# Patient Record
Sex: MALE | Race: WHITE | Employment: OTHER | URBAN - METROPOLITAN AREA
[De-identification: names, ages, dates, MRNs, and addresses within clinical notes are randomized per-mention and may not be internally consistent; named-entity substitution may affect disease eponyms.]

---

## 2017-02-27 PROBLEM — M17.0 OSTEOARTHRITIS OF BOTH KNEES: Status: ACTIVE | Noted: 2017-02-27

## 2017-02-27 PROBLEM — L23.7 POISON IVY DERMATITIS: Status: ACTIVE | Noted: 2017-02-27

## 2021-02-26 ENCOUNTER — HOSPITAL ENCOUNTER (INPATIENT)
Age: 71
LOS: 2 days | Discharge: HOME | DRG: 392 | End: 2021-02-28
Attending: STUDENT IN AN ORGANIZED HEALTH CARE EDUCATION/TRAINING PROGRAM | Admitting: SURGERY
Payer: MEDICARE

## 2021-02-26 DIAGNOSIS — R19.8 CHANGE IN BOWEL MOVEMENT: ICD-10-CM

## 2021-02-26 DIAGNOSIS — C80.1 ADENOCARCINOMA (HCC): ICD-10-CM

## 2021-02-26 DIAGNOSIS — R63.4 WEIGHT LOSS: ICD-10-CM

## 2021-02-26 DIAGNOSIS — K57.32 DIVERTICULITIS OF LARGE INTESTINE WITHOUT BLEEDING, UNSPECIFIED COMPLICATION STATUS: ICD-10-CM

## 2021-02-26 DIAGNOSIS — R10.9 ABDOMINAL PAIN, UNSPECIFIED ABDOMINAL LOCATION: Primary | ICD-10-CM

## 2021-02-26 DIAGNOSIS — R10.32 LLQ ABDOMINAL PAIN: ICD-10-CM

## 2021-02-26 DIAGNOSIS — R93.5 ABNORMAL ABDOMINAL CT SCAN: ICD-10-CM

## 2021-02-26 DIAGNOSIS — K63.89 COLONIC MASS: ICD-10-CM

## 2021-02-26 LAB
ALBUMIN SERPL-MCNC: 3.2 G/DL (ref 3.2–4.6)
ALBUMIN/GLOB SERPL: 0.7 {RATIO} (ref 1.2–3.5)
ALP SERPL-CCNC: 87 U/L (ref 50–136)
ALT SERPL-CCNC: 15 U/L (ref 12–65)
ANION GAP SERPL CALC-SCNC: 7 MMOL/L (ref 7–16)
AST SERPL-CCNC: 15 U/L (ref 15–37)
BASOPHILS # BLD: 0.1 K/UL (ref 0–0.2)
BASOPHILS NFR BLD: 1 % (ref 0–2)
BILIRUB SERPL-MCNC: 0.4 MG/DL (ref 0.2–1.1)
BUN SERPL-MCNC: 9 MG/DL (ref 8–23)
CALCIUM SERPL-MCNC: 9.3 MG/DL (ref 8.3–10.4)
CHLORIDE SERPL-SCNC: 107 MMOL/L (ref 98–107)
CO2 SERPL-SCNC: 28 MMOL/L (ref 21–32)
CREAT SERPL-MCNC: 0.72 MG/DL (ref 0.8–1.5)
DIFFERENTIAL METHOD BLD: ABNORMAL
EOSINOPHIL # BLD: 0 K/UL (ref 0–0.8)
EOSINOPHIL NFR BLD: 0 % (ref 0.5–7.8)
ERYTHROCYTE [DISTWIDTH] IN BLOOD BY AUTOMATED COUNT: 12 % (ref 11.9–14.6)
GLOBULIN SER CALC-MCNC: 4.6 G/DL (ref 2.3–3.5)
GLUCOSE SERPL-MCNC: 88 MG/DL (ref 65–100)
HCT VFR BLD AUTO: 35.2 % (ref 41.1–50.3)
HGB BLD-MCNC: 11 G/DL (ref 13.6–17.2)
IMM GRANULOCYTES # BLD AUTO: 0 K/UL (ref 0–0.5)
IMM GRANULOCYTES NFR BLD AUTO: 0 % (ref 0–5)
LACTATE SERPL-SCNC: 0.7 MMOL/L (ref 0.4–2)
LYMPHOCYTES # BLD: 0.8 K/UL (ref 0.5–4.6)
LYMPHOCYTES NFR BLD: 9 % (ref 13–44)
MCH RBC QN AUTO: 29.6 PG (ref 26.1–32.9)
MCHC RBC AUTO-ENTMCNC: 31.3 G/DL (ref 31.4–35)
MCV RBC AUTO: 94.9 FL (ref 79.6–97.8)
MONOCYTES # BLD: 0.6 K/UL (ref 0.1–1.3)
MONOCYTES NFR BLD: 6 % (ref 4–12)
NEUTS SEG # BLD: 7.6 K/UL (ref 1.7–8.2)
NEUTS SEG NFR BLD: 84 % (ref 43–78)
NRBC # BLD: 0 K/UL (ref 0–0.2)
PLATELET # BLD AUTO: 425 K/UL (ref 150–450)
PMV BLD AUTO: 9.1 FL (ref 9.4–12.3)
POTASSIUM SERPL-SCNC: 3.8 MMOL/L (ref 3.5–5.1)
PROT SERPL-MCNC: 7.8 G/DL (ref 6.3–8.2)
RBC # BLD AUTO: 3.71 M/UL (ref 4.23–5.6)
SODIUM SERPL-SCNC: 142 MMOL/L (ref 136–145)
WBC # BLD AUTO: 9.1 K/UL (ref 4.3–11.1)

## 2021-02-26 PROCEDURE — 74011250636 HC RX REV CODE- 250/636: Performed by: SURGERY

## 2021-02-26 PROCEDURE — 85025 COMPLETE CBC W/AUTO DIFF WBC: CPT

## 2021-02-26 PROCEDURE — 65270000029 HC RM PRIVATE

## 2021-02-26 PROCEDURE — 0DBN8ZX EXCISION OF SIGMOID COLON, VIA NATURAL OR ARTIFICIAL OPENING ENDOSCOPIC, DIAGNOSTIC: ICD-10-PCS | Performed by: INTERNAL MEDICINE

## 2021-02-26 PROCEDURE — 87040 BLOOD CULTURE FOR BACTERIA: CPT

## 2021-02-26 PROCEDURE — 96365 THER/PROPH/DIAG IV INF INIT: CPT

## 2021-02-26 PROCEDURE — 74011000258 HC RX REV CODE- 258: Performed by: STUDENT IN AN ORGANIZED HEALTH CARE EDUCATION/TRAINING PROGRAM

## 2021-02-26 PROCEDURE — 74011000250 HC RX REV CODE- 250: Performed by: SURGERY

## 2021-02-26 PROCEDURE — 99284 EMERGENCY DEPT VISIT MOD MDM: CPT

## 2021-02-26 PROCEDURE — 99223 1ST HOSP IP/OBS HIGH 75: CPT | Performed by: SURGERY

## 2021-02-26 PROCEDURE — 74011250636 HC RX REV CODE- 250/636: Performed by: NURSE PRACTITIONER

## 2021-02-26 PROCEDURE — 96375 TX/PRO/DX INJ NEW DRUG ADDON: CPT

## 2021-02-26 PROCEDURE — 74011250637 HC RX REV CODE- 250/637: Performed by: SURGERY

## 2021-02-26 PROCEDURE — 74011000258 HC RX REV CODE- 258: Performed by: NURSE PRACTITIONER

## 2021-02-26 PROCEDURE — 74011250636 HC RX REV CODE- 250/636: Performed by: STUDENT IN AN ORGANIZED HEALTH CARE EDUCATION/TRAINING PROGRAM

## 2021-02-26 PROCEDURE — 83605 ASSAY OF LACTIC ACID: CPT

## 2021-02-26 PROCEDURE — 80053 COMPREHEN METABOLIC PANEL: CPT

## 2021-02-26 PROCEDURE — 2709999900 HC NON-CHARGEABLE SUPPLY

## 2021-02-26 RX ORDER — POLYETHYLENE GLYCOL 3350 17 G/17G
238 POWDER, FOR SOLUTION ORAL ONCE
Status: COMPLETED | OUTPATIENT
Start: 2021-02-26 | End: 2021-02-26

## 2021-02-26 RX ORDER — SODIUM CHLORIDE 9 MG/ML
100 INJECTION, SOLUTION INTRAVENOUS CONTINUOUS
Status: DISCONTINUED | OUTPATIENT
Start: 2021-02-26 | End: 2021-02-28 | Stop reason: HOSPADM

## 2021-02-26 RX ORDER — NALOXONE HYDROCHLORIDE 0.4 MG/ML
0.4 INJECTION, SOLUTION INTRAMUSCULAR; INTRAVENOUS; SUBCUTANEOUS AS NEEDED
Status: DISCONTINUED | OUTPATIENT
Start: 2021-02-26 | End: 2021-02-28 | Stop reason: HOSPADM

## 2021-02-26 RX ORDER — SODIUM CHLORIDE 0.9 % (FLUSH) 0.9 %
5-40 SYRINGE (ML) INJECTION AS NEEDED
Status: DISCONTINUED | OUTPATIENT
Start: 2021-02-26 | End: 2021-02-28 | Stop reason: HOSPADM

## 2021-02-26 RX ORDER — SODIUM CHLORIDE 0.9 % (FLUSH) 0.9 %
5-40 SYRINGE (ML) INJECTION EVERY 8 HOURS
Status: DISCONTINUED | OUTPATIENT
Start: 2021-02-26 | End: 2021-02-28 | Stop reason: HOSPADM

## 2021-02-26 RX ORDER — HYDROMORPHONE HYDROCHLORIDE 1 MG/ML
1 INJECTION, SOLUTION INTRAMUSCULAR; INTRAVENOUS; SUBCUTANEOUS
Status: DISCONTINUED | OUTPATIENT
Start: 2021-02-26 | End: 2021-02-28 | Stop reason: HOSPADM

## 2021-02-26 RX ORDER — ONDANSETRON 2 MG/ML
4 INJECTION INTRAMUSCULAR; INTRAVENOUS
Status: DISCONTINUED | OUTPATIENT
Start: 2021-02-26 | End: 2021-02-28 | Stop reason: HOSPADM

## 2021-02-26 RX ORDER — POLYETHYLENE GLYCOL 3350 17 G/17G
238 POWDER, FOR SOLUTION ORAL ONCE
Status: ACTIVE | OUTPATIENT
Start: 2021-02-26 | End: 2021-02-27

## 2021-02-26 RX ADMIN — PIPERACILLIN SODIUM AND TAZOBACTAM SODIUM 4.5 G: 4; .5 INJECTION, POWDER, LYOPHILIZED, FOR SOLUTION INTRAVENOUS at 14:34

## 2021-02-26 RX ADMIN — POLYETHYLENE GLYCOL 3350 238 G: 17 POWDER, FOR SOLUTION ORAL at 23:00

## 2021-02-26 RX ADMIN — FAMOTIDINE 20 MG: 10 INJECTION INTRAVENOUS at 21:57

## 2021-02-26 RX ADMIN — PIPERACILLIN AND TAZOBACTAM 3.38 G: 3; .375 INJECTION, POWDER, LYOPHILIZED, FOR SOLUTION INTRAVENOUS at 21:58

## 2021-02-26 RX ADMIN — PIPERACILLIN SODIUM AND TAZOBACTAM SODIUM 4.5 G: 4; .5 INJECTION, POWDER, LYOPHILIZED, FOR SOLUTION INTRAVENOUS at 14:38

## 2021-02-26 RX ADMIN — SODIUM CHLORIDE 100 ML/HR: 900 INJECTION, SOLUTION INTRAVENOUS at 17:07

## 2021-02-26 RX ADMIN — Medication 10 ML: at 22:00

## 2021-02-26 NOTE — PROGRESS NOTES
Spoke with radiologist about these findings, also spoke with Dr Duy Rodney, then spoke with pt personally, he will go to ER at [facility] for eval and likely admission by hospitalist followed by GI and / or Oncology or surgery consults.

## 2021-02-26 NOTE — H&P
H&P/Consult Note/Progress Note/Office Note:   Gale Hussein  MRN: [id number]  :1950  Age:70 y.o. General Surgery Consult ordered by: Dr: Xavier Dorantes  Reason for General Surgery Consult: Abnormal CT findings    HPI: Gale Hussein is a 79 y.o. male with a past medical History of kidney stones and hyperlipidemia who presented to the ED 21 for evaluation. He was sent by PCP after abnormal CT scan. He just recently when to see his PCP for his Medicare check up. He has lost 20 pounds in the last 6 months. He reports quitting drinking coca cola and thought was loosing weight due to that. He reports feeling some LLQ pain occasionally over the last 2 weeks. Pain is 5/10. Pain is worse with nothing. Pain is better with BMs. He denies any blood in his stools. No nausea or vomiting. He reports darker stools but not black. He denies thinner stools. He does reports looser stools. No fever. No chills. No SOB. No chest pain. He has never had a colonoscopy before. He was sent for CT scan by PCP which showed a possible perforated colon cancer vs diverticulitis and was sent to ER for further work up. No relevant past surgical history. Takes ASA 325mg daily and meloxicam daily. He takes Nexium daily. No family history of colon cancer in his family.      Past Medical History:   Diagnosis Date    Calculus of kidney     Encounter for long-term (current) use of other medications 2014    High cholesterol 2014    Other testicular hypofunction 2014     Past Surgical History:   Procedure Laterality Date    HX ORTHOPAEDIC      LEFT LEG/ANKLE FX    HX TONSILLECTOMY       Current Facility-Administered Medications   Medication Dose Route Frequency    sodium chloride (NS) flush 5-40 mL  5-40 mL IntraVENous Q8H    sodium chloride (NS) flush 5-40 mL  5-40 mL IntraVENous PRN    HYDROmorphone (PF) (DILAUDID) injection 1 mg  1 mg IntraVENous Q4H PRN    naloxone (NARCAN) injection 0.4 mg 0.4 mg IntraVENous PRN    ondansetron (ZOFRAN) injection 4 mg  4 mg IntraVENous Q4H PRN    0.9% sodium chloride infusion  100 mL/hr IntraVENous CONTINUOUS    piperacillin-tazobactam (ZOSYN) 3.375 g in 0.9% sodium chloride (MBP/ADV) 100 mL MBP  3.375 g IntraVENous Q8H    famotidine (PF) (PEPCID) 20 mg in 0.9% sodium chloride 10 mL injection  20 mg IntraVENous Q12H     Current Outpatient Medications   Medication Sig    esomeprazole (NexIUM) 40 mg capsule Take 1 Cap by mouth daily.  meloxicam (MOBIC) 15 mg tablet Take 1 Tab by mouth daily.  ketoconazole (NIZORAL) 2 % shampoo Apply 2 mL to affected area two (2) times a day.  clobetasoL (TEMOVATE) 0.05 % ointment Apply  to affected area two (2) times a day.  terbinafine HCL (LAMISIL) 250 mg tablet Take 1 Tab by mouth daily.  triamcinolone acetonide (KENALOG) 0.1 % topical cream Apply  to affected area two (2) times a day. use thin layer     Patient has no known allergies. Social History     Socioeconomic History    Marital status:      Spouse name: Not on file    Number of children: Not on file    Years of education: Not on file    Highest education level: Not on file   Tobacco Use    Smoking status: Never Smoker    Smokeless tobacco: Never Used   Substance and Sexual Activity    Alcohol use: Yes     Frequency: Monthly or less     Drinks per session: 1 or 2     Binge frequency: Never    Drug use: No     Social History     Tobacco Use   Smoking Status Never Smoker   Smokeless Tobacco Never Used     Family History   Problem Relation Age of Onset    Heart Disease Father         CABG & STENTS     Heart Attack Father     No Known Problems Mother      ROS: The patient has no difficulty with chest pain or shortness of breath. No fever or chills. Comprehensive review of systems was otherwise unremarkable except as noted above.     Physical Exam:   Visit Vitals  /64 (BP 1 Location: Right upper arm, BP Patient Position: Supine) Pulse 80   Temp 99.2 °F (37.3 °C)   Resp 16   Ht 5' 10\" (1.778 m)   Wt 211 lb (95.7 kg)   SpO2 99%   BMI 30.28 kg/m²     Vitals:    02/26/21 1520 02/26/21 1600 02/26/21 1640 02/26/21 1731   BP: (!) 115/57 121/60 123/68 118/64   Pulse:    80   Resp:    16   Temp:    99.2 °F (37.3 °C)   SpO2: 99% 97% 98% 99%   Weight:       Height:         General:  Well developed, well-nourished, no acute distress  Pysch:  Awake, alert, oriented x 3, insight and judgement intact  HEENT:  Normocephalic, atraumatic, PERRL, conjunctiva/corneas clear, sclera   Anicteric, MMM  Neck:  Supple, no thyromegaly, symmetrical, trachea midline  Lungs:   Clear to auscultation bilaterally. no w/r/c, respirations unlabored   Chest wall:   No tenderness or deformity. Heart:   Regular rate and rhythm, S1, S2 normal, no murmur, click, rub, or gallop. Abdomen:   Soft, minimal-tenderness in LLQ with deep palpation, non-distended. Bowel sounds normal. No masses. No organomegaly. Back:      Symmetric, no curvature, ROM normal, no CVA tenderness  Extremities:   Extremities normal, atraumatic, no cyanosis or edema. Pulses:   2+ and symmetric all extremities. Skin:    Skin color, texture, turgor normal. No rashes or lesions. Multiple areas of discoloration. Lymph nodes:  Cervical, supraclavicular, and axillary nodes normal.  Neurologic:   CNII-XII intact. Normal strength, sensation, and reflexes throughout.      Recent vitals (if inpt):  Patient Vitals for the past 24 hrs:   BP Temp Pulse Resp SpO2 Height Weight   02/26/21 1731 118/64 99.2 °F (37.3 °C) 80 16 99 %     02/26/21 1640 123/68    98 %     02/26/21 1600 121/60    97 %     02/26/21 1520 (!) 115/57    99 %     02/26/21 1440 (!) 113/58    99 %     02/26/21 1250     98 %     02/26/21 1245 122/65 99 °F (37.2 °C) 88 16 99 % 5' 10\" (1.778 m) 211 lb (95.7 kg)       Amount and/or Complexity of Data Reviewed and Analyzed:  I reviewed and analyzed all of the unique labs and radiologic studies that are shown below as well as any that are in the HPI, and any that are in the expanded problem list below  *Each unique test, order, or document contributes to the combination of 2 or combination of 3 in Category 1 below. For this visit I also reviewed old records and prior notes. Recent Labs     02/26/21  1433   WBC 9.1   HGB 11.0*         K 3.8      CO2 28   BUN 9   CREA 0.72*   GLU 88   TBILI 0.4   ALT 15   AP 87       Lab Results   Component Value Date/Time    WBC 9.1 02/26/2021 02:33 PM    HGB 11.0 (L) 02/26/2021 02:33 PM    HCT 35.2 (L) 02/26/2021 02:33 PM    PLATELET 462 79/05/7360 02:33 PM    MCV 94.9 02/26/2021 02:33 PM     Lab Results   Component Value Date/Time    Sodium 142 02/26/2021 02:33 PM    Potassium 3.8 02/26/2021 02:33 PM    Chloride 107 02/26/2021 02:33 PM    CO2 28 02/26/2021 02:33 PM    Anion gap 7 02/26/2021 02:33 PM    Glucose 88 02/26/2021 02:33 PM    BUN 9 02/26/2021 02:33 PM    Creatinine 0.72 (L) 02/26/2021 02:33 PM    BUN/Creatinine ratio 13 02/24/2021 09:06 AM    GFR est AA >60 02/26/2021 02:33 PM    GFR est non-AA >60 02/26/2021 02:33 PM    Calcium 9.3 02/26/2021 02:33 PM     Lab Results   Component Value Date/Time    ALT (SGPT) 15 02/26/2021 02:33 PM    AST (SGOT) 15 02/26/2021 02:33 PM    Alk.  phosphatase 87 02/26/2021 02:33 PM    Bilirubin, total 0.4 02/26/2021 02:33 PM     Lab Results   Component Value Date/Time    Lipase 29 10/24/2016 03:27 PM     No results found for: INR, APTT, CBIL, LCAD, NH4, TROPT, TROIQ, INREXT, INREXT    Lab Results   Component Value Date/Time    Hemoglobin A1c 5.5 02/24/2021 09:06 AM       Nutritional assessment screen for wound healing issues:  Lab Results   Component Value Date/Time    Protein, total 7.8 02/26/2021 02:33 PM    Albumin 3.2 02/26/2021 02:33 PM         XR Results (most recent):  Results from Abstract encounter on 12/31/18   XR CHEST AP LAT       CT Results (most recent):  Results from Orders Only encounter on 02/26/21   CT ABD PELV W WO CONT     US Results (most recent):  No results found for this or any previous visit. Admission date (for inpatients): 2/26/2021   * No surgery found *  * No surgery found *        ASSESSMENT/PLAN:    ICD-10-CM ICD-9-CM    1. Abdominal pain, unspecified abdominal location  R10.9 789.00    2. Abnormal abdominal CT scan  R93.5 793.6    3. LLQ abdominal pain  R10.32 789.04    4. Change in bowel movement  R19.8 787.99    5. Colonic mass  K63.89 569.89    6. Weight loss  R63.4 783.21    7. Diverticulitis of large intestine without bleeding, unspecified complication status  M84.67 562.11        79 y.o. male with weight loss, anemia and CT concerning for contained perforated colon mass vs severe diverticulitis. He has never had a colonoscopy in the past. He has never been treated for diverticulitis in the past. This was found incidentally on outpatient CT scan. I personally reviewed hi outpatient CT scan. There is thickening of the sigmoid colon with severe diverticulosis and area of extension into the mesentery this could be a possible contained perforation vs diverticulosis. There is no free air in the abdomen. On exam, he is not septic or has peritonitis. He just need a diagnosis to be able to proceed with treatment. Plan: Will admit for further work up and diagnosis  NPO, IVFs  IV abx for possible perforation  GI consult for colonoscopy for diagnosis - will contact and see if they want to do prep tonight. Patient has been having regular BMs and I think he will tolerate prep. Will consult oncology once diagnosis is obtained. No surgical intervention needed at this time but we extensively discussed possible exploration with colectomy and colostomy if he were to decompensate. I spent greater than 50% of this 70 minutes visit counseling and coordinating care for this patient.       Dulce King MD  Bariatric & Minimally Invasive Surgery  [facility]  2/26/2021 6:42 PM

## 2021-02-26 NOTE — ED PROVIDER NOTES
70-year-old male advised to come to the emergency department by his primary care physician. Patient had outpatient CT scan obtained which showed possible perforation potentially stemming from underlying cancer versus extensive diverticular disease. Patient reports 20 pound weight loss over the last 6 months, states he cut out soft drinks but has not been attempting to lose weight. Patient with intermittent lower abdominal pains which prompted primary care physician to obtain outpatient CT scan. Patient denies fever, chills, chest pain, shortness of breath, vomiting, melena or hematochezia. Does endorse loose stools. Denies history of prior abdominal surgeries or prior history of colon cancer. Patient with minimal abdominal pain but states it comes and goes.            Past Medical History:   Diagnosis Date    Calculus of kidney     Encounter for long-term (current) use of other medications 4/22/2014    High cholesterol 7/29/2014    Other testicular hypofunction 7/29/2014       Past Surgical History:   Procedure Laterality Date    HX ORTHOPAEDIC      LEFT LEG/ANKLE FX    HX TONSILLECTOMY           Family History:   Problem Relation Age of Onset    Heart Disease Father         CABG & STENTS     Heart Attack Father     No Known Problems Mother        Social History     Socioeconomic History    Marital status:      Spouse name: Not on file    Number of children: Not on file    Years of education: Not on file    Highest education level: Not on file   Occupational History    Not on file   Social Needs    Financial resource strain: Not on file    Food insecurity     Worry: Not on file     Inability: Not on file    Transportation needs     Medical: Not on file     Non-medical: Not on file   Tobacco Use    Smoking status: Never Smoker    Smokeless tobacco: Never Used   Substance and Sexual Activity    Alcohol use: Yes     Frequency: Monthly or less     Drinks per session: 1 or 2     Binge frequency: Never    Drug use: No    Sexual activity: Not on file   Lifestyle    Physical activity     Days per week: Not on file     Minutes per session: Not on file    Stress: Not on file   Relationships    Social connections     Talks on phone: Not on file     Gets together: Not on file     Attends Confucianism service: Not on file     Active member of club or organization: Not on file     Attends meetings of clubs or organizations: Not on file     Relationship status: Not on file    Intimate partner violence     Fear of current or ex partner: Not on file     Emotionally abused: Not on file     Physically abused: Not on file     Forced sexual activity: Not on file   Other Topics Concern    Not on file   Social History Narrative    Not on file         ALLERGIES: Patient has no known allergies. Review of Systems   Constitutional: Negative for chills and fever. HENT: Negative for congestion, rhinorrhea, sinus pressure and sore throat. Eyes: Negative for visual disturbance. Respiratory: Negative for cough and shortness of breath. Cardiovascular: Negative for chest pain. Gastrointestinal: Positive for abdominal pain and diarrhea. Negative for blood in stool, nausea and vomiting. Endocrine: Negative for polyuria. Genitourinary: Negative for difficulty urinating and dysuria. Musculoskeletal: Negative for arthralgias, neck pain and neck stiffness. Skin: Negative for color change and rash. Neurological: Negative for syncope, speech difficulty, weakness, numbness and headaches. Psychiatric/Behavioral: Negative for behavioral problems, confusion and suicidal ideas. All other systems reviewed and are negative. Vitals:    02/26/21 1245 02/26/21 1250   BP: 122/65    Pulse: 88    Resp: 16    Temp: 99 °F (37.2 °C)    SpO2: 99% 98%   Weight: 95.7 kg (211 lb)    Height: 5' 10\" (1.778 m)             Physical Exam  Vitals signs and nursing note reviewed.    Constitutional:       Appearance: Normal appearance. HENT:      Head: Normocephalic and atraumatic. Nose: Nose normal.      Mouth/Throat:      Mouth: Mucous membranes are moist.   Eyes:      Extraocular Movements: Extraocular movements intact. Neck:      Musculoskeletal: Normal range of motion. Cardiovascular:      Rate and Rhythm: Normal rate and regular rhythm. Heart sounds: Normal heart sounds. Pulmonary:      Effort: Pulmonary effort is normal.      Breath sounds: Normal breath sounds. No wheezing, rhonchi or rales. Abdominal:      General: Abdomen is flat. Palpations: Abdomen is soft. Tenderness: There is abdominal tenderness. There is no guarding. Comments: Mild left lower quadrant suprapubic abdominal pain palpation. Musculoskeletal: Normal range of motion. Skin:     General: Skin is warm and dry. Neurological:      General: No focal deficit present. Mental Status: He is alert and oriented to person, place, and time. Psychiatric:         Mood and Affect: Mood normal.          MDM  Number of Diagnoses or Management Options  Abdominal pain, unspecified abdominal location  Abnormal abdominal CT scan  Diagnosis management comments: 49-year-old male presents with abnormal CT scan. Concern for intestinal perforation as well as potential evidence of colon cancer. CT scan was reviewed. Will obtain blood cultures, basic labs as well as will give empiric antibiotics, Zosyn. Labs normal white count, stable H&H, normal electrolytes and kidney function, Lactic acid 0.7. I spoke with general surgery who stated, evaluate the patient. Disposition to inpatient either hospitalist or surgery depending on their agreement. Patient voiced understanding agreement with this plan.        Amount and/or Complexity of Data Reviewed  Clinical lab tests: ordered and reviewed  Tests in the radiology section of CPT®: reviewed  Tests in the medicine section of CPT®: ordered and reviewed  Discussion of test results with the performing providers: yes  Decide to obtain previous medical records or to obtain history from someone other than the patient: yes  Discuss the patient with other providers: yes  Independent visualization of images, tracings, or specimens: yes           Procedures

## 2021-02-26 NOTE — ED TRIAGE NOTES
Pt masked prior to triage. Pt sent by PCP from [facility] due to abnormal finding on Abdominal CT.

## 2021-02-26 NOTE — ED NOTES
TRANSFER - OUT REPORT:    Verbal report given to Ester Obrien  being transferred to [facility] 07 for routine progression of care       Report consisted of patients Situation, Background, Assessment and   Recommendations(SBAR). Information from the following report(s) ED Summary, MAR and Recent Results was reviewed with the receiving nurse. Lines:   Peripheral IV 02/26/21 Left Antecubital (Active)       Peripheral IV 02/26/21 Left Forearm (Active)   Site Assessment Clean, dry, & intact 02/26/21 1600   Phlebitis Assessment 0 02/26/21 1600   Infiltration Assessment 0 02/26/21 1600   Dressing Status Clean, dry, & intact 02/26/21 1600   Dressing Type Tape;Transparent 02/26/21 1600   Hub Color/Line Status Pink;Flushed;Patent 02/26/21 1600   Action Taken Blood drawn 02/26/21 1600        Opportunity for questions and clarification was provided.       Patient transported with:   Proenza Schouer

## 2021-02-27 ENCOUNTER — ANESTHESIA EVENT (OUTPATIENT)
Dept: ENDOSCOPY | Age: 71
DRG: 392 | End: 2021-02-27
Payer: MEDICARE

## 2021-02-27 PROCEDURE — 99231 SBSQ HOSP IP/OBS SF/LOW 25: CPT | Performed by: SURGERY

## 2021-02-27 PROCEDURE — 74011250636 HC RX REV CODE- 250/636: Performed by: SURGERY

## 2021-02-27 PROCEDURE — 65270000029 HC RM PRIVATE

## 2021-02-27 PROCEDURE — 74011250637 HC RX REV CODE- 250/637: Performed by: INTERNAL MEDICINE

## 2021-02-27 PROCEDURE — 74011000258 HC RX REV CODE- 258: Performed by: NURSE PRACTITIONER

## 2021-02-27 PROCEDURE — 74011000250 HC RX REV CODE- 250: Performed by: SURGERY

## 2021-02-27 PROCEDURE — 74011250636 HC RX REV CODE- 250/636: Performed by: NURSE PRACTITIONER

## 2021-02-27 PROCEDURE — 2709999900 HC NON-CHARGEABLE SUPPLY

## 2021-02-27 RX ORDER — POLYETHYLENE GLYCOL 3350 17 G/17G
238 POWDER, FOR SOLUTION ORAL ONCE
Status: COMPLETED | OUTPATIENT
Start: 2021-02-27 | End: 2021-02-27

## 2021-02-27 RX ADMIN — FAMOTIDINE 20 MG: 10 INJECTION INTRAVENOUS at 21:04

## 2021-02-27 RX ADMIN — SODIUM CHLORIDE 100 ML/HR: 900 INJECTION, SOLUTION INTRAVENOUS at 06:26

## 2021-02-27 RX ADMIN — PIPERACILLIN AND TAZOBACTAM 3.38 G: 3; .375 INJECTION, POWDER, LYOPHILIZED, FOR SOLUTION INTRAVENOUS at 06:14

## 2021-02-27 RX ADMIN — PIPERACILLIN AND TAZOBACTAM 3.38 G: 3; .375 INJECTION, POWDER, LYOPHILIZED, FOR SOLUTION INTRAVENOUS at 13:49

## 2021-02-27 RX ADMIN — Medication 10 ML: at 21:04

## 2021-02-27 RX ADMIN — PIPERACILLIN AND TAZOBACTAM 3.38 G: 3; .375 INJECTION, POWDER, LYOPHILIZED, FOR SOLUTION INTRAVENOUS at 21:04

## 2021-02-27 RX ADMIN — Medication 10 ML: at 14:00

## 2021-02-27 RX ADMIN — Medication 10 ML: at 06:14

## 2021-02-27 RX ADMIN — POLYETHYLENE GLYCOL 3350 238 G: 17 POWDER, FOR SOLUTION ORAL at 11:46

## 2021-02-27 RX ADMIN — FAMOTIDINE 20 MG: 10 INJECTION INTRAVENOUS at 10:18

## 2021-02-27 NOTE — CONSULTS
[facility] Consult Note       Primary GI Physician: None    Referring Provider:  Ace Pillai    Consult Date:  2/26/2021    Admit Date:  2/26/2021    Chief Complaint: Weight loss    Subjective:     Mr Karthik Rod is a 79 y.o. male admitted for an abnormal CT showing diverticulosis, a thickened sigmoid colon, ? cancer, with some extraluminal gas. Patient has been losing weight but states he was trying to. He occasionally will have LLQ pain, but BMs have only been irregular lately. No blood is seen. There is a family hx of colon polyps but not colon cancer. Mr Karthik Rod has never had a colonoscopy. Is on  A 325 mg ASA every day. Hx of GERD and is on Nexium. No prior EGD. PMH:  Past Medical History:   Diagnosis Date    Calculus of kidney     Encounter for long-term (current) use of other medications 4/22/2014    High cholesterol 7/29/2014    Other testicular hypofunction 7/29/2014       PSH:  Past Surgical History:   Procedure Laterality Date    HX ORTHOPAEDIC      LEFT LEG/ANKLE FX    HX TONSILLECTOMY         Allergies:  No Known Allergies    Home Medications:  Prior to Admission medications    Medication Sig Start Date End Date Taking? Authorizing Provider   esomeprazole (NexIUM) 40 mg capsule Take 1 Cap by mouth daily. 2/24/21  Yes Rachele Lugo MD   meloxicam (MOBIC) 15 mg tablet Take 1 Tab by mouth daily. 2/24/21  Yes Rachele Lugo MD   clobetasoL (TEMOVATE) 0.05 % ointment Apply  to affected area two (2) times a day. 2/24/21  Yes Rachele Lugo MD   terbinafine HCL (LAMISIL) 250 mg tablet Take 1 Tab by mouth daily. 8/24/20  Yes Rachele Lugo MD   ketoconazole (NIZORAL) 2 % shampoo Apply 2 mL to affected area two (2) times a day. 2/24/21   Rachele Lugo MD   triamcinolone acetonide (KENALOG) 0.1 % topical cream Apply  to affected area two (2) times a day.  use thin layer 10/7/19   Ino Buchanan MD       Hospital Medications:  Current Facility-Administered Medications   Medication Dose Route Frequency    sodium chloride (NS) flush 5-40 mL  5-40 mL IntraVENous Q8H    sodium chloride (NS) flush 5-40 mL  5-40 mL IntraVENous PRN    HYDROmorphone (PF) (DILAUDID) injection 1 mg  1 mg IntraVENous Q4H PRN    naloxone (NARCAN) injection 0.4 mg  0.4 mg IntraVENous PRN    ondansetron (ZOFRAN) injection 4 mg  4 mg IntraVENous Q4H PRN    0.9% sodium chloride infusion  100 mL/hr IntraVENous CONTINUOUS    piperacillin-tazobactam (ZOSYN) 3.375 g in 0.9% sodium chloride (MBP/ADV) 100 mL MBP  3.375 g IntraVENous Q8H    famotidine (PF) (PEPCID) 20 mg in 0.9% sodium chloride 10 mL injection  20 mg IntraVENous Q12H    polyethylene glycol (MIRALAX) powder 238 g  238 g Oral ONCE       Social History:  Social History     Tobacco Use    Smoking status: Never Smoker    Smokeless tobacco: Never Used   Substance Use Topics    Alcohol use: Yes     Frequency: Monthly or less     Drinks per session: 1 or 2     Binge frequency: Never       Pt denies any history of drug use, blood transfusions, or tattoos. Family History:  Family History   Problem Relation Age of Onset    Heart Disease Father         CABG & STENTS     Heart Attack Father     No Known Problems Mother        Review of Systems:  A detailed 10 system ROS is obtained, with pertinent positives as listed above. All others are negative. Diet:      Objective:     Physical Exam:  Vitals:  Visit Vitals  BP (!) 152/79 (BP 1 Location: Right upper arm, BP Patient Position: Sitting)   Pulse 91   Temp 99.6 °F (37.6 °C)   Resp 20   Ht 5' 10\" (1.778 m)   Wt 92 kg (202 lb 12.8 oz)   SpO2 97%   BMI 29.10 kg/m²     Gen:  Pt is alert, cooperative, no acute distress  Skin:  No rashes. HEENT: Sclerae anicteric. The neck is supple. Cardiovascular: Regular rate and rhythm. No murmurs, gallops, or rubs. Respiratory: Clear breath sounds anteriorly with no wheezes, rales, or rhonchi. GI:  Abdomen nondistended, soft, and nontender.   The is a smooth fullness to deep palpation in the LLQ. Normal active bowel sounds. No enlargement of the liver or spleen. Rectal:  Deferred      Laboratory:    Recent Labs     02/26/21  1433 02/24/21  0906   WBC 9.1 11.1*   HGB 11.0* 11.2*   HCT 35.2* 34.6*    447   MCV 94.9 94    138   K 3.8 4.4    102   CO2 28 24   BUN 9 10   CREA 0.72* 0.79   CA 9.3 9.4   GLU 88 97   AP 87 87   ALT 15 11   TBILI 0.4 0.4   ALB 3.2 4.1   TP 7.8 6.5          Assessment:     Active Problems:    Adenocarcinoma (HCC) (2/26/2021)    Abnormal CT  Weight loss  GERD    Plan:     -Taking colon prep. I will see if procedure can be done tomorrow, but may have to move to Sunday if prep is poor or schedule will not allow.  -Needs an EGD for his GERD but this can be done electively.     Wilder Villarreal MD

## 2021-02-27 NOTE — PROGRESS NOTES
Pt completed bowel prep. Has had numerous BM's. He still is not clear. BM's are like muddy water. He understands that he is still NPO. He denies pain. Ambulates to BR independently. Has slept very little this night.

## 2021-02-27 NOTE — PROGRESS NOTES
[facility] Progress Note         Admit Date:  2/26/2021    Today's Date:  2/27/2021    CC:  Weight loss and Anemia    Subjective:     Patient reports multiple loose BM's with prep that are brown in color. Denies BRRB or tarry stool. Medications:   Current Facility-Administered Medications   Medication Dose Route Frequency    sodium chloride (NS) flush 5-40 mL  5-40 mL IntraVENous Q8H    sodium chloride (NS) flush 5-40 mL  5-40 mL IntraVENous PRN    HYDROmorphone (PF) (DILAUDID) injection 1 mg  1 mg IntraVENous Q4H PRN    naloxone (NARCAN) injection 0.4 mg  0.4 mg IntraVENous PRN    ondansetron (ZOFRAN) injection 4 mg  4 mg IntraVENous Q4H PRN    0.9% sodium chloride infusion  100 mL/hr IntraVENous CONTINUOUS    piperacillin-tazobactam (ZOSYN) 3.375 g in 0.9% sodium chloride (MBP/ADV) 100 mL MBP  3.375 g IntraVENous Q8H    famotidine (PF) (PEPCID) 20 mg in 0.9% sodium chloride 10 mL injection  20 mg IntraVENous Q12H       Review of Systems:  ROS was obtained, with pertinent positives as listed above. No chest pain or SOB. Diet:  Clears    Objective:   Vitals:  Visit Vitals  BP (!) 140/64 (BP 1 Location: Left upper arm, BP Patient Position: Sitting)   Pulse 76   Temp 98.4 °F (36.9 °C)   Resp 19   Ht 5' 10\" (1.778 m)   Wt 92 kg (202 lb 12.8 oz)   SpO2 98%   BMI 29.10 kg/m²     Intake/Output:  No intake/output data recorded. 02/25 1901 - 02/27 0700  In: 480 [P.O.:480]  Out: -   Exam:  General appearance: alert, cooperative, no distress  Lungs: clear to auscultation bilaterally anteriorly  Heart: regular rate and rhythm  Abdomen: soft, non-tender.  Bowel sounds normal. No masses, no organomegaly  Extremities: extremities normal, atraumatic, no cyanosis or edema  Neuro:  alert and oriented    Data Review (Labs):    Recent Labs     02/26/21  1433   WBC 9.1   HGB 11.0*   HCT 35.2*      MCV 94.9      K 3.8      CO2 28   BUN 9   CREA 0.72*   CA 9.3   GLU 88   AP 87   ALT 15   TBILI 0.4   ALB 3.2   TP 7.8       Assessment:     Active Problems:    Adenocarcinoma (HCC) (2/26/2021)      Mr Macario is a 70 y.o. male admitted for an abnormal CT showing diverticulosis, a thickened sigmoid colon, ? cancer, with some extraluminal gas.  Patient has been losing weight but states he was trying to.  He occasionally will have LLQ pain, but BMs have only been irregular lately.  He denies overt bleeding.  There is a family hx of colon polyps but not colon cancer. He denies prior colonoscopy. He is on  A 325 mg ASA every day.  Hx of GERD and is on Nexium.  No prior EGD.     Plan:      - Continue colon prep for COLO tomorrow  - Needs an EGD for his GERD but this can be done electively     Shea Cross NP  Patient is seen and examined in collaboration with Dr. Jose Carreon.  Assessment and plan as per Dr. Carreon.

## 2021-02-27 NOTE — H&P
H&P/Consult Note/Progress Note/Office Note:   Gale Hussein  MRN: [id number]  :1950  Age:70 y.o. General Surgery Consult ordered by: Dr: Xavier Dorantes  Reason for General Surgery Consult: Abnormal CT findings    HPI: Gale Hussein is a 79 y.o. male with a past medical History of kidney stones and hyperlipidemia who presented to the ED 21 for evaluation. He was sent by PCP after abnormal CT scan. He just recently when to see his PCP for his Medicare check up. He has lost 20 pounds in the last 6 months. He reports quitting drinking coca cola and thought was loosing weight due to that. He reports feeling some LLQ pain occasionally over the last 2 weeks. Pain is 5/10. Pain is worse with nothing. Pain is better with BMs. He denies any blood in his stools. No nausea or vomiting. He reports darker stools but not black. He denies thinner stools. He does reports looser stools. No fever. No chills. No SOB. No chest pain. He has never had a colonoscopy before. He was sent for CT scan by PCP which showed a possible perforated colon cancer vs diverticulitis and was sent to ER for further work up. No relevant past surgical history. Takes ASA 325mg daily and meloxicam daily. He takes Nexium daily. No family history of colon cancer in his family. 2021 - Doing well today. Still prepping. Still has some color stools with some solid portions. No worsening abdominal pain with prep. GI saw patient and will scope tomorrow.      Past Medical History:   Diagnosis Date    Calculus of kidney     Encounter for long-term (current) use of other medications 2014    High cholesterol 2014    Other testicular hypofunction 2014     Past Surgical History:   Procedure Laterality Date    HX ORTHOPAEDIC      LEFT LEG/ANKLE FX    HX TONSILLECTOMY       Current Facility-Administered Medications   Medication Dose Route Frequency    sodium chloride (NS) flush 5-40 mL  5-40 mL IntraVENous Q8H    sodium chloride (NS) flush 5-40 mL  5-40 mL IntraVENous PRN    HYDROmorphone (PF) (DILAUDID) injection 1 mg  1 mg IntraVENous Q4H PRN    naloxone (NARCAN) injection 0.4 mg  0.4 mg IntraVENous PRN    ondansetron (ZOFRAN) injection 4 mg  4 mg IntraVENous Q4H PRN    0.9% sodium chloride infusion  100 mL/hr IntraVENous CONTINUOUS    piperacillin-tazobactam (ZOSYN) 3.375 g in 0.9% sodium chloride (MBP/ADV) 100 mL MBP  3.375 g IntraVENous Q8H    famotidine (PF) (PEPCID) 20 mg in 0.9% sodium chloride 10 mL injection  20 mg IntraVENous Q12H     Patient has no known allergies. Social History     Socioeconomic History    Marital status:      Spouse name: Not on file    Number of children: Not on file    Years of education: Not on file    Highest education level: Not on file   Tobacco Use    Smoking status: Never Smoker    Smokeless tobacco: Never Used   Substance and Sexual Activity    Alcohol use: Yes     Frequency: Monthly or less     Drinks per session: 1 or 2     Binge frequency: Never    Drug use: No     Social History     Tobacco Use   Smoking Status Never Smoker   Smokeless Tobacco Never Used     Family History   Problem Relation Age of Onset    Heart Disease Father         CABG & STENTS     Heart Attack Father     No Known Problems Mother      ROS: The patient has no difficulty with chest pain or shortness of breath. No fever or chills. Comprehensive review of systems was otherwise unremarkable except as noted above.     Physical Exam:   Visit Vitals  /70 (BP 1 Location: Left upper arm, BP Patient Position: Sitting)   Pulse 79   Temp 98.6 °F (37 °C)   Resp 18   Ht 5' 10\" (1.778 m)   Wt 202 lb 12.8 oz (92 kg)   SpO2 99%   BMI 29.10 kg/m²     Vitals:    02/26/21 2311 02/27/21 0255 02/27/21 0759 02/27/21 1234   BP: (!) 128/91 136/73 (!) 140/64 132/70   Pulse: 84 78 76 79   Resp: 20 20 19 18   Temp: 98.6 °F (37 °C) 98.4 °F (36.9 °C) 98.4 °F (36.9 °C) 98.6 °F (37 °C)   SpO2: 96% 99% 98% 99%   Weight:       Height:         General:  Well developed, well-nourished, no acute distress  Pysch:  Awake, alert, oriented x 3, insight and judgement intact  HEENT:  Normocephalic, atraumatic, PERRL, conjunctiva/corneas clear, sclera   Anicteric, MMM  Neck:  Supple, no thyromegaly, symmetrical, trachea midline  Lungs:   Clear to auscultation bilaterally. no w/r/c, respirations unlabored   Chest wall:   No tenderness or deformity. Heart:   Regular rate and rhythm, S1, S2 normal, no murmur, click, rub, or gallop. Abdomen:   Soft,nontender, non-distended. Bowel sounds normal. No masses. No organomegaly. Back:      Symmetric, no curvature, ROM normal, no CVA tenderness  Extremities:   Extremities normal, atraumatic, no cyanosis or edema. Pulses:   2+ and symmetric all extremities. Skin:    Skin color, texture, turgor normal. No rashes or lesions. Multiple areas of discoloration. Lymph nodes:  Cervical, supraclavicular, and axillary nodes normal.  Neurologic:   CNII-XII intact. Normal strength, sensation, and reflexes throughout.      Recent vitals (if inpt):  Patient Vitals for the past 24 hrs:   BP Temp Pulse Resp SpO2 Weight   02/27/21 1234 132/70 98.6 °F (37 °C) 79 18 99 %    02/27/21 0759 (!) 140/64 98.4 °F (36.9 °C) 76 19 98 %    02/27/21 0255 136/73 98.4 °F (36.9 °C) 78 20 99 %    02/26/21 2311 (!) 128/91 98.6 °F (37 °C) 84 20 96 %    02/26/21 1905 (!) 152/79 99.6 °F (37.6 °C) 91 20 97 % 202 lb 12.8 oz (92 kg)   02/26/21 1848 124/68 99.2 °F (37.3 °C) 80 16 98 %    02/26/21 1731 118/64 99.2 °F (37.3 °C) 80 16 99 %    02/26/21 1640 123/68    98 %    02/26/21 1600 121/60    97 %    02/26/21 1520 (!) 115/57    99 %    02/26/21 1440 (!) 113/58    99 %        Amount and/or Complexity of Data Reviewed and Analyzed:  I reviewed and analyzed all of the unique labs and radiologic studies that are shown below as well as any that are in the HPI, and any that are in the expanded problem list below  *Each unique test, order, or document contributes to the combination of 2 or combination of 3 in Category 1 below. For this visit I also reviewed old records and prior notes. Recent Labs     02/26/21  1433   WBC 9.1   HGB 11.0*         K 3.8      CO2 28   BUN 9   CREA 0.72*   GLU 88   TBILI 0.4   ALT 15   AP 87       Lab Results   Component Value Date/Time    WBC 9.1 02/26/2021 02:33 PM    HGB 11.0 (L) 02/26/2021 02:33 PM    HCT 35.2 (L) 02/26/2021 02:33 PM    PLATELET 950 03/98/4150 02:33 PM    MCV 94.9 02/26/2021 02:33 PM     Lab Results   Component Value Date/Time    Sodium 142 02/26/2021 02:33 PM    Potassium 3.8 02/26/2021 02:33 PM    Chloride 107 02/26/2021 02:33 PM    CO2 28 02/26/2021 02:33 PM    Anion gap 7 02/26/2021 02:33 PM    Glucose 88 02/26/2021 02:33 PM    BUN 9 02/26/2021 02:33 PM    Creatinine 0.72 (L) 02/26/2021 02:33 PM    BUN/Creatinine ratio 13 02/24/2021 09:06 AM    GFR est AA >60 02/26/2021 02:33 PM    GFR est non-AA >60 02/26/2021 02:33 PM    Calcium 9.3 02/26/2021 02:33 PM     Lab Results   Component Value Date/Time    ALT (SGPT) 15 02/26/2021 02:33 PM    AST (SGOT) 15 02/26/2021 02:33 PM    Alk.  phosphatase 87 02/26/2021 02:33 PM    Bilirubin, total 0.4 02/26/2021 02:33 PM     Lab Results   Component Value Date/Time    Lipase 29 10/24/2016 03:27 PM     No results found for: INR, APTT, CBIL, LCAD, NH4, TROPT, TROIQ, INREXT, INREXT    Lab Results   Component Value Date/Time    Hemoglobin A1c 5.5 02/24/2021 09:06 AM       Nutritional assessment screen for wound healing issues:  Lab Results   Component Value Date/Time    Protein, total 7.8 02/26/2021 02:33 PM    Albumin 3.2 02/26/2021 02:33 PM         XR Results (most recent):  Results from Abstract encounter on 12/31/18   XR CHEST AP LAT       CT Results (most recent):  Results from Orders Only encounter on 02/26/21   CT ABD PELV W WO CONT     US Results (most recent):  No results found for this or any previous visit. Admission date (for inpatients): 2/26/2021   * No surgery found *  Procedure(s):  COLONOSCOPY        ASSESSMENT/PLAN:    ICD-10-CM ICD-9-CM    1. Abdominal pain, unspecified abdominal location  R10.9 789.00    2. Abnormal abdominal CT scan  R93.5 793.6    3. LLQ abdominal pain  R10.32 789.04    4. Change in bowel movement  R19.8 787.99    5. Colonic mass  K63.89 569.89    6. Weight loss  R63.4 783.21    7. Diverticulitis of large intestine without bleeding, unspecified complication status  P23.52 562.11    8. [facility]  C80.1 199.1        79 y.o. male with weight loss, anemia and CT concerning for contained perforated colon mass vs severe diverticulitis. He has never had a colonoscopy in the past. He has never been treated for diverticulitis in the past. This was found incidentally on outpatient CT scan. I personally reviewed his outpatient CT scan. There is thickening of the sigmoid colon with severe diverticulosis and area of extension into the mesentery this could be a possible contained perforation vs diverticulosis. There is no free air in the abdomen. On exam, he is not septic or has peritonitis. He just need a diagnosis to be able to proceed with treatment. Plan:  CLD, NPO at midnight for colonoscopy tomorrow  IV abx for possible perforation  GI consulted and following - plan for colonoscopy tomorrow - continue miralax prep today for colonoscopy tomorrow for diagnosis   Will consult oncology once diagnosis is obtained. No surgical intervention needed at this time but we extensively discussed possible exploration with colectomy and colostomy if he were to decompensate.        Josette Giraldo MD  Bariatric & Minimally Invasive Surgery  [facility]  2/27/2021

## 2021-02-27 NOTE — PROGRESS NOTES
End of Shift Note:   - Pt had a second colon prep this afternoon.   - Pt's stool is watery and light brown in color.  - Colonoscopy scheduled for tomorrow, Sun 2/28    Report given to oncoming RN.     Diogo Sawant, RN

## 2021-02-28 ENCOUNTER — ANESTHESIA (OUTPATIENT)
Dept: ENDOSCOPY | Age: 71
DRG: 392 | End: 2021-02-28
Payer: MEDICARE

## 2021-02-28 VITALS
RESPIRATION RATE: 18 BRPM | HEIGHT: 70 IN | OXYGEN SATURATION: 98 % | SYSTOLIC BLOOD PRESSURE: 107 MMHG | BODY MASS INDEX: 29.03 KG/M2 | DIASTOLIC BLOOD PRESSURE: 57 MMHG | TEMPERATURE: 98.4 F | WEIGHT: 202.8 LBS | HEART RATE: 76 BPM

## 2021-02-28 LAB
ANION GAP SERPL CALC-SCNC: 6 MMOL/L (ref 7–16)
BASOPHILS # BLD: 0 K/UL (ref 0–0.2)
BASOPHILS NFR BLD: 1 % (ref 0–2)
BUN SERPL-MCNC: 3 MG/DL (ref 8–23)
CALCIUM SERPL-MCNC: 8.8 MG/DL (ref 8.3–10.4)
CEA SERPL-MCNC: 1.1 NG/ML (ref 0–3)
CHLORIDE SERPL-SCNC: 109 MMOL/L (ref 98–107)
CO2 SERPL-SCNC: 27 MMOL/L (ref 21–32)
CREAT SERPL-MCNC: 0.72 MG/DL (ref 0.8–1.5)
DIFFERENTIAL METHOD BLD: ABNORMAL
EOSINOPHIL # BLD: 0.1 K/UL (ref 0–0.8)
EOSINOPHIL NFR BLD: 1 % (ref 0.5–7.8)
ERYTHROCYTE [DISTWIDTH] IN BLOOD BY AUTOMATED COUNT: 11.8 % (ref 11.9–14.6)
GLUCOSE SERPL-MCNC: 94 MG/DL (ref 65–100)
HCT VFR BLD AUTO: 29.9 % (ref 41.1–50.3)
HGB BLD-MCNC: 9.5 G/DL (ref 13.6–17.2)
IMM GRANULOCYTES # BLD AUTO: 0 K/UL (ref 0–0.5)
IMM GRANULOCYTES NFR BLD AUTO: 0 % (ref 0–5)
LYMPHOCYTES # BLD: 1.1 K/UL (ref 0.5–4.6)
LYMPHOCYTES NFR BLD: 18 % (ref 13–44)
MCH RBC QN AUTO: 29.8 PG (ref 26.1–32.9)
MCHC RBC AUTO-ENTMCNC: 31.8 G/DL (ref 31.4–35)
MCV RBC AUTO: 93.7 FL (ref 79.6–97.8)
MONOCYTES # BLD: 0.7 K/UL (ref 0.1–1.3)
MONOCYTES NFR BLD: 11 % (ref 4–12)
NEUTS SEG # BLD: 4.3 K/UL (ref 1.7–8.2)
NEUTS SEG NFR BLD: 69 % (ref 43–78)
NRBC # BLD: 0 K/UL (ref 0–0.2)
PLATELET # BLD AUTO: 376 K/UL (ref 150–450)
PMV BLD AUTO: 9 FL (ref 9.4–12.3)
POTASSIUM SERPL-SCNC: 3.5 MMOL/L (ref 3.5–5.1)
RBC # BLD AUTO: 3.19 M/UL (ref 4.23–5.6)
SODIUM SERPL-SCNC: 142 MMOL/L (ref 136–145)
WBC # BLD AUTO: 6.2 K/UL (ref 4.3–11.1)

## 2021-02-28 PROCEDURE — 74011000250 HC RX REV CODE- 250: Performed by: SURGERY

## 2021-02-28 PROCEDURE — 88305 TISSUE EXAM BY PATHOLOGIST: CPT

## 2021-02-28 PROCEDURE — 74011000258 HC RX REV CODE- 258: Performed by: NURSE PRACTITIONER

## 2021-02-28 PROCEDURE — 85025 COMPLETE CBC W/AUTO DIFF WBC: CPT

## 2021-02-28 PROCEDURE — 74011250636 HC RX REV CODE- 250/636: Performed by: SURGERY

## 2021-02-28 PROCEDURE — 74011000250 HC RX REV CODE- 250: Performed by: NURSE ANESTHETIST, CERTIFIED REGISTERED

## 2021-02-28 PROCEDURE — 76040000025: Performed by: INTERNAL MEDICINE

## 2021-02-28 PROCEDURE — 80048 BASIC METABOLIC PNL TOTAL CA: CPT

## 2021-02-28 PROCEDURE — 76060000031 HC ANESTHESIA FIRST 0.5 HR: Performed by: INTERNAL MEDICINE

## 2021-02-28 PROCEDURE — 82378 CARCINOEMBRYONIC ANTIGEN: CPT

## 2021-02-28 PROCEDURE — 2709999900 HC NON-CHARGEABLE SUPPLY

## 2021-02-28 PROCEDURE — 74011250636 HC RX REV CODE- 250/636: Performed by: NURSE PRACTITIONER

## 2021-02-28 PROCEDURE — 36415 COLL VENOUS BLD VENIPUNCTURE: CPT

## 2021-02-28 PROCEDURE — 2709999900 HC NON-CHARGEABLE SUPPLY: Performed by: INTERNAL MEDICINE

## 2021-02-28 PROCEDURE — 99238 HOSP IP/OBS DSCHRG MGMT 30/<: CPT | Performed by: SURGERY

## 2021-02-28 PROCEDURE — 74011250636 HC RX REV CODE- 250/636: Performed by: NURSE ANESTHETIST, CERTIFIED REGISTERED

## 2021-02-28 PROCEDURE — 77030021593 HC FCPS BIOP ENDOSC BSC -A: Performed by: INTERNAL MEDICINE

## 2021-02-28 RX ORDER — METRONIDAZOLE 500 MG/1
500 TABLET ORAL 3 TIMES DAILY
Qty: 42 TAB | Refills: 0 | Status: SHIPPED | OUTPATIENT
Start: 2021-02-28 | End: 2021-03-14

## 2021-02-28 RX ORDER — PROPOFOL 10 MG/ML
INJECTION, EMULSION INTRAVENOUS AS NEEDED
Status: DISCONTINUED | OUTPATIENT
Start: 2021-02-28 | End: 2021-02-28 | Stop reason: HOSPADM

## 2021-02-28 RX ORDER — LIDOCAINE HYDROCHLORIDE 20 MG/ML
INJECTION, SOLUTION EPIDURAL; INFILTRATION; INTRACAUDAL; PERINEURAL AS NEEDED
Status: DISCONTINUED | OUTPATIENT
Start: 2021-02-28 | End: 2021-02-28 | Stop reason: HOSPADM

## 2021-02-28 RX ORDER — PROPOFOL 10 MG/ML
INJECTION, EMULSION INTRAVENOUS
Status: DISCONTINUED | OUTPATIENT
Start: 2021-02-28 | End: 2021-02-28 | Stop reason: HOSPADM

## 2021-02-28 RX ORDER — CIPROFLOXACIN 750 MG/1
750 TABLET, FILM COATED ORAL 2 TIMES DAILY
Qty: 28 TAB | Refills: 0 | Status: SHIPPED | OUTPATIENT
Start: 2021-02-28 | End: 2021-03-14

## 2021-02-28 RX ORDER — SODIUM CHLORIDE, SODIUM LACTATE, POTASSIUM CHLORIDE, CALCIUM CHLORIDE 600; 310; 30; 20 MG/100ML; MG/100ML; MG/100ML; MG/100ML
INJECTION, SOLUTION INTRAVENOUS
Status: DISCONTINUED | OUTPATIENT
Start: 2021-02-28 | End: 2021-02-28 | Stop reason: HOSPADM

## 2021-02-28 RX ADMIN — PROPOFOL 100 MG: 10 INJECTION, EMULSION INTRAVENOUS at 08:21

## 2021-02-28 RX ADMIN — LIDOCAINE HYDROCHLORIDE 20 MG: 20 INJECTION, SOLUTION EPIDURAL; INFILTRATION; INTRACAUDAL; PERINEURAL at 08:21

## 2021-02-28 RX ADMIN — PHENYLEPHRINE HYDROCHLORIDE 100 MCG: 10 INJECTION INTRAVENOUS at 08:25

## 2021-02-28 RX ADMIN — PROPOFOL 25 MCG/KG/MIN: 10 INJECTION, EMULSION INTRAVENOUS at 08:21

## 2021-02-28 RX ADMIN — SODIUM CHLORIDE, SODIUM LACTATE, POTASSIUM CHLORIDE, AND CALCIUM CHLORIDE: 600; 310; 30; 20 INJECTION, SOLUTION INTRAVENOUS at 08:14

## 2021-02-28 RX ADMIN — PIPERACILLIN AND TAZOBACTAM 3.38 G: 3; .375 INJECTION, POWDER, LYOPHILIZED, FOR SOLUTION INTRAVENOUS at 05:30

## 2021-02-28 RX ADMIN — FAMOTIDINE 20 MG: 10 INJECTION INTRAVENOUS at 11:46

## 2021-02-28 RX ADMIN — PHENYLEPHRINE HYDROCHLORIDE 50 MCG: 10 INJECTION INTRAVENOUS at 08:34

## 2021-02-28 RX ADMIN — Medication 10 ML: at 05:31

## 2021-02-28 NOTE — PROGRESS NOTES
TRANSFER - OUT REPORT:    Verbal report given to Sheeba López RN(name) on Gabriel Suarez  being transferred to [facility](unit) for ordered procedure       Report consisted of patients Situation, Background, Assessment and   Recommendations(SBAR). Information from the following report(s) SBAR, Kardex and Recent Results was reviewed with the receiving nurse. Lines:   Peripheral IV 02/26/21 Left Antecubital (Active)   Site Assessment Clean, dry, & intact 02/28/21 0312   Phlebitis Assessment 0 02/28/21 0312   Infiltration Assessment 0 02/28/21 0312   Dressing Status Clean, dry, & intact 02/28/21 0312   Dressing Type Transparent;Tape 02/28/21 0312   Hub Color/Line Status Patent; Flushed 02/28/21 0312   Alcohol Cap Used No 02/28/21 0312       Peripheral IV 02/26/21 Left Forearm (Active)   Site Assessment Clean, dry, & intact 02/28/21 0312   Phlebitis Assessment 0 02/28/21 0312   Infiltration Assessment 0 02/28/21 0312   Dressing Status Clean, dry, & intact 02/28/21 0312   Dressing Type Transparent;Tape 02/28/21 5738   Hub Color/Line Status Patent; Flushed; Infusing 02/28/21 6756   Action Taken Blood drawn 02/26/21 1600   Alcohol Cap Used No 02/28/21 8598        Opportunity for questions and clarification was provided.       Patient transported with:   Registered Nurse

## 2021-02-28 NOTE — PROGRESS NOTES
Problem: Falls - Risk of  Goal: *Absence of Falls  Description: Document Ronald Hernandez Fall Risk and appropriate interventions in the flowsheet.   Outcome: Progressing Towards Goal  Note: Fall Risk Interventions:            Medication Interventions: Teach patient to arise slowly

## 2021-02-28 NOTE — PROGRESS NOTES
TRANSFER - IN REPORT:    Verbal report received from [facility]) on Gabriel Sing  being received from [facility](unit) for ordered procedure      Report consisted of patients Situation, Background, Assessment and   Recommendations(SBAR). Information from the following report(s) SBAR and Kardex was reviewed with the receiving nurse. Opportunity for questions and clarification was provided. Assessment completed upon patients arrival to unit and care assumed.

## 2021-02-28 NOTE — PROCEDURES
Colonoscopy Procedure Note    Indications: Mr Alberta Hoang a 75 y. o. male admitted for an abnormal CT showing diverticulosis, a thickened sigmoid colon, ? cancer, with some extraluminal gas.  Patient has been losing weight but states he was trying to. Jorge Speak occasionally will have LLQ pain, but BMs have only been irregular lately. Jorge Speak denies overt bleeding. Carmen Winters is a family hx of colon polyps but not colon cancer. He denies prior colonoscopy. He is on  A 325 mg ASA every day.  Hx of GERD and is on Nexium.  No prior EGD. Anesthesia/Sedation: TIVA     Pre-Procedure Physical:  Current Facility-Administered Medications   Medication Dose Route Frequency    sodium chloride (NS) flush 5-40 mL  5-40 mL IntraVENous Q8H    sodium chloride (NS) flush 5-40 mL  5-40 mL IntraVENous PRN    HYDROmorphone (PF) (DILAUDID) injection 1 mg  1 mg IntraVENous Q4H PRN    naloxone (NARCAN) injection 0.4 mg  0.4 mg IntraVENous PRN    ondansetron (ZOFRAN) injection 4 mg  4 mg IntraVENous Q4H PRN    0.9% sodium chloride infusion  100 mL/hr IntraVENous CONTINUOUS    piperacillin-tazobactam (ZOSYN) 3.375 g in 0.9% sodium chloride (MBP/ADV) 100 mL MBP  3.375 g IntraVENous Q8H    famotidine (PF) (PEPCID) 20 mg in 0.9% sodium chloride 10 mL injection  20 mg IntraVENous Q12H      Patient has no known allergies. Patient Vitals for the past 8 hrs:   BP Temp Pulse Resp SpO2   02/28/21 0743 (!) 115/55  68 20 98 %   02/28/21 0313 120/65 98.5 °F (36.9 °C) 66 20 97 %       Exam:    Airway: clear   Heart: normal S1and S2    Lungs: clear bilateral  Abdomen: soft, nontender, bowel sounds present and normal in all quads   Mental Status: awake, alert and oriented to person, place and time        Procedure Details      Informed consent was obtained for the procedure, including sedation. Risks of perforation, hemorrhage, adverse drug reaction and aspiration were discussed. The patient was placed in the left lateral decubitus position.    The patient was monitored continuously with ECG tracing, pulse oximetry, blood pressure monitoring, and direct observations. A rectal examination was performed. The colonoscope was inserted into the rectum and advanced under direct vision to the proximal sigmoid colon. The quality of the colonic preparation was fair. A careful inspection was made as the colonoscope was withdrawn, findings and interventions are described below. Appropriate photodocumentation was obtained. Findings: Rectum was normal.  Edematous folds in sigmoid with luminal narrowing. Two biopsies taken. No definite mass. Narrow colon from 20 cm to 28 cm. Sigmoid diverticulosis and ? diverticulitis noted. Scope passed only to 35 cm past the narrowing, did not go further due to perforation risk. Specimens: Sigmoid fold biopsies X 2    Estimated Blood Loss: 2 cc           Complications: None; patient tolerated the procedure well. Attending Attestation: I performed the procedure. Impression:  Sigmoid stricture/diverticulitis/diverticulosis    Recommendations:  F/U biopsies, check CEA, IV Abs, repeat colon in 2 months. If worsens surgical considerations. Start clears and defer advancing diet to Dr Eyad Capone.     Francesca Sanches MD

## 2021-02-28 NOTE — ACP (ADVANCE CARE PLANNING)
Patient advises that his son is his FRANCES Escalera Jump [phone]). Documents not on file, patient understands that he can have them brought in to be scanned into his chart.      Jo Ann Jenkins LMSW    [facility]    * [email or web address]

## 2021-02-28 NOTE — ROUTINE PROCESS
TRANSFER - OUT REPORT: 
 
Verbal report given to Madhuri ALY(name) on Darryl Macario  being transferred to [facility](unit) for routine post - op    
 
Report consisted of patient’s Situation, Background, Assessment and  
Recommendations(SBAR).  
 
Information from the following report(s) SBAR and Kardex was reviewed with the receiving nurse. 
 
Lines:  
Peripheral IV 02/26/21 Left Antecubital (Active)  
Site Assessment Clean, dry, & intact 02/28/21 0715  
Phlebitis Assessment 0 02/28/21 0715  
Infiltration Assessment 0 02/28/21 0715  
Dressing Status Clean, dry, & intact 02/28/21 0715  
Dressing Type Tape;Transparent 02/28/21 0715  
Hub Color/Line Status Patent;Flushed 02/28/21 0715  
Alcohol Cap Used No 02/28/21 0715  
   
Peripheral IV 02/26/21 Left Forearm (Active)  
Site Assessment Clean, dry, & intact 02/28/21 0848  
Phlebitis Assessment 0 02/28/21 0848  
Infiltration Assessment 0 02/28/21 0848  
Dressing Status Clean, dry, & intact 02/28/21 0848  
Dressing Type Transparent;Tape 02/28/21 0848  
Hub Color/Line Status Infusing;Patent 02/28/21 0848  
Action Taken Blood drawn 02/26/21 1600  
Alcohol Cap Used No 02/28/21 0715  
  
 
Opportunity for questions and clarification was provided.   
 
Patient transported with: 
 bed

## 2021-02-28 NOTE — DISCHARGE SUMMARY
Physician Discharge Summary     Patient ID:  Aneesh Finney  [id number]  79 y.o.  1950    Allergies: Patient has no known allergies. Admit Date: 2/26/2021    Discharge Date: 2/28/2021     HPI: Aneesh Finney is a 79 y.o. male with a past medical History of kidney stones and hyperlipidemia who presented to the ED 2/26/21 for evaluation. He was sent by PCP after abnormal CT scan.     He just recently when to see his PCP for his Medicare check up. He has lost 20 pounds in the last 6 months. He reports quitting drinking coca cola and thought was loosing weight due to that. He reports feeling some LLQ pain occasionally over the last 2 weeks. Pain is 5/10. Pain is worse with nothing. Pain is better with BMs. He denies any blood in his stools. No nausea or vomiting. He reports darker stools but not black. He denies thinner stools. He does reports looser stools. No fever. No chills. No SOB. No chest pain. He has never had a colonoscopy before.      He was sent for CT scan by PCP which showed a possible perforated colon cancer vs diverticulitis and was sent to ER for further work up.      No relevant past surgical history. Takes ASA 325mg daily and meloxicam daily. He takes Nexium daily. No family history of colon cancer in his family. Scope by GI 2/28/21. Rectum was normal.  Edematous folds in sigmoid with luminal narrowing. Two biopsies taken. No definite mass. Narrow colon from 20 cm to 28 cm. Sigmoid diverticulosis and ? diverticulitis noted. Scope passed only to 35 cm past the narrowing, did not go further due to perforation risk.       * Admission Diagnoses: Adenocarcinoma ([facility] [C80.1]    * Discharge Diagnoses:    Hospital Problems as of 2/28/2021 Date Reviewed: 2/24/2021          Codes Class Noted - Resolved POA    Adenocarcinoma ([facility] ICD-10-CM: C80.1  ICD-9-CM: 199.1  2/26/2021 - Present Unknown               Admission Condition: Stable    * Discharge Condition: stable    * Procedures: Procedure(s):  COLONOSCOPY  COLON BIOPSY    * Hospital Course:   Normal hospital course for this procedure. Consults: Gastroenterology    Significant Diagnostic Studies: labs, radiology and endoscopy    * Disposition: Home    Discharge Medications:   Current Discharge Medication List      START taking these medications    Details   ciprofloxacin HCl (CIPRO) 750 mg tablet Take 1 Tab by mouth two (2) times a day for 14 days. Qty: 28 Tab, Refills: 0      metroNIDAZOLE (FlagyL) 500 mg tablet Take 1 Tab by mouth three (3) times daily for 14 days. Qty: 42 Tab, Refills: 0         CONTINUE these medications which have NOT CHANGED    Details   esomeprazole (NexIUM) 40 mg capsule Take 1 Cap by mouth daily. Qty: 30 Cap, Refills: 6    Associated Diagnoses: Gastroesophageal reflux disease      meloxicam (MOBIC) 15 mg tablet Take 1 Tab by mouth daily. Qty: 30 Tab, Refills: 6    Associated Diagnoses: Arthritis      clobetasoL (TEMOVATE) 0.05 % ointment Apply  to affected area two (2) times a day. Qty: 15 g, Refills: 3    Associated Diagnoses: Seborrheic dermatitis      terbinafine HCL (LAMISIL) 250 mg tablet Take 1 Tab by mouth daily. Qty: 90 Tab, Refills: 0    Associated Diagnoses: Onychia of toe, unspecified laterality      ketoconazole (NIZORAL) 2 % shampoo Apply 2 mL to affected area two (2) times a day. Qty: 120 mL, Refills: 1    Associated Diagnoses: Seborrheic dermatitis      triamcinolone acetonide (KENALOG) 0.1 % topical cream Apply  to affected area two (2) times a day. use thin layer  Qty: 450 g, Refills: 0    Associated Diagnoses: Flexural eczema             * Follow-up Care/Patient Instructions:   Activity: Activity as tolerated  Diet: ADAT to GI soft  Wound Care: None needed    Follow-up Information     Follow up With Specialties Details Why Contact Info    Ruben Saldana, [address] [place] [address]  [phone]            Discharge Instructions/Follow-up Plans:   MD Instructions:     Follow-up with Dr. Dr. Janice Shrestha in 3 weeks. Call office to schedule appt     Diet - advance as tolerated to Soft foods diet. Activity - ambulate - as tolerated - no heavy lifting >10lb. May shower - no tub baths or soaking/submerging.     Rx: Cipro & Flagyl x 2 weeks  Resume other home medications. FU with GI in 1 month for repeat colonoscopy in 2 months. We discussed that he may still need a sigmoid resection if thickening and narrowing does not resolved in order to help his symptoms of LLQ pain and difficulty defecating. I discussed possible complications of outpatient treatment.   We went over the recommended diet and fiber intake.      If problems or questions arise, please call our office at [phone].     Greater than 30 minutes were spent discharging the patient          Signed:  Tiffany Andre NP  2/28/2021  12:42 PM

## 2021-02-28 NOTE — H&P
H&P/Consult Note/Progress Note/Office Note:   Harvinder Cortes  MRN: [id number]  :1950  Age:70 y.o. General Surgery Consult ordered by: Dr: Stephanie Phillips  Reason for General Surgery Consult: Abnormal CT findings    HPI: Harvinder Cortes is a 79 y.o. male with a past medical History of kidney stones and hyperlipidemia who presented to the ED 21 for evaluation. He was sent by PCP after abnormal CT scan. He just recently when to see his PCP for his Medicare check up. He has lost 20 pounds in the last 6 months. He reports quitting drinking coca cola and thought was loosing weight due to that. He reports feeling some LLQ pain occasionally over the last 2 weeks. Pain is 5/10. Pain is worse with nothing. Pain is better with BMs. He denies any blood in his stools. No nausea or vomiting. He reports darker stools but not black. He denies thinner stools. He does reports looser stools. No fever. No chills. No SOB. No chest pain. He has never had a colonoscopy before. He was sent for CT scan by PCP which showed a possible perforated colon cancer vs diverticulitis and was sent to ER for further work up. No relevant past surgical history. Takes ASA 325mg daily and meloxicam daily. He takes Nexium daily. No family history of colon cancer in his family. 2021 - Doing well today. Still prepping. Still has some color stools with some solid portions. No worsening abdominal pain with prep. GI saw patient and will scope tomorrow. 2021 - Seen after returning from colonoscopy. Feeling very well and happy because had a new grand baby last night. He denies any abdominal pain. No nausea or vomiting. Tolerating liquid diet yesterday without issues. Colonoscopy done today and results reviewed with him.      Past Medical History:   Diagnosis Date    Calculus of kidney     Encounter for long-term (current) use of other medications 2014    High cholesterol 2014    Other testicular hypofunction 7/29/2014     Past Surgical History:   Procedure Laterality Date    HX ORTHOPAEDIC      LEFT LEG/ANKLE FX    HX TONSILLECTOMY       Current Facility-Administered Medications   Medication Dose Route Frequency    sodium chloride (NS) flush 5-40 mL  5-40 mL IntraVENous Q8H    sodium chloride (NS) flush 5-40 mL  5-40 mL IntraVENous PRN    HYDROmorphone (PF) (DILAUDID) injection 1 mg  1 mg IntraVENous Q4H PRN    naloxone (NARCAN) injection 0.4 mg  0.4 mg IntraVENous PRN    ondansetron (ZOFRAN) injection 4 mg  4 mg IntraVENous Q4H PRN    0.9% sodium chloride infusion  100 mL/hr IntraVENous CONTINUOUS    piperacillin-tazobactam (ZOSYN) 3.375 g in 0.9% sodium chloride (MBP/ADV) 100 mL MBP  3.375 g IntraVENous Q8H    famotidine (PF) (PEPCID) 20 mg in 0.9% sodium chloride 10 mL injection  20 mg IntraVENous Q12H     Patient has no known allergies. Social History     Socioeconomic History    Marital status:      Spouse name: Not on file    Number of children: Not on file    Years of education: Not on file    Highest education level: Not on file   Tobacco Use    Smoking status: Never Smoker    Smokeless tobacco: Never Used   Substance and Sexual Activity    Alcohol use: Yes     Frequency: Monthly or less     Drinks per session: 1 or 2     Binge frequency: Never    Drug use: No     Social History     Tobacco Use   Smoking Status Never Smoker   Smokeless Tobacco Never Used     Family History   Problem Relation Age of Onset    Heart Disease Father         CABG & STENTS     Heart Attack Father     No Known Problems Mother      ROS: The patient has no difficulty with chest pain or shortness of breath. No fever or chills. Comprehensive review of systems was otherwise unremarkable except as noted above.     Physical Exam:   Visit Vitals  /64 (BP 1 Location: Right arm, BP Patient Position: At rest)   Pulse 78   Temp 97.2 °F (36.2 °C)   Resp 18   Ht 5' 10\" (1.778 m)   Wt 202 lb 12.8 oz (92 kg)   SpO2 98%   BMI 29.10 kg/m²     Vitals:    02/28/21 0852 02/28/21 0902 02/28/21 0910 02/28/21 0929   BP: (!) 107/47 (!) 128/55 (!) 110/56 129/64   Pulse: 67 72 67 78   Resp: 17 18 18 18   Temp:    97.2 °F (36.2 °C)   SpO2: 99% 97% 97% 98%   Weight:       Height:         General:  Well developed, well-nourished, no acute distress  Pysch:  Awake, alert, oriented x 3, insight and judgement intact  HEENT:  Normocephalic, atraumatic, PERRL, conjunctiva/corneas clear, sclera   Anicteric, MMM  Neck:  Supple, no thyromegaly, symmetrical, trachea midline  Lungs:   Clear to auscultation bilaterally. no w/r/c, respirations unlabored   Chest wall:   No tenderness or deformity. Heart:   Regular rate and rhythm, S1, S2 normal, no murmur, click, rub, or gallop. Abdomen:   Soft, nontender, non-distended. Bowel sounds normal. No masses. No organomegaly. Back:      Symmetric, no curvature, ROM normal, no CVA tenderness  Extremities:   Extremities normal, atraumatic, no cyanosis or edema. Pulses:   2+ and symmetric all extremities. Skin:    Skin color, texture, turgor normal. No rashes or lesions. Multiple areas of discoloration. Lymph nodes:  Cervical, supraclavicular, and axillary nodes normal.  Neurologic:   CNII-XII intact. Normal strength, sensation, and reflexes throughout.      Recent vitals (if inpt):  Patient Vitals for the past 24 hrs:   BP Temp Pulse Resp SpO2   02/28/21 0929 129/64 97.2 °F (36.2 °C) 78 18 98 %   02/28/21 0910 (!) 110/56  67 18 97 %   02/28/21 0902 (!) 128/55  72 18 97 %   02/28/21 0852 (!) 107/47  67 17 99 %   02/28/21 0843 (!) 107/52 98.6 °F (37 °C) 69 16 100 %   02/28/21 0743 (!) 115/55  68 20 98 %   02/28/21 0313 120/65 98.5 °F (36.9 °C) 66 20 97 %   02/27/21 2310 129/68 98.6 °F (37 °C) 77 20 97 %   02/27/21 1908 132/68 98.9 °F (37.2 °C) 75 20 97 %   02/27/21 1548 138/71 98.5 °F (36.9 °C) 82 20 97 %   02/27/21 1234 132/70 98.6 °F (37 °C) 79 18 99 %       Amount and/or Complexity of Data Reviewed and Analyzed:  I reviewed and analyzed all of the unique labs and radiologic studies that are shown below as well as any that are in the HPI, and any that are in the expanded problem list below  *Each unique test, order, or document contributes to the combination of 2 or combination of 3 in Category 1 below. For this visit I also reviewed old records and prior notes. Recent Labs     02/28/21  0458 02/26/21  1433   WBC 6.2 9.1   HGB 9.5* 11.0*    425    142   K 3.5 3.8   * 107   CO2 27 28   BUN 3* 9   CREA 0.72* 0.72*   GLU 94 88   TBILI  --  0.4   ALT  --  15   AP  --  87       Lab Results   Component Value Date/Time    WBC 6.2 02/28/2021 04:58 AM    HGB 9.5 (L) 02/28/2021 04:58 AM    HCT 29.9 (L) 02/28/2021 04:58 AM    PLATELET 841 31/54/4583 04:58 AM    MCV 93.7 02/28/2021 04:58 AM     Lab Results   Component Value Date/Time    Sodium 142 02/28/2021 04:58 AM    Potassium 3.5 02/28/2021 04:58 AM    Chloride 109 (H) 02/28/2021 04:58 AM    CO2 27 02/28/2021 04:58 AM    Anion gap 6 (L) 02/28/2021 04:58 AM    Glucose 94 02/28/2021 04:58 AM    BUN 3 (L) 02/28/2021 04:58 AM    Creatinine 0.72 (L) 02/28/2021 04:58 AM    BUN/Creatinine ratio 13 02/24/2021 09:06 AM    GFR est AA >60 02/28/2021 04:58 AM    GFR est non-AA >60 02/28/2021 04:58 AM    Calcium 8.8 02/28/2021 04:58 AM     Lab Results   Component Value Date/Time    ALT (SGPT) 15 02/26/2021 02:33 PM    AST (SGOT) 15 02/26/2021 02:33 PM    Alk.  phosphatase 87 02/26/2021 02:33 PM    Bilirubin, total 0.4 02/26/2021 02:33 PM     Lab Results   Component Value Date/Time    Lipase 29 10/24/2016 03:27 PM     No results found for: INR, APTT, CBIL, LCAD, NH4, TROPT, TROIQ, INREXT, INREXT    Lab Results   Component Value Date/Time    Hemoglobin A1c 5.5 02/24/2021 09:06 AM       Nutritional assessment screen for wound healing issues:  Lab Results   Component Value Date/Time    Protein, total 7.8 02/26/2021 02:33 PM    Albumin 3.2 02/26/2021 02:33 PM         XR Results (most recent):  Results from Abstract encounter on 12/31/18   XR CHEST AP LAT       CT Results (most recent):  Results from Orders Only encounter on 02/26/21   CT ABD PELV W WO CONT     US Results (most recent):  No results found for this or any previous visit. Admission date (for inpatients): 2/26/2021   * No surgery found *  Procedure(s):  COLONOSCOPY  COLON BIOPSY        ASSESSMENT/PLAN:    ICD-10-CM ICD-9-CM    1. Abdominal pain, unspecified abdominal location  R10.9 789.00    2. Abnormal abdominal CT scan  R93.5 793.6    3. LLQ abdominal pain  R10.32 789.04    4. Change in bowel movement  R19.8 787.99    5. Colonic mass  K63.89 569.89    6. Weight loss  R63.4 783.21    7. Diverticulitis of large intestine without bleeding, unspecified complication status  P71.35 562.11    8. Adenocarcinoma ([facility]  C80.1 199.1        79 y.o. male with weight loss, anemia and CT concerning for contained perforated colon mass vs severe diverticulitis. He has never had a colonoscopy in the past. He has never been treated for diverticulitis in the past. This was found incidentally on outpatient CT scan. I personally reviewed his outpatient CT scan. There is thickening of the sigmoid colon with severe diverticulosis and area of extension into the mesentery this could be a possible contained perforation vs diverticulosis. There is no free air in the abdomen. On exam, he is not septic or has peritonitis. He just need a diagnosis to be able to proceed with treatment. Colonoscopy today showed a long segment of diverticulitis with narrowing. CEA ordered by GI. Plan:  ADAT to GI soft  Cipro/flagyl x 2 weeks   FU with me in 3 weeks  FU with GI in 1 month for repeat colonoscopy in 2 months. We discussed that he may still need a sigmoid resection if thickening and narrowing does not resolved in order to help his symptoms of LLQ pain and difficulty defecating.    I discussed possible complications of outpatient treatment. We went over the recommended diet and fiber intake. I spent greater than 50% of this 30 minutes visit counseling and coordinating care for this patient.         Dulce King MD  Bariatric & Minimally Invasive Surgery  [facility]  2/28/2021

## 2021-02-28 NOTE — PROGRESS NOTES
Went over discharge paperwork with pt. Talked about a diet rich in fiber and how to incorporate it into their diet. Remove 2 IVs.    Pt felt stable driving home himself.    Madhuri Mtz RN

## 2021-02-28 NOTE — H&P
Date of Surgery Update:  Darryl Macario was seen and examined.  History and physical has been reviewed. The patient has been examined. There have been no significant clinical changes since the completion of the originally dated History and Physical.    Signed By: Manuel Kapadia MD     February 28, 2021 8:02 AM

## 2021-02-28 NOTE — ANESTHESIA PREPROCEDURE EVALUATION
Relevant Problems   PERSONAL HX & FAMILY HX OF CANCER   (+) Adenocarcinoma (HCC)       Anesthetic History   No history of anesthetic complications            Review of Systems / Medical History  Patient summary reviewed and pertinent labs reviewed    Pulmonary  Within defined limits                 Neuro/Psych   Within defined limits           Cardiovascular              Hyperlipidemia    Exercise tolerance: >4 METS  Comments: Denies CP, SOB or changes in functional status   GI/Hepatic/Renal               Comments: Lower abdominal pain  Wt loss  Diverticulitis vs ca seen on CT scan Endo/Other        Obesity and arthritis     Other Findings              Physical Exam    Airway  Mallampati: II  TM Distance: 4 - 6 cm  Neck ROM: normal range of motion   Mouth opening: Normal     Cardiovascular    Rhythm: regular  Rate: normal         Dental    Dentition: Caps/crowns     Pulmonary  Breath sounds clear to auscultation               Abdominal  GI exam deferred       Other Findings            Anesthetic Plan    ASA: 2  Anesthesia type: total IV anesthesia          Induction: Intravenous  Anesthetic plan and risks discussed with: Patient

## 2021-02-28 NOTE — PROGRESS NOTES
SW reviewed patient's chart and conducted a baseline assessment. Discharge plan at this time is as follows:     Care Management Interventions  PCP Verified by CM: Yes  Mode of Transport at Discharge: Self  Transition of Care Consult (CM Consult): Discharge Planning  Current Support Network: Own Home, Family Lives Nearby  Confirm Follow Up Transport: Family  Discharge Location  Discharge Placement: Home with family assistance      *Please note that discharge plans can change throughout an inpatient admission.  Ensure that you are referring to the most recent social work/nurse case management note for current discharge plan*     Alden Marx, [address] [place] [address] Work   [facility]    * [email or web address]

## 2021-02-28 NOTE — DISCHARGE INSTRUCTIONS
Take your antibiotics daily x 2 weeks  Drink at least 64 ounces of water daily. Take Metamucil supplement  1 scoop daily and follow a high fiber diet. Take Colace 100 mg twice a day. If still having hard stools, add Miralax 17g daily.

## 2021-02-28 NOTE — ANESTHESIA POSTPROCEDURE EVALUATION
Procedure(s):  COLONOSCOPY  COLON BIOPSY.     total IV anesthesia    Anesthesia Post Evaluation      Multimodal analgesia: multimodal analgesia used between 6 hours prior to anesthesia start to PACU discharge  Patient location during evaluation: PACU  Patient participation: complete - patient participated  Level of consciousness: awake and alert  Pain management: adequate  Airway patency: patent  Anesthetic complications: no  Cardiovascular status: acceptable  Respiratory status: acceptable  Hydration status: acceptable  Post anesthesia nausea and vomiting:  none  Final Post Anesthesia Temperature Assessment:  Normothermia (36.0-37.5 degrees C)      INITIAL Post-op Vital signs:   Vitals Value Taken Time   /55 02/28/21 0902   Temp     Pulse 72 02/28/21 0902   Resp 18 02/28/21 0902   SpO2 97 % 02/28/21 0902

## 2021-02-28 NOTE — PROGRESS NOTES
END OF SHIFT-     -Tolerated bowel prep well; BMs running clear/yellow.  -Pt prepped and ready for endoscopy. Will give bedside report to oncoming nurse.      Pat Gramajo RN

## 2021-02-28 NOTE — PROGRESS NOTES
SW met with patient who confirmed demographic information, states that he lives alone but his son lives on the same lot of land. Patient does not use assistive devices to ambulate, does not require ADL assistance, and denies any falls. Patient is seen by Dr. Lakisha Hearn and is current. Patient advises that his son is his HCPOA [facility] [employer] [phone]). Documents not on file, patient understands that he can have them brought in to be scanned into his chart.      Brown Sánchez LMSW    [facility]    * [email or web address]

## 2021-03-03 LAB
BACTERIA SPEC CULT: NORMAL
BACTERIA SPEC CULT: NORMAL
SERVICE CMNT-IMP: NORMAL
SERVICE CMNT-IMP: NORMAL

## 2021-05-11 NOTE — H&P (VIEW-ONLY)
Lana Whitaker MD  
Bariatric & Advanced Laparoscopic Surgery & Endoscopy [address] [facility], [address] [place] Phone [phone]   Fax [phone] Date of visit: 2021 Primary/Requesting provider: Miller Nichols MD  
   
 
Name: Jonathan Weiss MRN: [id number] : 1950 Age: 79 y.o. Sex: male PCP: Miller Nichols MD  
 
CC:   
Chief Complaint Patient presents with  Follow-up CT results; abnormal CT  
 
 
HPI: 
 
 Jonathan Weiss is a 79 y.o. male who presents for evaluation of recent hospitalization due to abnormal CT scan concerning for colonic mass. He was admitted and prepped for colonoscopy. GI did colonoscopy and biopsied colon. Biopsies were benign. Colon was very thickened but no obvious mass was seen. He was discharged with oral antibiotics for 2 weeks which he completed. He reports LLQ pain is improved but still has some discomfort. He saw GI yesterday and they ordered repeat CT scan. He denies fever, chills. He has been eating well. 
 
 
2021 - He is here for follow up. He reports symptoms have worsen. He has lost over 25 pounds in the last 3 months. He also reports suprapubic pain. pain is 1-2/10 and occurs during urination. It does not occur all the time. He also started having debris and stool in his urine. He saw urology and they did a cystoscopy confirming a colovesical fistula. PMH: 
 
Past Medical History:  
Diagnosis Date  Calculus of kidney  Encounter for long-term (current) use of other medications 2014  High cholesterol 2014  Other testicular hypofunction 2014 PSH: 
 
Past Surgical History:  
Procedure Laterality Date  COLONOSCOPY N/A 2021 COLONOSCOPY performed by Devon Tuttle MD at [address] [facility] HX ORTHOPAEDIC    
 LEFT LEG/ANKLE FX  
 HX TONSILLECTOMY MEDS: 
 
Current Outpatient Medications Medication Sig  neomycin (MYCIFRADIN) 500 mg tablet Take 2 Tabs by mouth four (4) times daily. Take 1000mg at 1pm, 2 pm and 10 pm the day prior to the procedure  metroNIDAZOLE (FlagyL) 500 mg tablet Take 1 Tab by mouth three (3) times daily. Take 1000mg at 1pm, 2 pm and 10 pm the day prior to the procedure  ciprofloxacin HCl (CIPRO) 500 mg tablet Take 1 Tab by mouth two (2) times a day.  clobetasoL (TEMOVATE) 0.05 % topical cream Apply  to affected area two (2) times a day.  meloxicam (MOBIC) 15 mg tablet Take 15 mg by mouth daily.  TERBINAFINE HCL EX by Apply Externally route.  esomeprazole (NEXIUM) 40 mg capsule Take 40 mg by mouth daily.  multivitamin, tx-iron-ca-min (THERA-M w/ IRON) 9 mg iron-400 mcg tab tablet Take 1 Tab by mouth daily.  aspirin (ASPIRIN) 325 mg tablet Take 325 mg by mouth daily.  B.infantis-B.ani-B.long-B.bifi (Probiotic 4X) 10-15 mg TbEC Take  by mouth. No current facility-administered medications for this visit. ALLERGIES:   
 
No Known Allergies SH: Social History Tobacco Use  Smoking status: Never Smoker  Smokeless tobacco: Never Used Substance Use Topics  Alcohol use: Yes Frequency: Monthly or less Drinks per session: 1 or 2 Binge frequency: Never  Drug use: No  
 
 
FH: 
 
Family History Problem Relation Age of Onset  Heart Disease Father CABG & STENTS   
 Heart Attack Father  No Known Problems Mother Review of systems: 
Review of Systems Constitutional: Negative for chills, fever and weight loss. HENT: Negative for ear pain, hearing loss and tinnitus. Eyes: Negative for blurred vision, double vision and redness. Respiratory: Negative for cough, hemoptysis, shortness of breath and wheezing. Cardiovascular: Negative for chest pain, palpitations and leg swelling. Gastrointestinal: Positive for blood in stool and heartburn. Negative for nausea and vomiting.   
Genitourinary: Positive for frequency and hematuria. Negative for dysuria and urgency. Musculoskeletal: Negative for back pain, joint pain and neck pain. Skin: Positive for rash. Negative for itching. Neurological: Positive for weakness. Negative for dizziness, seizures and loss of consciousness. Endo/Heme/Allergies: Negative for environmental allergies and polydipsia. Bruises/bleeds easily. Psychiatric/Behavioral: Negative for depression and memory loss. The patient does not have insomnia. Physical Exam:  
 
Visit Vitals /70 Pulse 89 Ht 5' 10\" (1.778 m) Wt 187 lb (84.8 kg) BMI 26.83 kg/m² General:  Well-developed, well-nourished, no distress. Psych:  Cooperative, good insight and judgement. Neuro:  Alert, oriented to person, place and time. HEENT:  Normocephalic, atraumatic. Sclera clear. Lungs:  Unlabored breathing. Symmetrical chest expansion. Chest wall:  No tenderness or deformity. Heart:  Regular rate and rhythm. No JVD. Abdomen:  Soft, non-tender, non-distended. No guarding or rebound. No palpable masses. Extremities:  Extremities normal, atraumatic, no cyanosis or edema. Skin:  Skin color, texture, turgor normal. No rashes. Labs: All recent labs were reviewed. Imaging: CT images were independently reviewed by me. + inflammation No results found. ICD-10-CM ICD-9-CM 1. Abnormal CT of the abdomen  R93.5 793.6 2. Colonic thickening  K63.9 569.89   
3. Diverticulitis of large intestine without perforation or abscess without bleeding  K57.32 562.11   
4. Weight loss  R63.4 783.21   
5. Pneumaturia  R39.89 599.84   
6. Suprapubic pain  R10.2 789.09   
7. Colovesical fistula  N32.1 596.1 8. BRBPR (bright red blood per rectum)  K62.5 569.3 Assessment/Plan:  Osman Hamlin is a 79 y.o. male who has signs and symptoms consistent with diverticulitis vs olon malignancy with associated colovesical fistula. 1. Schedule for colonoscopy.  
The risks, benefits, alternatives, and consequences were reviewed with the patient, who expressed verbal understanding and agreement to proceed. 2.  Bowel prep - discussed. The patient was educated on the importance of bowel preparation as well as a clear liquid diet the day before the procedure to minimize the chance of missed lesions and the need to repeat the procedure. 3.  After colonoscopy, will plan for robotic assisted laparoscopic low anterior resection with anastomosis, excision of colovesical fistula, bladder repair, possible ostomy. Antibiotics prescribed for bowel prep. Bowel prep discussed. I went over risks and benefits of procedure with patient and he agreed to proceed. Time: I spent 45 minutes preparing to see patient (including chart review and preparation), obtaining and/or reviewing additional medical history, performing a physical exam and evaluation, documenting clinical information in the electronic health record, independently interpreting results, communicating results to patient, family or caregiver, and/or coordinating care. Signed: Jag Ochoa MD 
Bariatric & Minimally Invasive Surgery 5/11/2021 3:45 PM

## 2021-05-11 NOTE — H&P (VIEW-ONLY)
Aleksandar Walker MD  
Bariatric & Advanced Laparoscopic Surgery & Endoscopy [facility], [address] [place] Phone [phone]   Fax [phone] Date of visit: 2021 Primary/Requesting provider: Beatrice Montano MD  
   
 
Name: Dimple Miller MRN: [id number] : 1950 Age: 79 y.o. Sex: male PCP: Beatrice Montano MD  
 
CC:   
Chief Complaint Patient presents with  Follow-up CT results; abnormal CT  
 
 
HPI: 
 
 Dimple Miller is a 79 y.o. male who presents for evaluation of recent hospitalization due to abnormal CT scan concerning for colonic mass. He was admitted and prepped for colonoscopy. GI did colonoscopy and biopsied colon. Biopsies were benign. Colon was very thickened but no obvious mass was seen. He was discharged with oral antibiotics for 2 weeks which he completed. He reports LLQ pain is improved but still has some discomfort. He saw GI yesterday and they ordered repeat CT scan. He denies fever, chills. He has been eating well. 
 
 
2021 - He is here for follow up. He reports symptoms have worsen. He has lost over 25 pounds in the last 3 months. He also reports suprapubic pain. pain is 1-2/10 and occurs during urination. It does not occur all the time. He also started having debris and stool in his urine. He saw urology and they did a cystoscopy confirming a colovesical fistula. PMH: 
 
Past Medical History:  
Diagnosis Date  Calculus of kidney  Encounter for long-term (current) use of other medications 2014  High cholesterol 2014  Other testicular hypofunction 2014 PSH: 
 
Past Surgical History:  
Procedure Laterality Date  COLONOSCOPY N/A 2021 COLONOSCOPY performed by Renetta Rocha MD at [address] HX ORTHOPAEDIC    
 LEFT LEG/ANKLE FX  
 HX TONSILLECTOMY MEDS: 
 
Current Outpatient Medications Medication Sig  neomycin (MYCIFRADIN) 500 mg tablet Take 2 Tabs by mouth four (4) times daily. Take 1000mg at 1pm, 2 pm and 10 pm the day prior to the procedure  metroNIDAZOLE (FlagyL) 500 mg tablet Take 1 Tab by mouth three (3) times daily. Take 1000mg at 1pm, 2 pm and 10 pm the day prior to the procedure  ciprofloxacin HCl (CIPRO) 500 mg tablet Take 1 Tab by mouth two (2) times a day.  clobetasoL (TEMOVATE) 0.05 % topical cream Apply  to affected area two (2) times a day.  meloxicam (MOBIC) 15 mg tablet Take 15 mg by mouth daily.  TERBINAFINE HCL EX by Apply Externally route.  esomeprazole (NEXIUM) 40 mg capsule Take 40 mg by mouth daily.  multivitamin, tx-iron-ca-min (THERA-M w/ IRON) 9 mg iron-400 mcg tab tablet Take 1 Tab by mouth daily.  aspirin (ASPIRIN) 325 mg tablet Take 325 mg by mouth daily.  B.infantis-B.ani-B.long-B.bifi (Probiotic 4X) 10-15 mg TbEC Take  by mouth. No current facility-administered medications for this visit. ALLERGIES:   
 
No Known Allergies SH: Social History Tobacco Use  Smoking status: Never Smoker  Smokeless tobacco: Never Used Substance Use Topics  Alcohol use: Yes Frequency: Monthly or less Drinks per session: 1 or 2 Binge frequency: Never  Drug use: No  
 
 
FH: 
 
Family History Problem Relation Age of Onset  Heart Disease Father CABG & STENTS   
 Heart Attack Father  No Known Problems Mother Review of systems: 
Review of Systems Constitutional: Negative for chills, fever and weight loss. HENT: Negative for ear pain, hearing loss and tinnitus. Eyes: Negative for blurred vision, double vision and redness. Respiratory: Negative for cough, hemoptysis, shortness of breath and wheezing. Cardiovascular: Negative for chest pain, palpitations and leg swelling. Gastrointestinal: Positive for blood in stool and heartburn. Negative for nausea and vomiting.   
Genitourinary: Positive for frequency and hematuria. Negative for dysuria and urgency. Musculoskeletal: Negative for back pain, joint pain and neck pain. Skin: Positive for rash. Negative for itching. Neurological: Positive for weakness. Negative for dizziness, seizures and loss of consciousness. Endo/Heme/Allergies: Negative for environmental allergies and polydipsia. Bruises/bleeds easily. Psychiatric/Behavioral: Negative for depression and memory loss. The patient does not have insomnia. Physical Exam:  
 
Visit Vitals /70 Pulse 89 Ht 5' 10\" (1.778 m) Wt 187 lb (84.8 kg) BMI 26.83 kg/m² General:  Well-developed, well-nourished, no distress. Psych:  Cooperative, good insight and judgement. Neuro:  Alert, oriented to person, place and time. HEENT:  Normocephalic, atraumatic. Sclera clear. Lungs:  Unlabored breathing. Symmetrical chest expansion. Chest wall:  No tenderness or deformity. Heart:  Regular rate and rhythm. No JVD. Abdomen:  Soft, non-tender, non-distended. No guarding or rebound. No palpable masses. Extremities:  Extremities normal, atraumatic, no cyanosis or edema. Skin:  Skin color, texture, turgor normal. No rashes. Labs: All recent labs were reviewed. Imaging: CT images were independently reviewed by me. + inflammation No results found. ICD-10-CM ICD-9-CM 1. Abnormal CT of the abdomen  R93.5 793.6 2. Colonic thickening  K63.9 569.89   
3. Diverticulitis of large intestine without perforation or abscess without bleeding  K57.32 562.11   
4. Weight loss  R63.4 783.21   
5. Pneumaturia  R39.89 599.84   
6. Suprapubic pain  R10.2 789.09   
7. Colovesical fistula  N32.1 596.1 8. BRBPR (bright red blood per rectum)  K62.5 569.3 Assessment/Plan:  Сергей Estes is a 79 y.o. male who has signs and symptoms consistent with diverticulitis vs olon malignancy with associated colovesical fistula. 1. Schedule for colonoscopy.  
The risks, benefits, alternatives, and consequences were reviewed with the patient, who expressed verbal understanding and agreement to proceed. 2.  Bowel prep - discussed. The patient was educated on the importance of bowel preparation as well as a clear liquid diet the day before the procedure to minimize the chance of missed lesions and the need to repeat the procedure. 3.  After colonoscopy, will plan for robotic assisted laparoscopic low anterior resection with anastomosis, excision of colovesical fistula, bladder repair, possible ostomy. Antibiotics prescribed for bowel prep. Bowel prep discussed. I went over risks and benefits of procedure with patient and he agreed to proceed. Time: I spent 45 minutes preparing to see patient (including chart review and preparation), obtaining and/or reviewing additional medical history, performing a physical exam and evaluation, documenting clinical information in the electronic health record, independently interpreting results, communicating results to patient, family or caregiver, and/or coordinating care. Signed: Mario Livingston MD 
Bariatric & Minimally Invasive Surgery 5/11/2021 3:45 PM

## 2021-05-12 ENCOUNTER — HOSPITAL ENCOUNTER (OUTPATIENT)
Dept: SURGERY | Age: 71
Discharge: HOME | End: 2021-05-12

## 2021-05-12 VITALS — WEIGHT: 187 LBS | BODY MASS INDEX: 26.77 KG/M2 | HEIGHT: 70 IN

## 2021-05-12 NOTE — PERIOP NOTES
Patient verified name, , and procedure. Type: 1a; abbreviated assessment per anesthesia guidelines    Labs per anesthesia: NONE    Instructed pt that they will be notified the day before their procedure by the GI Lab for time of arrival if their procedure is [place] and Pre-op for [address]. Arrival times should be called by 5 pm. If no phone is received the patient should contact their respective hospital. The GI lab telephone number is [phone] and ES Pre-op is [phone]. Follow diet and prep instructions per office including NPO status. If patient has NOT received instructions from office patient is advised to call surgeon office, verbalizes understanding. Bath or shower the night before and the am of surgery with non-mositurizing soap. No lotions, oils, powders, cologne on skin. No make up, eye make up or jewelry. Wear loose fitting comfortable, clean clothing. Must have adult present in building the entire time . Medications for the day of procedure NONE, patient to hold - none    The following discharge instructions reviewed with patient: medication given during procedure may cause drowsiness for several hours, therefore, do not drive or operate machinery for remainder of the day. You may not drink alcohol on the day of your procedure, please resume regular diet and activity unless otherwise directed. You may experience abdominal distention for several hours that is relieved by the passage of gas. Contact your physician if you have any of the following: fever or chills, severe abdominal pain or excessive amount of bleeding or a large amount when having a bowel movement.  Occasional specks of blood with bowel movement would not be unusual.

## 2021-05-15 RX ORDER — SODIUM CHLORIDE 0.9 % (FLUSH) 0.9 %
5-40 SYRINGE (ML) INJECTION AS NEEDED
Status: CANCELLED | OUTPATIENT
Start: 2021-05-15

## 2021-05-15 RX ORDER — SODIUM CHLORIDE 0.9 % (FLUSH) 0.9 %
5-40 SYRINGE (ML) INJECTION EVERY 8 HOURS
Status: CANCELLED | OUTPATIENT
Start: 2021-05-15

## 2021-05-16 ENCOUNTER — ANESTHESIA EVENT (OUTPATIENT)
Dept: ENDOSCOPY | Age: 71
DRG: 330 | End: 2021-05-16
Payer: MEDICARE

## 2021-05-16 RX ORDER — SODIUM CHLORIDE, SODIUM LACTATE, POTASSIUM CHLORIDE, CALCIUM CHLORIDE 600; 310; 30; 20 MG/100ML; MG/100ML; MG/100ML; MG/100ML
25 INJECTION, SOLUTION INTRAVENOUS CONTINUOUS
Status: CANCELLED | OUTPATIENT
Start: 2021-05-16 | End: 2021-05-17

## 2021-05-17 ENCOUNTER — HOSPITAL ENCOUNTER (OUTPATIENT)
Age: 71
Setting detail: OUTPATIENT SURGERY
Discharge: HOME | DRG: 330 | End: 2021-05-17
Attending: SURGERY | Admitting: SURGERY
Payer: MEDICARE

## 2021-05-17 ENCOUNTER — ANESTHESIA (OUTPATIENT)
Dept: ENDOSCOPY | Age: 71
DRG: 330 | End: 2021-05-17
Payer: MEDICARE

## 2021-05-17 VITALS
RESPIRATION RATE: 16 BRPM | WEIGHT: 180 LBS | DIASTOLIC BLOOD PRESSURE: 61 MMHG | OXYGEN SATURATION: 97 % | HEIGHT: 70 IN | TEMPERATURE: 97.6 F | HEART RATE: 69 BPM | SYSTOLIC BLOOD PRESSURE: 128 MMHG | BODY MASS INDEX: 25.77 KG/M2

## 2021-05-17 DIAGNOSIS — K56.699 SIGMOID STRICTURE (HCC): ICD-10-CM

## 2021-05-17 DIAGNOSIS — N32.1 COLOVESICAL FISTULA: ICD-10-CM

## 2021-05-17 PROCEDURE — 74011250636 HC RX REV CODE- 250/636: Performed by: REGISTERED NURSE

## 2021-05-17 PROCEDURE — 88305 TISSUE EXAM BY PATHOLOGIST: CPT

## 2021-05-17 PROCEDURE — 74011250636 HC RX REV CODE- 250/636: Performed by: ANESTHESIOLOGY

## 2021-05-17 PROCEDURE — 2709999900 HC NON-CHARGEABLE SUPPLY: Performed by: SURGERY

## 2021-05-17 PROCEDURE — 45331 SIGMOIDOSCOPY AND BIOPSY: CPT | Performed by: SURGERY

## 2021-05-17 PROCEDURE — 74011000250 HC RX REV CODE- 250: Performed by: REGISTERED NURSE

## 2021-05-17 PROCEDURE — 76040000025: Performed by: SURGERY

## 2021-05-17 PROCEDURE — 45338 SIGMOIDOSCOPY W/TUMR REMOVE: CPT | Performed by: SURGERY

## 2021-05-17 PROCEDURE — 76060000032 HC ANESTHESIA 0.5 TO 1 HR: Performed by: SURGERY

## 2021-05-17 PROCEDURE — 77030021593 HC FCPS BIOP ENDOSC BSC -A: Performed by: SURGERY

## 2021-05-17 PROCEDURE — 77030013991 HC SNR POLYP ENDOSC BSC -A: Performed by: SURGERY

## 2021-05-17 RX ORDER — SODIUM CHLORIDE, SODIUM LACTATE, POTASSIUM CHLORIDE, CALCIUM CHLORIDE 600; 310; 30; 20 MG/100ML; MG/100ML; MG/100ML; MG/100ML
100 INJECTION, SOLUTION INTRAVENOUS CONTINUOUS
Status: DISCONTINUED | OUTPATIENT
Start: 2021-05-17 | End: 2021-05-17 | Stop reason: HOSPADM

## 2021-05-17 RX ORDER — PROPOFOL 10 MG/ML
INJECTION, EMULSION INTRAVENOUS
Status: DISCONTINUED | OUTPATIENT
Start: 2021-05-17 | End: 2021-05-17 | Stop reason: HOSPADM

## 2021-05-17 RX ORDER — PROPOFOL 10 MG/ML
INJECTION, EMULSION INTRAVENOUS AS NEEDED
Status: DISCONTINUED | OUTPATIENT
Start: 2021-05-17 | End: 2021-05-17 | Stop reason: HOSPADM

## 2021-05-17 RX ORDER — LIDOCAINE HYDROCHLORIDE 20 MG/ML
INJECTION, SOLUTION EPIDURAL; INFILTRATION; INTRACAUDAL; PERINEURAL AS NEEDED
Status: DISCONTINUED | OUTPATIENT
Start: 2021-05-17 | End: 2021-05-17 | Stop reason: HOSPADM

## 2021-05-17 RX ADMIN — LIDOCAINE HYDROCHLORIDE 40 MG: 20 INJECTION, SOLUTION EPIDURAL; INFILTRATION; INTRACAUDAL; PERINEURAL at 08:50

## 2021-05-17 RX ADMIN — PROPOFOL 50 MG: 10 INJECTION, EMULSION INTRAVENOUS at 08:50

## 2021-05-17 RX ADMIN — PROPOFOL 140 MCG/KG/MIN: 10 INJECTION, EMULSION INTRAVENOUS at 08:50

## 2021-05-17 RX ADMIN — SODIUM CHLORIDE, SODIUM LACTATE, POTASSIUM CHLORIDE, AND CALCIUM CHLORIDE 100 ML/HR: 600; 310; 30; 20 INJECTION, SOLUTION INTRAVENOUS at 08:11

## 2021-05-17 NOTE — INTERVAL H&P NOTE
Update History & Physical 
 
The Patient's History and Physical of May 11, Colonoscopy was reviewed with the patient and I examined the patient. There was no change. The surgical site was confirmed by the patient and me. Plan:  The risk, benefits, expected outcome, and alternative to the recommended procedure have been discussed with the patient. Patient understands and wants to proceed with the procedure.  
 
Electronically signed by Alyson Croft MD on 5/17/2021 at 8:02 AM

## 2021-05-17 NOTE — ANESTHESIA POSTPROCEDURE EVALUATION
Procedure(s):  COLON BIOPSY  ENDOSCOPIC POLYPECTOMY  SIGMOIDOSCOPY FLEXIBLE.    total IV anesthesia    Anesthesia Post Evaluation      Multimodal analgesia: multimodal analgesia not used between 6 hours prior to anesthesia start to PACU discharge  Patient location during evaluation: PACU  Patient participation: complete - patient participated  Level of consciousness: awake and alert  Pain management: adequate  Airway patency: patent  Anesthetic complications: no  Cardiovascular status: hemodynamically stable  Respiratory status: acceptable  Hydration status: acceptable        INITIAL Post-op Vital signs:   Vitals Value Taken Time   /61 05/17/21 0939   Temp 36.4 °C (97.6 °F) 05/17/21 0924   Pulse 69 05/17/21 0939   Resp 16 05/17/21 0939   SpO2 97 % 05/17/21 0939

## 2021-05-17 NOTE — DISCHARGE INSTRUCTIONS
Gastrointestinal Colonoscopy/Flexible Sigmoidoscopy  Lower Exam Discharge Instructions      1. Call your doctor for any problems or questions. 2. Contact the doctors office for follow up appointment as directed  3. Medication may cause drowsiness for several hours, therefore, do not drive or operate machinery for remainder of the day. 4. No alcohol today. 5. Ordinarily, you may resume regular diet and activity after exam unless otherwise specified by your physician. 6. Because of air put into your colon during exam, you may experience some abdominal distension, relieved by the passage of gas, for several hours. 7. Contact your physician if you have any of the following:  a. Excessive amount of bleeding - large amount when having a bowel movement. Occasional specks of blood with bowel movement would not be unusual.  b. Severe abdominal pain  c.  Fever or Chills      Instructions Following Ambulatory Surgery    Activity  · As tolerated and directed by your doctor  · Bathe or shower as directed by your doctor    Diet  · Clear liquids until no nausea or vomiting; then light diet for the first day  · Advance to regular diet on second day, unless your doctor orders otherwise  · If nausea and vomiting continues, call your doctor    Pain  · Take pain medication as directed by your doctor  ·  Call your doctor if pain is NOT relieved by medication  · DO NOT take aspirin or blood thinners until directed by your doctor    Follow-Up Phone Calls  · Will be made nursing staff  · If you have any problems, call your doctor as needed    Call your doctor if  · Excessive bleeding that does not stop after holding mild pressure over the area  · Temperature of 101 degrees F or above  · Redness,excessive swelling or bruising, and/or green or yellow, smelly discharge from incision    After Anesthesia  · For the first 24 hours: DO NOT Drive, Drink alcoholic beverages, or Make important decisions  · Be aware of dizziness following anesthesia and while taking pain medication

## 2021-05-17 NOTE — ANESTHESIA PREPROCEDURE EVALUATION
Relevant Problems   PERSONAL HX & FAMILY HX OF CANCER   (+) Adenocarcinoma (HCC)       Anesthetic History   No history of anesthetic complications            Review of Systems / Medical History  Patient summary reviewed and pertinent labs reviewed    Pulmonary  Within defined limits                 Neuro/Psych   Within defined limits           Cardiovascular              Hyperlipidemia    Exercise tolerance: >4 METS  Comments: Denies CP, SOB or changes in functional status   GI/Hepatic/Renal               Comments: Lower abdominal pain  Wt loss  Diverticulitis vs ca seen on CT scan Endo/Other        Obesity and arthritis     Other Findings   Comments: Having colon resection Thursday for diverticulitis         Physical Exam    Airway  Mallampati: II  TM Distance: 4 - 6 cm  Neck ROM: normal range of motion   Mouth opening: Normal     Cardiovascular    Rhythm: regular  Rate: normal         Dental    Dentition: Caps/crowns     Pulmonary  Breath sounds clear to auscultation               Abdominal  GI exam deferred       Other Findings            Anesthetic Plan    ASA: 2  Anesthesia type: total IV anesthesia          Induction: Intravenous  Anesthetic plan and risks discussed with: Patient

## 2021-05-17 NOTE — OP NOTES
Colonoscopy Procedure Note    Date of procedure: 2021      Name: Mag Rowland      MRN: [id number]       : 1950       Age: 79 y.o. Sex: male    Preoperative Diagnosis: 1. Hx of Perforated diverticulitis      2. Sigmoid narrowing      3. Colovesical fistula    Postoperative Diagnosis: 1. same      2. Diverticulosis      3. Sigmoid narrowing at 25 cm      4. Rectal polyp      5. Internal hemorrhoids    Procedure(s):  COLON BIOPSY  ENDOSCOPIC POLYPECTOMY  SIGMOIDOSCOPY FLEXIBLE [id number], [id number], 73861-52    Procedure in Detail:  Informed consent was obtained for the procedure. The patient was placed in the left lateral decubitus position and sedation was induced by anesthesia. The [id number] was inserted into the rectum and advanced under direct vision to the sigmoid colon. The quality of the colonic preparation was good. A careful inspection was made as the colonoscope was withdrawn, including a retroflexed view of the rectum; findings and interventions are described below. Appropriate photodocumentation was obtained. Findings:   Rectum:     - Protruding lesions:     -Internal Hemorrhoids and     -Sessile Polyp(s) size 3 mm removed by polypectomy (snare cautery)  Sigmoid:     - Excavated lesions:     - Diverticulosis    - Protruding lesions:     -unable to pass passed 25 cm from anus due to swollen mucosa and narrowing. biopsies taken from visualized mucosa. Specimens: Specimens were collected and sent to pathology. ID Type Source Tests Collected by Time Destination   1 : Sigmoid Colon Bxs Preservative   Vasquez Kern MD 2021 [id number] Pathology   2 : Rectal Polyp (HOT) Preservative   Vasquez Kern MD 2021 0908 Pathology        Complications: None; patient tolerated the procedure well. EBL - minimal    Recommendations:   - Await pathology.      Signed By: Heavenly Pederson MD  [address] [facility] - Bariatric & Minimally Invasive Surgery  5/17/2021 9:17 AM

## 2021-05-18 ENCOUNTER — HOSPITAL ENCOUNTER (OUTPATIENT)
Dept: SURGERY | Age: 71
Discharge: HOME | DRG: 330 | End: 2021-05-18
Attending: SURGERY
Payer: MEDICARE

## 2021-05-18 VITALS
BODY MASS INDEX: 25.58 KG/M2 | DIASTOLIC BLOOD PRESSURE: 73 MMHG | TEMPERATURE: 97.3 F | WEIGHT: 178.7 LBS | RESPIRATION RATE: 12 BRPM | HEIGHT: 70 IN | SYSTOLIC BLOOD PRESSURE: 121 MMHG | OXYGEN SATURATION: 98 % | HEART RATE: 73 BPM

## 2021-05-18 LAB
ANION GAP SERPL CALC-SCNC: 7 MMOL/L (ref 7–16)
BUN SERPL-MCNC: 4 MG/DL (ref 8–23)
CALCIUM SERPL-MCNC: 9.4 MG/DL (ref 8.3–10.4)
CHLORIDE SERPL-SCNC: 107 MMOL/L (ref 98–107)
CO2 SERPL-SCNC: 27 MMOL/L (ref 21–32)
CREAT SERPL-MCNC: 0.63 MG/DL (ref 0.8–1.5)
ERYTHROCYTE [DISTWIDTH] IN BLOOD BY AUTOMATED COUNT: 13.5 % (ref 11.9–14.6)
GLUCOSE SERPL-MCNC: 94 MG/DL (ref 65–100)
HCT VFR BLD AUTO: 34.9 % (ref 41.1–50.3)
HGB BLD-MCNC: 11 G/DL (ref 13.6–17.2)
MCH RBC QN AUTO: 29.4 PG (ref 26.1–32.9)
MCHC RBC AUTO-ENTMCNC: 31.5 G/DL (ref 31.4–35)
MCV RBC AUTO: 93.3 FL (ref 79.6–97.8)
NRBC # BLD: 0 K/UL (ref 0–0.2)
PLATELET # BLD AUTO: 370 K/UL (ref 150–450)
PMV BLD AUTO: 9.1 FL (ref 9.4–12.3)
POTASSIUM SERPL-SCNC: 3.9 MMOL/L (ref 3.5–5.1)
RBC # BLD AUTO: 3.74 M/UL (ref 4.23–5.6)
SODIUM SERPL-SCNC: 141 MMOL/L (ref 136–145)
WBC # BLD AUTO: 3.8 K/UL (ref 4.3–11.1)

## 2021-05-18 PROCEDURE — 36415 COLL VENOUS BLD VENIPUNCTURE: CPT

## 2021-05-18 PROCEDURE — 80048 BASIC METABOLIC PNL TOTAL CA: CPT

## 2021-05-18 PROCEDURE — 85027 COMPLETE CBC AUTOMATED: CPT

## 2021-05-18 NOTE — PERIOP NOTES
Patient verified name and . Order for consent found in EHR; patient verified. Type 3 surgery, PAT assessment complete. Labs per surgeon: none  Labs per anesthesia protocol: CBC, BMP collected- results pending  EKG: not needed per anesthesia protocol    Patient COVID test not done- case posting listed as urgent. COVID test not required by current hospital policy- pt had [employer] vaccination series completed 21- pt instructed to bring card DOS to scan into EMR. Hospital approved surgical skin cleanser and instructions given per hospital policy. Patient provided with and instructed on educational handouts including Guide to Surgery, Pain Management, Hand Hygiene, Blood Transfusion Education, and [facility] Brochure. Patient answered medical/surgical history questions at their best of ability. All prior to admission medications documented in [facility]. Original medication prescription bottles NOT visualized during patient appointment. Patient instructed to hold all vitamins 7 days prior to surgery and NSAIDS 5 days prior to surgery, patient verbalized understanding. Patient teach back successful and patient demonstrates knowledge of instructions.

## 2021-05-18 NOTE — PERIOP NOTES
Lab results within anesthesia guidelines- will forward CBC/BMP to surgeon's office to review abnormal WBC result per anesthesia guidelines.     Recent Results (from the past 12 hour(s))   CBC W/O DIFF    Collection Time: 05/18/21 10:20 AM   Result Value Ref Range    WBC 3.8 (L) 4.3 - 11.1 K/uL    RBC 3.74 (L) 4.23 - 5.6 M/uL    HGB 11.0 (L) 13.6 - 17.2 g/dL    HCT 34.9 (L) 41.1 - 50.3 %    MCV 93.3 79.6 - 97.8 FL    MCH 29.4 26.1 - 32.9 PG    MCHC 31.5 31.4 - 35.0 g/dL    RDW 13.5 11.9 - 14.6 %    PLATELET 972 831 - 748 K/uL    MPV 9.1 (L) 9.4 - 12.3 FL    ABSOLUTE NRBC 0.00 0.0 - 0.2 K/uL   METABOLIC PANEL, BASIC    Collection Time: 05/18/21 10:20 AM   Result Value Ref Range    Sodium 141 136 - 145 mmol/L    Potassium 3.9 3.5 - 5.1 mmol/L    Chloride 107 98 - 107 mmol/L    CO2 27 21 - 32 mmol/L    Anion gap 7 7 - 16 mmol/L    Glucose 94 65 - 100 mg/dL    BUN 4 (L) 8 - 23 MG/DL    Creatinine 0.63 (L) 0.8 - 1.5 MG/DL    GFR est AA >60 >60 ml/min/1.73m2    GFR est non-AA >60 >60 ml/min/1.73m2    Calcium 9.4 8.3 - 10.4 MG/DL

## 2021-05-18 NOTE — PERIOP NOTES
PLEASE CONTINUE TAKING ALL PRESCRIPTION MEDICATIONS UP TO THE DAY OF SURGERY UNLESS OTHERWISE DIRECTED BELOW. DISCONTINUE all vitamins and supplements 7 days prior to surgery. DISCONTINUE Non-Steriodal Anti-Inflammatory (NSAIDS) such as Advil and Aleve 5 days prior to surgery. Home Medications to take  the day of surgery   Antibiotics if prescribed by surgeon            Home Medications   to Hold           Comments                Please do not bring home medications with you on the day of surgery unless otherwise directed by your nurse. If you are instructed to bring home medications, please give them to your nurse as they will be administered by the nursing staff. If you have any questions, please call [facility] [phone] or [facility] [phone]. A copy of this note was provided to the patient for reference.

## 2021-05-19 ENCOUNTER — ANESTHESIA EVENT (OUTPATIENT)
Dept: SURGERY | Age: 71
DRG: 330 | End: 2021-05-19
Payer: MEDICARE

## 2021-05-19 RX ORDER — FENTANYL CITRATE 50 UG/ML
100 INJECTION, SOLUTION INTRAMUSCULAR; INTRAVENOUS ONCE
Status: CANCELLED | OUTPATIENT
Start: 2021-05-19 | End: 2021-05-19

## 2021-05-19 RX ORDER — SODIUM CHLORIDE 0.9 % (FLUSH) 0.9 %
5-40 SYRINGE (ML) INJECTION AS NEEDED
Status: CANCELLED | OUTPATIENT
Start: 2021-05-19

## 2021-05-19 RX ORDER — SODIUM CHLORIDE 0.9 % (FLUSH) 0.9 %
5-40 SYRINGE (ML) INJECTION EVERY 8 HOURS
Status: CANCELLED | OUTPATIENT
Start: 2021-05-19

## 2021-05-20 ENCOUNTER — HOSPITAL ENCOUNTER (INPATIENT)
Age: 71
LOS: 6 days | Discharge: HOME HEALTH | DRG: 330 | End: 2021-05-26
Attending: SURGERY | Admitting: SURGERY
Payer: MEDICARE

## 2021-05-20 ENCOUNTER — ANESTHESIA (OUTPATIENT)
Dept: SURGERY | Age: 71
DRG: 330 | End: 2021-05-20
Payer: MEDICARE

## 2021-05-20 DIAGNOSIS — C80.1 ADENOCARCINOMA (HCC): ICD-10-CM

## 2021-05-20 DIAGNOSIS — N32.1 COLOVESICAL FISTULA: ICD-10-CM

## 2021-05-20 LAB
ABO + RH BLD: NORMAL
BLOOD GROUP ANTIBODIES SERPL: NORMAL
SPECIMEN EXP DATE BLD: NORMAL

## 2021-05-20 PROCEDURE — 77030020703 HC SEAL CANN DISP INTU -B: Performed by: SURGERY

## 2021-05-20 PROCEDURE — 74011250636 HC RX REV CODE- 250/636

## 2021-05-20 PROCEDURE — 0DBN0ZZ EXCISION OF SIGMOID COLON, OPEN APPROACH: ICD-10-PCS | Performed by: SURGERY

## 2021-05-20 PROCEDURE — 77030036998 HC STPLR CRV CUT CNTOUR J&J -F: Performed by: SURGERY

## 2021-05-20 PROCEDURE — 77030002916 HC SUT ETHLN J&J -A: Performed by: SURGERY

## 2021-05-20 PROCEDURE — 76210000006 HC OR PH I REC 0.5 TO 1 HR: Performed by: SURGERY

## 2021-05-20 PROCEDURE — 77030037088 HC TUBE ENDOTRACH ORAL NSL COVD-A: Performed by: ANESTHESIOLOGY

## 2021-05-20 PROCEDURE — 8E0W0CZ ROBOTIC ASSISTED PROCEDURE OF TRUNK REGION, OPEN APPROACH: ICD-10-PCS | Performed by: SURGERY

## 2021-05-20 PROCEDURE — 77030040922 HC BLNKT HYPOTHRM STRY -A: Performed by: ANESTHESIOLOGY

## 2021-05-20 PROCEDURE — 2709999900 HC NON-CHARGEABLE SUPPLY: Performed by: SURGERY

## 2021-05-20 PROCEDURE — 94762 N-INVAS EAR/PLS OXIMTRY CONT: CPT

## 2021-05-20 PROCEDURE — 77030013259 HC BG UROSTMY HOLL -A: Performed by: SURGERY

## 2021-05-20 PROCEDURE — 76010000882 HC OR TIME 5 TO 5.5HR INTENSV - TIER 2: Performed by: SURGERY

## 2021-05-20 PROCEDURE — 77030016151 HC PROTCTR LNS DFOG COVD -B: Performed by: SURGERY

## 2021-05-20 PROCEDURE — 77030036731 HC STPLR ENDOSC J&J -F: Performed by: SURGERY

## 2021-05-20 PROCEDURE — 77030035278 HC STPLR SEAL ENDOWR INTU -B: Performed by: SURGERY

## 2021-05-20 PROCEDURE — 77030002966 HC SUT PDS J&J -A: Performed by: SURGERY

## 2021-05-20 PROCEDURE — 77030012935 HC DRSG AQUACEL BMS -B: Performed by: SURGERY

## 2021-05-20 PROCEDURE — 77030033188 HC TBNG FLTRD BIIFUR DISP CNMD -C: Performed by: SURGERY

## 2021-05-20 PROCEDURE — 77030019908 HC STETH ESOPH SIMS -A: Performed by: ANESTHESIOLOGY

## 2021-05-20 PROCEDURE — 77030034461 HC TIP ENDO SUC IRR STRYKFLW STRY -B: Performed by: SURGERY

## 2021-05-20 PROCEDURE — 74011250636 HC RX REV CODE- 250/636: Performed by: ANESTHESIOLOGY

## 2021-05-20 PROCEDURE — 77030035279 HC SEAL VSL ENDOWR XI INTU -I2: Performed by: SURGERY

## 2021-05-20 PROCEDURE — 0DTP0ZZ RESECTION OF RECTUM, OPEN APPROACH: ICD-10-PCS | Performed by: SURGERY

## 2021-05-20 PROCEDURE — 77030035489 HC REDUCR CANN ENDOWR INTU -C: Performed by: SURGERY

## 2021-05-20 PROCEDURE — 88307 TISSUE EXAM BY PATHOLOGIST: CPT

## 2021-05-20 PROCEDURE — 44143 PARTIAL REMOVAL OF COLON: CPT | Performed by: SURGERY

## 2021-05-20 PROCEDURE — 44139 MOBILIZATION OF COLON: CPT | Performed by: SURGERY

## 2021-05-20 PROCEDURE — 74011250636 HC RX REV CODE- 250/636: Performed by: SURGERY

## 2021-05-20 PROCEDURE — 77030008462 HC STPLR SKN PROX J&J -A: Performed by: SURGERY

## 2021-05-20 PROCEDURE — 77030002986 HC SUT PROL J&J -A: Performed by: SURGERY

## 2021-05-20 PROCEDURE — 77030002996 HC SUT SLK J&J -A: Performed by: SURGERY

## 2021-05-20 PROCEDURE — C1765 ADHESION BARRIER: HCPCS | Performed by: SURGERY

## 2021-05-20 PROCEDURE — 76060000041 HC ANESTHESIA 5 TO 5.5 HR: Performed by: SURGERY

## 2021-05-20 PROCEDURE — 99221 1ST HOSP IP/OBS SF/LOW 40: CPT | Performed by: UROLOGY

## 2021-05-20 PROCEDURE — 74011000250 HC RX REV CODE- 250: Performed by: SURGERY

## 2021-05-20 PROCEDURE — 77030035277 HC OBTRTR BLDELSS DISP INTU -B: Performed by: SURGERY

## 2021-05-20 PROCEDURE — 77030011266 HC ELECTRD BLD INSL COVD -A: Performed by: SURGERY

## 2021-05-20 PROCEDURE — 74011250637 HC RX REV CODE- 250/637: Performed by: ANESTHESIOLOGY

## 2021-05-20 PROCEDURE — 86901 BLOOD TYPING SEROLOGIC RH(D): CPT

## 2021-05-20 PROCEDURE — 77030039425 HC BLD LARYNG TRULITE DISP TELE -A: Performed by: ANESTHESIOLOGY

## 2021-05-20 PROCEDURE — 74011000250 HC RX REV CODE- 250: Performed by: ANESTHESIOLOGY

## 2021-05-20 PROCEDURE — 0D1E0Z4 BYPASS LARGE INTESTINE TO CUTANEOUS, OPEN APPROACH: ICD-10-PCS | Performed by: SURGERY

## 2021-05-20 PROCEDURE — 77030031139 HC SUT VCRL2 J&J -A: Performed by: SURGERY

## 2021-05-20 PROCEDURE — 77030038552 HC DRN WND MDII -A: Performed by: SURGERY

## 2021-05-20 PROCEDURE — 77030019927 HC TBNG IRR CYSTO BAXT -A: Performed by: SURGERY

## 2021-05-20 PROCEDURE — 74011250637 HC RX REV CODE- 250/637: Performed by: SURGERY

## 2021-05-20 PROCEDURE — 65270000029 HC RM PRIVATE

## 2021-05-20 PROCEDURE — 74011000250 HC RX REV CODE- 250

## 2021-05-20 RX ORDER — ACETAMINOPHEN 500 MG
1000 TABLET ORAL ONCE
Status: COMPLETED | OUTPATIENT
Start: 2021-05-20 | End: 2021-05-20

## 2021-05-20 RX ORDER — HEPARIN SODIUM 5000 [USP'U]/ML
5000 INJECTION, SOLUTION INTRAVENOUS; SUBCUTANEOUS EVERY 8 HOURS
Status: DISCONTINUED | OUTPATIENT
Start: 2021-05-21 | End: 2021-05-26 | Stop reason: HOSPADM

## 2021-05-20 RX ORDER — ACETAMINOPHEN 500 MG
1000 TABLET ORAL
Status: DISCONTINUED | OUTPATIENT
Start: 2021-05-20 | End: 2021-05-20

## 2021-05-20 RX ORDER — FAMOTIDINE 20 MG/1
20 TABLET, FILM COATED ORAL ONCE
Status: COMPLETED | OUTPATIENT
Start: 2021-05-20 | End: 2021-05-20

## 2021-05-20 RX ORDER — HYDROMORPHONE HYDROCHLORIDE 2 MG/ML
INJECTION, SOLUTION INTRAMUSCULAR; INTRAVENOUS; SUBCUTANEOUS AS NEEDED
Status: DISCONTINUED | OUTPATIENT
Start: 2021-05-20 | End: 2021-05-20 | Stop reason: HOSPADM

## 2021-05-20 RX ORDER — ONDANSETRON 2 MG/ML
INJECTION INTRAMUSCULAR; INTRAVENOUS AS NEEDED
Status: DISCONTINUED | OUTPATIENT
Start: 2021-05-20 | End: 2021-05-20 | Stop reason: HOSPADM

## 2021-05-20 RX ORDER — PROPOFOL 10 MG/ML
INJECTION, EMULSION INTRAVENOUS AS NEEDED
Status: DISCONTINUED | OUTPATIENT
Start: 2021-05-20 | End: 2021-05-20 | Stop reason: HOSPADM

## 2021-05-20 RX ORDER — SODIUM CHLORIDE, SODIUM LACTATE, POTASSIUM CHLORIDE, CALCIUM CHLORIDE 600; 310; 30; 20 MG/100ML; MG/100ML; MG/100ML; MG/100ML
150 INJECTION, SOLUTION INTRAVENOUS CONTINUOUS
Status: DISCONTINUED | OUTPATIENT
Start: 2021-05-20 | End: 2021-05-20

## 2021-05-20 RX ORDER — MIDAZOLAM HYDROCHLORIDE 1 MG/ML
2 INJECTION, SOLUTION INTRAMUSCULAR; INTRAVENOUS
Status: DISCONTINUED | OUTPATIENT
Start: 2021-05-20 | End: 2021-05-20 | Stop reason: HOSPADM

## 2021-05-20 RX ORDER — GLYCOPYRROLATE 0.2 MG/ML
INJECTION INTRAMUSCULAR; INTRAVENOUS AS NEEDED
Status: DISCONTINUED | OUTPATIENT
Start: 2021-05-20 | End: 2021-05-20 | Stop reason: HOSPADM

## 2021-05-20 RX ORDER — LIDOCAINE HYDROCHLORIDE 20 MG/ML
INJECTION, SOLUTION EPIDURAL; INFILTRATION; INTRACAUDAL; PERINEURAL AS NEEDED
Status: DISCONTINUED | OUTPATIENT
Start: 2021-05-20 | End: 2021-05-20 | Stop reason: HOSPADM

## 2021-05-20 RX ORDER — HYDROCODONE BITARTRATE AND ACETAMINOPHEN 5; 325 MG/1; MG/1
1 TABLET ORAL AS NEEDED
Status: DISCONTINUED | OUTPATIENT
Start: 2021-05-20 | End: 2021-05-20

## 2021-05-20 RX ORDER — SODIUM CHLORIDE, SODIUM LACTATE, POTASSIUM CHLORIDE, CALCIUM CHLORIDE 600; 310; 30; 20 MG/100ML; MG/100ML; MG/100ML; MG/100ML
150 INJECTION, SOLUTION INTRAVENOUS CONTINUOUS
Status: DISCONTINUED | OUTPATIENT
Start: 2021-05-20 | End: 2021-05-20 | Stop reason: HOSPADM

## 2021-05-20 RX ORDER — SODIUM CHLORIDE, SODIUM LACTATE, POTASSIUM CHLORIDE, CALCIUM CHLORIDE 600; 310; 30; 20 MG/100ML; MG/100ML; MG/100ML; MG/100ML
100 INJECTION, SOLUTION INTRAVENOUS CONTINUOUS
Status: DISPENSED | OUTPATIENT
Start: 2021-05-20 | End: 2021-05-21

## 2021-05-20 RX ORDER — ROCURONIUM BROMIDE 10 MG/ML
INJECTION, SOLUTION INTRAVENOUS AS NEEDED
Status: DISCONTINUED | OUTPATIENT
Start: 2021-05-20 | End: 2021-05-20 | Stop reason: HOSPADM

## 2021-05-20 RX ORDER — SODIUM CHLORIDE 9 MG/ML
50 INJECTION, SOLUTION INTRAVENOUS CONTINUOUS
Status: DISCONTINUED | OUTPATIENT
Start: 2021-05-20 | End: 2021-05-20

## 2021-05-20 RX ORDER — CEFAZOLIN SODIUM/WATER 2 G/20 ML
2 SYRINGE (ML) INTRAVENOUS ONCE
Status: COMPLETED | OUTPATIENT
Start: 2021-05-20 | End: 2021-05-20

## 2021-05-20 RX ORDER — NALOXONE HYDROCHLORIDE 0.4 MG/ML
0.4 INJECTION, SOLUTION INTRAMUSCULAR; INTRAVENOUS; SUBCUTANEOUS AS NEEDED
Status: DISCONTINUED | OUTPATIENT
Start: 2021-05-20 | End: 2021-05-26 | Stop reason: HOSPADM

## 2021-05-20 RX ORDER — HYDROMORPHONE HYDROCHLORIDE 2 MG/ML
0.5 INJECTION, SOLUTION INTRAMUSCULAR; INTRAVENOUS; SUBCUTANEOUS
Status: DISCONTINUED | OUTPATIENT
Start: 2021-05-20 | End: 2021-05-20

## 2021-05-20 RX ORDER — FENTANYL CITRATE 50 UG/ML
INJECTION, SOLUTION INTRAMUSCULAR; INTRAVENOUS AS NEEDED
Status: DISCONTINUED | OUTPATIENT
Start: 2021-05-20 | End: 2021-05-20 | Stop reason: HOSPADM

## 2021-05-20 RX ORDER — LIDOCAINE HYDROCHLORIDE 10 MG/ML
0.1 INJECTION INFILTRATION; PERINEURAL AS NEEDED
Status: DISCONTINUED | OUTPATIENT
Start: 2021-05-20 | End: 2021-05-20 | Stop reason: HOSPADM

## 2021-05-20 RX ORDER — ACETAMINOPHEN 500 MG
1000 TABLET ORAL EVERY 6 HOURS
Status: DISCONTINUED | OUTPATIENT
Start: 2021-05-21 | End: 2021-05-26 | Stop reason: HOSPADM

## 2021-05-20 RX ORDER — NEOSTIGMINE METHYLSULFATE 1 MG/ML
INJECTION, SOLUTION INTRAVENOUS AS NEEDED
Status: DISCONTINUED | OUTPATIENT
Start: 2021-05-20 | End: 2021-05-20 | Stop reason: HOSPADM

## 2021-05-20 RX ORDER — OXYCODONE HYDROCHLORIDE 5 MG/1
5 TABLET ORAL
Status: DISCONTINUED | OUTPATIENT
Start: 2021-05-20 | End: 2021-05-26 | Stop reason: HOSPADM

## 2021-05-20 RX ORDER — BUPIVACAINE HYDROCHLORIDE AND EPINEPHRINE 5; 5 MG/ML; UG/ML
INJECTION, SOLUTION EPIDURAL; INTRACAUDAL; PERINEURAL AS NEEDED
Status: DISCONTINUED | OUTPATIENT
Start: 2021-05-20 | End: 2021-05-20 | Stop reason: HOSPADM

## 2021-05-20 RX ORDER — ONDANSETRON 4 MG/1
4 TABLET, ORALLY DISINTEGRATING ORAL
Status: DISCONTINUED | OUTPATIENT
Start: 2021-05-20 | End: 2021-05-26 | Stop reason: HOSPADM

## 2021-05-20 RX ORDER — EPHEDRINE SULFATE/0.9% NACL/PF 50 MG/5 ML
SYRINGE (ML) INTRAVENOUS AS NEEDED
Status: DISCONTINUED | OUTPATIENT
Start: 2021-05-20 | End: 2021-05-20 | Stop reason: HOSPADM

## 2021-05-20 RX ADMIN — OXYCODONE 5 MG: 5 TABLET ORAL at 22:58

## 2021-05-20 RX ADMIN — ACETAMINOPHEN 1000 MG: 500 TABLET, FILM COATED ORAL at 23:01

## 2021-05-20 RX ADMIN — ROCURONIUM BROMIDE 10 MG: 10 INJECTION, SOLUTION INTRAVENOUS at 15:16

## 2021-05-20 RX ADMIN — CEFAZOLIN 2 MG: 1 INJECTION, POWDER, FOR SOLUTION INTRAVENOUS at 14:56

## 2021-05-20 RX ADMIN — SODIUM CHLORIDE, SODIUM LACTATE, POTASSIUM CHLORIDE, AND CALCIUM CHLORIDE: 600; 310; 30; 20 INJECTION, SOLUTION INTRAVENOUS at 15:42

## 2021-05-20 RX ADMIN — FAMOTIDINE 20 MG: 20 TABLET, FILM COATED ORAL at 11:29

## 2021-05-20 RX ADMIN — ROCURONIUM BROMIDE 20 MG: 10 INJECTION, SOLUTION INTRAVENOUS at 16:17

## 2021-05-20 RX ADMIN — ONDANSETRON 4 MG: 2 INJECTION INTRAMUSCULAR; INTRAVENOUS at 19:04

## 2021-05-20 RX ADMIN — FENTANYL CITRATE 50 MCG: 50 INJECTION INTRAMUSCULAR; INTRAVENOUS at 15:29

## 2021-05-20 RX ADMIN — GLYCOPYRROLATE 0.4 MG: 0.2 INJECTION, SOLUTION INTRAMUSCULAR; INTRAVENOUS at 19:23

## 2021-05-20 RX ADMIN — ROCURONIUM BROMIDE 10 MG: 10 INJECTION, SOLUTION INTRAVENOUS at 17:19

## 2021-05-20 RX ADMIN — LIDOCAINE HYDROCHLORIDE 0.1 ML: 10 INJECTION, SOLUTION INFILTRATION; PERINEURAL at 11:31

## 2021-05-20 RX ADMIN — SODIUM CHLORIDE, SODIUM LACTATE, POTASSIUM CHLORIDE, AND CALCIUM CHLORIDE: 600; 310; 30; 20 INJECTION, SOLUTION INTRAVENOUS at 18:33

## 2021-05-20 RX ADMIN — PROPOFOL 150 MG: 10 INJECTION, EMULSION INTRAVENOUS at 14:58

## 2021-05-20 RX ADMIN — Medication 3 MG: at 19:23

## 2021-05-20 RX ADMIN — ACETAMINOPHEN 1000 MG: 500 TABLET, FILM COATED ORAL at 11:29

## 2021-05-20 RX ADMIN — Medication 10 MG: at 16:53

## 2021-05-20 RX ADMIN — FENTANYL CITRATE 50 MCG: 50 INJECTION INTRAMUSCULAR; INTRAVENOUS at 16:23

## 2021-05-20 RX ADMIN — CEFAZOLIN 2 MG: 1 INJECTION, POWDER, FOR SOLUTION INTRAVENOUS at 19:01

## 2021-05-20 RX ADMIN — ROCURONIUM BROMIDE 20 MG: 10 INJECTION, SOLUTION INTRAVENOUS at 15:25

## 2021-05-20 RX ADMIN — HYDROMORPHONE HYDROCHLORIDE 0.5 MG: 2 INJECTION INTRAMUSCULAR; INTRAVENOUS; SUBCUTANEOUS at 17:30

## 2021-05-20 RX ADMIN — SODIUM CHLORIDE, SODIUM LACTATE, POTASSIUM CHLORIDE, AND CALCIUM CHLORIDE 150 ML/HR: 600; 310; 30; 20 INJECTION, SOLUTION INTRAVENOUS at 11:31

## 2021-05-20 RX ADMIN — HYDROMORPHONE HYDROCHLORIDE 0.5 MG: 2 INJECTION INTRAMUSCULAR; INTRAVENOUS; SUBCUTANEOUS at 18:06

## 2021-05-20 RX ADMIN — SODIUM CHLORIDE, SODIUM LACTATE, POTASSIUM CHLORIDE, AND CALCIUM CHLORIDE 100 ML/HR: 600; 310; 30; 20 INJECTION, SOLUTION INTRAVENOUS at 20:50

## 2021-05-20 RX ADMIN — FENTANYL CITRATE 100 MCG: 50 INJECTION INTRAMUSCULAR; INTRAVENOUS at 14:53

## 2021-05-20 RX ADMIN — LIDOCAINE HYDROCHLORIDE 100 MG: 20 INJECTION, SOLUTION EPIDURAL; INFILTRATION; INTRACAUDAL; PERINEURAL at 14:58

## 2021-05-20 RX ADMIN — HYDROMORPHONE HYDROCHLORIDE 0.5 MG: 2 INJECTION INTRAMUSCULAR; INTRAVENOUS; SUBCUTANEOUS at 16:43

## 2021-05-20 RX ADMIN — ROCURONIUM BROMIDE 40 MG: 10 INJECTION, SOLUTION INTRAVENOUS at 14:58

## 2021-05-20 RX ADMIN — ROCURONIUM BROMIDE 10 MG: 10 INJECTION, SOLUTION INTRAVENOUS at 17:58

## 2021-05-20 RX ADMIN — PROMETHAZINE HYDROCHLORIDE 3.25 MG: 25 INJECTION INTRAMUSCULAR; INTRAVENOUS at 20:18

## 2021-05-20 NOTE — PROGRESS NOTES
- Visited as requested, to offer spiritual support and prayer prior to surgery. Pt's son was present. Listened, as pt reflected on the events that led to his current health issues. He anticipates being in the hospital for several days after surgery. Pt shared that his father Deatra Puls similar issues which eventually led to his death at age 80. \"  Mr. Estrella Fields is hopeful that he can recover from this, however, and return to work at [employer] and  other daily activities. Chaplains to follow-up, as needed.      Sari Sarmiento MDiv, [address] [place] [address]

## 2021-05-20 NOTE — ANESTHESIA PREPROCEDURE EVALUATION
Relevant Problems   PERSONAL HX & FAMILY HX OF CANCER   (+) Adenocarcinoma (HCC)       Anesthetic History   No history of anesthetic complications            Review of Systems / Medical History  Patient summary reviewed and pertinent labs reviewed    Pulmonary  Within defined limits                 Neuro/Psych   Within defined limits           Cardiovascular              Hyperlipidemia    Exercise tolerance: >4 METS  Comments: Denies CP, SOB or changes in functional status   GI/Hepatic/Renal     GERD: well controlled          Comments: Lower abdominal pain  Wt loss  Diverticulitis vs ca seen on CT scan Endo/Other        Arthritis     Other Findings   Comments: Having colon resection today for diverticulitis           Physical Exam    Airway  Mallampati: II  TM Distance: 4 - 6 cm  Neck ROM: normal range of motion   Mouth opening: Normal     Cardiovascular    Rhythm: irregular  Rate: normal         Dental    Dentition: Caps/crowns     Pulmonary  Breath sounds clear to auscultation               Abdominal  GI exam deferred       Other Findings            Anesthetic Plan    ASA: 2  Anesthesia type: general          Induction: Intravenous  Anesthetic plan and risks discussed with: Patient and Son / Daughter      Possible tap blocks

## 2021-05-20 NOTE — INTERVAL H&P NOTE
Update History & Physical 
 
The Patient's History and Physical of May 11, Robotic assisted laparoscopic LAR, excision of colovesical fistula, bladder repair possible ostomy was reviewed with the patient and I examined the patient. There was no change. The surgical site was confirmed by the patient and me. Plan:  The risk, benefits, expected outcome, and alternative to the recommended procedure have been discussed with the patient. Patient understands and wants to proceed with the procedure.  
 
Electronically signed by Christina Hill MD on 5/20/2021 at 11:55 AM

## 2021-05-20 NOTE — OP NOTES
Operative Report    Name: Dl Archibald      MRN: [id number]       : 1950       Age: 79 y.o. 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 Arianne Ruiz helped with dissection of rectum in pelvis and resection. Anesthesia: General     Complications: None    Estimated Blood Loss: less than 100 ml           Specimens:   ID Type Source Tests Collected by Time Destination   1 : low anterior resection Fresh   Yaritza Marshall MD 2021 190 Pathology       Implants:  * No implants in log *     Findings: Sigmoid and rectum adhered to the bladder dome. This was carefully dissected. The colon seem to have perforated into the posterior pelvis creating a very hard and calcified mass. The colon was hardened and dissection was extremely difficult. It require additional time and we had to convert to open for better visualization. Dr. Arianne Ruiz came in to help with distal dissection. Statement of Medical Necessity: Dl Archibald is a 79 y.o. male who had an episode of perforated diverticulitis and presented to the ER with abdominal pain. He underwent colonoscopy and Gi was unable to scope pass the sigmoid area due to narrowing. They took biopsies which were negative. We were concern about colon cancer but it seem more inflammatory in nature. He received antibiotics for 2 weeks and recovered well. Then he started having pneumaturia and fecaluria.  He saw urology and had a cystoscopy showing a possible colovesical fistula in the dome of the bladder. He returned and decided to proceed with surgery. We attempted a colonoscopy  4 days ago and I was unable to scope pass the narrow area in the sigmoid colon. He was having normal bowel movements throughout all this time. He lost a few pounds. CEA was normal. A robotic assisted laparoscopic low anterior resection with possible anastomosis vs ostomy, excision of colovesical fistula and bladder repair was recommended. We discussed risks, benefits and alternatives of surgery including but not limited to pain, bleeding, infection, scar, bowel injury, ureter injury, nerve injury, anastomotic leak, impotence, injury to surrounding organs, need for more procedures. Patient understood and agreed to proceed. Procedure Details   After informed consent was taken, patient was taken to the operating room and placed in supine position. Anesthesia was induced and patient was intubated. Patient received preoperative antibiotics. Patient was placed in lithotomy position with pressure points adequately padded. Patient received a mechanical and antibiotic prep the day prior to the procedure. Saucedo was placed. Abdomen and perineum were prepped and draped in standard sterile fashion. A timeout was performed with all team members present. A 1 cm incision was made to the right of the umbilicus. The fascias was elevated. A Veress needle was inserted. Saline drop test was normal. The abdomen was insufflated with carbon dioxide to a pressure of 15 mmHg and the patient tolerated insufflation well. The abdomen was bluntly accessed with a blunt 8 mm robotic trocar. A laparoscope was inserted and a general survey was performed. There was no evidence of intraabdominal injury from trocar placement. Two additional 8 mm robotic cannulas were inserted in the left upper quadrant of the abdomen. A 12 mm robotic cannula was placed in the right lower quadrant.  An assistant 5 mm port was placed in the right upper quadrant. The patient was placed in a Trendelenburg position. The robot was docked. The instruments were carefully inserted under direct visualization. A general survey was performed and the sigmoid colon seem distended and hard and was adhered to the bladder dome. We decided to takedown the splenic flexure laparoscopically and then open to proceed with resection. Using a combination of blunt and sharp dissection, I dissected the Line of Toldt up to the splenic flexure. and carefully mobilize the left colon medially. We then removed the instruments and undocked the robot. A lower midline incision was made, centered around the umbilicus, and electrocautery was used to dissect down to the anterior abdominal fascia. The fascia was elevated and carefully opened with cautery. The liver was inspected and was free of lesions. There was no peritoneal disease. The sigmoid colon and proximal was very adhered to the dome of the bladder. Carefully blunt and sharp dissection was used to detached the colon from the bladder. Once detached, we inspected the bladder, it was very hardened but no obvious fistulous tract was seen for closure. We continue to reflect the left colon medially by incising the line of Toldt with electrocautery in the left lateral pelvis. We encoutered a small abscess with food contents/ this was drained tne the left pelvis wall was very hard and it was very difficault to find the left ureter. The left ureter was finally identified and protected at all times. We continued to mobilized the left colon superiorly and we mobilized the splenic flexure in order to have adequate length after resection. Once the left colon was adequately medialized and the splenic flexure mobilized, the specimen was exteriorized. A resection point was identified in the proximal sigmoid colon. A window was made in the mesentery. The proximal sigmoid colon was divided with a blue load RADHAMES stapler.  Then, we continue dissecting the the distal sigmoid/ proximal rectum off the pelvic wall circumferentially. This was very hard and I called Dr. Hilario Reardon to assist. It was very hard and I was concerned this could be cancer and wanted to make sure we could get far enough distally for margins. Dr. Hilario Reardon scrubbed and assisted with distal mobilization and resection. The left colic branch was identified and carefully divided and the stump was tied doubly with 2-0 silk tie. The distal rectum was divided with a green loaded contour stapler. Dr. Carla Martinez with Urology came to evaluated the ureter. It was present and intact. We also injected about 150 cc of saline cole the bladder and no leakage was seen. Decision was made to do a colostomy and not a primary anastomosis due to chronic inflammation and thickening of tissues. Hemostasis was assured in the wound after the specimen was removed. Hemostasis was good in the left colic gutter. The previously marked preferred ostomy site in the left side of the abdomen skin was incised by removing a nickel size piece of skin. The subcutaneous tissue was divided down to the fascia with electrocautery. The was fascia was identified. We created a X incision on the anterior abdominal fascia over the rectus muscle. The rectus abdomini muscle fibers were carefully  and another X incision was created in the posterior abdominal fascia. Once we had a large enough ostomy tunnel, we tunnel the left colon and brought it out the skin. A [place] clamp was placed to prevent retraction. The abdomen was irrigated and suctioned. The gutter was checked for hemostasis. Seprafilm was placed in the abdomen. The fascia was closed with running #1 PDS starting from each end of the wound and tying to each other in the middle. Interrupted 1-Vicryl sutures were placed for internal retention. The wound was again irrigated and suctioned. There was no bleeding. The skin was closed with staples. Betadine smooth were placed.   A sterile Aquacel Ag dressing was placed. The colostomy was then matured using 3-0 Vicryl suture circumferentially. An ostomy bag was cut to size and placed over the ostomy. All instrument, sponge, and needle counts were correct. The patient was awakened, extubated, and taken to PACU in stable condition.          Cheri Serrano MD  Bariatric & Minimally Invasive Surgery  [facility]  5/20/2021 9:43 PM

## 2021-05-21 LAB
ANION GAP SERPL CALC-SCNC: 9 MMOL/L (ref 7–16)
BASOPHILS # BLD: 0 K/UL (ref 0–0.2)
BASOPHILS NFR BLD: 0 % (ref 0–2)
BUN SERPL-MCNC: 6 MG/DL (ref 8–23)
CALCIUM SERPL-MCNC: 8.2 MG/DL (ref 8.3–10.4)
CHLORIDE SERPL-SCNC: 107 MMOL/L (ref 98–107)
CO2 SERPL-SCNC: 25 MMOL/L (ref 21–32)
CREAT SERPL-MCNC: 0.65 MG/DL (ref 0.8–1.5)
DIFFERENTIAL METHOD BLD: ABNORMAL
EOSINOPHIL # BLD: 0 K/UL (ref 0–0.8)
EOSINOPHIL NFR BLD: 0 % (ref 0.5–7.8)
ERYTHROCYTE [DISTWIDTH] IN BLOOD BY AUTOMATED COUNT: 13.7 % (ref 11.9–14.6)
GLUCOSE SERPL-MCNC: 130 MG/DL (ref 65–100)
HCT VFR BLD AUTO: 33.4 % (ref 41.1–50.3)
HGB BLD-MCNC: 10.5 G/DL (ref 13.6–17.2)
IMM GRANULOCYTES # BLD AUTO: 0.1 K/UL (ref 0–0.5)
IMM GRANULOCYTES NFR BLD AUTO: 0 % (ref 0–5)
LYMPHOCYTES # BLD: 0.5 K/UL (ref 0.5–4.6)
LYMPHOCYTES NFR BLD: 3 % (ref 13–44)
MAGNESIUM SERPL-MCNC: 1.8 MG/DL (ref 1.8–2.4)
MCH RBC QN AUTO: 29 PG (ref 26.1–32.9)
MCHC RBC AUTO-ENTMCNC: 31.4 G/DL (ref 31.4–35)
MCV RBC AUTO: 92.3 FL (ref 79.6–97.8)
MONOCYTES # BLD: 0.5 K/UL (ref 0.1–1.3)
MONOCYTES NFR BLD: 3 % (ref 4–12)
NEUTS SEG # BLD: 13.5 K/UL (ref 1.7–8.2)
NEUTS SEG NFR BLD: 93 % (ref 43–78)
NRBC # BLD: 0 K/UL (ref 0–0.2)
PHOSPHATE SERPL-MCNC: 3.6 MG/DL (ref 2.3–3.7)
PLATELET # BLD AUTO: 350 K/UL (ref 150–450)
PMV BLD AUTO: 9.3 FL (ref 9.4–12.3)
POTASSIUM SERPL-SCNC: 3.6 MMOL/L (ref 3.5–5.1)
RBC # BLD AUTO: 3.62 M/UL (ref 4.23–5.6)
SODIUM SERPL-SCNC: 141 MMOL/L (ref 136–145)
WBC # BLD AUTO: 14.5 K/UL (ref 4.3–11.1)

## 2021-05-21 PROCEDURE — 74011000250 HC RX REV CODE- 250: Performed by: SURGERY

## 2021-05-21 PROCEDURE — 83735 ASSAY OF MAGNESIUM: CPT

## 2021-05-21 PROCEDURE — 74011250637 HC RX REV CODE- 250/637: Performed by: PHYSICIAN ASSISTANT

## 2021-05-21 PROCEDURE — 65270000029 HC RM PRIVATE

## 2021-05-21 PROCEDURE — 2709999900 HC NON-CHARGEABLE SUPPLY

## 2021-05-21 PROCEDURE — 74011250636 HC RX REV CODE- 250/636: Performed by: PHYSICIAN ASSISTANT

## 2021-05-21 PROCEDURE — APPSS15 APP SPLIT SHARED TIME 0-15 MINUTES: Performed by: PHYSICIAN ASSISTANT

## 2021-05-21 PROCEDURE — 74011250636 HC RX REV CODE- 250/636: Performed by: SURGERY

## 2021-05-21 PROCEDURE — 85025 COMPLETE CBC W/AUTO DIFF WBC: CPT

## 2021-05-21 PROCEDURE — 74011250637 HC RX REV CODE- 250/637: Performed by: SURGERY

## 2021-05-21 PROCEDURE — 36415 COLL VENOUS BLD VENIPUNCTURE: CPT

## 2021-05-21 PROCEDURE — 80048 BASIC METABOLIC PNL TOTAL CA: CPT

## 2021-05-21 PROCEDURE — 84100 ASSAY OF PHOSPHORUS: CPT

## 2021-05-21 PROCEDURE — APPNB15 APP NON BILLABLE TIME 0-15 MINS: Performed by: PHYSICIAN ASSISTANT

## 2021-05-21 RX ORDER — POTASSIUM CHLORIDE 14.9 MG/ML
20 INJECTION INTRAVENOUS
Status: COMPLETED | OUTPATIENT
Start: 2021-05-21 | End: 2021-05-21

## 2021-05-21 RX ORDER — MAGNESIUM SULFATE HEPTAHYDRATE 40 MG/ML
2 INJECTION, SOLUTION INTRAVENOUS ONCE
Status: COMPLETED | OUTPATIENT
Start: 2021-05-21 | End: 2021-05-21

## 2021-05-21 RX ORDER — CEFAZOLIN SODIUM/WATER 2 G/20 ML
2 SYRINGE (ML) INTRAVENOUS EVERY 8 HOURS
Status: COMPLETED | OUTPATIENT
Start: 2021-05-21 | End: 2021-05-21

## 2021-05-21 RX ORDER — HYDROMORPHONE HYDROCHLORIDE 1 MG/ML
0.5 INJECTION, SOLUTION INTRAMUSCULAR; INTRAVENOUS; SUBCUTANEOUS
Status: COMPLETED | OUTPATIENT
Start: 2021-05-21 | End: 2021-05-21

## 2021-05-21 RX ORDER — HYDROMORPHONE HYDROCHLORIDE 1 MG/ML
0.5 INJECTION, SOLUTION INTRAMUSCULAR; INTRAVENOUS; SUBCUTANEOUS
Status: DISCONTINUED | OUTPATIENT
Start: 2021-05-21 | End: 2021-05-24

## 2021-05-21 RX ORDER — FAMOTIDINE 20 MG/1
20 TABLET, FILM COATED ORAL 2 TIMES DAILY
Status: DISCONTINUED | OUTPATIENT
Start: 2021-05-21 | End: 2021-05-26 | Stop reason: HOSPADM

## 2021-05-21 RX ADMIN — MAGNESIUM SULFATE HEPTAHYDRATE 2 G: 40 INJECTION, SOLUTION INTRAVENOUS at 09:07

## 2021-05-21 RX ADMIN — CEFAZOLIN SODIUM 2 G: 100 INJECTION, POWDER, LYOPHILIZED, FOR SOLUTION INTRAVENOUS at 22:40

## 2021-05-21 RX ADMIN — CEFAZOLIN SODIUM 2 G: 100 INJECTION, POWDER, LYOPHILIZED, FOR SOLUTION INTRAVENOUS at 09:04

## 2021-05-21 RX ADMIN — OXYCODONE 5 MG: 5 TABLET ORAL at 14:37

## 2021-05-21 RX ADMIN — OXYCODONE 5 MG: 5 TABLET ORAL at 05:39

## 2021-05-21 RX ADMIN — ACETAMINOPHEN 1000 MG: 500 TABLET, FILM COATED ORAL at 12:12

## 2021-05-21 RX ADMIN — POTASSIUM CHLORIDE 20 MEQ: 14.9 INJECTION, SOLUTION INTRAVENOUS at 10:32

## 2021-05-21 RX ADMIN — ACETAMINOPHEN 1000 MG: 500 TABLET, FILM COATED ORAL at 05:39

## 2021-05-21 RX ADMIN — CEFAZOLIN SODIUM 2 G: 100 INJECTION, POWDER, LYOPHILIZED, FOR SOLUTION INTRAVENOUS at 14:37

## 2021-05-21 RX ADMIN — FAMOTIDINE 20 MG: 20 TABLET, FILM COATED ORAL at 17:19

## 2021-05-21 RX ADMIN — HYDROMORPHONE HYDROCHLORIDE 0.5 MG: 1 INJECTION, SOLUTION INTRAMUSCULAR; INTRAVENOUS; SUBCUTANEOUS at 07:47

## 2021-05-21 RX ADMIN — ACETAMINOPHEN 1000 MG: 500 TABLET, FILM COATED ORAL at 17:19

## 2021-05-21 RX ADMIN — POTASSIUM CHLORIDE 20 MEQ: 14.9 INJECTION, SOLUTION INTRAVENOUS at 12:12

## 2021-05-21 RX ADMIN — HEPARIN SODIUM 5000 UNITS: 5000 INJECTION INTRAVENOUS; SUBCUTANEOUS at 23:15

## 2021-05-21 RX ADMIN — ACETAMINOPHEN 1000 MG: 500 TABLET, FILM COATED ORAL at 23:16

## 2021-05-21 RX ADMIN — FAMOTIDINE 20 MG: 20 TABLET, FILM COATED ORAL at 09:27

## 2021-05-21 RX ADMIN — HEPARIN SODIUM 5000 UNITS: 5000 INJECTION INTRAVENOUS; SUBCUTANEOUS at 17:19

## 2021-05-21 RX ADMIN — OXYCODONE 5 MG: 5 TABLET ORAL at 09:27

## 2021-05-21 RX ADMIN — OXYCODONE 5 MG: 5 TABLET ORAL at 02:05

## 2021-05-21 RX ADMIN — HEPARIN SODIUM 5000 UNITS: 5000 INJECTION INTRAVENOUS; SUBCUTANEOUS at 07:49

## 2021-05-21 NOTE — ANESTHESIA POSTPROCEDURE EVALUATION
Procedure(s):  LOW ANTERIOR RESECTION ROBOTIC ASSISTED RESECTION OF COLOVESICLE FISTULA AND BLADDER REPAIR/ OSTOMY.     general    Anesthesia Post Evaluation      Multimodal analgesia: multimodal analgesia used between 6 hours prior to anesthesia start to PACU discharge  Patient location during evaluation: PACU  Patient participation: complete - patient participated  Level of consciousness: awake  Pain management: adequate  Airway patency: patent  Anesthetic complications: no  Cardiovascular status: acceptable  Respiratory status: acceptable  Hydration status: acceptable  Post anesthesia nausea and vomiting:  none  Final Post Anesthesia Temperature Assessment:  Normothermia (36.0-37.5 degrees C)      INITIAL Post-op Vital signs:   Vitals Value Taken Time   /71 05/20/21 2020   Temp 37.1 °C (98.7 °F) 05/20/21 1957   Pulse 85 05/20/21 2020   Resp 16 05/20/21 2020   SpO2 99 % 05/20/21 2020

## 2021-05-21 NOTE — CONSULTS
[address] [facility]    Name:  Kevin Beckwith  MR#:  [id number]  :  1950  ACCOUNT #:  [de-identified]  DATE OF SERVICE:  2021    CONSULTING PHYSICIAN:  Dr. Bk Lagnston. REASON FOR CONSULTATION:  Colovesical fistula. HISTORY OF PRESENT ILLNESS:  A 35-year-old man who had been seen with fecaluria and cystoscopy by myself on 2021, showed likely colovesical fistula. He was taken to the OR today by Dr. Lucía Michelle, and I had a call, there was difficulty locating the ureter. I was asked to come to the OR in the [place] and examine the patient. On entering the OR, the patient has had the sigmoid removed. There is a defect in the dome of the bladder that does not enter the mucosa. It appears to be very hard and is consistent with an inflammatory rind. We had the scrub tech fill the bladder with 200 mL of normal saline. I was not able to see a leak. The left ureter is easily visible and is posterior to the surgical field. It does not appear to have any defects or leak. My recommendation is to leave an indwelling Saucedo for at least 7 days and then the Saucedo can be removed. I believe that the bladder defect is very small and should heal with Saucedo decompression.       MD PEEWEE Rodriguez/[email or web address]  D:  2021 19:03  T:  2021 2:00  JOB #:  [id number]

## 2021-05-21 NOTE — WOUND CARE
Pt seen for new colostomy placed yesterday. Pt currently hurting but agreed to lesson. Current pouch intact and stoma is pink and budded with some bowel sweat. Reviewed with patient emptying and changing procedures on model. Pt states that his dad had a stoma and he helped him change it once and was unable due to the odor. But now since his sense of smell is not as \"strong\" he does not think that will be a problem. Pt able to perform emptying procedure on the model with some verbal cues. Pt states that he lives at home alone but his son and his son's wife live on the same property and would be able to help. Pt also states that his cousin that lives in [place] has had a colostomy for 20 years and that he will be a great resource for him as well. All questions answered. Ostomy team will follow up Monday for continued lessons.

## 2021-05-21 NOTE — PROGRESS NOTES
05/20/21 2221   Oxygen Therapy   O2 Sat (%) 100 %   Pulse via Oximetry 89 beats per minute   O2 Device None (Room air)   O2 Flow Rate (L/min) 0 l/min   Pt on continuous monitor for HS. Alarm limits set. Pt working on IS.

## 2021-05-21 NOTE — PROGRESS NOTES
PLAN:  Continue CLD  LR @100mL/hr  Replace K+ and Mg  Continue Ancef  Keep busby for 5-7 days post op  Add Dilaudid for better pain control  Wound care consult for new colostomy  Labs in AM  Prophylaxis with SQ Heparin, SCDs, IS, Pepcid. ASSESSMENT:  Admit Date: 5/20/2021   1 Day Post-Op  Procedure(s):  LOW ANTERIOR RESECTION ROBOTIC ASSISTED RESECTION OF COLOVESICLE FISTULA AND BLADDER REPAIR/ OSTOMY    Active Problems:    Colovesical fistula (5/20/2021)         SUBJECTIVE:  5/21/21 POD#1 Pt reports uncontrolled pain this Am. -N/V, -flatus or BM. Af, VSS, on 2L NC.  975mL UOP/24h stanley urine via busby. RAFIQ drain with 160mL output. K+ 3.6, Cr 0.65. WBC 14, H/H 10/33. Mg 1.8. OBJECTIVE:  Constitutional: Alert oriented cooperative patient in no acute distress; appears stated age   Visit Vitals  /76   Pulse 88   Temp 97.7 °F (36.5 °C)   Resp 18   Ht 5' 10\" (1.778 m)   Wt 177 lb 1.6 oz (80.3 kg)   SpO2 98%   BMI 25.41 kg/m²     Eyes: Sclera are clear. ENMT: no external lesions gross hearing normal; no obvious neck masses, no ear or lip lesions  CV: Regular rate. Normal perfusion  Resp: No JVD. Breathing is  non-labored; no audible wheezing. GI: mild distention; colostomy to LUQ with pink and viable stoma, no gas or stool ouptut. Dressing with serosanguinous strike through. RAFIQ drain with serosanguinous output. Hypoactive BS. : busby with stanley urine  Musculoskeletal: unremarkable with normal function. No embolic signs or cyanosis.    Neuro:  Oriented; moves all 4; no focal deficits  Psychiatric: normal affect and mood, no memory impairment      Patient Vitals for the past 24 hrs:   BP Temp Pulse Resp SpO2 Height Weight   05/21/21 0710 133/76 97.7 °F (36.5 °C) 88 18 98 %     05/21/21 0340 127/74 97.7 °F (36.5 °C) 85 16 97 %     05/20/21 2330 136/70 98.1 °F (36.7 °C) 81 16 97 %     05/20/21 2221     100 %     05/20/21 2030 136/64 98.8 °F (37.1 °C) 80 16 100 %     05/20/21 2020 138/71  85 16 99 %     05/20/21 2015 (!) 144/72  76 16 99 %     05/20/21 2010 (!) 143/71  83 16 99 %     05/20/21 2005 138/70  77 16 99 %     05/20/21 2000 130/66  76 16 98 %     05/20/21 1957 (!) 145/64 98.7 °F (37.1 °C) 80 16 100 %     05/20/21 1056 125/71 98.6 °F (37 °C) 83 16 98 % 5' 10\" (1.778 m) 177 lb 1.6 oz (80.3 kg)     Labs:    Recent Labs     05/21/21  0451   WBC 14.5*   HGB 10.5*         K 3.6      CO2 25   BUN 6*   CREA 0.65*   *     Brigida Huizar [place]

## 2021-05-21 NOTE — PROGRESS NOTES
TRANSFER - IN REPORT:    Verbal report received from Han Calvillo RN on Sage Nguyen  being received from PACU for routine post - op      Report consisted of patients Situation, Background, Assessment and   Recommendations(SBAR). Information from the following report(s) SBAR, Kardex, OR Summary, Intake/Output, MAR and Recent Results was reviewed with the receiving nurse. Opportunity for questions and clarification was provided.

## 2021-05-21 NOTE — PERIOP NOTES
TRANSFER - OUT REPORT:    Verbal report given to [facility] RN on Richard Perrin  being transferred to [facility] 613 for routine post - op       Report consisted of patients Situation, Background, Assessment and   Recommendations(SBAR). Information from the following report(s) SBAR, OR Summary, MAR and Cardiac Rhythm NSR was reviewed with the receiving nurse. Lines:   Peripheral IV 05/20/21 Left;Posterior Hand (Active)   Site Assessment Clean, dry, & intact 05/20/21 2020   Phlebitis Assessment 0 05/20/21 2020   Infiltration Assessment 0 05/20/21 2020   Dressing Status Clean, dry, & intact 05/20/21 2020   Dressing Type Transparent;Tape 05/20/21 2020   Hub Color/Line Status Green; Infusing;Patent 05/20/21 2020   Alcohol Cap Used No 05/20/21 1130        Opportunity for questions and clarification was provided. Patient transported with:   O2 @ 2 liters  Tech    VTE prophylaxis orders have been written for Richard Perrin. Patient and family given floor number and nurses name. Family updated re: pt status after security code verified.

## 2021-05-21 NOTE — PROGRESS NOTES
Admission assessment complete. Received patient from PACU with son at bedside. Patient is still very drowsy but opens eyes spontaneously to voice and touch. Lung sounds are clear bilaterally, respirations even and unlabored, IV site c/d/i and infusing LR at 100. Patient looks comfortable. Patient has busby which is patent and draining, will remove once patient is more alert and able to ambulate to bathroom. Abd incision has aquacel dressing and it is dry and intact with some breakthrough drainage present. There is some bruising on R arm and chest area. RAFIQ drain on right side is charged, patent, and draining, see flowsheet. Ostomy site is c/d/i. Ostomy bag is empty at this time. Oriented patient to room and call light system. Safety measures in place. Bed low and locked, side table and call light within reach, side rails up x3, bed alarm on, gripper socks on.  Instructed patient to call for assistance and when getting out of bed.          05/20/21 3654   Dual Skin Pressure Injury Assessment   Dual Skin Pressure Injury Assessment X   Second Care Provider (Based on [address] [place] [address]) Kelsy Persaud RN   Skin Integumentary   Skin Integumentary (WDL) X    Pressure  Injury Documentation No Pressure Injury Noted-Pressure Ulcer Prevention Initiated   Skin Color Appropriate for ethnicity   Skin Integrity Incision (comment)

## 2021-05-21 NOTE — PROGRESS NOTES
Problem: Falls - Risk of  Goal: *Absence of Falls  Description: Document Billie Broad Fall Risk and appropriate interventions in the flowsheet.   Outcome: Progressing Towards Goal  Note: Fall Risk Interventions:  Mobility Interventions: Patient to call before getting OOB    Mentation Interventions: Adequate sleep, hydration, pain control, Family/sitter at bedside, Evaluate medications/consider consulting pharmacy, Door open when patient unattended, Bed/chair exit alarm, More frequent rounding, Increase mobility, Toileting rounds, Room close to nurse's station    Medication Interventions: Patient to call before getting OOB, Evaluate medications/consider consulting pharmacy, Teach patient to arise slowly, Bed/chair exit alarm    Elimination Interventions: Call light in reach, Bed/chair exit alarm, Stay With Me (per policy), Patient to call for help with toileting needs, Toilet paper/wipes in reach    History of Falls Interventions: Bed/chair exit alarm, Evaluate medications/consider consulting pharmacy, Investigate reason for fall, Room close to nurse's station

## 2021-05-22 LAB
ANION GAP SERPL CALC-SCNC: 5 MMOL/L (ref 7–16)
BASOPHILS # BLD: 0.1 K/UL (ref 0–0.2)
BASOPHILS NFR BLD: 1 % (ref 0–2)
BUN SERPL-MCNC: 9 MG/DL (ref 8–23)
CALCIUM SERPL-MCNC: 8.3 MG/DL (ref 8.3–10.4)
CHLORIDE SERPL-SCNC: 105 MMOL/L (ref 98–107)
CO2 SERPL-SCNC: 29 MMOL/L (ref 21–32)
CREAT SERPL-MCNC: 0.52 MG/DL (ref 0.8–1.5)
DIFFERENTIAL METHOD BLD: ABNORMAL
EOSINOPHIL # BLD: 0.1 K/UL (ref 0–0.8)
EOSINOPHIL NFR BLD: 1 % (ref 0.5–7.8)
ERYTHROCYTE [DISTWIDTH] IN BLOOD BY AUTOMATED COUNT: 13.7 % (ref 11.9–14.6)
GLUCOSE SERPL-MCNC: 96 MG/DL (ref 65–100)
HCT VFR BLD AUTO: 29.6 % (ref 41.1–50.3)
HGB BLD-MCNC: 9.1 G/DL (ref 13.6–17.2)
IMM GRANULOCYTES # BLD AUTO: 0.1 K/UL (ref 0–0.5)
IMM GRANULOCYTES NFR BLD AUTO: 1 % (ref 0–5)
LYMPHOCYTES # BLD: 0.7 K/UL (ref 0.5–4.6)
LYMPHOCYTES NFR BLD: 9 % (ref 13–44)
MCH RBC QN AUTO: 29 PG (ref 26.1–32.9)
MCHC RBC AUTO-ENTMCNC: 30.7 G/DL (ref 31.4–35)
MCV RBC AUTO: 94.3 FL (ref 79.6–97.8)
MONOCYTES # BLD: 0.6 K/UL (ref 0.1–1.3)
MONOCYTES NFR BLD: 7 % (ref 4–12)
NEUTS SEG # BLD: 6.8 K/UL (ref 1.7–8.2)
NEUTS SEG NFR BLD: 82 % (ref 43–78)
NRBC # BLD: 0 K/UL (ref 0–0.2)
PLATELET # BLD AUTO: 293 K/UL (ref 150–450)
PMV BLD AUTO: 9.8 FL (ref 9.4–12.3)
POTASSIUM SERPL-SCNC: 3.8 MMOL/L (ref 3.5–5.1)
RBC # BLD AUTO: 3.14 M/UL (ref 4.23–5.6)
SODIUM SERPL-SCNC: 139 MMOL/L (ref 136–145)
WBC # BLD AUTO: 8.4 K/UL (ref 4.3–11.1)

## 2021-05-22 PROCEDURE — 74011250636 HC RX REV CODE- 250/636: Performed by: SURGERY

## 2021-05-22 PROCEDURE — 85025 COMPLETE CBC W/AUTO DIFF WBC: CPT

## 2021-05-22 PROCEDURE — 77030027138 HC INCENT SPIROMETER -A

## 2021-05-22 PROCEDURE — 80048 BASIC METABOLIC PNL TOTAL CA: CPT

## 2021-05-22 PROCEDURE — 36415 COLL VENOUS BLD VENIPUNCTURE: CPT

## 2021-05-22 PROCEDURE — 74011250637 HC RX REV CODE- 250/637: Performed by: SURGERY

## 2021-05-22 PROCEDURE — 74011250637 HC RX REV CODE- 250/637: Performed by: PHYSICIAN ASSISTANT

## 2021-05-22 PROCEDURE — 65270000029 HC RM PRIVATE

## 2021-05-22 RX ORDER — AMOXICILLIN 250 MG
2 CAPSULE ORAL 2 TIMES DAILY
Status: DISCONTINUED | OUTPATIENT
Start: 2021-05-22 | End: 2021-05-26 | Stop reason: HOSPADM

## 2021-05-22 RX ADMIN — OXYCODONE 5 MG: 5 TABLET ORAL at 18:05

## 2021-05-22 RX ADMIN — OXYCODONE 5 MG: 5 TABLET ORAL at 06:30

## 2021-05-22 RX ADMIN — FAMOTIDINE 20 MG: 20 TABLET, FILM COATED ORAL at 18:05

## 2021-05-22 RX ADMIN — OXYCODONE 5 MG: 5 TABLET ORAL at 11:49

## 2021-05-22 RX ADMIN — ONDANSETRON 4 MG: 4 TABLET, ORALLY DISINTEGRATING ORAL at 18:13

## 2021-05-22 RX ADMIN — DOCUSATE SODIUM 50MG AND SENNOSIDES 8.6MG 2 TABLET: 8.6; 5 TABLET, FILM COATED ORAL at 18:05

## 2021-05-22 RX ADMIN — ACETAMINOPHEN 1000 MG: 500 TABLET, FILM COATED ORAL at 06:30

## 2021-05-22 RX ADMIN — ACETAMINOPHEN 1000 MG: 500 TABLET, FILM COATED ORAL at 18:05

## 2021-05-22 RX ADMIN — HEPARIN SODIUM 5000 UNITS: 5000 INJECTION INTRAVENOUS; SUBCUTANEOUS at 08:52

## 2021-05-22 RX ADMIN — HEPARIN SODIUM 5000 UNITS: 5000 INJECTION INTRAVENOUS; SUBCUTANEOUS at 15:44

## 2021-05-22 RX ADMIN — FAMOTIDINE 20 MG: 20 TABLET, FILM COATED ORAL at 08:53

## 2021-05-22 NOTE — PROGRESS NOTES
Admit Date: 2021    POD 2 Days Post-Op    Procedure:  Procedure(s):  LOW ANTERIOR RESECTION ROBOTIC ASSISTED RESECTION OF COLOVESICLE FISTULA AND BLADDER REPAIR/ OSTOMY    Subjective:     Patient has complaints of general malaise. Pain control has been adequate. Got a little light-headed when arose earlier. No gas per ostomy. Objective:     Visit Vitals  BP (!) 151/79 (BP 1 Location: Right upper arm, BP Patient Position: At rest)   Pulse 85   Temp 98.2 °F (36.8 °C)   Resp 18   Ht 5' 10\" (1.778 m)   Wt 176 lb 9.6 oz (80.1 kg)   SpO2 97%   BMI 25.34 kg/m²       Temp (24hrs), Av.4 °F (36.9 °C), Min:98.1 °F (36.7 °C), Max:99 °F (37.2 °C)  . I&O reviewed as documented. Physical Exam:    General-in no distress. Abdomen- Wound dressing dry and pt has mild appropriately distributed tenderness along incision. Stoma pink. RAFIQ nonbloody. Urine per busby also nonbloody. Labs:   Recent Results (from the past 24 hour(s))   CBC WITH AUTOMATED DIFF    Collection Time: 21  5:12 AM   Result Value Ref Range    WBC 8.4 4.3 - 11.1 K/uL    RBC 3.14 (L) 4.23 - 5.6 M/uL    HGB 9.1 (L) 13.6 - 17.2 g/dL    HCT 29.6 (L) 41.1 - 50.3 %    MCV 94.3 79.6 - 97.8 FL    MCH 29.0 26.1 - 32.9 PG    MCHC 30.7 (L) 31.4 - 35.0 g/dL    RDW 13.7 11.9 - 14.6 %    PLATELET 680 851 - 729 K/uL    MPV 9.8 9.4 - 12.3 FL    ABSOLUTE NRBC 0.00 0.0 - 0.2 K/uL    DF AUTOMATED      NEUTROPHILS 82 (H) 43 - 78 %    LYMPHOCYTES 9 (L) 13 - 44 %    MONOCYTES 7 4.0 - 12.0 %    EOSINOPHILS 1 0.5 - 7.8 %    BASOPHILS 1 0.0 - 2.0 %    IMMATURE GRANULOCYTES 1 0.0 - 5.0 %    ABS. NEUTROPHILS 6.8 1.7 - 8.2 K/UL    ABS. LYMPHOCYTES 0.7 0.5 - 4.6 K/UL    ABS. MONOCYTES 0.6 0.1 - 1.3 K/UL    ABS. EOSINOPHILS 0.1 0.0 - 0.8 K/UL    ABS. BASOPHILS 0.1 0.0 - 0.2 K/UL    ABS. IMM.  GRANS. 0.1 0.0 - 0.5 K/UL   METABOLIC PANEL, BASIC    Collection Time: 21  5:12 AM   Result Value Ref Range    Sodium 139 136 - 145 mmol/L    Potassium 3.8 3.5 - 5.1 mmol/L    Chloride 105 98 - 107 mmol/L    CO2 29 21 - 32 mmol/L    Anion gap 5 (L) 7 - 16 mmol/L    Glucose 96 65 - 100 mg/dL    BUN 9 8 - 23 MG/DL    Creatinine 0.52 (L) 0.8 - 1.5 MG/DL    GFR est AA >60 >60 ml/min/1.73m2    GFR est non-AA >60 >60 ml/min/1.73m2    Calcium 8.3 8.3 - 10.4 MG/DL            Assessment:     Active Problems:    Colovesical fistula (5/20/2021)          Plan/Recommendations/Medical Decision Making:     Continue present treatment   Await GI function  Mobilize and add incentive spirometer

## 2021-05-22 NOTE — PROGRESS NOTES
Care Management Interventions  PCP Verified by CM: Yes  Current Support Network: Other  Discharge Location  Discharge Placement: Home with family assistance  79 yr-old M with new colostomy. Lives alone but son lives on the same property and would be able to help. Ostomy team f/u again on Monday for continued lessons. Will follow.

## 2021-05-22 NOTE — PROGRESS NOTES
Problem: Falls - Risk of  Goal: *Absence of Falls  Description: Document Chris Rodas Fall Risk and appropriate interventions in the flowsheet.   Outcome: Progressing Towards Goal  Note: Fall Risk Interventions:  Mobility Interventions: OT consult for ADLs, Patient to call before getting OOB, PT Consult for mobility concerns, PT Consult for assist device competence, Strengthening exercises (ROM-active/passive), Utilize walker, cane, or other assistive device    Mentation Interventions: Adequate sleep, hydration, pain control    Medication Interventions: Patient to call before getting OOB, Teach patient to arise slowly, Evaluate medications/consider consulting pharmacy    Elimination Interventions: Call light in reach, Elevated toilet seat, Patient to call for help with toileting needs, Urinal in reach    History of Falls Interventions: Bed/chair exit alarm, Evaluate medications/consider consulting pharmacy, Investigate reason for fall, Room close to nurse's station         Problem: Patient Education: Go to Patient Education Activity  Goal: Patient/Family Education  Outcome: Progressing Towards Goal     Problem: Pain  Goal: *Control of Pain  Outcome: Progressing Towards Goal  Goal: *PALLIATIVE CARE:  Alleviation of Pain  Outcome: Progressing Towards Goal     Problem: Patient Education: Go to Patient Education Activity  Goal: Patient/Family Education  Outcome: Progressing Towards Goal

## 2021-05-23 LAB
BASOPHILS # BLD: 0.1 K/UL (ref 0–0.2)
BASOPHILS NFR BLD: 1 % (ref 0–2)
DIFFERENTIAL METHOD BLD: ABNORMAL
EOSINOPHIL # BLD: 0.2 K/UL (ref 0–0.8)
EOSINOPHIL NFR BLD: 2 % (ref 0.5–7.8)
ERYTHROCYTE [DISTWIDTH] IN BLOOD BY AUTOMATED COUNT: 13.7 % (ref 11.9–14.6)
HCT VFR BLD AUTO: 33.7 % (ref 41.1–50.3)
HGB BLD-MCNC: 10.5 G/DL (ref 13.6–17.2)
IMM GRANULOCYTES # BLD AUTO: 0 K/UL (ref 0–0.5)
IMM GRANULOCYTES NFR BLD AUTO: 0 % (ref 0–5)
LYMPHOCYTES # BLD: 0.7 K/UL (ref 0.5–4.6)
LYMPHOCYTES NFR BLD: 7 % (ref 13–44)
MCH RBC QN AUTO: 29 PG (ref 26.1–32.9)
MCHC RBC AUTO-ENTMCNC: 31.2 G/DL (ref 31.4–35)
MCV RBC AUTO: 93.1 FL (ref 79.6–97.8)
MONOCYTES # BLD: 0.4 K/UL (ref 0.1–1.3)
MONOCYTES NFR BLD: 4 % (ref 4–12)
NEUTS SEG # BLD: 9.2 K/UL (ref 1.7–8.2)
NEUTS SEG NFR BLD: 86 % (ref 43–78)
NRBC # BLD: 0 K/UL (ref 0–0.2)
PLATELET # BLD AUTO: 420 K/UL (ref 150–450)
PMV BLD AUTO: 9.3 FL (ref 9.4–12.3)
RBC # BLD AUTO: 3.62 M/UL (ref 4.23–5.6)
WBC # BLD AUTO: 10.6 K/UL (ref 4.3–11.1)

## 2021-05-23 PROCEDURE — 65270000029 HC RM PRIVATE

## 2021-05-23 PROCEDURE — 74011250637 HC RX REV CODE- 250/637: Performed by: SURGERY

## 2021-05-23 PROCEDURE — 36415 COLL VENOUS BLD VENIPUNCTURE: CPT

## 2021-05-23 PROCEDURE — 85025 COMPLETE CBC W/AUTO DIFF WBC: CPT

## 2021-05-23 PROCEDURE — 74011250636 HC RX REV CODE- 250/636: Performed by: SURGERY

## 2021-05-23 PROCEDURE — 74011250637 HC RX REV CODE- 250/637: Performed by: PHYSICIAN ASSISTANT

## 2021-05-23 RX ADMIN — HEPARIN SODIUM 5000 UNITS: 5000 INJECTION INTRAVENOUS; SUBCUTANEOUS at 00:16

## 2021-05-23 RX ADMIN — ACETAMINOPHEN 1000 MG: 500 TABLET, FILM COATED ORAL at 17:31

## 2021-05-23 RX ADMIN — DOCUSATE SODIUM 50MG AND SENNOSIDES 8.6MG 2 TABLET: 8.6; 5 TABLET, FILM COATED ORAL at 17:31

## 2021-05-23 RX ADMIN — FAMOTIDINE 20 MG: 20 TABLET, FILM COATED ORAL at 09:25

## 2021-05-23 RX ADMIN — HEPARIN SODIUM 5000 UNITS: 5000 INJECTION INTRAVENOUS; SUBCUTANEOUS at 09:24

## 2021-05-23 RX ADMIN — HEPARIN SODIUM 5000 UNITS: 5000 INJECTION INTRAVENOUS; SUBCUTANEOUS at 16:07

## 2021-05-23 RX ADMIN — FAMOTIDINE 20 MG: 20 TABLET, FILM COATED ORAL at 17:31

## 2021-05-23 RX ADMIN — ACETAMINOPHEN 1000 MG: 500 TABLET, FILM COATED ORAL at 23:09

## 2021-05-23 RX ADMIN — HEPARIN SODIUM 5000 UNITS: 5000 INJECTION INTRAVENOUS; SUBCUTANEOUS at 23:09

## 2021-05-23 RX ADMIN — ACETAMINOPHEN 1000 MG: 500 TABLET, FILM COATED ORAL at 00:16

## 2021-05-23 RX ADMIN — DOCUSATE SODIUM 50MG AND SENNOSIDES 8.6MG 2 TABLET: 8.6; 5 TABLET, FILM COATED ORAL at 09:24

## 2021-05-23 RX ADMIN — ACETAMINOPHEN 1000 MG: 500 TABLET, FILM COATED ORAL at 12:22

## 2021-05-23 NOTE — ROUTINE PROCESS
Verbal shift change report given to myself (oncoming nurse) by Mari aDe Jesus Scherer RN (offgoing nurse). Report included the following information SBAR, Kardex, MAR and Recent Results.

## 2021-05-23 NOTE — ROUTINE PROCESS
Bedside and Verbal shift change report given to [facility] [address], RN (oncoming nurse) by myself (offgoing nurse). Report included the following information SBAR, Kardex, MAR and Recent Results.

## 2021-05-23 NOTE — PROGRESS NOTES
Midline incision aquacel is peeling up at the edges. Contacted on-call general surgery.  Orders given to change aquacel

## 2021-05-24 ENCOUNTER — HOME HEALTH ADMISSION (OUTPATIENT)
Dept: HOME HEALTH SERVICES | Facility: HOME HEALTH | Age: 71
End: 2021-05-24
Payer: MEDICARE

## 2021-05-24 PROCEDURE — 74011250637 HC RX REV CODE- 250/637: Performed by: SURGERY

## 2021-05-24 PROCEDURE — 74011250637 HC RX REV CODE- 250/637: Performed by: PHYSICIAN ASSISTANT

## 2021-05-24 PROCEDURE — 65270000029 HC RM PRIVATE

## 2021-05-24 PROCEDURE — 74011250636 HC RX REV CODE- 250/636: Performed by: SURGERY

## 2021-05-24 RX ORDER — HYDROMORPHONE HYDROCHLORIDE 1 MG/ML
0.5 INJECTION, SOLUTION INTRAMUSCULAR; INTRAVENOUS; SUBCUTANEOUS
Status: DISCONTINUED | OUTPATIENT
Start: 2021-05-24 | End: 2021-05-26 | Stop reason: HOSPADM

## 2021-05-24 RX ADMIN — ACETAMINOPHEN 1000 MG: 500 TABLET, FILM COATED ORAL at 06:48

## 2021-05-24 RX ADMIN — FAMOTIDINE 20 MG: 20 TABLET, FILM COATED ORAL at 08:15

## 2021-05-24 RX ADMIN — ACETAMINOPHEN 1000 MG: 500 TABLET, FILM COATED ORAL at 17:03

## 2021-05-24 RX ADMIN — HEPARIN SODIUM 5000 UNITS: 5000 INJECTION INTRAVENOUS; SUBCUTANEOUS at 23:14

## 2021-05-24 RX ADMIN — DOCUSATE SODIUM 50MG AND SENNOSIDES 8.6MG 2 TABLET: 8.6; 5 TABLET, FILM COATED ORAL at 17:03

## 2021-05-24 RX ADMIN — DOCUSATE SODIUM 50MG AND SENNOSIDES 8.6MG 2 TABLET: 8.6; 5 TABLET, FILM COATED ORAL at 08:15

## 2021-05-24 RX ADMIN — FAMOTIDINE 20 MG: 20 TABLET, FILM COATED ORAL at 17:03

## 2021-05-24 RX ADMIN — HEPARIN SODIUM 5000 UNITS: 5000 INJECTION INTRAVENOUS; SUBCUTANEOUS at 17:03

## 2021-05-24 RX ADMIN — HEPARIN SODIUM 5000 UNITS: 5000 INJECTION INTRAVENOUS; SUBCUTANEOUS at 08:16

## 2021-05-24 RX ADMIN — ACETAMINOPHEN 1000 MG: 500 TABLET, FILM COATED ORAL at 23:12

## 2021-05-24 NOTE — PROGRESS NOTES
Admit Date: 2021    POD 4 Days Post-Op    Procedure:  Procedure(s):  LOW ANTERIOR RESECTION ROBOTIC ASSISTED RESECTION OF COLOVESICLE FISTULA AND BLADDER REPAIR/ OSTOMY    Subjective:     Pt reports mild nausea. No vomiting. On CLD. No ostomy output yet. AF, VSS, on room air. Busby in place with 1.3L UOP. RAFIQ drain with 170mL serous output. Objective:     Visit Vitals  /81 (BP 1 Location: Right upper arm, BP Patient Position: At rest)   Pulse 79   Temp 98.6 °F (37 °C)   Resp 18   Ht 5' 10\" (1.778 m)   Wt 174 lb 3.2 oz (79 kg)   SpO2 97%   BMI 25.00 kg/m²       Temp (24hrs), Av.2 °F (36.8 °C), Min:97.5 °F (36.4 °C), Max:98.6 °F (37 °C)  . I&O reviewed as documented. Physical Exam:    General-in no distress. Abdomen- Wound dressing dry and pt has mild appropriately distributed tenderness along incision. Stoma slightly dusky. RAFIQ serous. Urine per busby also nonbloody. Hypoactive BS. Labs:   Recent Results (from the past 24 hour(s))   CBC WITH AUTOMATED DIFF    Collection Time: 21  9:21 AM   Result Value Ref Range    WBC 10.6 4.3 - 11.1 K/uL    RBC 3.62 (L) 4.23 - 5.6 M/uL    HGB 10.5 (L) 13.6 - 17.2 g/dL    HCT 33.7 (L) 41.1 - 50.3 %    MCV 93.1 79.6 - 97.8 FL    MCH 29.0 26.1 - 32.9 PG    MCHC 31.2 (L) 31.4 - 35.0 g/dL    RDW 13.7 11.9 - 14.6 %    PLATELET 539 970 - 573 K/uL    MPV 9.3 (L) 9.4 - 12.3 FL    ABSOLUTE NRBC 0.00 0.0 - 0.2 K/uL    NEUTROPHILS 86 (H) 43 - 78 %    LYMPHOCYTES 7 (L) 13 - 44 %    MONOCYTES 4 4.0 - 12.0 %    EOSINOPHILS 2 0.5 - 7.8 %    BASOPHILS 1 0.0 - 2.0 %    IMMATURE GRANULOCYTES 0 0.0 - 5.0 %    ABS. NEUTROPHILS 9.2 (H) 1.7 - 8.2 K/UL    ABS. LYMPHOCYTES 0.7 0.5 - 4.6 K/UL    ABS. MONOCYTES 0.4 0.1 - 1.3 K/UL    ABS. EOSINOPHILS 0.2 0.0 - 0.8 K/UL    ABS. BASOPHILS 0.1 0.0 - 0.2 K/UL    ABS. IMM.  GRANS. 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      LUIS Vieira

## 2021-05-24 NOTE — PROGRESS NOTES
Care Management Interventions  PCP Verified by CM: Yes  Mode of Transport at Discharge: Self  Transition of Care Consult (CM Consult): [address]: Yes  Current Support Network: Relative's Home  Confirm Follow Up Transport: Family  The Plan for Transition of Care is Related to the Following Treatment Goals : return home with new ostomony  The Patient and/or Patient Representative was Provided with a Choice of Provider and Agrees with the Discharge Plan?: Yes  Freedom of Choice List was Provided with Basic Dialogue that Supports the Patient's Individualized Plan of Care/Goals, Treatment Preferences and Shares the Quality Data Associated with the Providers?: Yes  Discharge Location  Discharge Placement: Home with family assistance    Order rec'd to arrange home health. Pt going home with a new ostomy. He lives with his son and his family in [place]. He is normally indep with care and still works. He plans to be at home (homebound) until he is given permission to resume his work which does require some lifting. Pt agreeable to [facility] and did not have a preference towards provider. Referral sent to Dunia Mejia with [facility]. WD nurse scheduled for initial education today. Pt does not anticipate discharge today.

## 2021-05-24 NOTE — PROGRESS NOTES
Problem: Falls - Risk of  Goal: *Absence of Falls  Description: Document Layo Paulson Fall Risk and appropriate interventions in the flowsheet.   Outcome: Progressing Towards Goal  Note: Fall Risk Interventions:  Mobility Interventions: Bed/chair exit alarm    Mentation Interventions: Bed/chair exit alarm    Medication Interventions: Bed/chair exit alarm    Elimination Interventions: Call light in reach    History of Falls Interventions: Evaluate medications/consider consulting pharmacy, Door open when patient unattended, Investigate reason for fall         Problem: Pain  Goal: *Control of Pain  Outcome: Progressing Towards Goal

## 2021-05-24 NOTE — PROGRESS NOTES
Shift assessment complete. Pt resting in bed. A&Ox4. Abdominal incision c/d/i. Saucedo in place. RAFIQ drain and colostomy c/d/i. Pt denies pain at this time. Bed in lowest position, call light within reach, side railx3. Encouraged to call for help when needed.

## 2021-05-24 NOTE — WOUND CARE
Patient seen for new colostomy lesson. Stoma site red and budded, 1 inch at present. It is in a deep fold, will need flexible pouch until he can go into a convex barrier in 2 weeks. Patient reports he has lost almost 50 lbs since January 2021. Changed pouch on patient with him observing. Is putting out loose brown stool and gas. Discussed pouching regime, ordering supplies, gas producing foods and diet. Discussed hernia prevention. All written materials given including home skills kit. DVD given in kit. Plan to continue teaching, will order home starter kits as he stated he would like to try different pouches. Answered all questions.

## 2021-05-24 NOTE — PROGRESS NOTES
Shift assessment complete. Pt a/ox4 and sitting in recliner. Dressings to abd c/d/i. RAFIQ drain c/d/i, patent, charged and draining serosanguinous fluid. Saucedo catheter c/d/i patent and draining clear yellow urine. Pt up ad marilee. No complaints of pain at this time/ Bed low and locked, side rails x3, gripper socks on, and call light in reach. Encouraged to call for help if needed and pt verbalized understanding.

## 2021-05-25 LAB
ANION GAP SERPL CALC-SCNC: 5 MMOL/L (ref 7–16)
BASOPHILS # BLD: 0 K/UL (ref 0–0.2)
BASOPHILS NFR BLD: 1 % (ref 0–2)
BUN SERPL-MCNC: 7 MG/DL (ref 8–23)
CALCIUM SERPL-MCNC: 8.9 MG/DL (ref 8.3–10.4)
CHLORIDE SERPL-SCNC: 105 MMOL/L (ref 98–107)
CO2 SERPL-SCNC: 29 MMOL/L (ref 21–32)
CREAT SERPL-MCNC: 0.46 MG/DL (ref 0.8–1.5)
DIFFERENTIAL METHOD BLD: ABNORMAL
EOSINOPHIL # BLD: 0.3 K/UL (ref 0–0.8)
EOSINOPHIL NFR BLD: 5 % (ref 0.5–7.8)
ERYTHROCYTE [DISTWIDTH] IN BLOOD BY AUTOMATED COUNT: 13.7 % (ref 11.9–14.6)
GLUCOSE SERPL-MCNC: 101 MG/DL (ref 65–100)
HCT VFR BLD AUTO: 30.3 % (ref 41.1–50.3)
HGB BLD-MCNC: 9.7 G/DL (ref 13.6–17.2)
IMM GRANULOCYTES # BLD AUTO: 0 K/UL (ref 0–0.5)
IMM GRANULOCYTES NFR BLD AUTO: 1 % (ref 0–5)
LYMPHOCYTES # BLD: 0.8 K/UL (ref 0.5–4.6)
LYMPHOCYTES NFR BLD: 14 % (ref 13–44)
MCH RBC QN AUTO: 29.3 PG (ref 26.1–32.9)
MCHC RBC AUTO-ENTMCNC: 32 G/DL (ref 31.4–35)
MCV RBC AUTO: 91.5 FL (ref 79.6–97.8)
MONOCYTES # BLD: 0.4 K/UL (ref 0.1–1.3)
MONOCYTES NFR BLD: 7 % (ref 4–12)
NEUTS SEG # BLD: 4.3 K/UL (ref 1.7–8.2)
NEUTS SEG NFR BLD: 73 % (ref 43–78)
NRBC # BLD: 0 K/UL (ref 0–0.2)
PLATELET # BLD AUTO: 354 K/UL (ref 150–450)
PMV BLD AUTO: 9.2 FL (ref 9.4–12.3)
POTASSIUM SERPL-SCNC: 3.2 MMOL/L (ref 3.5–5.1)
RBC # BLD AUTO: 3.31 M/UL (ref 4.23–5.6)
SODIUM SERPL-SCNC: 139 MMOL/L (ref 136–145)
WBC # BLD AUTO: 5.9 K/UL (ref 4.3–11.1)

## 2021-05-25 PROCEDURE — 85025 COMPLETE CBC W/AUTO DIFF WBC: CPT

## 2021-05-25 PROCEDURE — 74011250637 HC RX REV CODE- 250/637: Performed by: SURGERY

## 2021-05-25 PROCEDURE — 74011250637 HC RX REV CODE- 250/637: Performed by: PHYSICIAN ASSISTANT

## 2021-05-25 PROCEDURE — 80048 BASIC METABOLIC PNL TOTAL CA: CPT

## 2021-05-25 PROCEDURE — 36415 COLL VENOUS BLD VENIPUNCTURE: CPT

## 2021-05-25 PROCEDURE — 74011250636 HC RX REV CODE- 250/636: Performed by: SURGERY

## 2021-05-25 PROCEDURE — APPSS15 APP SPLIT SHARED TIME 0-15 MINUTES: Performed by: PHYSICIAN ASSISTANT

## 2021-05-25 PROCEDURE — 74011250636 HC RX REV CODE- 250/636: Performed by: PHYSICIAN ASSISTANT

## 2021-05-25 PROCEDURE — 2709999900 HC NON-CHARGEABLE SUPPLY

## 2021-05-25 PROCEDURE — APPNB15 APP NON BILLABLE TIME 0-15 MINS: Performed by: PHYSICIAN ASSISTANT

## 2021-05-25 PROCEDURE — 65270000029 HC RM PRIVATE

## 2021-05-25 RX ORDER — POTASSIUM CHLORIDE 14.9 MG/ML
20 INJECTION INTRAVENOUS
Status: COMPLETED | OUTPATIENT
Start: 2021-05-25 | End: 2021-05-25

## 2021-05-25 RX ADMIN — POTASSIUM CHLORIDE 20 MEQ: 14.9 INJECTION, SOLUTION INTRAVENOUS at 11:09

## 2021-05-25 RX ADMIN — DOCUSATE SODIUM 50MG AND SENNOSIDES 8.6MG 2 TABLET: 8.6; 5 TABLET, FILM COATED ORAL at 17:40

## 2021-05-25 RX ADMIN — FAMOTIDINE 20 MG: 20 TABLET, FILM COATED ORAL at 08:13

## 2021-05-25 RX ADMIN — DOCUSATE SODIUM 50MG AND SENNOSIDES 8.6MG 2 TABLET: 8.6; 5 TABLET, FILM COATED ORAL at 08:13

## 2021-05-25 RX ADMIN — ACETAMINOPHEN 1000 MG: 500 TABLET, FILM COATED ORAL at 17:40

## 2021-05-25 RX ADMIN — ACETAMINOPHEN 1000 MG: 500 TABLET, FILM COATED ORAL at 23:12

## 2021-05-25 RX ADMIN — FAMOTIDINE 20 MG: 20 TABLET, FILM COATED ORAL at 17:40

## 2021-05-25 RX ADMIN — ACETAMINOPHEN 1000 MG: 500 TABLET, FILM COATED ORAL at 11:09

## 2021-05-25 RX ADMIN — POTASSIUM CHLORIDE 20 MEQ: 14.9 INJECTION, SOLUTION INTRAVENOUS at 14:11

## 2021-05-25 RX ADMIN — HEPARIN SODIUM 5000 UNITS: 5000 INJECTION INTRAVENOUS; SUBCUTANEOUS at 08:13

## 2021-05-25 NOTE — WOUND CARE
Lengthy lesson for colostomy, pouch ordering, changing, emptying discussed, patient verbalized understanding. Pouch emptied by patient with minimal guidance. Pouch changed by patient with minimal guidance. Opportunity for questions given all answered. Patient state he may discharge as early as tomorrow. If possible would benefit from 1 more lesson with patient and will need home health for reinforcing teaching. Will monitor.

## 2021-05-25 NOTE — PROGRESS NOTES
Problem: Falls - Risk of  Goal: *Absence of Falls  Description: Document Mellissa Fall Risk and appropriate interventions in the flowsheet.   Outcome: Progressing Towards Goal  Note: Fall Risk Interventions:  Mobility Interventions: Bed/chair exit alarm, OT consult for ADLs, Patient to call before getting OOB, PT Consult for mobility concerns, PT Consult for assist device competence, Strengthening exercises (ROM-active/passive), Utilize walker, cane, or other assistive device    Mentation Interventions: Bed/chair exit alarm    Medication Interventions: Patient to call before getting OOB, Teach patient to arise slowly, Bed/chair exit alarm    Elimination Interventions: Call light in reach, Elevated toilet seat, Patient to call for help with toileting needs    History of Falls Interventions: Bed/chair exit alarm         Problem: Patient Education: Go to Patient Education Activity  Goal: Patient/Family Education  Outcome: Progressing Towards Goal     Problem: Pain  Goal: *Control of Pain  Outcome: Progressing Towards Goal  Goal: *PALLIATIVE CARE:  Alleviation of Pain  Outcome: Progressing Towards Goal     Problem: Patient Education: Go to Patient Education Activity  Goal: Patient/Family Education  Outcome: Progressing Towards Goal

## 2021-05-25 NOTE — PROGRESS NOTES
Problem: Falls - Risk of  Goal: *Absence of Falls  Description: Document Keira Oliva Fall Risk and appropriate interventions in the flowsheet.   Outcome: Progressing Towards Goal  Note: Fall Risk Interventions:  Mobility Interventions: Bed/chair exit alarm    Mentation Interventions: Bed/chair exit alarm    Medication Interventions: Bed/chair exit alarm    Elimination Interventions: Call light in reach    History of Falls Interventions: Bed/chair exit alarm         Problem: Pain  Goal: *Control of Pain  Outcome: Progressing Towards Goal

## 2021-05-25 NOTE — PROGRESS NOTES
Admit Date: 2021    POD 5 Days Post-Op    Procedure:  Procedure(s):  LOW ANTERIOR RESECTION ROBOTIC ASSISTED RESECTION OF COLOVESICLE FISTULA AND BLADDER REPAIR/ OSTOMY    Subjective:     Pt reports gas and stool output. Tolerating FLD. AF, VSS, on room air. Busby in place with 1.2L UOP. RAFIQ drain with 20mL serous output. Dressing and smooth removed yesterday. K+ 3.2, replacing. Objective:     Visit Vitals  /75 (BP 1 Location: Right upper arm, BP Patient Position: Sitting)   Pulse 87   Temp 98.1 °F (36.7 °C)   Resp 16   Ht 5' 10\" (1.778 m)   Wt 174 lb 3.2 oz (79 kg)   SpO2 97%   BMI 25.00 kg/m²       Temp (24hrs), Av.2 °F (36.8 °C), Min:97.8 °F (36.6 °C), Max:98.7 °F (37.1 °C)  . I&O reviewed as documented. Physical Exam:    General-in no distress. Abdomen- Wound is without cellulitis or drainage and pt has mild appropriately distributed tenderness along incision. Stoma slightly dusky with stool and gas output. RAFIQ serous. Urine per busby also nonbloody. + BS. Labs:   Recent Results (from the past 24 hour(s))   CBC WITH AUTOMATED DIFF    Collection Time: 21  4:13 AM   Result Value Ref Range    WBC 5.9 4.3 - 11.1 K/uL    RBC 3.31 (L) 4.23 - 5.6 M/uL    HGB 9.7 (L) 13.6 - 17.2 g/dL    HCT 30.3 (L) 41.1 - 50.3 %    MCV 91.5 79.6 - 97.8 FL    MCH 29.3 26.1 - 32.9 PG    MCHC 32.0 31.4 - 35.0 g/dL    RDW 13.7 11.9 - 14.6 %    PLATELET 433 334 - 993 K/uL    MPV 9.2 (L) 9.4 - 12.3 FL    ABSOLUTE NRBC 0.00 0.0 - 0.2 K/uL    DF AUTOMATED      NEUTROPHILS 73 43 - 78 %    LYMPHOCYTES 14 13 - 44 %    MONOCYTES 7 4.0 - 12.0 %    EOSINOPHILS 5 0.5 - 7.8 %    BASOPHILS 1 0.0 - 2.0 %    IMMATURE GRANULOCYTES 1 0.0 - 5.0 %    ABS. NEUTROPHILS 4.3 1.7 - 8.2 K/UL    ABS. LYMPHOCYTES 0.8 0.5 - 4.6 K/UL    ABS. MONOCYTES 0.4 0.1 - 1.3 K/UL    ABS. EOSINOPHILS 0.3 0.0 - 0.8 K/UL    ABS. BASOPHILS 0.0 0.0 - 0.2 K/UL    ABS. IMM.  GRANS. 0.0 0.0 - 0.5 K/UL   METABOLIC PANEL, BASIC    Collection Time: 05/25/21  4:13 AM   Result Value Ref Range    Sodium 139 136 - 145 mmol/L    Potassium 3.2 (L) 3.5 - 5.1 mmol/L    Chloride 105 98 - 107 mmol/L    CO2 29 21 - 32 mmol/L    Anion gap 5 (L) 7 - 16 mmol/L    Glucose 101 (H) 65 - 100 mg/dL    BUN 7 (L) 8 - 23 MG/DL    Creatinine 0.46 (L) 0.8 - 1.5 MG/DL    GFR est AA >60 >60 ml/min/1.73m2    GFR est non-AA >60 >60 ml/min/1.73m2    Calcium 8.9 8.3 - 10.4 MG/DL            Assessment:     Active Problems:    Colovesical fistula (5/20/2021)          Plan/Recommendations/Medical Decision Making:     Continue present treatment   FLD  Mobilize and incentive spirometer  Replace lytes PRN  Routine drain care  Continue busby day 5/7  Routine ostomy care  Dressing and smooth removed by surgeon yesterday  Ambulating  Prophylaxis with SQ Heparin, Pepcid, SCDs, IS. CM following for HH at discharge.      LUIS Cali

## 2021-05-25 NOTE — PROGRESS NOTES
Shift assessment complete. Pt resting in chair. A&Ox4. Ostomy and RAFIQ drain in place. Saucedo draining without difficulty. Pt denies pain at this time. Bed in lowest position, call light within reach, side rails x3. Encouraged to call for help when needed.

## 2021-05-26 VITALS
TEMPERATURE: 97.6 F | OXYGEN SATURATION: 99 % | DIASTOLIC BLOOD PRESSURE: 55 MMHG | RESPIRATION RATE: 15 BRPM | WEIGHT: 174.2 LBS | BODY MASS INDEX: 24.94 KG/M2 | HEART RATE: 88 BPM | SYSTOLIC BLOOD PRESSURE: 103 MMHG | HEIGHT: 70 IN

## 2021-05-26 PROCEDURE — 99024 POSTOP FOLLOW-UP VISIT: CPT | Performed by: PHYSICIAN ASSISTANT

## 2021-05-26 PROCEDURE — APPNB30 APP NON BILLABLE TIME 0-30 MINS: Performed by: PHYSICIAN ASSISTANT

## 2021-05-26 PROCEDURE — 2709999900 HC NON-CHARGEABLE SUPPLY

## 2021-05-26 PROCEDURE — 74011250637 HC RX REV CODE- 250/637: Performed by: SURGERY

## 2021-05-26 PROCEDURE — 74011250637 HC RX REV CODE- 250/637: Performed by: PHYSICIAN ASSISTANT

## 2021-05-26 PROCEDURE — APPSS15 APP SPLIT SHARED TIME 0-15 MINUTES: Performed by: PHYSICIAN ASSISTANT

## 2021-05-26 RX ORDER — AMOXICILLIN 250 MG
2 CAPSULE ORAL 2 TIMES DAILY
Status: SHIPPED | COMMUNITY
Start: 2021-05-26

## 2021-05-26 RX ORDER — OXYCODONE HYDROCHLORIDE 5 MG/1
5 TABLET ORAL
Qty: 20 TABLET | Refills: 0 | Status: SHIPPED | OUTPATIENT
Start: 2021-05-26 | End: 2021-05-31

## 2021-05-26 RX ADMIN — DOCUSATE SODIUM 50MG AND SENNOSIDES 8.6MG 2 TABLET: 8.6; 5 TABLET, FILM COATED ORAL at 10:32

## 2021-05-26 RX ADMIN — ACETAMINOPHEN 1000 MG: 500 TABLET, FILM COATED ORAL at 06:22

## 2021-05-26 RX ADMIN — FAMOTIDINE 20 MG: 20 TABLET, FILM COATED ORAL at 10:32

## 2021-05-26 NOTE — DISCHARGE SUMMARY
[facility] [address]  [place], [address] [facility]  [phone]   Discharge Summary     Emily Taylor  MRN: [id number]     : 1950     Age: 79 y.o. Admit date: 2021     Discharge date: 2021  Attending Physician: Petra Doan*  Primary Discharge Diagnosis:   Active Problems:    Colovesical fistula (2021)      Primary Operations or Procedures Performed :  Procedure(s):  LOW ANTERIOR RESECTION ROBOTIC ASSISTED RESECTION OF COLOVESICLE FISTULA AND BLADDER REPAIR/ OSTOMY     Brief History and Reason for Admission: Emily Taylor was admitted with the following history of present illness. Emily Taylor is a 79 y.o. male who presents for evaluation of recent hospitalization due to abnormal CT scan concerning for colonic mass. He was admitted and prepped for colonoscopy. GI did colonoscopy and biopsied colon. Biopsies were benign. Colon was very thickened but no obvious mass was seen. He was discharged with oral antibiotics for 2 weeks which he completed. He reports LLQ pain is improved but still has some discomfort. He saw GI yesterday and they ordered repeat CT scan. He denies fever, chills. He has been eating well.        2021 - He is here for follow up. He reports symptoms have worsen. He has lost over 25 pounds in the last 3 months. He also reports suprapubic pain. pain is 1-2/10 and occurs during urination. It does not occur all the time. He also started having debris and stool in his urine. He saw urology and they did a cystoscopy confirming a colovesical fistula. Hospital Course: The patient underwent Procedure(s):  LOW ANTERIOR RESECTION ROBOTIC ASSISTED RESECTION OF COLOVESICLE FISTULA AND BLADDER REPAIR/ OSTOMY on 2021. On POD#6 pt met criteria for discharge. He had stool and gas output from his ostomy. He had minimal serous drainage from his RAFIQ drain.  His busby was removed and he was voiding with incontinence. He was tolerating PO intake and ambulating. He was discharged home to home health with his drain in place. The patient progressed satisfactorily meeting milestones necessary for successful discharge including tolerating a diet, adequate mobility, adequate pain control, and active bowel function. Patient was deemed a good candidate for discharge at the time of morning rounding. They are to follow up as indicated in their provided discharge paperwork. The patient helped develop and voices understanding with the plan of care. They are amenable without reservations at this time to moving forward with discharge. Condition at Discharge: Good    Discharge Medications:   Current Discharge Medication List      START taking these medications    Details   oxyCODONE IR (ROXICODONE) 5 mg immediate release tablet Take 1 Tablet by mouth every four (4) hours as needed for Pain for up to 5 days. Max Daily Amount: 30 mg.  Qty: 20 Tablet, Refills: 0    Associated Diagnoses: Colovesical fistula; Adenocarcinoma ([address]      senna-docusate (PERICOLACE) 8.6-50 mg per tablet Take 2 Tablets by mouth two (2) times a day. CONTINUE these medications which have NOT CHANGED    Details   clobetasoL (TEMOVATE) 0.05 % topical cream Apply  to affected area two (2) times a day. meloxicam (MOBIC) 15 mg tablet Take 15 mg by mouth daily. TERBINAFINE HCL EX by Apply Externally route.      esomeprazole (NEXIUM) 40 mg capsule Take 40 mg by mouth daily. Takes mid day      multivitamin, tx-iron-ca-min (THERA-M w/ IRON) 9 mg iron-400 mcg tab tablet Take 1 Tab by mouth daily. aspirin (ASPIRIN) 325 mg tablet Take 325 mg by mouth daily. B.infantis-B.ani-B.long-B.bifi (Probiotic 4X) 10-15 mg TbEC Take  by mouth.          STOP taking these medications       neomycin (MYCIFRADIN) 500 mg tablet Comments:   Reason for Stopping:         metroNIDAZOLE (FlagyL) 500 mg tablet Comments:   Reason for Stopping: Disposition: Home with Home Health    Discharge Instructions/Follow-up Care:        Discharge Instructions/Follow-up Plans:   MD Instructions:     Follow-up with Dr. Yordan Arvizu in 1 week. Keep incisions clean and dry, may remain uncovered. Do not apply lotions, creams or ointments to incisions. Keep record of your drain output and bring this to your follow up appointment.     Diet - as tolerated - Soft foods diet. Activity - ambulate - as tolerated - no heavy lifting >10lb. May shower - no tub baths or soaking/submerging.     No driving while taking narcotics. Do not drink alcohol while taking narcotics.   Resume other home medications.      If problems or questions arise, please call our office at [phone].     Greater than 30 minutes were spent discharging the patient       Signed:  Brigida Huizar, [address]   5/26/2021  10:37 AM

## 2021-05-26 NOTE — DISCHARGE INSTRUCTIONS
Discharge Instructions/Follow-up Plans:   MD Instructions:     Follow-up with Dr. Deidra Dinh in 1 week. Keep incisions clean and dry, may remain uncovered. Do not apply lotions, creams or ointments to incisions. Keep record of your drain output and bring this to your follow up appointment.     Diet - as tolerated - Soft foods diet. Activity - ambulate - as tolerated - no heavy lifting >10lb. May shower - no tub baths or soaking/submerging.     No driving while taking narcotics. Do not drink alcohol while taking narcotics. Resume other home medications.      If problems or questions arise, please call our office at [phone].     Greater than 30 minutes were spent discharging the patient      Patient Education        Laparoscopic Bowel Resection: What to Expect at [place] [address]. had part of your small or large intestine taken out. You are likely to have pain that comes and goes for the next few days. You may feel like you have the flu. You also may have a low fever and feel tired and nauseated. This is common. You should feel better after 1 to 2 weeks and will probably be back to normal in 2 to 4 weeks. Your bowel movements may not be regular for several weeks. Also, you may have some blood in your stool. This care sheet gives you a general idea about how long it will take for you to recover. But each person recovers at a different pace. Follow the steps below to get better as quickly as possible. How can you care for yourself at home? Activity    · Rest when you feel tired. Getting enough sleep will help you recover.     · Try to walk each day. Start by walking a little more than you did the day before. Bit by bit, increase the amount you walk.  Walking boosts blood flow and helps prevent pneumonia and constipation.     · Avoid strenuous activities, such as biking, jogging, weight lifting, or aerobic exercise, until your doctor says it is okay.     · Avoid lifting anything that would make you strain. This may include heavy grocery bags and milk containers, a heavy briefcase or backpack, cat litter or dog food bags, a vacuum , or a child.     · Ask your doctor when you can drive again.     · You will probably need to take 2 to 4 weeks off from work. It depends on the type of work you do and how you feel.     · You may shower 24 to 48 hours after surgery, if your doctor says it is okay. Pat the cut (incision) dry. Do not take a bath for the first 2 weeks, or until your doctor tells you it is okay.     · Ask your doctor when it is okay for you to have sex. Diet    · You may not have much appetite after the surgery. But try to eat a healthy diet. Your doctor will tell you about any foods you should not eat.     · Eat a low-fiber diet for several weeks after surgery. Eat many small meals throughout the day. Add high-fiber foods a little at a time.     · Eat yogurt. It puts good bacteria into your colon and helps prevent diarrhea.     · Try to avoid nuts, seeds, and corn for a while. They may be hard to digest.     · You may need to take vitamins that contain sodium and potassium. Your doctor will tell you whether you should take any vitamins or supplements.     · Drink plenty of fluids to prevent dehydration. Choose water and other caffeine-free clear liquids until you feel better. If you have kidney, heart, or liver disease and have to limit fluids, talk with your doctor before you increase the amount of fluids you drink. Medicines    · Your doctor will tell you if and when you can restart your medicines. You will also be given instructions about taking any new medicines.     · If you take aspirin or some other blood thinner, ask your doctor if and when to start taking it again. Make sure that you understand exactly what your doctor wants you to do.     · Take pain medicines exactly as directed. ? If the doctor gave you a prescription medicine for pain, take it as prescribed.   ? If you are not taking a prescription pain medicine, ask your doctor if you can take an over-the-counter medicine.     · If your doctor prescribed antibiotics, take them as directed. Do not stop taking them just because you feel better. You need to take the full course of antibiotics.     · You may need to take some medicines in a different form. You will be told whether to crush pills or take a liquid form of the medicine.     · If your doctor gives you a stool softener, take it as directed. Incision care    · If you have strips of tape on the incisions, leave the tape on for a week or until it falls off.     · Wash the area daily with warm, soapy water and pat it dry. Don't use hydrogen peroxide or alcohol, which can slow healing. You may cover the area with a gauze bandage if it weeps or rubs against clothing. Change the bandage every day. Follow-up care is a key part of your treatment and safety. Be sure to make and go to all appointments, and call your doctor if you are having problems. It's also a good idea to know your test results and keep a list of the medicines you take. When should you call for help? Call 911 anytime you think you may need emergency care. For example, call if:    · You passed out (lost consciousness).     · You are short of breath. Call your doctor now or seek immediate medical care if:    · You have pain that does not get better after you take pain medicine.     · You cannot pass stool or gas.     · You are sick to your stomach and cannot keep fluids down.     · Bright red blood has soaked through your bandage.     · You have loose stitches, or your incision comes open.     · You have signs of a blood clot in your leg (called a deep vein thrombosis), such as:  ? Pain in your calf, back of the knee, thigh, or groin. ? Redness and swelling in your leg or groin.     · You have signs of infection, such as:  ? Increased pain, swelling, warmth, or redness.   ? Red streaks leading from the incision. ? Pus draining from the incision. ? A fever. Watch closely for any changes in your health, and be sure to contact your doctor if you have any problems. Where can you learn more? Go to http://www.gray.com/  Enter H191 in the search box to learn more about \"Laparoscopic Bowel Resection: What to Expect at Home. \"  Current as of: April 15, 2020               Content Version: 12.8  © 2006-2021 [employer]. Care instructions adapted under license by [employer] (which disclaims liability or warranty for this information). If you have questions about a medical condition or this instruction, always ask your healthcare professional. [address] any warranty or liability for your use of this information.

## 2021-05-26 NOTE — PROGRESS NOTES
Admit Date: 2021    POD 6 Days Post-Op    Procedure:  Procedure(s):  LOW ANTERIOR RESECTION ROBOTIC ASSISTED RESECTION OF COLOVESICLE FISTULA AND BLADDER REPAIR/ OSTOMY    Subjective:     Pt reports gas and stool output. Tolerating FLD. AF, VSS, on room air. Saucedo removed and pt incontinent of urine post removal. RAFIQ drain with minimal serous output. Objective:     Visit Vitals  /75 (BP 1 Location: Right upper arm, BP Patient Position: At rest)   Pulse 74   Temp 98.2 °F (36.8 °C)   Resp 15   Ht 5' 10\" (1.778 m)   Wt 174 lb 3.2 oz (79 kg)   SpO2 97%   BMI 25.00 kg/m²       Temp (24hrs), Av °F (31.1 °C), Min:36.7 °F (2.6 °C), Max:98.8 °F (37.1 °C)  . I&O reviewed as documented. Physical Exam:    General-in no distress. Abdomen- Wound is without cellulitis or drainage and pt has mild appropriately distributed tenderness along incision. Stoma slightly dusky with stool and gas output. RAFIQ serous.  + BS. Labs:   No results found for this or any previous visit (from the past 24 hour(s)). Assessment:     Active Problems:    Colovesical fistula (2021)          Plan/Recommendations/Medical Decision Making:     Continue present treatment   GI soft  Mobilize and incentive spirometer  Routine drain care  Saucedo removed this AM; incontinent of urine post removal  Routine ostomy care  Ambulating  Prophylaxis with SQ Heparin, Pepcid, SCDs, IS.    CM following for [facility] at discharge later today     LUIS Vieira

## 2021-05-26 NOTE — WOUND CARE
Patient seen for ostomy lesson. Patient emptied his pouch independently. He felt comfortable with cares but stated he is sure he will have questions after he goes home. He has the ostomy team office number for further questions. He agreed to home starter kits for free samples. Discussed diet, hydration and bathing. Discussed lifting restrictions for hernia prevention. Answered all questions at bedside. Patient being discharged home today. He has supplies and written materials.

## 2021-05-27 ENCOUNTER — HOME CARE VISIT (OUTPATIENT)
Dept: SCHEDULING | Facility: HOME HEALTH | Age: 71
End: 2021-05-27
Payer: MEDICARE

## 2021-05-27 PROCEDURE — 400018 HH-NO PAY CLAIM PROCEDURE

## 2021-05-27 PROCEDURE — 400013 HH SOC

## 2021-05-27 PROCEDURE — G0299 HHS/HOSPICE OF RN EA 15 MIN: HCPCS

## 2021-05-27 PROCEDURE — 3331090002 HH PPS REVENUE DEBIT

## 2021-05-27 PROCEDURE — 3331090001 HH PPS REVENUE CREDIT

## 2021-05-28 VITALS
OXYGEN SATURATION: 98 % | DIASTOLIC BLOOD PRESSURE: 70 MMHG | RESPIRATION RATE: 16 BRPM | TEMPERATURE: 98.2 F | HEART RATE: 78 BPM | SYSTOLIC BLOOD PRESSURE: 110 MMHG

## 2021-05-28 PROCEDURE — 3331090001 HH PPS REVENUE CREDIT

## 2021-05-28 PROCEDURE — 3331090002 HH PPS REVENUE DEBIT

## 2021-05-29 PROCEDURE — 3331090001 HH PPS REVENUE CREDIT

## 2021-05-29 PROCEDURE — 3331090002 HH PPS REVENUE DEBIT

## 2021-05-30 PROCEDURE — 3331090002 HH PPS REVENUE DEBIT

## 2021-05-30 PROCEDURE — 3331090001 HH PPS REVENUE CREDIT

## 2021-05-31 PROCEDURE — 3331090001 HH PPS REVENUE CREDIT

## 2021-05-31 PROCEDURE — 3331090002 HH PPS REVENUE DEBIT

## 2021-06-01 PROCEDURE — 3331090002 HH PPS REVENUE DEBIT

## 2021-06-01 PROCEDURE — 3331090001 HH PPS REVENUE CREDIT

## 2021-06-02 ENCOUNTER — HOME CARE VISIT (OUTPATIENT)
Dept: SCHEDULING | Facility: HOME HEALTH | Age: 71
End: 2021-06-02
Payer: MEDICARE

## 2021-06-02 PROCEDURE — 3331090002 HH PPS REVENUE DEBIT

## 2021-06-02 PROCEDURE — G0299 HHS/HOSPICE OF RN EA 15 MIN: HCPCS

## 2021-06-02 PROCEDURE — 3331090001 HH PPS REVENUE CREDIT

## 2021-06-03 VITALS
RESPIRATION RATE: 19 BRPM | DIASTOLIC BLOOD PRESSURE: 64 MMHG | SYSTOLIC BLOOD PRESSURE: 122 MMHG | TEMPERATURE: 98.6 F | HEART RATE: 78 BPM | OXYGEN SATURATION: 99 %

## 2021-06-03 PROCEDURE — 3331090002 HH PPS REVENUE DEBIT

## 2021-06-03 PROCEDURE — 3331090001 HH PPS REVENUE CREDIT

## 2021-06-04 PROCEDURE — 3331090001 HH PPS REVENUE CREDIT

## 2021-06-04 PROCEDURE — 3331090002 HH PPS REVENUE DEBIT

## 2021-06-05 PROCEDURE — 3331090002 HH PPS REVENUE DEBIT

## 2021-06-05 PROCEDURE — 3331090001 HH PPS REVENUE CREDIT

## 2021-06-06 PROCEDURE — 3331090002 HH PPS REVENUE DEBIT

## 2021-06-06 PROCEDURE — 3331090001 HH PPS REVENUE CREDIT

## 2021-06-07 PROCEDURE — 3331090001 HH PPS REVENUE CREDIT

## 2021-06-07 PROCEDURE — 3331090002 HH PPS REVENUE DEBIT

## 2021-06-08 ENCOUNTER — HOME CARE VISIT (OUTPATIENT)
Dept: SCHEDULING | Facility: HOME HEALTH | Age: 71
End: 2021-06-08
Payer: MEDICARE

## 2021-06-08 VITALS
SYSTOLIC BLOOD PRESSURE: 120 MMHG | TEMPERATURE: 98.5 F | HEART RATE: 73 BPM | RESPIRATION RATE: 19 BRPM | DIASTOLIC BLOOD PRESSURE: 88 MMHG | OXYGEN SATURATION: 99 %

## 2021-06-08 PROCEDURE — A4414 OST SKNBAR W/O CONV<=4 SQ IN: HCPCS

## 2021-06-08 PROCEDURE — 3331090001 HH PPS REVENUE CREDIT

## 2021-06-08 PROCEDURE — G0299 HHS/HOSPICE OF RN EA 15 MIN: HCPCS

## 2021-06-08 PROCEDURE — A5063 DRAIN OSTOMY POUCH W/FLANGE: HCPCS

## 2021-06-08 PROCEDURE — 3331090002 HH PPS REVENUE DEBIT

## 2021-06-09 PROCEDURE — 3331090001 HH PPS REVENUE CREDIT

## 2021-06-09 PROCEDURE — 3331090002 HH PPS REVENUE DEBIT

## 2021-06-10 ENCOUNTER — HOME CARE VISIT (OUTPATIENT)
Dept: SCHEDULING | Facility: HOME HEALTH | Age: 71
End: 2021-06-10
Payer: MEDICARE

## 2021-06-10 VITALS
DIASTOLIC BLOOD PRESSURE: 60 MMHG | RESPIRATION RATE: 16 BRPM | TEMPERATURE: 98 F | SYSTOLIC BLOOD PRESSURE: 105 MMHG | HEART RATE: 77 BPM | OXYGEN SATURATION: 100 %

## 2021-06-10 PROCEDURE — 3331090001 HH PPS REVENUE CREDIT

## 2021-06-10 PROCEDURE — 3331090002 HH PPS REVENUE DEBIT

## 2021-06-10 PROCEDURE — G0299 HHS/HOSPICE OF RN EA 15 MIN: HCPCS

## 2021-06-11 PROCEDURE — 3331090002 HH PPS REVENUE DEBIT

## 2021-06-11 PROCEDURE — 3331090001 HH PPS REVENUE CREDIT

## 2021-06-12 PROCEDURE — 3331090001 HH PPS REVENUE CREDIT

## 2021-06-12 PROCEDURE — 3331090002 HH PPS REVENUE DEBIT

## 2021-06-13 PROCEDURE — 3331090002 HH PPS REVENUE DEBIT

## 2021-06-13 PROCEDURE — 3331090001 HH PPS REVENUE CREDIT

## 2021-06-14 PROCEDURE — 3331090002 HH PPS REVENUE DEBIT

## 2021-06-14 PROCEDURE — 3331090001 HH PPS REVENUE CREDIT

## 2021-06-15 ENCOUNTER — HOME CARE VISIT (OUTPATIENT)
Dept: SCHEDULING | Facility: HOME HEALTH | Age: 71
End: 2021-06-15
Payer: MEDICARE

## 2021-06-15 VITALS
SYSTOLIC BLOOD PRESSURE: 120 MMHG | HEART RATE: 66 BPM | RESPIRATION RATE: 17 BRPM | TEMPERATURE: 98.9 F | DIASTOLIC BLOOD PRESSURE: 70 MMHG | OXYGEN SATURATION: 99 %

## 2021-06-15 PROCEDURE — 3331090001 HH PPS REVENUE CREDIT

## 2021-06-15 PROCEDURE — 3331090002 HH PPS REVENUE DEBIT

## 2021-06-15 PROCEDURE — G0299 HHS/HOSPICE OF RN EA 15 MIN: HCPCS

## 2021-06-15 PROCEDURE — A5057 1 PC OST POU W BUILT-IN CONV: HCPCS

## 2021-06-16 PROCEDURE — 3331090002 HH PPS REVENUE DEBIT

## 2021-06-16 PROCEDURE — 3331090001 HH PPS REVENUE CREDIT

## 2021-06-17 PROCEDURE — 3331090001 HH PPS REVENUE CREDIT

## 2021-06-17 PROCEDURE — 3331090002 HH PPS REVENUE DEBIT

## 2021-06-18 PROCEDURE — 3331090002 HH PPS REVENUE DEBIT

## 2021-06-18 PROCEDURE — 3331090001 HH PPS REVENUE CREDIT

## 2021-06-19 PROCEDURE — 3331090002 HH PPS REVENUE DEBIT

## 2021-06-19 PROCEDURE — 3331090001 HH PPS REVENUE CREDIT

## 2021-06-20 PROCEDURE — 3331090002 HH PPS REVENUE DEBIT

## 2021-06-20 PROCEDURE — 3331090001 HH PPS REVENUE CREDIT

## 2021-06-21 PROCEDURE — 3331090001 HH PPS REVENUE CREDIT

## 2021-06-21 PROCEDURE — 3331090002 HH PPS REVENUE DEBIT

## 2021-06-22 ENCOUNTER — HOME CARE VISIT (OUTPATIENT)
Dept: SCHEDULING | Facility: HOME HEALTH | Age: 71
End: 2021-06-22
Payer: MEDICARE

## 2021-06-22 VITALS
SYSTOLIC BLOOD PRESSURE: 122 MMHG | TEMPERATURE: 98.5 F | OXYGEN SATURATION: 99 % | HEART RATE: 66 BPM | DIASTOLIC BLOOD PRESSURE: 64 MMHG | RESPIRATION RATE: 17 BRPM

## 2021-06-22 PROCEDURE — G0299 HHS/HOSPICE OF RN EA 15 MIN: HCPCS

## 2021-06-22 PROCEDURE — 3331090002 HH PPS REVENUE DEBIT

## 2021-06-22 PROCEDURE — 3331090001 HH PPS REVENUE CREDIT

## 2021-08-24 NOTE — H&P (VIEW-ONLY)
Shira Burdick MD   Bariatric & Advanced Laparoscopic Surgery & Endoscopy  [address] [place], [place] [address]  Phone [phone]   Fax [phone]      Date of visit: 2021      Primary/Requesting provider: Isabel Briones MD         Name: Everardo Jones      MRN: [id number]       : 1950       Age: 79 y.o. Sex: male        PCP: Isabel Briones MD     CC:    Chief Complaint   Patient presents with    Follow-up     FU 2021 Low Anterior Resection robotic assisted resection        HPI:     Everardo Jones is a 79 y.o. male who presents for follow up after Hartmanns' procedure due to colovesical . He reports doing very well over the last 3 months. His colostomy has been working well. No complaints. He is eating well. Good stool output. No pain. No wound issues. He is excited about having his colostomy potentially reversed. PMH:    Past Medical History:   Diagnosis Date    Calculus of kidney     \"they don't bother me\" 21    Colovesical fistula     COVID-19 vaccine series completed 2021    Aron Olvera     Diverticulitis     Encounter for long-term (current) use of other medications 2014    High cholesterol 2014    no meds    Other testicular hypofunction 2014    Weight loss        PSH:    Past Surgical History:   Procedure Laterality Date    COLONOSCOPY N/A 2021    COLONOSCOPY performed by Edison Nunez MD at [address] 2021    SIGMOIDOSCOPY FLEXIBLE performed by Toan Bermudez MD at [facility] HX COLONOSCOPY  2021    HX ORTHOPAEDIC      LEFT LEG/ANKLE FX    HX TONSILLECTOMY         MEDS:    Current Outpatient Medications   Medication Sig    senna-docusate (PERICOLACE) 8.6-50 mg per tablet Take 2 Tablets by mouth two (2) times a day.  clobetasoL (TEMOVATE) 0.05 % topical cream Apply 1 Dose to affected area two (2) times a day. both hands    meloxicam (MOBIC) 15 mg tablet Take 15 mg by mouth daily.  TERBINAFINE HCL EX 1 Dose by Apply Externally route daily. toes    esomeprazole (NEXIUM) 40 mg capsule Take 40 mg by mouth daily. Takes mid day    multivitamin, tx-iron-ca-min (THERA-M w/ IRON) 9 mg iron-400 mcg tab tablet Take 1 Tab by mouth daily.  aspirin (ASPIRIN) 325 mg tablet Take 325 mg by mouth daily.  B.infantis-B.ani-B.long-B.bifi (Probiotic 4X) 10-15 mg TbEC Take 1 Dose by mouth daily. (Patient not taking: Reported on 8/24/2021)     No current facility-administered medications for this visit. ALLERGIES:      No Known Allergies    SH:    Social History     Tobacco Use    Smoking status: Never Smoker    Smokeless tobacco: Never Used   Vaping Use    Vaping Use: Never used   Substance Use Topics    Alcohol use: Not Currently    Drug use: No       FH:    Family History   Problem Relation Age of Onset    Heart Disease Father         CABG & STENTS     Heart Attack Father     No Known Problems Mother        Review of systems:  Review of Systems   Constitutional: Negative for chills, fever and weight loss. HENT: Negative for hearing loss, sore throat and tinnitus. Eyes:        Glasses   Respiratory: Negative for cough, shortness of breath and wheezing. Cardiovascular: Negative for chest pain, palpitations and PND. Gastrointestinal: Positive for heartburn. Negative for abdominal pain and vomiting. Genitourinary: Negative for dysuria, frequency and urgency. Musculoskeletal: Negative for back pain, joint pain and neck pain. Skin: Negative for itching and rash. Neurological: Negative for dizziness, seizures and loss of consciousness. Endo/Heme/Allergies: Bruises/bleeds easily. Psychiatric/Behavioral: Negative for depression and memory loss. The patient does not have insomnia.           Physical Exam:     Visit Vitals  BP (!) 144/82   Pulse 72   Ht 5' 10\" (1.778 m)   Wt 187 lb (84.8 kg)   BMI 26.83 kg/m²       General:  Well-developed, well-nourished, no distress. Psych:  Cooperative, good insight and judgement. Neuro:  Alert, oriented to person, place and time. HEENT:  Normocephalic, atraumatic. Sclera clear. Lungs:  Unlabored breathing. Symmetrical chest expansion. Chest wall:  No tenderness or deformity. Heart:  Regular rate and rhythm. No JVD. Abdomen:  Soft, non-tender, non-distended. No guarding or rebound. LLQ colostomy pink, stool in bag. Midline lower abdominal scar well healed. Extremities:  Extremities normal, atraumatic, no cyanosis or edema. Skin:  Skin color, texture, turgor normal. No rashes. Labs: All recent labs were reviewed. Normal WBC. Normal lytes. ICD-10-CM ICD-9-CM    1. Colovesical fistula  N32.1 596.1    2. Sigmoid stricture ([facility] [address]  K56.699 560.9    3. Abnormal CT of the abdomen  R93.5 793.6    4. Colostomy care [facility])  Z43.3 V55.3          Assessment/Plan:  Negar Solorio is a 79 y.o. male who has signs and symptoms consistent with colostomy that needs reversal    1. Schedule for colonoscopy to clear colon and pouch  The risks, benefits, alternatives, and consequences were reviewed with the patient, who expressed verbal understanding and agreement to proceed. 2.  Bowel prep and enemas - discussed. The patient was educated on the importance of bowel preparation as well as a clear liquid diet the day before the procedure to minimize the chance of missed lesions and the need to repeat the procedure. 3.  We will then plan for Patrizia's reversal. He will then need a mechanical bowel prep and antibiotic prep. He will return to discuss as does not want to get confused with colonoscopy prep.         Time: I spent 40 minutes preparing to see patient (including chart review and preparation), obtaining and/or reviewing additional medical history, performing a physical exam and evaluation, documenting clinical information in the electronic health record, independently interpreting results, communicating results to patient, family or caregiver, and/or coordinating care.       Signed: Dnota Muhammad MD  Bariatric & Minimally Invasive Surgery  8/24/2021 1:27 PM

## 2021-09-03 ENCOUNTER — HOSPITAL ENCOUNTER (OUTPATIENT)
Dept: SURGERY | Age: 71
Discharge: HOME | End: 2021-09-03

## 2021-09-08 VITALS — HEIGHT: 70 IN | BODY MASS INDEX: 26.92 KG/M2 | WEIGHT: 188 LBS

## 2021-09-08 NOTE — PERIOP NOTES
Patient verified name, , and procedure. Type: 1a; abbreviated assessment per anesthesia guidelines    Labs per anesthesia: none    Instructed pt that they will be notified the day before their procedure by the GI Lab for time of arrival if their procedure is [facility] and Pre-op for [place] cases. Arrival times should be called by 5 pm. If no phone is received the patient should contact their respective hospital. The GI lab telephone number is [phone] and ES Pre-op is [phone]. Follow diet and prep instructions per office including NPO status. If patient has NOT received instructions from office patient is advised to call surgeon office, verbalizes understanding. Bath or shower the night before and the am of surgery with non-mositurizing soap. No lotions, oils, powders, cologne on skin. No make up, eye make up or jewelry. Wear loose fitting comfortable, clean clothing. Must have adult present in building the entire time . Medications for the day of procedure Nexium, patient to hold ASA per Dr. Webber Goods instructions (pt states has not taken ASA in a week)    The following discharge instructions reviewed with patient: medication given during procedure may cause drowsiness for several hours, therefore, do not drive or operate machinery for remainder of the day. You may not drink alcohol on the day of your procedure, please resume regular diet and activity unless otherwise directed. You may experience abdominal distention for several hours that is relieved by the passage of gas. Contact your physician if you have any of the following: fever or chills, severe abdominal pain or excessive amount of bleeding or a large amount when having a bowel movement.  Occasional specks of blood with bowel movement would not be unusual.

## 2021-09-09 ENCOUNTER — ANESTHESIA EVENT (OUTPATIENT)
Dept: ENDOSCOPY | Age: 71
End: 2021-09-09
Payer: MEDICARE

## 2021-09-10 ENCOUNTER — HOSPITAL ENCOUNTER (OUTPATIENT)
Age: 71
Setting detail: OUTPATIENT SURGERY
Discharge: HOME | End: 2021-09-10
Attending: SURGERY | Admitting: SURGERY
Payer: MEDICARE

## 2021-09-10 ENCOUNTER — ANESTHESIA (OUTPATIENT)
Dept: ENDOSCOPY | Age: 71
End: 2021-09-10
Payer: MEDICARE

## 2021-09-10 VITALS
BODY MASS INDEX: 25.48 KG/M2 | TEMPERATURE: 98.4 F | SYSTOLIC BLOOD PRESSURE: 123 MMHG | DIASTOLIC BLOOD PRESSURE: 69 MMHG | RESPIRATION RATE: 16 BRPM | HEART RATE: 66 BPM | OXYGEN SATURATION: 99 % | WEIGHT: 178 LBS | HEIGHT: 70 IN

## 2021-09-10 DIAGNOSIS — Z12.11 COLON CANCER SCREENING: ICD-10-CM

## 2021-09-10 DIAGNOSIS — Z43.3 COLOSTOMY CARE (HCC): ICD-10-CM

## 2021-09-10 PROCEDURE — 2709999900 HC NON-CHARGEABLE SUPPLY: Performed by: SURGERY

## 2021-09-10 PROCEDURE — 76040000025: Performed by: SURGERY

## 2021-09-10 PROCEDURE — 74011250636 HC RX REV CODE- 250/636: Performed by: ANESTHESIOLOGY

## 2021-09-10 PROCEDURE — 76060000031 HC ANESTHESIA FIRST 0.5 HR: Performed by: SURGERY

## 2021-09-10 PROCEDURE — 74011000250 HC RX REV CODE- 250: Performed by: STUDENT IN AN ORGANIZED HEALTH CARE EDUCATION/TRAINING PROGRAM

## 2021-09-10 PROCEDURE — 44388 COLONOSCOPY THRU STOMA SPX: CPT | Performed by: SURGERY

## 2021-09-10 PROCEDURE — 74011250636 HC RX REV CODE- 250/636: Performed by: STUDENT IN AN ORGANIZED HEALTH CARE EDUCATION/TRAINING PROGRAM

## 2021-09-10 RX ORDER — SODIUM CHLORIDE, SODIUM LACTATE, POTASSIUM CHLORIDE, CALCIUM CHLORIDE 600; 310; 30; 20 MG/100ML; MG/100ML; MG/100ML; MG/100ML
100 INJECTION, SOLUTION INTRAVENOUS CONTINUOUS
Status: DISCONTINUED | OUTPATIENT
Start: 2021-09-10 | End: 2021-09-10 | Stop reason: HOSPADM

## 2021-09-10 RX ORDER — PROPOFOL 10 MG/ML
INJECTION, EMULSION INTRAVENOUS AS NEEDED
Status: DISCONTINUED | OUTPATIENT
Start: 2021-09-10 | End: 2021-09-10 | Stop reason: HOSPADM

## 2021-09-10 RX ORDER — LIDOCAINE HYDROCHLORIDE 20 MG/ML
INJECTION, SOLUTION EPIDURAL; INFILTRATION; INTRACAUDAL; PERINEURAL AS NEEDED
Status: DISCONTINUED | OUTPATIENT
Start: 2021-09-10 | End: 2021-09-10 | Stop reason: HOSPADM

## 2021-09-10 RX ADMIN — LIDOCAINE HYDROCHLORIDE 50 MG: 20 INJECTION, SOLUTION EPIDURAL; INFILTRATION; INTRACAUDAL; PERINEURAL at 08:59

## 2021-09-10 RX ADMIN — PROPOFOL 140 MCG/KG/MIN: 10 INJECTION, EMULSION INTRAVENOUS at 09:00

## 2021-09-10 RX ADMIN — PROPOFOL 120 MG: 10 INJECTION, EMULSION INTRAVENOUS at 08:59

## 2021-09-10 RX ADMIN — SODIUM CHLORIDE, SODIUM LACTATE, POTASSIUM CHLORIDE, AND CALCIUM CHLORIDE 100 ML/HR: 600; 310; 30; 20 INJECTION, SOLUTION INTRAVENOUS at 08:24

## 2021-09-10 NOTE — ANESTHESIA PREPROCEDURE EVALUATION
Relevant Problems   No relevant active problems       Anesthetic History   No history of anesthetic complications            Review of Systems / Medical History  Patient summary reviewed and pertinent labs reviewed    Pulmonary  Within defined limits                 Neuro/Psych   Within defined limits           Cardiovascular              Hyperlipidemia    Exercise tolerance: >4 METS  Comments: Denies CP, SOB or changes in functional status   GI/Hepatic/Renal     GERD: well controlled           Endo/Other        Arthritis     Other Findings              Physical Exam    Airway  Mallampati: II  TM Distance: 4 - 6 cm  Neck ROM: normal range of motion   Mouth opening: Normal     Cardiovascular    Rhythm: irregular  Rate: normal         Dental    Dentition: Caps/crowns     Pulmonary  Breath sounds clear to auscultation               Abdominal  GI exam deferred       Other Findings            Anesthetic Plan    ASA: 2  Anesthesia type: total IV anesthesia          Induction: Intravenous  Anesthetic plan and risks discussed with: Patient and Son / Daughter

## 2021-09-10 NOTE — OP NOTES
Colonoscopy Procedure Note    Date of procedure: 9/10/2021      Name: Ayse Segura      MRN: [id number]       : 1950       Age: 79 y.o. Sex: male    Preoperative Diagnosis: 1. CRC screening      2. Diverticulitis      3. colostomy    Postoperative Diagnosis: 1. same      2. Diverticulosis      3. Ulcerative proctitis    Procedure(s):  COLONOSCOPY THRU STOMA  SIGMOIDOSCOPY FLEXIBLE/BMI 27 [id number], [id number]    Procedure in Detail:  Informed consent was obtained for the procedure. The patient was placed in the left lateral decubitus position and sedation was induced by anesthesia. The [id number] was inserted into the rectum and advanced under direct vision to the end of the rectal pouch. A careful inspection was made as the colonoscope was withdrawn, including a retroflexed view of the rectum; findings and interventions are described below. The [id number] was then inserted through the colostomy in the left upper quadrant and advanced under direct vision to the end of cecum, which was identified by the ileocecal valve and appendiceal orifice. The quality of the colonic preparation was good. A careful inspection was made as the colonoscope was withdrawn; findings and interventions are described below. Appropriate photodocumentation was obtained. Findings:   Rectum:     - Excavated lesions:     - Ulcer(s): ulcerative proctitis  Sigmoid:     - absent  Descending Colon:     - Excavated lesions:     - Diverticulosis  Transverse Colon:     - Excavated lesions:     - Diverticulosis  Ascending Colon:     - Excavated lesions:     - Diverticulosis  Cecum:   Normal      Specimens: No specimens were collected. * No specimens in log *     Complications: None; patient tolerated the procedure well. EBL - minimal    Recommendations:   - Repeat colonoscopy in 5 years.      Signed By: Doc Kraft MD  [address] [facility] - Bariatric & Minimally Invasive Surgery  9/10/2021 9:20 AM

## 2021-09-10 NOTE — DISCHARGE INSTRUCTIONS
Gastrointestinal Colonoscopy/Flexible Sigmoidoscopy  Lower Exam Discharge Instructions      1. Call your doctor for any problems or questions. 2. Contact the doctors office for follow up appointment as directed  3. Medication may cause drowsiness for several hours, therefore, do not drive or operate machinery for remainder of the day. 4. No alcohol today. 5. Ordinarily, you may resume regular diet and activity after exam unless otherwise specified by your physician. 6. Because of air put into your colon during exam, you may experience some abdominal distension, relieved by the passage of gas, for several hours. 7. Contact your physician if you have any of the following:  a. Excessive amount of bleeding - large amount when having a bowel movement. Occasional specks of blood with bowel movement would not be unusual.  b. Severe abdominal pain  c. Fever or Chills      After general anesthesia or intravenous sedation, for 24 hours or while taking prescription Narcotics:  · Limit your activities  · A responsible adult needs to be with you for the next 24 hours  · Do not drive and operate hazardous machinery  · Do not make important personal or business decisions  · Do not drink alcoholic beverages  · If you have not urinated within 8 hours after discharge, please contact your surgeon on call. · If you have sleep apnea and have a CPAP machine, please use it for all naps and sleeping. · Please use caution when taking narcotics and any of your home medications that may cause drowsiness. *  Please give a list of your current medications to your Primary Care Provider. *  Please update this list whenever your medications are discontinued, doses are      changed, or new medications (including over-the-counter products) are added. *  Please carry medication information at all times in case of emergency situations.     These are general instructions for a healthy lifestyle:  No smoking/ No tobacco products/ Avoid exposure to second hand smoke  Surgeon General's Warning:  Quitting smoking now greatly reduces serious risk to your health. Obesity, smoking, and sedentary lifestyle greatly increases your risk for illness  A healthy diet, regular physical exercise & weight monitoring are important for maintaining a healthy lifestyle    You may be retaining fluid if you have a history of heart failure or if you experience any of the following symptoms:  Weight gain of 3 pounds or more overnight or 5 pounds in a week, increased swelling in our hands or feet or shortness of breath while lying flat in bed. Please call your doctor as soon as you notice any of these symptoms; do not wait until your next office visit.

## 2021-09-10 NOTE — ANESTHESIA POSTPROCEDURE EVALUATION
Procedure(s):  COLONOSCOPY THRU STOMA  SIGMOIDOSCOPY FLEXIBLE/BMI 27.     total IV anesthesia    Anesthesia Post Evaluation      Multimodal analgesia: multimodal analgesia used between 6 hours prior to anesthesia start to PACU discharge  Patient location during evaluation: PACU  Patient participation: complete - patient participated  Level of consciousness: awake and alert  Pain score: 0  Pain management: adequate  Airway patency: patent  Anesthetic complications: no  Cardiovascular status: acceptable and hemodynamically stable  Respiratory status: acceptable and spontaneous ventilation  Hydration status: acceptable  Post anesthesia nausea and vomiting:  none  Final Post Anesthesia Temperature Assessment:  Normothermia (36.0-37.5 degrees C)      INITIAL Post-op Vital signs:   Vitals Value Taken Time   /68 09/10/21 0930   Temp     Pulse 67 09/10/21 0930   Resp 16 09/10/21 0930   SpO2 100 % 09/10/21 0930

## 2021-09-10 NOTE — INTERVAL H&P NOTE
Update History & Physical    The Patient's History and Physical of August 24,   Colonoscopy was reviewed with the patient and I examined the patient. There was no change. The surgical site was confirmed by the patient and me. Plan:  The risk, benefits, expected outcome, and alternative to the recommended procedure have been discussed with the patient. Patient understands and wants to proceed with the procedure.     Electronically signed by Brittanie Molina MD on 9/10/2021 at 8:03 AM

## 2021-09-21 ENCOUNTER — HOSPITAL ENCOUNTER (OUTPATIENT)
Dept: SURGERY | Age: 71
Discharge: HOME | End: 2021-09-21
Attending: SURGERY

## 2021-09-21 VITALS — BODY MASS INDEX: 26.92 KG/M2 | WEIGHT: 188 LBS | HEIGHT: 70 IN

## 2021-09-21 RX ORDER — ESOMEPRAZOLE MAGNESIUM 40 MG/1
20 CAPSULE, DELAYED RELEASE ORAL DAILY
COMMUNITY

## 2021-09-21 NOTE — PERIOP NOTES
Patient verified name and . Order for consent NOT found in EHR; patient verifies procedure. Type 1B surgery, phone assessment complete. Orders NOT received. Labs per surgeon: No orders received. Labs per anesthesia protocol: None--pt states he has never had any problems with being anemic except just before and after LOW ANTERIOR RESECTION ROBOTIC ASSISTED RESECTION OF COLOVESICLE FISTULA AND BLADDER REPAIR/ OSTOMY in 2021. Anesthesia to review pt denial of anemia with a hgb was 9.9 on 21. Anesthesia to determine if hgb needs to be drawn prior to sx or DOS. Charge nurse to f/u. Patient COVID test date 21 at 1005; patient is aware of the appointment. The testing center is located at the [facility] [address] [place] [address]. If appointment is needed-patient provided telephone number of [phone]. Patient answered medical/surgical history questions at their best of ability. All prior to admission medications documented in [facility]. Patient instructed to take the following medications the day of surgery according to anesthesia guidelines with a small sip of water: Nexium. On the day before surgery please take Acetaminophen 1000mg in the morning and then again before bed. You may substitute for Tylenol 650 mg. Hold all vitamins, supplements, herbals 7 days prior to surgery and NSAIDS/ASA 5 days prior to surgery.          Patient instructed on the following:    > Arrive at [address], time of arrival to be called the day before by 1700  > NPO after midnight including gum, mints, and ice chips  > Responsible adult must drive patient to the hospital, stay during surgery, and patient will need supervision 24 hours after anesthesia  > Use anti-bacterial soap in shower the night before surgery and on the morning of surgery  > All piercings must be removed prior to arrival.    > Leave all valuables (money and jewelry) at home but bring insurance card and ID on DOS.   > You may be required to pay a deductible or co-pay on the day of your procedure. You can pre-pay by calling [phone] if your surgery is at the [facility] or [phone] if your surgery is at the [facility]. > Do not wear make-up, nail polish, lotions, cologne, perfumes, powders, or oil on skin. Artificial nails are not permitted.

## 2021-11-01 ENCOUNTER — HOSPITAL ENCOUNTER (OUTPATIENT)
Dept: SURGERY | Age: 71
Discharge: HOME | End: 2021-11-01
Attending: SURGERY
Payer: MEDICARE

## 2021-11-01 VITALS
SYSTOLIC BLOOD PRESSURE: 147 MMHG | TEMPERATURE: 98.3 F | DIASTOLIC BLOOD PRESSURE: 75 MMHG | OXYGEN SATURATION: 98 % | HEIGHT: 70 IN | HEART RATE: 78 BPM | BODY MASS INDEX: 27.03 KG/M2 | WEIGHT: 188.8 LBS

## 2021-11-01 LAB
ANION GAP SERPL CALC-SCNC: 4 MMOL/L (ref 7–16)
BUN SERPL-MCNC: 9 MG/DL (ref 8–23)
CALCIUM SERPL-MCNC: 9.8 MG/DL (ref 8.3–10.4)
CHLORIDE SERPL-SCNC: 109 MMOL/L (ref 98–107)
CO2 SERPL-SCNC: 26 MMOL/L (ref 21–32)
CREAT SERPL-MCNC: 0.81 MG/DL (ref 0.8–1.5)
ERYTHROCYTE [DISTWIDTH] IN BLOOD BY AUTOMATED COUNT: 13.7 % (ref 11.9–14.6)
GLUCOSE SERPL-MCNC: 92 MG/DL (ref 65–100)
HCT VFR BLD AUTO: 38.8 % (ref 41.1–50.3)
HGB BLD-MCNC: 12.5 G/DL (ref 13.6–17.2)
MCH RBC QN AUTO: 31.2 PG (ref 26.1–32.9)
MCHC RBC AUTO-ENTMCNC: 32.2 G/DL (ref 31.4–35)
MCV RBC AUTO: 96.8 FL (ref 79.6–97.8)
NRBC # BLD: 0 K/UL (ref 0–0.2)
PLATELET # BLD AUTO: 264 K/UL (ref 150–450)
PMV BLD AUTO: 9.8 FL (ref 9.4–12.3)
POTASSIUM SERPL-SCNC: 3.9 MMOL/L (ref 3.5–5.1)
RBC # BLD AUTO: 4.01 M/UL (ref 4.23–5.6)
SODIUM SERPL-SCNC: 139 MMOL/L (ref 136–145)
WBC # BLD AUTO: 6.5 K/UL (ref 4.3–11.1)

## 2021-11-01 PROCEDURE — 85027 COMPLETE CBC AUTOMATED: CPT

## 2021-11-01 PROCEDURE — 36415 COLL VENOUS BLD VENIPUNCTURE: CPT

## 2021-11-01 PROCEDURE — 80048 BASIC METABOLIC PNL TOTAL CA: CPT

## 2021-11-01 NOTE — PERIOP NOTES
PLEASE CONTINUE TAKING ALL PRESCRIPTION MEDICATIONS UP TO THE DAY OF SURGERY UNLESS OTHERWISE DIRECTED BELOW. DISCONTINUE all vitamins and supplements 7 days prior to surgery. DISCONTINUE Non-Steriodal Anti-Inflammatory (NSAIDS) such as Advil and Aleve 5 days prior to surgery. Home Medications to take  the day of surgery    Nexium           Home Medications   to Hold   Hold all NSAIDs: Meloxicam, Aleve, Advil, Motrin, Ibuprofen for 5 days prior to surgery. Hold Aspirin for 5 days prior to surgery. Comments    Covid test 11/4/21 at 9:40 am @ [address]    On the day before surgery please take Acetaminophen 1000mg in the morning and then again before bed. Bring DynaHex soap to hospital DOS. Please do not bring home medications with you on the day of surgery unless otherwise directed by your nurse. If you are instructed to bring home medications, please give them to your nurse as they will be administered by the nursing staff. If you have any questions, please call [facility] Gini Browne [phone]. A copy of this note was provided to the patient for reference.

## 2021-11-01 NOTE — PERIOP NOTES
Patient verified name and     Order for consent NOT found in EHR; patient verified. Type 3 surgery, walk in assessment complete. Labs per surgeon: none ordered at time of assessment;  Labs per anesthesia protocol: CBC, BMP; results processing  EKG: not needed per anesthesia protocol. Patient COVID test date 21 at 9:40 am; Patient is aware of the appointment. The testing center is located at the [address] [place]. If appointment is needed patient provided telephone number of [phone]. Hospital approved surgical skin cleanser and instructions given per hospital policy. Patient provided with and instructed on educational handouts including Guide to Surgery, Pain Management, Hand Hygiene, Blood Transfusion Education, and [facility] Brochure. Patient answered medical/surgical history questions at their best of ability. All prior to admission medications documented in [facility]. Original medication prescription bottles are not visualized during patient appointment. Patient instructed to hold all vitamins 7 days prior to surgery and NSAIDS 5 days prior to surgery, patient verbalized understanding. Patient teach back successful and patient demonstrates knowledge of instructions.

## 2021-11-08 ENCOUNTER — ANESTHESIA (OUTPATIENT)
Dept: SURGERY | Age: 71
DRG: 675 | End: 2021-11-08
Payer: MEDICARE

## 2021-11-08 ENCOUNTER — ANESTHESIA EVENT (OUTPATIENT)
Dept: SURGERY | Age: 71
DRG: 675 | End: 2021-11-08
Payer: MEDICARE

## 2021-11-08 ENCOUNTER — HOSPITAL ENCOUNTER (INPATIENT)
Age: 71
LOS: 3 days | Discharge: HOME | DRG: 675 | End: 2021-11-11
Attending: SURGERY | Admitting: SURGERY
Payer: MEDICARE

## 2021-11-08 DIAGNOSIS — Z90.49 S/P PARTIAL COLECTOMY: ICD-10-CM

## 2021-11-08 DIAGNOSIS — N32.1 COLOVESICAL FISTULA: Primary | ICD-10-CM

## 2021-11-08 PROCEDURE — 76010000173 HC OR TIME 3 TO 3.5 HR INTENSV-TIER 1: Performed by: SURGERY

## 2021-11-08 PROCEDURE — 74011250636 HC RX REV CODE- 250/636: Performed by: ANESTHESIOLOGY

## 2021-11-08 PROCEDURE — 74011250636 HC RX REV CODE- 250/636: Performed by: STUDENT IN AN ORGANIZED HEALTH CARE EDUCATION/TRAINING PROGRAM

## 2021-11-08 PROCEDURE — 77030037088 HC TUBE ENDOTRACH ORAL NSL COVD-A: Performed by: ANESTHESIOLOGY

## 2021-11-08 PROCEDURE — 77030000038 HC TIP SCIS LAPSCP SURI -B: Performed by: SURGERY

## 2021-11-08 PROCEDURE — 0DN80ZZ RELEASE SMALL INTESTINE, OPEN APPROACH: ICD-10-PCS | Performed by: SURGERY

## 2021-11-08 PROCEDURE — 77030002966 HC SUT PDS J&J -A: Performed by: SURGERY

## 2021-11-08 PROCEDURE — 74011250637 HC RX REV CODE- 250/637: Performed by: ANESTHESIOLOGY

## 2021-11-08 PROCEDURE — 77030010285 HC STPLR INT PRSTRNG COVD -B: Performed by: SURGERY

## 2021-11-08 PROCEDURE — 77030012935 HC DRSG AQUACEL BMS -B: Performed by: SURGERY

## 2021-11-08 PROCEDURE — 77030011266 HC ELECTRD BLD INSL COVD -A: Performed by: SURGERY

## 2021-11-08 PROCEDURE — 77030040923 HC STPLR ENDOSC ECHELON J&J -E: Performed by: SURGERY

## 2021-11-08 PROCEDURE — 77030019908 HC STETH ESOPH SIMS -A: Performed by: ANESTHESIOLOGY

## 2021-11-08 PROCEDURE — 77030039896 HC LIGASURE EXCT SEAL DIV DISECT COVD -F: Performed by: SURGERY

## 2021-11-08 PROCEDURE — 74011250636 HC RX REV CODE- 250/636: Performed by: SURGERY

## 2021-11-08 PROCEDURE — 2709999900 HC NON-CHARGEABLE SUPPLY: Performed by: SURGERY

## 2021-11-08 PROCEDURE — 0DBL0ZZ EXCISION OF TRANSVERSE COLON, OPEN APPROACH: ICD-10-PCS | Performed by: SURGERY

## 2021-11-08 PROCEDURE — 77030003029 HC SUT VCRL J&J -B: Performed by: SURGERY

## 2021-11-08 PROCEDURE — 88307 TISSUE EXAM BY PATHOLOGIST: CPT

## 2021-11-08 PROCEDURE — 77030040830 HC CATH URETH FOL MDII -A: Performed by: SURGERY

## 2021-11-08 PROCEDURE — 77030039425 HC BLD LARYNG TRULITE DISP TELE -A: Performed by: ANESTHESIOLOGY

## 2021-11-08 PROCEDURE — 74011000250 HC RX REV CODE- 250: Performed by: STUDENT IN AN ORGANIZED HEALTH CARE EDUCATION/TRAINING PROGRAM

## 2021-11-08 PROCEDURE — 76060000037 HC ANESTHESIA 3 TO 3.5 HR: Performed by: SURGERY

## 2021-11-08 PROCEDURE — 65270000029 HC RM PRIVATE

## 2021-11-08 PROCEDURE — 77030040361 HC SLV COMPR DVT MDII -B: Performed by: SURGERY

## 2021-11-08 PROCEDURE — 74011250637 HC RX REV CODE- 250/637: Performed by: SURGERY

## 2021-11-08 PROCEDURE — 77030018836 HC SOL IRR NACL ICUM -A: Performed by: SURGERY

## 2021-11-08 PROCEDURE — 77030040922 HC BLNKT HYPOTHRM STRY -A: Performed by: ANESTHESIOLOGY

## 2021-11-08 PROCEDURE — 76210000017 HC OR PH I REC 1.5 TO 2 HR: Performed by: SURGERY

## 2021-11-08 PROCEDURE — 44626 REPAIR BOWEL OPENING: CPT | Performed by: SURGERY

## 2021-11-08 PROCEDURE — 77030002996 HC SUT SLK J&J -A: Performed by: SURGERY

## 2021-11-08 PROCEDURE — 77030031139 HC SUT VCRL2 J&J -A: Performed by: SURGERY

## 2021-11-08 RX ORDER — LIDOCAINE HYDROCHLORIDE 20 MG/ML
INJECTION, SOLUTION EPIDURAL; INFILTRATION; INTRACAUDAL; PERINEURAL AS NEEDED
Status: DISCONTINUED | OUTPATIENT
Start: 2021-11-08 | End: 2021-11-08 | Stop reason: HOSPADM

## 2021-11-08 RX ORDER — OXYCODONE HYDROCHLORIDE 5 MG/1
5 TABLET ORAL
Status: DISCONTINUED | OUTPATIENT
Start: 2021-11-08 | End: 2021-11-11 | Stop reason: HOSPADM

## 2021-11-08 RX ORDER — ACETAMINOPHEN 500 MG
1000 TABLET ORAL EVERY 6 HOURS
Status: DISCONTINUED | OUTPATIENT
Start: 2021-11-08 | End: 2021-11-11 | Stop reason: HOSPADM

## 2021-11-08 RX ORDER — HEPARIN SODIUM 5000 [USP'U]/ML
5000 INJECTION, SOLUTION INTRAVENOUS; SUBCUTANEOUS EVERY 8 HOURS
Status: DISCONTINUED | OUTPATIENT
Start: 2021-11-08 | End: 2021-11-11 | Stop reason: HOSPADM

## 2021-11-08 RX ORDER — ACETAMINOPHEN 325 MG/1
650 TABLET ORAL ONCE
Status: COMPLETED | OUTPATIENT
Start: 2021-11-08 | End: 2021-11-08

## 2021-11-08 RX ORDER — HYDROMORPHONE HYDROCHLORIDE 1 MG/ML
0.5 INJECTION, SOLUTION INTRAMUSCULAR; INTRAVENOUS; SUBCUTANEOUS
Status: DISCONTINUED | OUTPATIENT
Start: 2021-11-08 | End: 2021-11-11 | Stop reason: HOSPADM

## 2021-11-08 RX ORDER — GABAPENTIN 100 MG/1
200 CAPSULE ORAL 3 TIMES DAILY
Status: DISCONTINUED | OUTPATIENT
Start: 2021-11-08 | End: 2021-11-11 | Stop reason: HOSPADM

## 2021-11-08 RX ORDER — CEFAZOLIN SODIUM/WATER 2 G/20 ML
2 SYRINGE (ML) INTRAVENOUS ONCE
Status: COMPLETED | OUTPATIENT
Start: 2021-11-08 | End: 2021-11-08

## 2021-11-08 RX ORDER — SODIUM CHLORIDE, SODIUM LACTATE, POTASSIUM CHLORIDE, CALCIUM CHLORIDE 600; 310; 30; 20 MG/100ML; MG/100ML; MG/100ML; MG/100ML
75 INJECTION, SOLUTION INTRAVENOUS CONTINUOUS
Status: DISPENSED | OUTPATIENT
Start: 2021-11-08 | End: 2021-11-09

## 2021-11-08 RX ORDER — HYDROMORPHONE HYDROCHLORIDE 2 MG/ML
0.5 INJECTION, SOLUTION INTRAMUSCULAR; INTRAVENOUS; SUBCUTANEOUS
Status: COMPLETED | OUTPATIENT
Start: 2021-11-08 | End: 2021-11-08

## 2021-11-08 RX ORDER — NEOSTIGMINE METHYLSULFATE 1 MG/ML
INJECTION, SOLUTION INTRAVENOUS AS NEEDED
Status: DISCONTINUED | OUTPATIENT
Start: 2021-11-08 | End: 2021-11-08 | Stop reason: HOSPADM

## 2021-11-08 RX ORDER — ONDANSETRON 2 MG/ML
INJECTION INTRAMUSCULAR; INTRAVENOUS AS NEEDED
Status: DISCONTINUED | OUTPATIENT
Start: 2021-11-08 | End: 2021-11-08 | Stop reason: HOSPADM

## 2021-11-08 RX ORDER — SODIUM CHLORIDE 0.9 % (FLUSH) 0.9 %
5-40 SYRINGE (ML) INJECTION EVERY 8 HOURS
Status: DISCONTINUED | OUTPATIENT
Start: 2021-11-08 | End: 2021-11-08 | Stop reason: HOSPADM

## 2021-11-08 RX ORDER — MIDAZOLAM HYDROCHLORIDE 1 MG/ML
2 INJECTION, SOLUTION INTRAMUSCULAR; INTRAVENOUS
Status: DISCONTINUED | OUTPATIENT
Start: 2021-11-08 | End: 2021-11-08 | Stop reason: HOSPADM

## 2021-11-08 RX ORDER — FENTANYL CITRATE 50 UG/ML
INJECTION, SOLUTION INTRAMUSCULAR; INTRAVENOUS AS NEEDED
Status: DISCONTINUED | OUTPATIENT
Start: 2021-11-08 | End: 2021-11-08 | Stop reason: HOSPADM

## 2021-11-08 RX ORDER — NALOXONE HYDROCHLORIDE 0.4 MG/ML
0.4 INJECTION, SOLUTION INTRAMUSCULAR; INTRAVENOUS; SUBCUTANEOUS AS NEEDED
Status: DISCONTINUED | OUTPATIENT
Start: 2021-11-08 | End: 2021-11-11 | Stop reason: HOSPADM

## 2021-11-08 RX ORDER — KETOROLAC TROMETHAMINE 30 MG/ML
15 INJECTION, SOLUTION INTRAMUSCULAR; INTRAVENOUS EVERY 6 HOURS
Status: COMPLETED | OUTPATIENT
Start: 2021-11-08 | End: 2021-11-09

## 2021-11-08 RX ORDER — GLYCOPYRROLATE 0.2 MG/ML
INJECTION INTRAMUSCULAR; INTRAVENOUS AS NEEDED
Status: DISCONTINUED | OUTPATIENT
Start: 2021-11-08 | End: 2021-11-08 | Stop reason: HOSPADM

## 2021-11-08 RX ORDER — OXYCODONE HYDROCHLORIDE 5 MG/1
5 TABLET ORAL
Status: COMPLETED | OUTPATIENT
Start: 2021-11-08 | End: 2021-11-08

## 2021-11-08 RX ORDER — SUCCINYLCHOLINE CHLORIDE 20 MG/ML
INJECTION INTRAMUSCULAR; INTRAVENOUS AS NEEDED
Status: DISCONTINUED | OUTPATIENT
Start: 2021-11-08 | End: 2021-11-08 | Stop reason: HOSPADM

## 2021-11-08 RX ORDER — ROCURONIUM BROMIDE 10 MG/ML
INJECTION, SOLUTION INTRAVENOUS AS NEEDED
Status: DISCONTINUED | OUTPATIENT
Start: 2021-11-08 | End: 2021-11-08 | Stop reason: HOSPADM

## 2021-11-08 RX ORDER — OXYCODONE AND ACETAMINOPHEN 10; 325 MG/1; MG/1
1 TABLET ORAL AS NEEDED
Status: DISCONTINUED | OUTPATIENT
Start: 2021-11-08 | End: 2021-11-08 | Stop reason: HOSPADM

## 2021-11-08 RX ORDER — SODIUM CHLORIDE, SODIUM LACTATE, POTASSIUM CHLORIDE, CALCIUM CHLORIDE 600; 310; 30; 20 MG/100ML; MG/100ML; MG/100ML; MG/100ML
75 INJECTION, SOLUTION INTRAVENOUS CONTINUOUS
Status: DISCONTINUED | OUTPATIENT
Start: 2021-11-08 | End: 2021-11-08 | Stop reason: HOSPADM

## 2021-11-08 RX ORDER — ONDANSETRON 2 MG/ML
4 INJECTION INTRAMUSCULAR; INTRAVENOUS
Status: DISCONTINUED | OUTPATIENT
Start: 2021-11-08 | End: 2021-11-11 | Stop reason: HOSPADM

## 2021-11-08 RX ORDER — SODIUM CHLORIDE 0.9 % (FLUSH) 0.9 %
5-40 SYRINGE (ML) INJECTION AS NEEDED
Status: DISCONTINUED | OUTPATIENT
Start: 2021-11-08 | End: 2021-11-08 | Stop reason: HOSPADM

## 2021-11-08 RX ORDER — HYDROMORPHONE HYDROCHLORIDE 2 MG/ML
1 INJECTION, SOLUTION INTRAMUSCULAR; INTRAVENOUS; SUBCUTANEOUS ONCE
Status: COMPLETED | OUTPATIENT
Start: 2021-11-08 | End: 2021-11-08

## 2021-11-08 RX ORDER — PROPOFOL 10 MG/ML
INJECTION, EMULSION INTRAVENOUS AS NEEDED
Status: DISCONTINUED | OUTPATIENT
Start: 2021-11-08 | End: 2021-11-08 | Stop reason: HOSPADM

## 2021-11-08 RX ADMIN — HYDROMORPHONE HYDROCHLORIDE 0.5 MG: 2 INJECTION, SOLUTION INTRAMUSCULAR; INTRAVENOUS; SUBCUTANEOUS at 10:58

## 2021-11-08 RX ADMIN — PHENYLEPHRINE HYDROCHLORIDE 100 MCG: 10 INJECTION INTRAVENOUS at 07:30

## 2021-11-08 RX ADMIN — GLYCOPYRROLATE 0.6 MG: 0.2 INJECTION, SOLUTION INTRAMUSCULAR; INTRAVENOUS at 10:09

## 2021-11-08 RX ADMIN — ROCURONIUM BROMIDE 40 MG: 10 INJECTION, SOLUTION INTRAVENOUS at 07:39

## 2021-11-08 RX ADMIN — KETOROLAC TROMETHAMINE 15 MG: 30 INJECTION, SOLUTION INTRAMUSCULAR at 12:43

## 2021-11-08 RX ADMIN — HYDROMORPHONE HYDROCHLORIDE 0.5 MG: 2 INJECTION, SOLUTION INTRAMUSCULAR; INTRAVENOUS; SUBCUTANEOUS at 10:50

## 2021-11-08 RX ADMIN — CEFAZOLIN 2 G: 1 INJECTION, POWDER, FOR SOLUTION INTRAVENOUS at 07:41

## 2021-11-08 RX ADMIN — SODIUM CHLORIDE, SODIUM LACTATE, POTASSIUM CHLORIDE, AND CALCIUM CHLORIDE: 600; 310; 30; 20 INJECTION, SOLUTION INTRAVENOUS at 07:53

## 2021-11-08 RX ADMIN — ROCURONIUM BROMIDE 10 MG: 10 INJECTION, SOLUTION INTRAVENOUS at 07:19

## 2021-11-08 RX ADMIN — SODIUM CHLORIDE, SODIUM LACTATE, POTASSIUM CHLORIDE, AND CALCIUM CHLORIDE 100 ML/HR: 600; 310; 30; 20 INJECTION, SOLUTION INTRAVENOUS at 12:47

## 2021-11-08 RX ADMIN — PHENYLEPHRINE HYDROCHLORIDE 100 MCG: 10 INJECTION INTRAVENOUS at 07:25

## 2021-11-08 RX ADMIN — SUCCINYLCHOLINE CHLORIDE 160 MG: 20 INJECTION, SOLUTION INTRAMUSCULAR; INTRAVENOUS at 07:19

## 2021-11-08 RX ADMIN — PROPOFOL 130 MG: 10 INJECTION, EMULSION INTRAVENOUS at 07:19

## 2021-11-08 RX ADMIN — FENTANYL CITRATE 25 MCG: 50 INJECTION INTRAMUSCULAR; INTRAVENOUS at 10:12

## 2021-11-08 RX ADMIN — HYDROMORPHONE HYDROCHLORIDE 1 MG: 2 INJECTION, SOLUTION INTRAMUSCULAR; INTRAVENOUS; SUBCUTANEOUS at 11:24

## 2021-11-08 RX ADMIN — HYDROMORPHONE HYDROCHLORIDE 0.5 MG: 2 INJECTION, SOLUTION INTRAMUSCULAR; INTRAVENOUS; SUBCUTANEOUS at 10:42

## 2021-11-08 RX ADMIN — KETOROLAC TROMETHAMINE 15 MG: 30 INJECTION, SOLUTION INTRAMUSCULAR at 17:22

## 2021-11-08 RX ADMIN — LIDOCAINE HYDROCHLORIDE 80 MG: 20 INJECTION, SOLUTION EPIDURAL; INFILTRATION; INTRACAUDAL; PERINEURAL at 07:19

## 2021-11-08 RX ADMIN — SODIUM CHLORIDE, SODIUM LACTATE, POTASSIUM CHLORIDE, AND CALCIUM CHLORIDE: 600; 310; 30; 20 INJECTION, SOLUTION INTRAVENOUS at 09:08

## 2021-11-08 RX ADMIN — FENTANYL CITRATE 25 MCG: 50 INJECTION INTRAMUSCULAR; INTRAVENOUS at 10:08

## 2021-11-08 RX ADMIN — ACETAMINOPHEN 1000 MG: 500 TABLET, FILM COATED ORAL at 23:09

## 2021-11-08 RX ADMIN — GABAPENTIN 200 MG: 100 CAPSULE ORAL at 17:22

## 2021-11-08 RX ADMIN — PHENYLEPHRINE HYDROCHLORIDE 50 MCG: 10 INJECTION INTRAVENOUS at 07:45

## 2021-11-08 RX ADMIN — GABAPENTIN 200 MG: 100 CAPSULE ORAL at 23:10

## 2021-11-08 RX ADMIN — HEPARIN SODIUM 5000 UNITS: 5000 INJECTION INTRAVENOUS; SUBCUTANEOUS at 19:51

## 2021-11-08 RX ADMIN — ACETAMINOPHEN 1000 MG: 500 TABLET, FILM COATED ORAL at 17:22

## 2021-11-08 RX ADMIN — SODIUM CHLORIDE, SODIUM LACTATE, POTASSIUM CHLORIDE, AND CALCIUM CHLORIDE 100 ML/HR: 600; 310; 30; 20 INJECTION, SOLUTION INTRAVENOUS at 23:18

## 2021-11-08 RX ADMIN — FENTANYL CITRATE 50 MCG: 50 INJECTION INTRAMUSCULAR; INTRAVENOUS at 07:19

## 2021-11-08 RX ADMIN — Medication 4 MG: at 10:09

## 2021-11-08 RX ADMIN — ONDANSETRON 4 MG: 2 INJECTION INTRAMUSCULAR; INTRAVENOUS at 10:09

## 2021-11-08 RX ADMIN — ROCURONIUM BROMIDE 10 MG: 10 INJECTION, SOLUTION INTRAVENOUS at 08:47

## 2021-11-08 RX ADMIN — ACETAMINOPHEN 650 MG: 325 TABLET, FILM COATED ORAL at 06:21

## 2021-11-08 RX ADMIN — OXYCODONE 5 MG: 5 TABLET ORAL at 11:12

## 2021-11-08 RX ADMIN — PHENYLEPHRINE HYDROCHLORIDE 100 MCG: 10 INJECTION INTRAVENOUS at 10:03

## 2021-11-08 RX ADMIN — KETOROLAC TROMETHAMINE 15 MG: 30 INJECTION, SOLUTION INTRAMUSCULAR at 23:10

## 2021-11-08 RX ADMIN — SODIUM CHLORIDE, SODIUM LACTATE, POTASSIUM CHLORIDE, AND CALCIUM CHLORIDE: 600; 310; 30; 20 INJECTION, SOLUTION INTRAVENOUS at 07:15

## 2021-11-08 RX ADMIN — ACETAMINOPHEN 1000 MG: 500 TABLET, FILM COATED ORAL at 12:43

## 2021-11-08 RX ADMIN — FENTANYL CITRATE 50 MCG: 50 INJECTION INTRAMUSCULAR; INTRAVENOUS at 07:41

## 2021-11-08 RX ADMIN — HYDROMORPHONE HYDROCHLORIDE 0.5 MG: 2 INJECTION, SOLUTION INTRAMUSCULAR; INTRAVENOUS; SUBCUTANEOUS at 11:06

## 2021-11-08 RX ADMIN — PHENYLEPHRINE HYDROCHLORIDE 100 MCG: 10 INJECTION INTRAVENOUS at 09:45

## 2021-11-08 RX ADMIN — FENTANYL CITRATE 50 MCG: 50 INJECTION INTRAMUSCULAR; INTRAVENOUS at 08:12

## 2021-11-08 RX ADMIN — ROCURONIUM BROMIDE 10 MG: 10 INJECTION, SOLUTION INTRAVENOUS at 09:31

## 2021-11-08 NOTE — PERIOP NOTES
TRANSFER - OUT REPORT:    Verbal report given to Jose RN(name) on Joslyn Miguel  being transferred to 3rd floor med surg(unit) for routine progression of care       Report consisted of patients Situation, Background, Assessment and   Recommendations(SBAR). Information from the following report(s) SBAR, Procedure Summary, Intake/Output, MAR and Cardiac Rhythm NSR with PVC was reviewed with the receiving nurse. Lines:   Peripheral IV 11/08/21 Posterior; Right Wrist (Active)   Site Assessment Clean, dry, & intact 11/08/21 1130   Phlebitis Assessment 0 11/08/21 1130   Infiltration Assessment 0 11/08/21 1130   Dressing Status Clean, dry, & intact 11/08/21 1130   Dressing Type Transparent 11/08/21 1130   Hub Color/Line Status Green 11/08/21 1130        Opportunity for questions and clarification was provided.       Patient transported with:   [employer]

## 2021-11-08 NOTE — H&P
Hieu Ramirez MD   Bariatric & Advanced Laparoscopic Surgery & Endoscopy  [address]  [place]  Phone [phone]   Fax [phone]      Date of visit: 2021      Primary/Requesting provider: Kim Callahan MD         Name: Arnulfo Singer      MRN: [id number]       : 1950       Age: 70 y.o. Sex: male        PCP: Kim Callahan MD     CC:    No chief complaint on file. HPI:     Arnulfo Singer is a 70 y.o. male who presents for follow up after Hartmanns' procedure due to colovesical . He reports doing very well over the last 3 months. His colostomy has been working well. No complaints. He is eating well. Good stool output. No pain. No wound issues. He is excited about having his colostomy potentially reversed. 2021 - s/p colonoscopy and flex sig. Colon and rectum looked good. There was some ulcerative proctitis but expected due to disuse. He is otherwise doing well. No complaints.          PMH:    Past Medical History:   Diagnosis Date    Calculus of kidney     \"they don't bother me\" 21    Colostomy in place [facility])     Colovesical fistula     COVID-19 vaccine series completed 2021    Aron Olvera     Diverticulitis     Encounter for long-term (current) use of other medications 2014    GERD (gastroesophageal reflux disease)     High cholesterol 2014    no meds    Other testicular hypofunction 2014    Weight loss        PSH:    Past Surgical History:   Procedure Laterality Date    COLONOSCOPY N/A 2021    COLONOSCOPY performed by Kezia Avalos MD at [facility] COLONOSCOPY N/A 9/10/2021    COLONOSCOPY THRU STOMA performed by Oumar Hairston MD at [facility] N/A 2021    SIGMOIDOSCOPY FLEXIBLE performed by Oumar Hairston MD at [facility] N/A 9/10/2021    SIGMOIDOSCOPY FLEXIBLE/BMI 27 performed by Hosea Agudelo MD at [facility] HX COLONOSCOPY  05/17/2021    HX ORTHOPAEDIC      LEFT LEG/ANKLE FX    HX TONSILLECTOMY         MEDS:    Current Facility-Administered Medications   Medication    sodium chloride (NS) flush 5-40 mL    sodium chloride (NS) flush 5-40 mL    ceFAZolin (ANCEF) 2 g/20 mL in sterile water IV syringe    lactated Ringers infusion    midazolam (VERSED) injection 2 mg       ALLERGIES:      No Known Allergies    SH:    Social History     Tobacco Use    Smoking status: Never Smoker    Smokeless tobacco: Never Used   Vaping Use    Vaping Use: Never used   Substance Use Topics    Alcohol use: Yes     Comment: rarely    Drug use: No       FH:    Family History   Problem Relation Age of Onset    Heart Disease Father         CABG & STENTS     Heart Attack Father     No Known Problems Mother        Review of systems:  Review of Systems   Constitutional: Negative for chills, fever and weight loss. HENT: Negative for hearing loss, sore throat and tinnitus. Eyes:        Glasses   Respiratory: Negative for cough, shortness of breath and wheezing. Cardiovascular: Negative for chest pain, palpitations and PND. Gastrointestinal: Positive for heartburn. Negative for abdominal pain and vomiting. Genitourinary: Negative for dysuria, frequency and urgency. Musculoskeletal: Negative for back pain, joint pain and neck pain. Skin: Negative for itching and rash. Neurological: Negative for dizziness, seizures and loss of consciousness. Endo/Heme/Allergies: Bruises/bleeds easily. Psychiatric/Behavioral: Negative for depression and memory loss. The patient does not have insomnia. Physical Exam:     Visit Vitals  /73   Pulse 73   Temp 98.4 °F (36.9 °C)   Resp 18   Ht 5' 10\" (1.778 m)   Wt 185 lb 6.4 oz (84.1 kg)   SpO2 100%   BMI 26.60 kg/m²       General:  Well-developed, well-nourished, no distress.   Psych:  Cooperative, good insight and judgement. Neuro:  Alert, oriented to person, place and time. HEENT:  Normocephalic, atraumatic. Sclera clear. Lungs:  Unlabored breathing. Symmetrical chest expansion. Chest wall:  No tenderness or deformity. Heart:  Regular rate and rhythm. No JVD. Abdomen:  Soft, non-tender, non-distended. No guarding or rebound. LLQ colostomy pink, stool in bag. Midline lower abdominal scar well healed. Extremities:  Extremities normal, atraumatic, no cyanosis or edema. Skin:  Skin color, texture, turgor normal. No rashes. Labs: All recent labs were reviewed. Normal WBC. Normal lytes. Assessment/Plan:  Loyal Angelucci is a 70 y.o. male who has signs and symptoms consistent with colostomy that needs reversal    1. Schedule for exploratory laparotomy, colectomy and Patrizia's reversal.   The risks, benefits, alternatives, and consequences were reviewed with the patient, who expressed verbal understanding and agreement to proceed.            Signed: Carmen Elizabeth MD  Bariatric & Minimally Invasive Surgery  11/8/2021

## 2021-11-08 NOTE — ANESTHESIA POSTPROCEDURE EVALUATION
Procedure(s):  HARTMANNS COLOSTOMY REVERSAL  LOW ANTERIOR RESECTION/ LITHOTOMY.     general    Anesthesia Post Evaluation      Multimodal analgesia: multimodal analgesia used between 6 hours prior to anesthesia start to PACU discharge  Patient location during evaluation: PACU  Patient participation: complete - patient participated  Level of consciousness: awake  Pain management: adequate  Airway patency: patent  Anesthetic complications: no  Cardiovascular status: acceptable  Respiratory status: acceptable  Hydration status: acceptable  Post anesthesia nausea and vomiting:  none  Final Post Anesthesia Temperature Assessment:  Normothermia (36.0-37.5 degrees C)      INITIAL Post-op Vital signs:   Vitals Value Taken Time   /63 11/08/21 1140   Temp 37.1 °C (98.7 °F) 11/08/21 1030   Pulse 71 11/08/21 1140   Resp 16 11/08/21 1140   SpO2 96 % 11/08/21 1140

## 2021-11-08 NOTE — OP NOTES
[address] [address]  OPERATIVE REPORT    Name:  Sheba Garcia  MR#:  [id number]  :  1950  ACCOUNT #:  [de-identified]  DATE OF SERVICE:  2021    PREOPERATIVE DIAGNOSIS:  Patrizia's procedure that was previously done secondary to perforated sigmoid diverticulitis. POSTOPERATIVE DIAGNOSIS:  Patrizia's procedure that was previously done secondary to perforated sigmoid diverticulitis. PROCEDURE PERFORMED:  I assisted Dr. Kathi Elizalde with takedown of colostomy. I did the lower part for her so that she could remain scrubbed in, in the abdomen. SURGEON:  Dulce King MD    ASSISTANT:  Rainelle Riedel. [place], MD    ANESTHESIA:  GETA    COMPLICATIONS:  None. IMPLANTS:  None. DRAINS:  None. HISTORY/PROCEDURE:  This is a 80-year-old male who had perforated diverticulitis. Dr. Judge Ying did previous Patrizia's procedure. He is scheduled for takedown of the Patrizia's today. Dr. Judge Ying contacted me and asked me to do the lower portion. I did in-between cases when she had taken down the colostomy and placed the anvil for the stapling device in the proximal colon. She had already opened the abdomen, done her portion of the procedure and she called me in when she was ready, I came in for, put gown and gloves on after scrubbing and did the below portion. When I got to the lower portion of the patient, the patient was up in stirrups. I checked the anus. The sphincter was somewhat dilated. I was able to insert a 25 sizer, then a 28 sizer right up to the staple line for the Patrizia's pouch. Dr. Judge Ying directed me to where she wanted my hands to be. This served as a guide for when I placed the stapling device. She asked for 29 powered stapling device. I brought that through the anus and all the way up to the staple line. She guided my hands to where I needed to be. I then withdrew the point for the stapling device under her guidance.   She attached the anvil that was in the proximal colon. I then closed the powered stapling device and when she said everything was freed up, I fired the stapling device after taking off the safety. I then unlocked it in the usual fashion with two 360-degree twists and then withdrew the stapling device. I checked both anastomotic rings. They were completely intact and looked very good. Tissue was very healthy. This was done for diverticulitis, so nothing needed to be sent from these anastomotic rings. She then held pressure on the proximal colon and I inserted a disposable proctoscope. I put it in about 10 cm and she put saline over the anastomosis. I used the insufflation to insufflate this area. She did a leak test checking anastomosis. There was no bubbling, so no evidence of any leak. I withdrew the air and that completed my portion of the procedure. She continued the rest of the procedure.       MD KATHY [id number]  D:  11/08/2021 9:58  T:  11/08/2021 14:29  JOB #:  [id number]

## 2021-11-08 NOTE — PROGRESS NOTES
Problem: Falls - Risk of  Goal: *Absence of Falls  Description: Document Naila Lobe Fall Risk and appropriate interventions in the flowsheet.   Outcome: Progressing Towards Goal  Note: Fall Risk Interventions:                                Problem: Patient Education: Go to Patient Education Activity  Goal: Patient/Family Education  Outcome: Progressing Towards Goal

## 2021-11-08 NOTE — OP NOTES
Operative Report    Name: Jun John      MRN: [id number]       : 1950       Age: 70 y.o. Sex: male    Date of Surgery: 2021     Preoperative Diagnosis: Colovesical fistula [N32.1]     Postoperative Diagnosis: Colovesical fistula [N32.1]     Name of procedure: . Procedure(s):  HARTMANNS COLOSTOMY REVERSAL  LOW ANTERIOR RESECTION/ LITHOTOMY 65892    Surgeon: Pato Hassan MD     Assistant: Crystal Lugo MD's assistance was needed for the anastomosis part of the procedure. He helped with the perineal portion. Anesthesia: General     Complications: None    Estimated Blood Loss: less than 50 ml           Specimens:   ID Type Source Tests Collected by Time Destination   1 : colon Fresh Colon  Ollie Roberts MD 2021 1025 Pathology       Implants:  * No implants in log *    Findings: extensive small bowel adhesions, no leak after EAA anastomosis    Statement of Medical Necessity: Jun John is a 70 y.o. male who presented for reversal of a previous colostomy/Patrizia's procedure done for diverticulitis complicated by colo vesical fistula. He had a colonoscopy and flexible sigmoidoscopy and colon was healthy and no other masses found. An exploratory laparotomy, partial colectomy and anastomosis with possible ostomy was was recommended. We discussed risks, benefits and alternatives of surgery. Patient understood and agreed to proceed. Procedure Details   After informed consent was taken, patient was taken to the operating room and placed in supine position. Anesthesia was induced and patient was intubated. Patient received preoperative antibiotics. Patient was then positioned in lithotomy position with adequate padding in pressure points. Saucedo was placed. Abdomen and perineum were prepped and draped in standard sterile fashion. A timeout was performed with all team members present.     A lower midline incision was performed with a knife through his previous scar and carried down to the fascia with electrocautery. The fascia was opened carefully. The intraabdominal contents were inspected. There were multiple adhesions from small bowel to the left lateral pelvic wall. These were carefully lysed. There was small bowel also adhered to the center of the pelvis. This bowel was carefully mobilized and the rectal stump exposed. The previously placed Prolene sutures were seen in the pelvis. The colostomy was closed with a 3-0 Vicryl suture in a figure of 8 fashion. This was used to elevated the colostomy. A circular incision was made around the stoma and the stoma was carefully mobilized from the abdominal wall until detached. The left colon was then carefully mobilized as well as the transverse colon to obtain length for the anastomosis. The site of proximal resection was elected based on reach and blood supply. The mesentery was divided with Ligasure. A pursestring suture device was placed proximal to the staple line and the distal colon line amputated sharply with scissors. Specimen was sent to pathology. The colon was dilated with a 25 and then a 29 mm EEA dilators. The anvil for the 29 mm end-to-end anastomosis stapler was placed within this colon segment and secured with the pursestring suture. The pelvis was irrigated with warm saline and hemostasis was assured in all dissection planes. The dilators were passed per rectum to the staple line of the rectum by Dr. Alfredo Kingsley. The handle for the EEA stapler was then passed under direct vision, carefully to the staple line. The spike was advanced at the staple line and connected to the anvil. It was closed to the appropriate tightness and fired, creating a circular anastomosis. The colon was inspected. It was laying without tension and with no twists. 3-0 Silk sutures were placed anteriorly and laterally to reduce tension.  The pelvis was irrigated with warm saline and insufflation was provided through the rigid sigmoidoscope by Dr. Channing Tai to check for any anastomotic leak. There were no bubbles seen and again hemostasis was complete. All fluid was suctioned out. The fascia at the previous colostomy site was closed with 0-PDS suture in a running fashion. The abdominal fascia was closed with running 1- PDS suture from both ends and tied in the center. The wound was then irrigated and suctioned. Skin was closed with staples at the midline incision. The colostomy site was closed with interrupted 3-0 Vicryl sutures over a vessel loop. Betadine smooth were introduced throughout the incisions and Aquacel Ag dressing was placed. The patient was awakened in the room and extubated and taken to recovery in stable condition. All instrument and lap counts were correct x2 at the completion of procedure. The patient tolerated the procedure well with no immediate complications.         Signed by: Dulce King MD  [facility] - Bariatric & Minimally Invasive Surgery  11/8/2021 10:38 AM

## 2021-11-08 NOTE — ANESTHESIA PREPROCEDURE EVALUATION
Relevant Problems   No relevant active problems       Anesthetic History   No history of anesthetic complications            Review of Systems / Medical History  Patient summary reviewed and pertinent labs reviewed    Pulmonary  Within defined limits                 Neuro/Psych   Within defined limits           Cardiovascular              Hyperlipidemia    Exercise tolerance: >4 METS  Comments: Denies CP, SOB or changes in functional status   GI/Hepatic/Renal     GERD: well controlled    Renal disease: stones       Endo/Other        Obesity and arthritis     Other Findings            Physical Exam    Airway  Mallampati: III  TM Distance: 4 - 6 cm  Neck ROM: normal range of motion   Mouth opening: Normal     Cardiovascular      Rate: normal         Dental    Dentition: Caps/crowns     Pulmonary                 Abdominal  GI exam deferred       Other Findings            Anesthetic Plan    ASA: 2  Anesthesia type: general          Induction: Intravenous  Anesthetic plan and risks discussed with: Patient

## 2021-11-08 NOTE — PROGRESS NOTES
TRANSFER - IN REPORT:    Verbal report received from SHEEBA Hernandez on Sofia Khan  being received from PACU for routine post - op      Report consisted of patients Situation, Background, Assessment and   Recommendations(SBAR). Information from the following report(s) SBAR, Kardex, OR Summary, Procedure Summary and MAR was reviewed with the receiving nurse. Opportunity for questions and clarification was provided. Assessment completed upon patients arrival to unit and care assumed.

## 2021-11-08 NOTE — PROGRESS NOTES
Attempted cell phone# on file to update family of surgeon status, no answer. Also attempted family waiting room with no answer.

## 2021-11-08 NOTE — PROGRESS NOTES
Pt arrived to floor via stretcher from PACU. Pt transferred from stretcher to bed without incident. Pt tolerated transfer well. Admission assessment completed. Pt has midline and LLQ dressing in place. Dressing is clean and intact with betadine on tefla underneath. Pt is alert but drowsy. VSS. Primary Nurse Jose Carlos Richard and Agnieszka Erickson RN performed a dual skin assessment on this patient Impairment noted- see wound doc flow sheet  Randall score is 23.

## 2021-11-09 LAB
ANION GAP SERPL CALC-SCNC: 3 MMOL/L (ref 7–16)
BASOPHILS # BLD: 0 K/UL (ref 0–0.2)
BASOPHILS NFR BLD: 1 % (ref 0–2)
BUN SERPL-MCNC: 9 MG/DL (ref 8–23)
CALCIUM SERPL-MCNC: 8.2 MG/DL (ref 8.3–10.4)
CHLORIDE SERPL-SCNC: 107 MMOL/L (ref 98–107)
CO2 SERPL-SCNC: 29 MMOL/L (ref 21–32)
CREAT SERPL-MCNC: 0.91 MG/DL (ref 0.8–1.5)
DIFFERENTIAL METHOD BLD: ABNORMAL
EOSINOPHIL # BLD: 0.1 K/UL (ref 0–0.8)
EOSINOPHIL NFR BLD: 1 % (ref 0.5–7.8)
ERYTHROCYTE [DISTWIDTH] IN BLOOD BY AUTOMATED COUNT: 13.3 % (ref 11.9–14.6)
GLUCOSE SERPL-MCNC: 93 MG/DL (ref 65–100)
HCT VFR BLD AUTO: 31.2 % (ref 41.1–50.3)
HGB BLD-MCNC: 10 G/DL (ref 13.6–17.2)
IMM GRANULOCYTES # BLD AUTO: 0 K/UL (ref 0–0.5)
IMM GRANULOCYTES NFR BLD AUTO: 0 % (ref 0–5)
LYMPHOCYTES # BLD: 0.8 K/UL (ref 0.5–4.6)
LYMPHOCYTES NFR BLD: 11 % (ref 13–44)
MCH RBC QN AUTO: 31.6 PG (ref 26.1–32.9)
MCHC RBC AUTO-ENTMCNC: 32.1 G/DL (ref 31.4–35)
MCV RBC AUTO: 98.7 FL (ref 79.6–97.8)
MONOCYTES # BLD: 0.6 K/UL (ref 0.1–1.3)
MONOCYTES NFR BLD: 8 % (ref 4–12)
NEUTS SEG # BLD: 5.6 K/UL (ref 1.7–8.2)
NEUTS SEG NFR BLD: 79 % (ref 43–78)
NRBC # BLD: 0 K/UL (ref 0–0.2)
PLATELET # BLD AUTO: 217 K/UL (ref 150–450)
PMV BLD AUTO: 10 FL (ref 9.4–12.3)
POTASSIUM SERPL-SCNC: 3.7 MMOL/L (ref 3.5–5.1)
RBC # BLD AUTO: 3.16 M/UL (ref 4.23–5.6)
SODIUM SERPL-SCNC: 139 MMOL/L (ref 136–145)
WBC # BLD AUTO: 7.1 K/UL (ref 4.3–11.1)

## 2021-11-09 PROCEDURE — 51798 US URINE CAPACITY MEASURE: CPT

## 2021-11-09 PROCEDURE — 85025 COMPLETE CBC W/AUTO DIFF WBC: CPT

## 2021-11-09 PROCEDURE — 65270000029 HC RM PRIVATE

## 2021-11-09 PROCEDURE — 80048 BASIC METABOLIC PNL TOTAL CA: CPT

## 2021-11-09 PROCEDURE — 74011250636 HC RX REV CODE- 250/636: Performed by: SURGERY

## 2021-11-09 PROCEDURE — 36415 COLL VENOUS BLD VENIPUNCTURE: CPT

## 2021-11-09 PROCEDURE — 74011250637 HC RX REV CODE- 250/637: Performed by: SURGERY

## 2021-11-09 RX ORDER — DOCUSATE SODIUM 100 MG/1
100 CAPSULE, LIQUID FILLED ORAL 2 TIMES DAILY
Status: DISCONTINUED | OUTPATIENT
Start: 2021-11-09 | End: 2021-11-11 | Stop reason: HOSPADM

## 2021-11-09 RX ORDER — DOCUSATE SODIUM 100 MG/1
100 CAPSULE, LIQUID FILLED ORAL DAILY
Status: DISCONTINUED | OUTPATIENT
Start: 2021-11-09 | End: 2021-11-09

## 2021-11-09 RX ADMIN — GABAPENTIN 200 MG: 100 CAPSULE ORAL at 21:06

## 2021-11-09 RX ADMIN — GABAPENTIN 200 MG: 100 CAPSULE ORAL at 16:00

## 2021-11-09 RX ADMIN — GABAPENTIN 200 MG: 100 CAPSULE ORAL at 08:42

## 2021-11-09 RX ADMIN — HEPARIN SODIUM 5000 UNITS: 5000 INJECTION INTRAVENOUS; SUBCUTANEOUS at 05:14

## 2021-11-09 RX ADMIN — DOCUSATE SODIUM 100 MG: 100 CAPSULE, LIQUID FILLED ORAL at 17:07

## 2021-11-09 RX ADMIN — KETOROLAC TROMETHAMINE 15 MG: 30 INJECTION, SOLUTION INTRAMUSCULAR at 05:14

## 2021-11-09 RX ADMIN — HEPARIN SODIUM 5000 UNITS: 5000 INJECTION INTRAVENOUS; SUBCUTANEOUS at 20:35

## 2021-11-09 RX ADMIN — HEPARIN SODIUM 5000 UNITS: 5000 INJECTION INTRAVENOUS; SUBCUTANEOUS at 11:52

## 2021-11-09 RX ADMIN — ACETAMINOPHEN 1000 MG: 500 TABLET, FILM COATED ORAL at 17:07

## 2021-11-09 RX ADMIN — DOCUSATE SODIUM 100 MG: 100 CAPSULE, LIQUID FILLED ORAL at 09:55

## 2021-11-09 RX ADMIN — ACETAMINOPHEN 1000 MG: 500 TABLET, FILM COATED ORAL at 11:52

## 2021-11-09 NOTE — PROGRESS NOTES
Problem: Falls - Risk of  Goal: *Absence of Falls  Description: Document Tucker Young Fall Risk and appropriate interventions in the flowsheet.   Outcome: Progressing Towards Goal  Note: Fall Risk Interventions:  Mobility Interventions: Communicate number of staff needed for ambulation/transfer, Patient to call before getting OOB         Medication Interventions: Assess postural VS orthostatic hypotension, Evaluate medications/consider consulting pharmacy, Patient to call before getting OOB, Teach patient to arise slowly    Elimination Interventions: Call light in reach, Patient to call for help with toileting needs

## 2021-11-09 NOTE — PROGRESS NOTES
END OF SHIFT NOTE:    Intake/Output  No intake/output data recorded. Voiding: YES  Catheter: NO  Drain:              Stool:  0 occurrences. Emesis:  0 occurrences. VITAL SIGNS  Patient Vitals for the past 12 hrs:   Temp Pulse Resp BP SpO2   11/09/21 0431 98 °F (36.7 °C) 76 18 99/60 96 %   11/08/21 2355 99.5 °F (37.5 °C) 79 18 99/61 96 %   11/08/21 2001 99.4 °F (37.4 °C) 76 17 (!) 105/57 97 %       Pain Assessment  Pain 1  Pain Scale 1: Visual (11/09/21 0431)  Pain Intensity 1: 0 (11/09/21 0431)  Patient Stated Pain Goal: 0 (11/09/21 0431)  Pain Reassessment 1: Patient resting w/respiratory rate greater than 10 (11/08/21 1135)  Pain Intervention(s) 1: Medication (see MAR) (11/08/21 1105)    Ambulating  Yes    Additional Information:     Pt ambulated in hallway with walker. Tolerated well. Tolerated clear liquids. Saucedo cath removed. Waiting for pt to void. Shift report given to oncoming nurse at the bedside.     Bridget Delaney, RN

## 2021-11-09 NOTE — PROGRESS NOTES
Care Management Interventions  Transition of Care Consult (CM Consult): Discharge Planning  Support Systems: Child(aracelis), Friend/Neighbor  Confirm Follow Up Transport: Family  The Patient and/or Patient Representative was Provided with a Choice of Provider and Agrees with the Discharge Plan?: Yes  Freedom of Choice List was Provided with Basic Dialogue that Supports the Patient's Individualized Plan of Care/Goals, Treatment Preferences and Shares the Quality Data Associated with the Providers?: Yes  Discharge Location  Discharge Placement: Home with family assistance    In to talk with patient. S/p colon surgery. Sitting up in chair. States he lives alone but son lives on same property. Has other family and friends around to help support him if he has any needs. Asked if I was with Social Work and I advised yes. ...that CM and SW work together to get patients discharged to home, [facility], rehab or skilled nursing. States he did not have any more to say to me because SW at [facility] had sent his mother to Skilled nursing facility and she  there. Allowed pt to talk. He said she just didn't have a good experience with SW and he wasn't trying to be rude but that he didn't have anything he needed to discuss with me. Will still be available if he changes his mind.

## 2021-11-09 NOTE — PROGRESS NOTES
Hector Spencer MD   Bariatric & Advanced Laparoscopic Surgery & Endoscopy  [address] [place] [address]  Phone [phone]   Fax [phone]      Date of visit: 2021          Name: Gale Hussein      MRN: [id number]       : 1950       Age: 70 y.o. Sex: male          Subjective:     Gale Hussein is a 70 y.o. male s/p Procedure(s):  HARTMANNS COLOSTOMY REVERSAL  LOW ANTERIOR RESECTION/ LITHOTOMY 1 Day Post-Op      21 - feeling good this AM. Pain is minimal. +walking. No flatus or BM. MEDS:    Current Facility-Administered Medications   Medication    lactated Ringers infusion    acetaminophen (TYLENOL) tablet 1,000 mg    gabapentin (NEURONTIN) capsule 200 mg    HYDROmorphone (DILAUDID) injection 0.5 mg    naloxone (NARCAN) injection 0.4 mg    oxyCODONE IR (ROXICODONE) tablet 5 mg    ondansetron (ZOFRAN) injection 4 mg    heparin (porcine) injection 5,000 Units    influenza vaccine  (6 mos+)(PF) (FLUARIX/FLULAVAL/FLUZONE QUAD) injection 0.5 mL       ALLERGIES:       No Known Allergies    I/O:      Intake/Output Summary (Last 24 hours) at 2021 0847  Last data filed at 2021 0844  Gross per 24 hour   Intake 3936 ml   Output 725 ml   Net 3211 ml           Physical Exam:     Visit Vitals  BP (!) 103/59 (BP 1 Location: Right upper arm, BP Patient Position: At rest)   Pulse 77   Temp 98.3 °F (36.8 °C)   Resp 18   Ht 5' 10\" (1.778 m)   Wt 185 lb 6.4 oz (84.1 kg)   SpO2 96%   BMI 26.60 kg/m²       General:  Well-developed, well-nourished, no distress. Psych:  Cooperative, good insight and judgement. Neuro:  Alert, oriented to person, place and time. HEENT:  Normocephalic, atraumatic. Sclera clear. Lungs:  Unlabored breathing. Symmetrical chest expansion. Chest wall:  No tenderness or deformity. Heart:  Regular rate and rhythm. No JVD. Abdomen:  Soft, mild-tenderness, non-distended. No guarding or rebound. Extremities:  Extremities normal, atraumatic, no cyanosis or edema. Skin:  Skin color, texture, turgor normal. No rashes. Labs: All recent labs were reviewed. Normal WBC. Recent Results (from the past 24 hour(s))   METABOLIC PANEL, BASIC    Collection Time: 11/09/21  7:44 AM   Result Value Ref Range    Sodium 139 136 - 145 mmol/L    Potassium 3.7 3.5 - 5.1 mmol/L    Chloride 107 98 - 107 mmol/L    CO2 29 21 - 32 mmol/L    Anion gap 3 (L) 7 - 16 mmol/L    Glucose 93 65 - 100 mg/dL    BUN 9 8 - 23 MG/DL    Creatinine 0.91 0.8 - 1.5 MG/DL    GFR est AA >60 >60 ml/min/1.73m2    GFR est non-AA >60 >60 ml/min/1.73m2    Calcium 8.2 (L) 8.3 - 10.4 MG/DL   CBC WITH AUTOMATED DIFF    Collection Time: 11/09/21  7:44 AM   Result Value Ref Range    WBC 7.1 4.3 - 11.1 K/uL    RBC 3.16 (L) 4.23 - 5.6 M/uL    HGB 10.0 (L) 13.6 - 17.2 g/dL    HCT 31.2 (L) 41.1 - 50.3 %    MCV 98.7 (H) 79.6 - 97.8 FL    MCH 31.6 26.1 - 32.9 PG    MCHC 32.1 31.4 - 35.0 g/dL    RDW 13.3 11.9 - 14.6 %    PLATELET 724 303 - 932 K/uL    MPV 10.0 9.4 - 12.3 FL    ABSOLUTE NRBC 0.00 0.0 - 0.2 K/uL    DF AUTOMATED      NEUTROPHILS 79 (H) 43 - 78 %    LYMPHOCYTES 11 (L) 13 - 44 %    MONOCYTES 8 4.0 - 12.0 %    EOSINOPHILS 1 0.5 - 7.8 %    BASOPHILS 1 0.0 - 2.0 %    IMMATURE GRANULOCYTES 0 0.0 - 5.0 %    ABS. NEUTROPHILS 5.6 1.7 - 8.2 K/UL    ABS. LYMPHOCYTES 0.8 0.5 - 4.6 K/UL    ABS. MONOCYTES 0.6 0.1 - 1.3 K/UL    ABS. EOSINOPHILS 0.1 0.0 - 0.8 K/UL    ABS. BASOPHILS 0.0 0.0 - 0.2 K/UL    ABS. IMM. GRANS. 0.0 0.0 - 0.5 K/UL         Assessment/Plan:  Trice Kemp is a 70 y.o. male s/p Procedure(s):  HARTMANNS COLOSTOMY REVERSAL  LOW ANTERIOR RESECTION/ LITHOTOMY    Pain control  DIET ADULT - CLD  Bowel regimen - colace/senna  Wound - Aquacel AG in place with betadine smooth.    OOB and ambulate as tolerated  DVT proph - Heparin        Signed: Cara Ornelas MD  Bariatric & Minimally Invasive Surgery  11/9/2021 8:47 AM

## 2021-11-10 PROCEDURE — 74011250637 HC RX REV CODE- 250/637: Performed by: SURGERY

## 2021-11-10 PROCEDURE — 65270000029 HC RM PRIVATE

## 2021-11-10 PROCEDURE — 74011250636 HC RX REV CODE- 250/636: Performed by: SURGERY

## 2021-11-10 RX ADMIN — GABAPENTIN 200 MG: 100 CAPSULE ORAL at 08:26

## 2021-11-10 RX ADMIN — HEPARIN SODIUM 5000 UNITS: 5000 INJECTION INTRAVENOUS; SUBCUTANEOUS at 04:59

## 2021-11-10 RX ADMIN — ACETAMINOPHEN 1000 MG: 500 TABLET, FILM COATED ORAL at 05:00

## 2021-11-10 RX ADMIN — HEPARIN SODIUM 5000 UNITS: 5000 INJECTION INTRAVENOUS; SUBCUTANEOUS at 20:37

## 2021-11-10 RX ADMIN — ACETAMINOPHEN 1000 MG: 500 TABLET, FILM COATED ORAL at 11:52

## 2021-11-10 RX ADMIN — GABAPENTIN 200 MG: 100 CAPSULE ORAL at 16:56

## 2021-11-10 RX ADMIN — GABAPENTIN 200 MG: 100 CAPSULE ORAL at 21:35

## 2021-11-10 RX ADMIN — DOCUSATE SODIUM 100 MG: 100 CAPSULE, LIQUID FILLED ORAL at 16:56

## 2021-11-10 RX ADMIN — DOCUSATE SODIUM 100 MG: 100 CAPSULE, LIQUID FILLED ORAL at 08:25

## 2021-11-10 RX ADMIN — HEPARIN SODIUM 5000 UNITS: 5000 INJECTION INTRAVENOUS; SUBCUTANEOUS at 11:52

## 2021-11-10 RX ADMIN — ACETAMINOPHEN 1000 MG: 500 TABLET, FILM COATED ORAL at 16:56

## 2021-11-10 NOTE — PROGRESS NOTES
Problem: Falls - Risk of  Goal: *Absence of Falls  Description: Document Tracey Sadi Fall Risk and appropriate interventions in the flowsheet.   Outcome: Progressing Towards Goal  Note: Fall Risk Interventions:  Mobility Interventions: Patient to call before getting OOB, Communicate number of staff needed for ambulation/transfer         Medication Interventions: Assess postural VS orthostatic hypotension, Patient to call before getting OOB    Elimination Interventions: Call light in reach, Patient to call for help with toileting needs

## 2021-11-10 NOTE — PROGRESS NOTES
Ernestina Dan MD   Bariatric & Advanced Laparoscopic Surgery & Endoscopy  [address] [place], [place] [address]Vincenttessa Hendrix  Phone [phone]   Fax [phone]      Date of visit: 11/10/2021          Name: Gabriel Suarez      MRN: [id number]       : 1950       Age: 70 y.o. Sex: male          Subjective:     Gabriel Suarez is a 70 y.o. male s/p Procedure(s):  HARTMANNS COLOSTOMY REVERSAL  LOW ANTERIOR RESECTION/ LITHOTOMY 1 Day Post-Op      21 - feeling good this AM. Pain is minimal. +walking. No flatus or BM.   11/10/2021 - doing very well. Tolerating diet. +flatus. No BM. MEDS:    Current Facility-Administered Medications   Medication    docusate sodium (COLACE) capsule 100 mg    acetaminophen (TYLENOL) tablet 1,000 mg    gabapentin (NEURONTIN) capsule 200 mg    HYDROmorphone (DILAUDID) injection 0.5 mg    naloxone (NARCAN) injection 0.4 mg    oxyCODONE IR (ROXICODONE) tablet 5 mg    ondansetron (ZOFRAN) injection 4 mg    heparin (porcine) injection 5,000 Units    influenza vaccine  (6 mos+)(PF) (FLUARIX/FLULAVAL/FLUZONE QUAD) injection 0.5 mL       ALLERGIES:       No Known Allergies    I/O:      Intake/Output Summary (Last 24 hours) at 11/10/2021 1111  Last data filed at 11/10/2021 1001  Gross per 24 hour   Intake 1826 ml   Output 2350 ml   Net -524 ml           Physical Exam:     Visit Vitals  /72 (BP 1 Location: Right upper arm, BP Patient Position: Supine)   Pulse 81   Temp 98.5 °F (36.9 °C)   Resp 20   Ht 5' 10\" (1.778 m)   Wt 185 lb 6.4 oz (84.1 kg)   SpO2 98%   BMI 26.60 kg/m²       General:  Well-developed, well-nourished, no distress. Psych:  Cooperative, good insight and judgement. Neuro:  Alert, oriented to person, place and time. HEENT:  Normocephalic, atraumatic. Sclera clear. Lungs:  Unlabored breathing. Symmetrical chest expansion. Chest wall:  No tenderness or deformity. Heart:  Regular rate and rhythm.  No JVD.  Abdomen:  Soft, mild-tenderness, non-distended. Aquacel AG and betadine smooth removed. Extremities:  Extremities normal, atraumatic, no cyanosis or edema. Skin:  Skin color, texture, turgor normal. No rashes. Labs: All recent labs were reviewed. Normal WBC. No results found for this or any previous visit (from the past 24 hour(s)). Assessment/Plan:  Angelic Gotti is a 70 y.o. male s/p Procedure(s):  HARTMANNS COLOSTOMY REVERSAL  LOW ANTERIOR RESECTION/ LITHOTOMY    Pain control  DIET ADULT - advance to FLD  Shower today  Bowel regimen - colace/senna  Wound - clean as needed, vessel loop in place.   OOB and ambulate as tolerated  DVT proph - Heparin        Signed: Jeri Harvey MD  Bariatric & Minimally Invasive Surgery  11/10/2021

## 2021-11-10 NOTE — PROGRESS NOTES
Problem: Falls - Risk of  Goal: *Absence of Falls  Description: Document [place] Flow Fall Risk and appropriate interventions in the flowsheet.   Outcome: Progressing Towards Goal  Note: Fall Risk Interventions:  Mobility Interventions: PT Consult for mobility concerns, Strengthening exercises (ROM-active/passive)         Medication Interventions: Evaluate medications/consider consulting pharmacy, Teach patient to arise slowly    Elimination Interventions: Call light in reach, Patient to call for help with toileting needs

## 2021-11-11 VITALS
DIASTOLIC BLOOD PRESSURE: 75 MMHG | OXYGEN SATURATION: 100 % | RESPIRATION RATE: 17 BRPM | HEIGHT: 70 IN | HEART RATE: 83 BPM | BODY MASS INDEX: 26.54 KG/M2 | WEIGHT: 185.4 LBS | SYSTOLIC BLOOD PRESSURE: 121 MMHG | TEMPERATURE: 98.7 F

## 2021-11-11 PROCEDURE — 90686 IIV4 VACC NO PRSV 0.5 ML IM: CPT | Performed by: SURGERY

## 2021-11-11 PROCEDURE — 74011250637 HC RX REV CODE- 250/637: Performed by: SURGERY

## 2021-11-11 PROCEDURE — 44626 REPAIR BOWEL OPENING: CPT | Performed by: SURGERY

## 2021-11-11 PROCEDURE — 74011250636 HC RX REV CODE- 250/636: Performed by: SURGERY

## 2021-11-11 PROCEDURE — 90471 IMMUNIZATION ADMIN: CPT

## 2021-11-11 RX ORDER — OXYCODONE AND ACETAMINOPHEN 5; 325 MG/1; MG/1
1 TABLET ORAL
Qty: 28 TABLET | Refills: 0 | Status: SHIPPED | OUTPATIENT
Start: 2021-11-11 | End: 2021-11-18

## 2021-11-11 RX ADMIN — GABAPENTIN 200 MG: 100 CAPSULE ORAL at 08:27

## 2021-11-11 RX ADMIN — INFLUENZA VIRUS VACCINE 0.5 ML: 15; 15; 15; 15 SUSPENSION INTRAMUSCULAR at 12:23

## 2021-11-11 RX ADMIN — ACETAMINOPHEN 1000 MG: 500 TABLET, FILM COATED ORAL at 05:31

## 2021-11-11 RX ADMIN — DOCUSATE SODIUM 100 MG: 100 CAPSULE, LIQUID FILLED ORAL at 08:27

## 2021-11-11 RX ADMIN — HEPARIN SODIUM 5000 UNITS: 5000 INJECTION INTRAVENOUS; SUBCUTANEOUS at 05:31

## 2021-11-11 NOTE — DISCHARGE INSTRUCTIONS
DISCHARGE SUMMARY from Nurse    PATIENT INSTRUCTIONS:    After general anesthesia or intravenous sedation, for 24 hours or while taking prescription Narcotics:  · Limit your activities  · Do not drive and operate hazardous machinery  · Do not make important personal or business decisions  · Do  not drink alcoholic beverages  · If you have not urinated within 8 hours after discharge, please contact your surgeon on call. Report the following to your surgeon:  · Excessive pain, swelling, redness or odor of or around the surgical area  · Temperature over 100.5  · Nausea and vomiting lasting longer than 4 hours or if unable to take medications  · Any signs of decreased circulation or nerve impairment to extremity: change in color, persistent  numbness, tingling, coldness or increase pain  · Any questions    What to do at Home:  Recommended activity: Activity as tolerated, ***    *  Please give a list of your current medications to your Primary Care Provider. *  Please update this list whenever your medications are discontinued, doses are      changed, or new medications (including over-the-counter products) are added. *  Please carry medication information at all times in case of emergency situations. These are general instructions for a healthy lifestyle:    No smoking/ No tobacco products/ Avoid exposure to second hand smoke  Surgeon General's Warning:  Quitting smoking now greatly reduces serious risk to your health. Obesity, smoking, and sedentary lifestyle greatly increases your risk for illness    A healthy diet, regular physical exercise & weight monitoring are important for maintaining a healthy lifestyle    You may be retaining fluid if you have a history of heart failure or if you experience any of the following symptoms:  Weight gain of 3 pounds or more overnight or 5 pounds in a week, increased swelling in our hands or feet or shortness of breath while lying flat in bed.   Please call your doctor as soon as you notice any of these symptoms; do not wait until your next office visit. The discharge information has been reviewed with the patient. The patient verbalized understanding. Discharge medications reviewed with the patient and appropriate educational materials and side effects teaching were provided. ___________________________________________________________________________________________________________________________________No bathtub or pool for 2 weeks. Ok to shower. No weight lifting greater than 20 lbs for 6 weeks. Take tylenol or ibuprofen for as needed for mild pain every 4-6 hours. Percocet prescribed for mod to severe pain. This is a narcotic and can cause drowsiness, nausea and vomiting and constipation. Take Colace 100mg BID (over the counter) if constipated.

## 2021-11-11 NOTE — PROGRESS NOTES
Pt discharge instructions given. All questions answered. Port/IV de-accessed. Appointments verified. Pt discharged via ambulatory.

## 2021-11-11 NOTE — DISCHARGE SUMMARY
Physician Discharge Summary     Patient ID:  Loyal Angelucci  [id number]  70 y.o.  1950    Allergies: Patient has no known allergies. Admit Date: 11/8/2021    Discharge Date: 11/11/2021    * Admission Diagnoses: Colovesical fistula [N32.1];S/P partial colectomy [Z90.49]    * Discharge Diagnoses:    Hospital Problems as of 11/11/2021 Date Reviewed: 11/8/2021          Codes Class Noted - Resolved POA    S/P partial colectomy ICD-10-CM: Z90.49  ICD-9-CM: V45.89  11/8/2021 - Present Unknown               Admission Condition: Good    * Discharge Condition: good    * Procedures: Procedure(s):  HARTMANNS COLOSTOMY REVERSAL  LOW ANTERIOR RESECTION/ [address] Course:   Normal hospital course for this procedure. Patient did well post operatively. Patient had normal hemodynamics. No tachycardia. Tolerated clears and was advance to FLD without issues. Pain and nausea were controlled.  He passed flatus and had BM after surgery while in the hospital.    Consults: None    Significant Diagnostic Studies: labs:   Lab Results   Component Value Date/Time    WBC 7.1 11/09/2021 07:44 AM    HGB 10.0 (L) 11/09/2021 07:44 AM    HCT 31.2 (L) 11/09/2021 07:44 AM    PLATELET 286 41/27/0551 07:44 AM    MCV 98.7 (H) 11/09/2021 07:44 AM     Lab Results   Component Value Date/Time    Sodium 139 11/09/2021 07:44 AM    Potassium 3.7 11/09/2021 07:44 AM    Chloride 107 11/09/2021 07:44 AM    CO2 29 11/09/2021 07:44 AM    Anion gap 3 (L) 11/09/2021 07:44 AM    Glucose 93 11/09/2021 07:44 AM    BUN 9 11/09/2021 07:44 AM    Creatinine 0.91 11/09/2021 07:44 AM    BUN/Creatinine ratio 13 06/01/2021 02:41 PM    GFR est AA >60 11/09/2021 07:44 AM    GFR est non-AA >60 11/09/2021 07:44 AM    Calcium 8.2 (L) 11/09/2021 07:44 AM     Radiology: None    * Disposition: Home    Discharge Medications:   Current Discharge Medication List      START taking these medications    Details   oxyCODONE-acetaminophen (Percocet) 5-325 mg per tablet Take 1 Tablet by mouth every six (6) hours as needed for Pain for up to 7 days. Max Daily Amount: 4 Tablets. Qty: 28 Tablet, Refills: 0  Start date: 11/11/2021, End date: 11/18/2021    Associated Diagnoses: Colovesical fistula         CONTINUE these medications which have NOT CHANGED    Details   esomeprazole (NexIUM) 40 mg capsule Take 20 mg by mouth daily. senna-docusate (PERICOLACE) 8.6-50 mg per tablet Take 2 Tablets by mouth two (2) times a day. aspirin (ASPIRIN) 325 mg tablet Take 325 mg by mouth daily. clobetasoL (TEMOVATE) 0.05 % topical cream Apply 1 Dose to affected area two (2) times a day. both hands      meloxicam (MOBIC) 15 mg tablet Take 15 mg by mouth daily. TERBINAFINE HCL EX 1 Dose by Apply Externally route daily. toes      multivitamin, tx-iron-ca-min (THERA-M w/ IRON) 9 mg iron-400 mcg tab tablet Take 1 Tab by mouth daily. B.infantis-B.ani-B.long-B.bifi (Probiotic 4X) 10-15 mg TbEC Take 1 Dose by mouth daily. STOP taking these medications       neomycin (MYCIFRADIN) 500 mg tablet Comments:   Reason for Stopping:         metroNIDAZOLE (FlagyL) 500 mg tablet Comments:   Reason for Stopping:               * Follow-up Care/Patient Instructions:   Activity: Activity as tolerated, No driving while on analgesics, No heavy lifting for 4 weeks, and See surgical instructions  Diet: Bariatric Full liquid diet  Wound Care: Keep wound clean and dry and As directed    Follow-up Information     Follow up With Specialties Details Why Contact Info    Carlos Webb MD Family Medicine   [address]  [address] [phone]              Signed:  Jessica Pal MD  11/11/2021  11:02 AM

## 2021-11-11 NOTE — PROGRESS NOTES
Problem: Falls - Risk of  Goal: *Absence of Falls  Description: Document Naila Calderón Fall Risk and appropriate interventions in the flowsheet.   Outcome: Progressing Towards Goal  Note: Fall Risk Interventions:  Mobility Interventions: Patient to call before getting OOB, PT Consult for assist device competence         Medication Interventions: Evaluate medications/consider consulting pharmacy, Teach patient to arise slowly    Elimination Interventions: Call light in reach, Toileting schedule/hourly rounds

## 2021-11-11 NOTE — PROGRESS NOTES
Dyllan Parker MD   Bariatric & Advanced Laparoscopic Surgery & Endoscopy  [address] [place], [place] [address]  Phone [phone]   Fax [phone]      Date of visit: 2021          Name: Sirena Miller      MRN: [id number]       : 1950       Age: 70 y.o. Sex: male          Subjective:     Sirena Miller is a 70 y.o. male s/p Procedure(s):  HARTMANNS COLOSTOMY REVERSAL  LOW ANTERIOR RESECTION/ LITHOTOMY 1 Day Post-Op      21 - feeling good this AM. Pain is minimal. +walking. No flatus or BM.   11/10/2021 - doing very well. Tolerating diet. +flatus. No BM.   2021 - doing very well this AM. +flatus/BM. Tolerating FLD. No complaints. MEDS:    Current Facility-Administered Medications   Medication    docusate sodium (COLACE) capsule 100 mg    acetaminophen (TYLENOL) tablet 1,000 mg    gabapentin (NEURONTIN) capsule 200 mg    HYDROmorphone (DILAUDID) injection 0.5 mg    naloxone (NARCAN) injection 0.4 mg    oxyCODONE IR (ROXICODONE) tablet 5 mg    ondansetron (ZOFRAN) injection 4 mg    heparin (porcine) injection 5,000 Units    influenza vaccine  (6 mos+)(PF) (FLUARIX/FLULAVAL/FLUZONE QUAD) injection 0.5 mL       ALLERGIES:       No Known Allergies    I/O:      Intake/Output Summary (Last 24 hours) at 2021 1100  Last data filed at 2021 8088  Gross per 24 hour   Intake 600 ml   Output 1550 ml   Net -950 ml           Physical Exam:     Visit Vitals  /75 (BP 1 Location: Right upper arm, BP Patient Position: At rest)   Pulse 83   Temp 98.7 °F (37.1 °C)   Resp 17   Ht 5' 10\" (1.778 m)   Wt 185 lb 6.4 oz (84.1 kg)   SpO2 100%   BMI 26.60 kg/m²       General:  Well-developed, well-nourished, no distress. Psych:  Cooperative, good insight and judgement. Neuro:  Alert, oriented to person, place and time. HEENT:  Normocephalic, atraumatic. Sclera clear. Lungs:  Unlabored breathing. Symmetrical chest expansion. Chest wall:  No tenderness or deformity. Heart:  Regular rate and rhythm. No JVD. Abdomen:  Soft, mild-tenderness, non-distended. Wound clean. Vessel loop in place. No drainage. Extremities:  Extremities normal, atraumatic, no cyanosis or edema. Skin:  Skin color, texture, turgor normal. No rashes. Labs: All recent labs were reviewed. Normal WBC. No results found for this or any previous visit (from the past 24 hour(s)). Assessment/Plan:  Alexsander Guadalupe is a 70 y.o. male s/p Procedure(s):  HARTMANNS COLOSTOMY REVERSAL  LOW ANTERIOR RESECTION/ LITHOTOMY    Pain control  DIET ADULT - advance to soft diet  Bowel regimen - colace/senna  Wound - clean as needed, vessel loop in place.   OOB and ambulate as tolerated  DVT proph - Heparin  Home today      Signed: Osman Huff MD  Bariatric & Minimally Invasive Surgery  11/11/2021

## 2021-11-12 ENCOUNTER — PATIENT OUTREACH (OUTPATIENT)
Dept: CASE MANAGEMENT | Age: 71
End: 2021-11-12

## 2021-11-12 NOTE — PROGRESS NOTES
Care Transitions Initial Call    Call within 2 business days of discharge: Yes     Patient: Natasha Emery Patient : 1950 MRN: [id number]    Last Discharge [address]       Complaint Diagnosis Description Type Department Provider    21  Colovesical fistula . .. Admission (Discharged) Sabino Castillo MD          Was this an external facility discharge? No     Challenges to be reviewed by the provider   Additional needs identified to be addressed with provider: no  none         Method of communication with provider : none    Discussed COVID-19 related testing which was available at this time. Test results were negative. Patient informed of results, if available? yes     Advance Care Planning:   Does patient have an Advance Directive:  health care decision makers updated    Inpatient Readmission Risk score: Unplanned Readmit Risk Score: 10.1 ( )    Was this a readmission? no       Patients top risk factors for readmission: Colovesical fistula; s/p colostomy reversal   Interventions to address risk factors: Obtained and reviewed discharge summary and/or continuity of care documents    Care Transition Nurse (CTN) contacted the patient by telephone to perform post hospital discharge assessment. Verified name and  with patient as identifiers. Provided introduction to self, and explanation of the CTN role. CTN reviewed discharge instructions, medical action plan and red flags with patient who verbalized understanding. Were discharge instructions available to patient? yes. Reviewed appropriate site of care based on symptoms and resources available to patient including: When to call 911. Patient given an opportunity to ask questions and does not have any further questions or concerns at this time. The patient agrees to contact the PCP office for questions related to their healthcare.      Medication reconciliation was performed with patient, who verbalizes understanding of administration of home medications. Referral to Pharm D needed: no     Home Health/Outpatient orders at discharge: none    Durable Medical Equipment ordered at discharge: None  Covid Risk Education    Educated patient about risk for severe COVID-19 due to risk factors according to CDC guidelines. LPN CC reviewed discharge instructions, medical action plan and red flag symptoms with the patient who verbalized understanding. Discussed COVID vaccination status: yes. Education provided on COVID-19 vaccination as appropriate. Discussed exposure protocols and quarantine with CDC Guidelines. Patient was given an opportunity to verbalize any questions and concerns and agrees to contact LPN CC or health care provider for questions related to their healthcare. Was patient discharged with a pulse oximeter? no.     Discussed follow-up appointments. If no appointment was previously scheduled, appointment scheduling offered: yes. Is follow up appointment scheduled within 7 days of discharge? yes. [facility] follow up appointment(s):   Future Appointments   Date Time Provider Jeff Fitzgerald   11/18/2021 11:00 AM Gertrude Jaramillo MD [facility]   11/30/2021  8:15 AM Cody Marcelino MD [address] [phone] for follow-up call in 10-14 days based on severity of symptoms and risk factors. Plan for next call: follow up appointment-11/18/21  CTN provided contact information for future needs. Goals Addressed                 This Visit's Progress     Attends follow-up appointments as directed.  Returns to baseline activity level.

## 2021-11-16 NOTE — PROGRESS NOTES
Physician Progress Note      PATIENT:               Niru Ordonez  CSN #:                  [id number]  :                       1950  ADMIT DATE:       2021 5:28 AM  DISCH DATE:        2021 1:34 PM  RESPONDING  PROVIDER #:        Jass Bran MD          QUERY TEXT:    Patient presented for reversal of a previous colostomy/Patrizia's procedure done for diverticulitis complicated by colovesical fistula. Can the fistula be further specified as: The medical record reflects the following:  Risk Factors:  previous colostomy/Patrizia's procedure, perforated diverticulitis  Clinical Indicators: \"76 y.o. male who presented for reversal of a previous colostomy/Patrizia's procedure done for diverticulitis complicated by colo vesical fistula. He had a colonoscopy and flexible sigmoidoscopy and colon was healthy and no other masses found. An exploratory laparotomy, partial colectomy and anastomosis with possible ostomy was was recommended\"  Treatment: Excision of Transverse Colon  Options provided:  -- Colovesical fistula as a postoperative complication of prior surgery  -- Colovesical fistula unrelated to prior surgery  -- Other - I will add my own diagnosis  -- Disagree - Not applicable / Not valid  -- Disagree - Clinically unable to determine / Unknown  -- Refer to Clinical Documentation Reviewer    PROVIDER RESPONSE TEXT:    Colovesical fistula unrelated to prior surgery and is not a postoperative complication.     Query created by: Omar Adhikari on 2021 2:27 PM      Electronically signed by:  Jass Bran MD 2021 2:31 PM

## 2021-11-17 NOTE — PROGRESS NOTES
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 To accurately reflect the procedure performed please addend the OR note to include if the lysis of adhesions be  further specified as[de-identified]    The medical record reflects the following:  Risk Factors: Colovesical fistula  Clinical Indicators: Operative note stated, \"Findings: extensive small bowel adhesions. \" \"There were multiple adhesions from small bowel to the left lateral pelvic wall. These were carefully lysed. There was small bowel also adhered to the center of the pelvis. This bowel was carefully mobilized and the rectal stump exposed. \"  Treatment: colostomy reversal  Options provided:  -- Extensive/significant lysis of adhesions extending the length and difficulty of the procedure  -- Routine/not clinically significant lysis of adhesions  -- Adhesiolysis of the following tissue/organs during procedure on , Please specify tissue/organs such as intestines, omentum, peritoneum, etc.). -- Other - I will add my own diagnosis  -- Disagree - Not applicable / Not valid  -- Disagree - Clinically unable to determine / Unknown  -- Refer to Clinical Documentation Reviewer    PROVIDER RESPONSE TEXT:    Extensive/significant lysis of adhesions extended the length and difficulty of the procedure on .     Query created by: Gaurang Saenz on 2021 6:32 AM      Electronically signed by:  Bertha Bains MD 2021 8:46 AM

## 2021-11-30 ENCOUNTER — PATIENT OUTREACH (OUTPATIENT)
Dept: CASE MANAGEMENT | Age: 71
End: 2021-11-30

## 2021-11-30 NOTE — PROGRESS NOTES
Care Transitions Follow Up Call    Challenges to be reviewed by the provider   Additional needs identified to be addressed with provider: no  none           Method of communication with provider : none    Care Transition Nurse (CTN) contacted the patient by telephone to follow up after admission on 21. Verified name and  with patient as identifiers. Addressed changes since last contact: none  Follow up appointment completed? yes. Was follow up appointment scheduled within 7 days of discharge? no.     Advance Care Planning:   Does patient have an Advance Directive:  health care decision makers updated    CTN reviewed discharge instructions, medical action plan and red flags with patient and discussed any barriers to care and/or understanding of plan of care after discharge. Discussed appropriate site of care based on symptoms and resources available to patient including: When to call 911. The patient agrees to contact the PCP office for questions related to their healthcare. Patients top risk factors for readmission: s/p colostomy reversal   Interventions to address risk factors: Obtained and reviewed discharge summary and/or continuity of care documents    [facility] follow up appointment(s):   Future Appointments   Date Time Provider Jeff Fitzgerald   2/10/2022  9:20 AM MD RYLIE Stephenson   2022  9:40 AM Chato Buchanan MD [facility]         CTN provided contact information for future needs. Plan for follow-up call in 10-14 days based on severity of symptoms and risk factors. Plan for next call: Current needs     Goals Addressed                 This Visit's Progress     Attends follow-up appointments as directed.    On track

## 2021-12-15 ENCOUNTER — PATIENT OUTREACH (OUTPATIENT)
Dept: CASE MANAGEMENT | Age: 71
End: 2021-12-15

## 2021-12-15 NOTE — PROGRESS NOTES
Care Transitions Follow Up Call    Challenges to be reviewed by the provider   Additional needs identified to be addressed with provider: no  none           Method of communication with provider : none    Care Transition Nurse (CTN) contacted the patient by telephone to follow up after admission on 21. Verified name and  with patient as identifiers. Addressed changes since last contact: none  Follow up appointment completed? yes. Was follow up appointment scheduled within 7 days of discharge? no.     Advance Care Planning:   Does patient have an Advance Directive:  health care decision makers updated    CTN reviewed discharge instructions, medical action plan and red flags with patient and discussed any barriers to care and/or understanding of plan of care after discharge. Discussed appropriate site of care based on symptoms and resources available to patient including: When to call 911. The patient agrees to contact the PCP office for questions related to their healthcare. Patients top risk factors for readmission: s/p colostomy reversal   Interventions to address risk factors: Obtained and reviewed discharge summary and/or continuity of care documents    [facility] follow up appointment(s):   Future Appointments   Date Time Provider Jeff Fitzgerald   2/10/2022  9:20 AM Claude Norrie, MD [facility]   2022  9:40 AM Floresita Buchanan MD [facility]     CTN provided contact information for future needs. No further follow-up call indicated. Goals Addressed                 This Visit's Progress     Attends follow-up appointments as directed. On track     Returns to baseline activity level.    On track

## 2022-03-18 PROBLEM — N32.1 COLOVESICAL FISTULA: Status: ACTIVE | Noted: 2021-05-20

## 2022-03-18 PROBLEM — L23.7 POISON IVY DERMATITIS: Status: ACTIVE | Noted: 2017-02-27

## 2022-03-18 PROBLEM — Z90.49 S/P PARTIAL COLECTOMY: Status: ACTIVE | Noted: 2021-11-08

## 2022-03-19 PROBLEM — M17.0 OSTEOARTHRITIS OF BOTH KNEES: Status: ACTIVE | Noted: 2017-02-27

## 2022-05-31 ENCOUNTER — OFFICE VISIT (OUTPATIENT)
Dept: FAMILY MEDICINE CLINIC | Facility: CLINIC | Age: 72
End: 2022-05-31
Payer: MEDICARE

## 2022-05-31 VITALS
HEIGHT: 70 IN | WEIGHT: 210 LBS | DIASTOLIC BLOOD PRESSURE: 60 MMHG | SYSTOLIC BLOOD PRESSURE: 138 MMHG | BODY MASS INDEX: 30.06 KG/M2

## 2022-05-31 DIAGNOSIS — R73.9 HIGH BLOOD SUGAR: ICD-10-CM

## 2022-05-31 DIAGNOSIS — E78.00 HIGH CHOLESTEROL: ICD-10-CM

## 2022-05-31 DIAGNOSIS — R53.83 FATIGUE, UNSPECIFIED TYPE: ICD-10-CM

## 2022-05-31 DIAGNOSIS — R31.9 HEMATURIA, UNSPECIFIED TYPE: Primary | ICD-10-CM

## 2022-05-31 PROBLEM — C80.1 MALIGNANT (PRIMARY) NEOPLASM, UNSPECIFIED (HCC): Status: RESOLVED | Noted: 2022-05-31 | Resolved: 2022-05-31

## 2022-05-31 PROBLEM — C80.1 MALIGNANT (PRIMARY) NEOPLASM, UNSPECIFIED (HCC): Status: ACTIVE | Noted: 2022-05-31

## 2022-05-31 LAB
ALBUMIN SERPL-MCNC: 4 G/DL (ref 3.2–4.6)
ALBUMIN/GLOB SERPL: 1.2 {RATIO} (ref 1.2–3.5)
ALP SERPL-CCNC: 81 U/L (ref 50–136)
ALT SERPL-CCNC: 22 U/L (ref 12–65)
ANION GAP SERPL CALC-SCNC: 7 MMOL/L (ref 7–16)
AST SERPL-CCNC: 21 U/L (ref 15–37)
BASOPHILS # BLD: 0.1 K/UL (ref 0–0.2)
BASOPHILS NFR BLD: 1 % (ref 0–2)
BILIRUB SERPL-MCNC: 0.4 MG/DL (ref 0.2–1.1)
BILIRUBIN, URINE, POC: NEGATIVE
BLOOD URINE, POC: NEGATIVE
BUN SERPL-MCNC: 20 MG/DL (ref 8–23)
CALCIUM SERPL-MCNC: 9.6 MG/DL (ref 8.3–10.4)
CHLORIDE SERPL-SCNC: 109 MMOL/L (ref 98–107)
CHOLEST SERPL-MCNC: 227 MG/DL
CO2 SERPL-SCNC: 23 MMOL/L (ref 21–32)
CREAT SERPL-MCNC: 1.2 MG/DL (ref 0.8–1.5)
DIFFERENTIAL METHOD BLD: ABNORMAL
EOSINOPHIL # BLD: 0.1 K/UL (ref 0–0.8)
EOSINOPHIL NFR BLD: 2 % (ref 0.5–7.8)
ERYTHROCYTE [DISTWIDTH] IN BLOOD BY AUTOMATED COUNT: 14.1 % (ref 11.9–14.6)
EST. AVERAGE GLUCOSE BLD GHB EST-MCNC: 97 MG/DL
GLOBULIN SER CALC-MCNC: 3.3 G/DL (ref 2.3–3.5)
GLUCOSE SERPL-MCNC: 75 MG/DL (ref 65–100)
GLUCOSE URINE, POC: NEGATIVE
HBA1C MFR BLD: 5 % (ref 4.2–6.3)
HCT VFR BLD AUTO: 40.4 % (ref 41.1–50.3)
HDLC SERPL-MCNC: 54 MG/DL (ref 40–60)
HDLC SERPL: 4.2 {RATIO}
HGB BLD-MCNC: 12.6 G/DL (ref 13.6–17.2)
IMM GRANULOCYTES # BLD AUTO: 0 K/UL (ref 0–0.5)
IMM GRANULOCYTES NFR BLD AUTO: 0 % (ref 0–5)
KETONES, URINE, POC: NEGATIVE
LDLC SERPL CALC-MCNC: 145.6 MG/DL
LEUKOCYTE ESTERASE, URINE, POC: NEGATIVE
LYMPHOCYTES # BLD: 1.3 K/UL (ref 0.5–4.6)
LYMPHOCYTES NFR BLD: 22 % (ref 13–44)
MCH RBC QN AUTO: 31.1 PG (ref 26.1–32.9)
MCHC RBC AUTO-ENTMCNC: 31.2 G/DL (ref 31.4–35)
MCV RBC AUTO: 99.8 FL (ref 79.6–97.8)
MONOCYTES # BLD: 0.5 K/UL (ref 0.1–1.3)
MONOCYTES NFR BLD: 10 % (ref 4–12)
NEUTS SEG # BLD: 3.6 K/UL (ref 1.7–8.2)
NEUTS SEG NFR BLD: 65 % (ref 43–78)
NITRITE, URINE, POC: NEGATIVE
NRBC # BLD: 0 K/UL (ref 0–0.2)
PH, URINE, POC: 6 (ref 4.6–8)
PLATELET # BLD AUTO: 266 K/UL (ref 150–450)
PMV BLD AUTO: 10 FL (ref 9.4–12.3)
POTASSIUM SERPL-SCNC: 4.2 MMOL/L (ref 3.5–5.1)
PROT SERPL-MCNC: 7.3 G/DL (ref 6.3–8.2)
PROTEIN,URINE, POC: NEGATIVE
RBC # BLD AUTO: 4.05 M/UL (ref 4.23–5.6)
SODIUM SERPL-SCNC: 139 MMOL/L (ref 136–145)
SPECIFIC GRAVITY, URINE, POC: 1.03 (ref 1–1.03)
TRIGL SERPL-MCNC: 137 MG/DL (ref 35–150)
TSH, 3RD GENERATION: 1.73 UIU/ML (ref 0.36–3.74)
URINALYSIS CLARITY, POC: CLEAR
URINALYSIS COLOR, POC: NORMAL
UROBILINOGEN, POC: NORMAL
VLDLC SERPL CALC-MCNC: 27.4 MG/DL (ref 6–23)
WBC # BLD AUTO: 5.6 K/UL (ref 4.3–11.1)

## 2022-05-31 PROCEDURE — G8427 DOCREV CUR MEDS BY ELIG CLIN: HCPCS | Performed by: FAMILY MEDICINE

## 2022-05-31 PROCEDURE — 3017F COLORECTAL CA SCREEN DOC REV: CPT | Performed by: FAMILY MEDICINE

## 2022-05-31 PROCEDURE — 1123F ACP DISCUSS/DSCN MKR DOCD: CPT | Performed by: FAMILY MEDICINE

## 2022-05-31 PROCEDURE — G8417 CALC BMI ABV UP PARAM F/U: HCPCS | Performed by: FAMILY MEDICINE

## 2022-05-31 PROCEDURE — 1036F TOBACCO NON-USER: CPT | Performed by: FAMILY MEDICINE

## 2022-05-31 PROCEDURE — 81003 URINALYSIS AUTO W/O SCOPE: CPT | Performed by: FAMILY MEDICINE

## 2022-05-31 PROCEDURE — 99214 OFFICE O/P EST MOD 30 MIN: CPT | Performed by: FAMILY MEDICINE

## 2022-05-31 ASSESSMENT — ANXIETY QUESTIONNAIRES
3. WORRYING TOO MUCH ABOUT DIFFERENT THINGS: 0
1. FEELING NERVOUS, ANXIOUS, OR ON EDGE: 0
6. BECOMING EASILY ANNOYED OR IRRITABLE: 0
GAD7 TOTAL SCORE: 0
IF YOU CHECKED OFF ANY PROBLEMS ON THIS QUESTIONNAIRE, HOW DIFFICULT HAVE THESE PROBLEMS MADE IT FOR YOU TO DO YOUR WORK, TAKE CARE OF THINGS AT HOME, OR GET ALONG WITH OTHER PEOPLE: NOT DIFFICULT AT ALL
2. NOT BEING ABLE TO STOP OR CONTROL WORRYING: 0
5. BEING SO RESTLESS THAT IT IS HARD TO SIT STILL: 0
4. TROUBLE RELAXING: 0
7. FEELING AFRAID AS IF SOMETHING AWFUL MIGHT HAPPEN: 0

## 2022-05-31 ASSESSMENT — PATIENT HEALTH QUESTIONNAIRE - PHQ9
SUM OF ALL RESPONSES TO PHQ QUESTIONS 1-9: 0
2. FEELING DOWN, DEPRESSED OR HOPELESS: 0
SUM OF ALL RESPONSES TO PHQ QUESTIONS 1-9: 0
1. LITTLE INTEREST OR PLEASURE IN DOING THINGS: 0
SUM OF ALL RESPONSES TO PHQ9 QUESTIONS 1 & 2: 0
SUM OF ALL RESPONSES TO PHQ QUESTIONS 1-9: 0
SUM OF ALL RESPONSES TO PHQ QUESTIONS 1-9: 0

## 2022-05-31 ASSESSMENT — ENCOUNTER SYMPTOMS
GASTROINTESTINAL NEGATIVE: 1
RESPIRATORY NEGATIVE: 1

## 2022-05-31 NOTE — PROGRESS NOTES
[address] [place] [address]  _______________________________________  Carlos A Egan MD                 [address] [place] [address] Drive, [address]. MD Jerilyn                     Daniel Wallace 2                                                                                    Phone: [phone]                                                                                    Fax: [phone]    Eliot Mann is a 70 y.o. male who is seen for evaluation of   Chief Complaint   Patient presents with    Hematuria       HPI:   Hematuria  This is a recurrent problem. Episode onset: has had couple episodes of blood in urine last few days, see details below. Irritative symptoms do not include frequency. Pertinent negatives include no dysuria or flank pain. Gastroesophageal Reflux  Chronicity: no breakthrough of sx noetd. uses meds PRN    Hypertension  This is a chronic problem. Episode onset: stable on meds, needs refills and labs. Other  Episode onset: had listed primary malignancy on his chart but this is cleared off the list, a year ago he had severe obstruction in bowels thought to be cancer but all biopsies proved negative. Hyperlipidemia  This is a chronic problem. Episode onset: chol high in past, need repeat labs fasting       Chief Complaint   Patient presents with    Hematuria         Review of Systems:    Review of Systems   Respiratory: Negative. Cardiovascular: Negative. Gastrointestinal: Negative. Genitourinary: Positive for hematuria. Negative for decreased urine volume, difficulty urinating, dysuria, enuresis, flank pain, frequency, penile discharge, penile swelling and testicular pain. Musculoskeletal: Negative.         History:  Past Medical History:   Diagnosis Date    Calculus of kidney     \"they don't bother me\" 5/18/21    Col[facility] in [facility])     Colovesical fistula     COVID-19 vaccine series completed 02/25/2021    Wally Michele     Diverticulitis     Encounter for long-term (current) use of other medications 4/22/2014    GERD (gastroesophageal reflux disease)     High cholesterol 07/29/2014    no meds    Other testicular hypofunction 7/29/2014    Weight loss        Past Surgical History:   Procedure Laterality Date    COLONOSCOPY N/A 2/28/2021    COLONOSCOPY performed by Radha Casas MD at [address]  05/17/2021    COLONOSCOPY N/A 9/10/2021    COLONOSCOPY THRU STOMA performed by Gabriel Farooq MD at [facility] N/A 5/17/2021    SIGMOIDOSCOPY FLEXIBLE performed by Gabriel Farooq MD at [facility] N/A 9/10/2021    SIGMOIDOSCOPY FLEXIBLE/BMI 27 performed by Gabriel Farooq MD at [address] [facility]  11/08/2021    Hartmanns colostomy reversal    TONSILLECTOMY         Family History   Problem Relation Age of Onset    Heart Disease Father         CABG & STENTS     No Known Problems Mother     Heart Attack Father        Social History     Tobacco Use    Smoking status: Never Smoker    Smokeless tobacco: Never Used   Substance Use Topics    Alcohol use: Yes       No Known Allergies    Current Outpatient Medications   Medication Sig Dispense Refill    TERBINAFINE HCL EX Apply 1 Dose topically daily      aspirin 325 MG tablet Take 325 mg by mouth daily      clobetasol (TEMOVATE) 0.05 % cream Apply topically 2 times daily      esomeprazole (NEXIUM) 40 MG delayed release capsule Take 20 mg by mouth daily      meloxicam (MOBIC) 15 MG tablet Take 15 mg by mouth daily      senna-docusate (PERICOLACE) 8.6-50 MG per tablet Take 2 tablets by mouth 2 times daily       No current facility-administered medications for this visit. Vitals:    /60   Ht 5' 10\" (1.778 m)   Wt 210 lb (95.3 kg)   BMI 30.13 kg/m²     Physical Exam:  Physical Exam  Vitals and nursing note reviewed. Constitutional:       Appearance: Normal appearance. He is not ill-appearing or diaphoretic. HENT:      Head: Normocephalic and atraumatic. Nose: Nose normal.   Eyes:      Pupils: Pupils are equal, round, and reactive to light. Cardiovascular:      Rate and Rhythm: Normal rate and regular rhythm. Pulses: Normal pulses. Heart sounds: Normal heart sounds. Pulmonary:      Effort: Pulmonary effort is normal.      Breath sounds: Normal breath sounds. Musculoskeletal:         General: No swelling or tenderness. Normal range of motion. Cervical back: Normal range of motion and neck supple. Skin:     General: Skin is warm and dry. Findings: No erythema or rash. Neurological:      General: No focal deficit present. Mental Status: He is alert and oriented to person, place, and time. Mental status is at baseline. Psychiatric:         Mood and Affect: Mood normal.         Assessment/Plan:     ICD-10-CM    1. Hematuria, unspecified type  R31.9 AMB POC URINALYSIS DIP STICK AUTO W/O MICRO   2. High cholesterol  E78.00 Comprehensive Metabolic Panel     Lipid Panel   3. High blood sugar  R73.9 Hemoglobin A1C   4. Fatigue, unspecified type  R53.83 CBC with Auto Differential     TSH   has had blood in urine couple times in past few days, no pain like with kidney stone, etc. Has had stones in past. No blood in past couple days. If happens again, will need to get CT (stone protocol) but low likelihood of being helpful at this point. Labs and medicines sent and pending as appropriate  Encourage low salt diet with exercise as tolerated  Encourage weight control efforts to reduce overall health risks in future  Encourage monitor BP at home   Recheck in 6 months or as needed for other problems.     Albina Foster MD

## 2022-10-03 ENCOUNTER — OFFICE VISIT (OUTPATIENT)
Dept: FAMILY MEDICINE CLINIC | Facility: CLINIC | Age: 72
End: 2022-10-03
Payer: MEDICARE

## 2022-10-03 VITALS
HEIGHT: 70 IN | WEIGHT: 214 LBS | BODY MASS INDEX: 30.64 KG/M2 | SYSTOLIC BLOOD PRESSURE: 124 MMHG | DIASTOLIC BLOOD PRESSURE: 70 MMHG

## 2022-10-03 DIAGNOSIS — M17.0 OSTEOARTHRITIS OF BOTH KNEES, UNSPECIFIED OSTEOARTHRITIS TYPE: Primary | ICD-10-CM

## 2022-10-03 DIAGNOSIS — Z00.00 ROUTINE GENERAL MEDICAL EXAMINATION AT A HEALTH CARE FACILITY: ICD-10-CM

## 2022-10-03 DIAGNOSIS — K51.20 ULCERATIVE (CHRONIC) PROCTITIS WITHOUT COMPLICATIONS (HCC): ICD-10-CM

## 2022-10-03 DIAGNOSIS — D50.9 IRON DEFICIENCY ANEMIA, UNSPECIFIED IRON DEFICIENCY ANEMIA TYPE: ICD-10-CM

## 2022-10-03 DIAGNOSIS — E78.00 HIGH CHOLESTEROL: ICD-10-CM

## 2022-10-03 DIAGNOSIS — I10 ESSENTIAL (PRIMARY) HYPERTENSION: ICD-10-CM

## 2022-10-03 DIAGNOSIS — E83.42 HYPOMAGNESEMIA: ICD-10-CM

## 2022-10-03 DIAGNOSIS — R73.9 HIGH BLOOD SUGAR: ICD-10-CM

## 2022-10-03 DIAGNOSIS — E87.6 HYPOKALEMIA: ICD-10-CM

## 2022-10-03 LAB
ALBUMIN SERPL-MCNC: 3.7 G/DL (ref 3.2–4.6)
ALBUMIN/GLOB SERPL: 1.1 {RATIO} (ref 1.2–3.5)
ALP SERPL-CCNC: 80 U/L (ref 50–136)
ALT SERPL-CCNC: 24 U/L (ref 12–65)
ANION GAP SERPL CALC-SCNC: 1 MMOL/L (ref 4–13)
AST SERPL-CCNC: 24 U/L (ref 15–37)
BASOPHILS # BLD: 0.1 K/UL (ref 0–0.2)
BASOPHILS NFR BLD: 1 % (ref 0–2)
BILIRUB SERPL-MCNC: 0.3 MG/DL (ref 0.2–1.1)
BUN SERPL-MCNC: 14 MG/DL (ref 8–23)
CALCIUM SERPL-MCNC: 9.5 MG/DL (ref 8.3–10.4)
CHLORIDE SERPL-SCNC: 110 MMOL/L (ref 101–110)
CHOLEST SERPL-MCNC: 213 MG/DL
CO2 SERPL-SCNC: 27 MMOL/L (ref 21–32)
CREAT SERPL-MCNC: 0.9 MG/DL (ref 0.8–1.5)
DIFFERENTIAL METHOD BLD: ABNORMAL
EOSINOPHIL # BLD: 0.2 K/UL (ref 0–0.8)
EOSINOPHIL NFR BLD: 4 % (ref 0.5–7.8)
ERYTHROCYTE [DISTWIDTH] IN BLOOD BY AUTOMATED COUNT: 12.8 % (ref 11.9–14.6)
GLOBULIN SER CALC-MCNC: 3.3 G/DL (ref 2.3–3.5)
GLUCOSE SERPL-MCNC: 98 MG/DL (ref 65–100)
HCT VFR BLD AUTO: 38.8 % (ref 41.1–50.3)
HDLC SERPL-MCNC: 42 MG/DL (ref 40–60)
HDLC SERPL: 5.1 {RATIO}
HGB BLD-MCNC: 12.5 G/DL (ref 13.6–17.2)
IMM GRANULOCYTES # BLD AUTO: 0 K/UL (ref 0–0.5)
IMM GRANULOCYTES NFR BLD AUTO: 0 % (ref 0–5)
LDLC SERPL CALC-MCNC: 129.4 MG/DL
LYMPHOCYTES # BLD: 1.3 K/UL (ref 0.5–4.6)
LYMPHOCYTES NFR BLD: 25 % (ref 13–44)
MCH RBC QN AUTO: 33.4 PG (ref 26.1–32.9)
MCHC RBC AUTO-ENTMCNC: 32.2 G/DL (ref 31.4–35)
MCV RBC AUTO: 103.7 FL (ref 79.6–97.8)
MONOCYTES # BLD: 0.4 K/UL (ref 0.1–1.3)
MONOCYTES NFR BLD: 8 % (ref 4–12)
NEUTS SEG # BLD: 3.2 K/UL (ref 1.7–8.2)
NEUTS SEG NFR BLD: 62 % (ref 43–78)
NRBC # BLD: 0 K/UL (ref 0–0.2)
PLATELET # BLD AUTO: 256 K/UL (ref 150–450)
PMV BLD AUTO: 10.2 FL (ref 9.4–12.3)
POTASSIUM SERPL-SCNC: 4.3 MMOL/L (ref 3.5–5.1)
PROT SERPL-MCNC: 7 G/DL (ref 6.3–8.2)
RBC # BLD AUTO: 3.74 M/UL (ref 4.23–5.6)
SODIUM SERPL-SCNC: 138 MMOL/L (ref 136–145)
TRIGL SERPL-MCNC: 208 MG/DL (ref 35–150)
VLDLC SERPL CALC-MCNC: 41.6 MG/DL (ref 6–23)
WBC # BLD AUTO: 5.1 K/UL (ref 4.3–11.1)

## 2022-10-03 PROCEDURE — G8417 CALC BMI ABV UP PARAM F/U: HCPCS | Performed by: FAMILY MEDICINE

## 2022-10-03 PROCEDURE — 3017F COLORECTAL CA SCREEN DOC REV: CPT | Performed by: FAMILY MEDICINE

## 2022-10-03 PROCEDURE — 99214 OFFICE O/P EST MOD 30 MIN: CPT | Performed by: FAMILY MEDICINE

## 2022-10-03 PROCEDURE — 1123F ACP DISCUSS/DSCN MKR DOCD: CPT | Performed by: FAMILY MEDICINE

## 2022-10-03 PROCEDURE — G0439 PPPS, SUBSEQ VISIT: HCPCS | Performed by: FAMILY MEDICINE

## 2022-10-03 PROCEDURE — G8427 DOCREV CUR MEDS BY ELIG CLIN: HCPCS | Performed by: FAMILY MEDICINE

## 2022-10-03 PROCEDURE — G8484 FLU IMMUNIZE NO ADMIN: HCPCS | Performed by: FAMILY MEDICINE

## 2022-10-03 PROCEDURE — 1036F TOBACCO NON-USER: CPT | Performed by: FAMILY MEDICINE

## 2022-10-03 RX ORDER — ZINC OXIDE 13 %
CREAM (GRAM) TOPICAL
COMMUNITY

## 2022-10-03 RX ORDER — POLYETHYLENE GLYCOL 3350 17 G/17G
17 POWDER, FOR SOLUTION ORAL DAILY PRN
COMMUNITY

## 2022-10-03 ASSESSMENT — LIFESTYLE VARIABLES
HOW OFTEN DO YOU HAVE A DRINK CONTAINING ALCOHOL: PATIENT DECLINED
HOW MANY STANDARD DRINKS CONTAINING ALCOHOL DO YOU HAVE ON A TYPICAL DAY: PATIENT DECLINED

## 2022-10-03 ASSESSMENT — ENCOUNTER SYMPTOMS
EYES NEGATIVE: 1
RESPIRATORY NEGATIVE: 1

## 2022-10-03 ASSESSMENT — PATIENT HEALTH QUESTIONNAIRE - PHQ9
1. LITTLE INTEREST OR PLEASURE IN DOING THINGS: 0
SUM OF ALL RESPONSES TO PHQ QUESTIONS 1-9: 0
2. FEELING DOWN, DEPRESSED OR HOPELESS: 0
SUM OF ALL RESPONSES TO PHQ9 QUESTIONS 1 & 2: 0
SUM OF ALL RESPONSES TO PHQ QUESTIONS 1-9: 0

## 2022-10-03 NOTE — PROGRESS NOTES
[address] [place] [address]  _______________________________________  Naya Callahan MD                 [address] [place] [address] Drive,  [address]. Jerilyn, [address] [place] [address]                     Daniel Kam 2                                                                                    Phone: [phone]                                                                                    Fax: [phone]    Hola Mclaughlin is a 70 y.o. male who is seen for evaluation of   Chief Complaint   Patient presents with    Medicare AWV    Numbness     B/L hands, tingle and numbness off and on     Cholesterol Problem       HPI:   Neuropathy    Pain distribution: Patient continues to have numbness and tingling consistent with neuropathy. Symptoms are currently controlled on current. Hypertension  This is a chronic problem. Progression since onset: Hypertension controlled without side effects or breakthrough needs refills recheck labs. Abnormal Lab  Episode onset: History of iron deficiency anemia as well as elevated sugar and inflammation seen in the past, recheck labs to screen for these ordered today. Associated symptoms include fatigue and numbness. Pertinent negatives include no chills or diaphoresis. Hyperlipidemia  This is a chronic problem. Condition status: Hyperlipidemia controlled medication needs recheck fasting lipids today. Arthritis  Visit type: flare lately with increase pain in hands and wrists and also bilat knees. no injury noted. Associated symptoms include fatigue. Chief Complaint   Patient presents with    Medicare AWV    Numbness     B/L hands, tingle and numbness off and on     Cholesterol Problem         Review of Systems:    Review of Systems   Constitutional:  Positive for fatigue. Negative for activity change, chills and diaphoresis. HENT: Negative. Eyes: Negative. Respiratory: Negative. Cardiovascular: Negative. Endocrine: Negative. Genitourinary: Negative. Musculoskeletal:  Positive for arthritis. Neurological:  Positive for numbness.      History:  Past Medical History:   Diagnosis Date    Calculus of kidney     \"they don't bother me\" 5/18/21    Colostomy in place [facility])     Colovesical fistula     COVID-19 vaccine series completed 02/25/2021    [employer]     Diverticulitis     Encounter for long-term (current) use of other medications 4/22/2014    GERD (gastroesophageal reflux disease)     High cholesterol 07/29/2014    no meds    Other testicular hypofunction 7/29/2014    Weight loss        Past Surgical History:   Procedure Laterality Date    COLONOSCOPY N/A 2/28/2021    COLONOSCOPY performed by Hilda German MD at [address] [address])  05/17/2021    COLONOSCOPY N/A 9/10/2021    COLONOSCOPY THRU STOMA performed by Annemarie Couch MD at [id number] 5/17/2021    [address] performed by Annemarie Couch MD at [id number] N/A 9/10/2021    SIGMOIDOSCOPY FLEXIBLE/BMI 27 performed by Annemarie Couch MD at [address] [place]    OTHER SURGICAL HISTORY  11/08/2021    Hartmanns colostomy reversal    TONSILLECTOMY         Family History   Problem Relation Age of Onset    Heart Disease Father         CABG & STENTS     No Known Problems Mother     Heart Attack Father        Social History     Tobacco Use    Smoking status: Never    Smokeless tobacco: Never   Substance Use Topics    Alcohol use: Yes       No Known Allergies    Current Outpatient Medications   Medication Sig Dispense Refill    Omega-3 Fatty Acids (FISH OIL OMEGA-3 PO) Take by mouth      polyethylene glycol (MIRALAX) 17 g packet Take 17 g by mouth daily as needed for Constipation      Probiotic Product (PROBIOTIC DAILY) CAPS Take by mouth      clobetasol (TEMOVATE) 0.05 % cream Apply topically 2 times daily      esomeprazole (NEXIUM) 40 MG delayed release capsule Take 20 mg by mouth daily      meloxicam (MOBIC) 15 MG tablet Take 15 mg by mouth daily      senna-docusate (PERICOLACE) 8.6-50 MG per tablet Take 2 tablets by mouth 2 times daily Taking PRN      TERBINAFINE HCL EX Apply 1 Dose topically daily (Patient not taking: Reported on 10/3/2022)      aspirin 325 MG tablet Take 325 mg by mouth daily (Patient not taking: Reported on 10/3/2022)       No current facility-administered medications for this visit. Vitals:    /70 (Site: Left Upper Arm, Position: Sitting)   Ht 5' 10\" (1.778 m)   Wt 214 lb (97.1 kg)   BMI 30.71 kg/m²     Physical Exam:  Physical Exam  Vitals and nursing note reviewed. Constitutional:       Appearance: Normal appearance. He is not ill-appearing or diaphoretic. HENT:      Head: Normocephalic and atraumatic. Nose: Nose normal.   Eyes:      Pupils: Pupils are equal, round, and reactive to light. Cardiovascular:      Rate and Rhythm: Normal rate and regular rhythm. Pulses: Normal pulses. Heart sounds: Normal heart sounds. Pulmonary:      Effort: Pulmonary effort is normal.      Breath sounds: Normal breath sounds. Musculoskeletal:         General: No swelling or tenderness. Normal range of motion. Cervical back: Normal range of motion and neck supple. Skin:     General: Skin is warm and dry. Findings: No erythema or rash. Neurological:      General: No focal deficit present. Mental Status: He is alert and oriented to person, place, and time. Mental status is at baseline. Psychiatric:         Mood and Affect: Mood normal.     Assessment/Plan:     ICD-10-CM    1. Osteoarthritis of both knees, unspecified osteoarthritis type  M17.0       2. High cholesterol  E78.00 Lipid Panel      3. High blood sugar  R73.9 Hemoglobin A1C      4. Hypokalemia  E87.6       5. 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  Iron deficiency anemia, unspecified iron deficiency anemia type  D50.9       7. Essential (primary) hypertension  I10 CBC with Auto Differential     Comprehensive Metabolic Panel      8. Hypomagnesemia  E83.42       NEW ISSUE : arthritis pain in hands and knees bilaterally, pain increase, encourage exercise and NSAIDS as safely advised, call if other problems, can consider PT or further evaluation    Labs and medicines sent and pending as appropriate  Encourage low salt diet with exercise as tolerated  Encourage weight control efforts to reduce overall health risks in future  Encourage monitor BP at home   Recheck in 4 months or as needed for other problems. 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  Recommended screening schedule for the next 5-10 years is provided to the patient in written form: see Patient Instructions/AVS.     Return for Medicare Annual Wellness Visit in 1 year. Subjective   The following acute and/or chronic problems were also addressed today:  Arthritis pain and swelling in hands and knees, pain and reduced ROM with mild crepitus as well    Patient's complete Health Risk Assessment and screening values have been reviewed and are found in Flowsheets.  The following problems were reviewed today and where indicated follow up appointments were made and/or referrals ordered. Positive Risk Factor Screenings with Interventions:    Fall Risk:  Do you feel unsteady or are you worried about falling? : (!) yes  2 or more falls in past year?: no  Fall with injury in past year?: no   Fall Risk Interventions:    Home safety tips provided            General Health and ACP:  General  In general, how would you say your health is?: Good  In the past 7 days, have you experienced any of the following: New or Increased Pain, New or Increased Fatigue, Loneliness, Social Isolation, Stress or Anger?: No  Do you have a Living Will?: Yes    Advance Directives       Power of  Living Will ACP-Advance Directive ACP-Power of     Not on File Not on File Not on File Not on File          General Health Risk Interventions:  Poor self-assessment of health status: patient advised to follow-up in this office for further evaluation and treatment of arthritis issues within 4 month(s)    Health Habits/Nutrition:  Physical Activity: Unknown    Days of Exercise per Week: Patient refused    Minutes of Exercise per Session: Not on file     Have you lost any weight without trying in the past 3 months?: No  Body mass index: (!) 30.7  Have you seen the dentist within the past year?: Yes  Health Habits/Nutrition Interventions:  Inadequate physical activity:  patient agrees to increase physical activity as follows: increase walking    Hearing/Vision:  Do you or your family notice any trouble with your hearing that hasn't been managed with hearing aids?: (!) Yes  Do you have difficulty driving, watching TV, or doing any of your daily activities because of your eyesight?: No  Have you had an eye exam within the past year?: Yes  No results found.   Hearing/Vision Interventions:  none    Safety:  Do you have working smoke detectors?: Yes  Do you have any tripping hazards - loose or unsecured carpets or rugs?: No  Do you have any tripping hazards - clutter in doorways, halls, or stairs?: No  Do you have either shower bars, grab bars, non-slip mats or non-slip surfaces in your shower or bathtub?: (!) No  Do all of your stairways have a railing or banister?: Not Applicable  Do you always fasten your seatbelt when you are in a car?: Yes  Safety Interventions:  Home safety tips provided           Objective   Vitals:    10/03/22 1412   BP: 124/70   Site: Left Upper Arm   Position: Sitting   Weight: 214 lb (97.1 kg)   Height: 5' 10\" (1.778 m)      Body mass index is 30.71 kg/m². General Appearance: alert and oriented to person, place and time, well-developed and well-nourished, in no acute distress  Pulmonary/Chest: clear to auscultation bilaterally- no wheezes, rales or rhonchi, normal air movement, no respiratory distress  Cardiovascular: normal rate, normal S1 and S2, no gallops, intact distal pulses, and no carotid bruits  Extremities: no cyanosis and no clubbing       No Known Allergies  Prior to Visit Medications    Medication Sig Taking?  Authorizing Provider   Omega-3 Fatty Acids (FISH OIL OMEGA-3 PO) Take by mouth Yes Historical Provider, MD   polyethylene glycol (MIRALAX) 17 g packet Take 17 g by mouth daily as needed for Constipation Yes Historical Provider, MD   Probiotic Product (PROBIOTIC DAILY) CAPS Take by mouth Yes Historical Provider, MD   clobetasol (TEMOVATE) 0.05 % cream Apply topically 2 times daily Yes Ar Automatic Reconciliation   esomeprazole (NEXIUM) 40 MG delayed release capsule Take 20 mg by mouth daily Yes Ar Automatic Reconciliation   meloxicam (MOBIC) 15 MG tablet Take 15 mg by mouth daily Yes Ar Automatic Reconciliation   senna-docusate (PERICOLACE) 8.6-50 MG per tablet Take 2 tablets by mouth 2 times daily Taking PRN Yes Ar Automatic Reconciliation       CareTeam (Including outside providers/suppliers regularly involved in providing care):   Patient Care Team:  Radhika West MD as PCP - Nancy Plata MD as PCP - [facility] Empaneled Provider  Clara Carrillo MD as Surgeon     Reviewed and updated this visit:  Tobacco  Allergies  Meds  Problems  Med Hx  Surg Hx  Soc Hx  Fam Hx

## 2022-10-03 NOTE — ADDENDUM NOTE
Addended by: Mora Roman on: 10/3/2022 04:31 PM     Modules accepted: Orders, Level of Service, SmartSet

## 2022-10-03 NOTE — PATIENT INSTRUCTIONS
Personalized Preventive Plan for Mateo Kendall - 10/3/2022  Medicare offers a range of preventive health benefits. Some of the tests and screenings are paid in full while other may be subject to a deductible, co-insurance, and/or copay. Some of these benefits include a comprehensive review of your medical history including lifestyle, illnesses that may run in your family, and various assessments and screenings as appropriate. After reviewing your medical record and screening and assessments performed today your provider may have ordered immunizations, labs, imaging, and/or referrals for you. A list of these orders (if applicable) as well as your Preventive Care list are included within your After Visit Summary for your review. Other Preventive Recommendations:    A preventive eye exam performed by an eye specialist is recommended every 1-2 years to screen for glaucoma; cataracts, macular degeneration, and other eye disorders. A preventive dental visit is recommended every 6 months. Try to get at least 150 minutes of exercise per week or 10,000 steps per day on a pedometer . Order or download the FREE \"Exercise & Physical Activity: Your Everyday Guide\" from [employer]. Call 5-413.723.9901 or search [employer] online. You need 9963-9215 mg of calcium and 3726-8002 IU of vitamin D per day. It is possible to meet your calcium requirement with diet alone, but a vitamin D supplement is usually necessary to meet this goal.  When exposed to the sun, use a sunscreen that protects against both UVA and UVB radiation with an SPF of 30 or greater. Reapply every 2 to 3 hours or after sweating, drying off with a towel, or swimming. Always wear a seat belt when traveling in a car. Always wear a helmet when riding a bicycle or motorcycle.

## 2022-10-04 LAB
EST. AVERAGE GLUCOSE BLD GHB EST-MCNC: 103 MG/DL
HBA1C MFR BLD: 5.2 % (ref 4.8–5.6)

## 2022-10-28 RX ORDER — MELOXICAM 15 MG/1
TABLET ORAL
Qty: 90 TABLET | Refills: 1 | Status: SHIPPED | OUTPATIENT
Start: 2022-10-28

## 2022-12-21 ENCOUNTER — OFFICE VISIT (OUTPATIENT)
Dept: FAMILY MEDICINE CLINIC | Facility: CLINIC | Age: 72
End: 2022-12-21
Payer: MEDICARE

## 2022-12-21 VITALS
HEIGHT: 70 IN | BODY MASS INDEX: 30.64 KG/M2 | DIASTOLIC BLOOD PRESSURE: 60 MMHG | SYSTOLIC BLOOD PRESSURE: 104 MMHG | WEIGHT: 214 LBS

## 2022-12-21 DIAGNOSIS — Z13.89 SCREENING FOR BLOOD OR PROTEIN IN URINE: ICD-10-CM

## 2022-12-21 DIAGNOSIS — B35.1 TOENAIL FUNGUS: ICD-10-CM

## 2022-12-21 DIAGNOSIS — E87.6 HYPOKALEMIA: ICD-10-CM

## 2022-12-21 DIAGNOSIS — E78.00 HIGH CHOLESTEROL: ICD-10-CM

## 2022-12-21 DIAGNOSIS — K51.20 ULCERATIVE (CHRONIC) PROCTITIS WITHOUT COMPLICATIONS (HCC): ICD-10-CM

## 2022-12-21 DIAGNOSIS — R73.9 HIGH BLOOD SUGAR: ICD-10-CM

## 2022-12-21 DIAGNOSIS — K13.0 LIP ULCER: ICD-10-CM

## 2022-12-21 DIAGNOSIS — N30.01 ACUTE CYSTITIS WITH HEMATURIA: Primary | ICD-10-CM

## 2022-12-21 DIAGNOSIS — I10 ESSENTIAL (PRIMARY) HYPERTENSION: ICD-10-CM

## 2022-12-21 DIAGNOSIS — E83.42 HYPOMAGNESEMIA: ICD-10-CM

## 2022-12-21 DIAGNOSIS — M17.0 OSTEOARTHRITIS OF BOTH KNEES, UNSPECIFIED OSTEOARTHRITIS TYPE: ICD-10-CM

## 2022-12-21 DIAGNOSIS — D50.9 IRON DEFICIENCY ANEMIA, UNSPECIFIED IRON DEFICIENCY ANEMIA TYPE: ICD-10-CM

## 2022-12-21 LAB
BILIRUBIN, URINE, POC: ABNORMAL
BLOOD URINE, POC: ABNORMAL
GLUCOSE URINE, POC: NEGATIVE
KETONES, URINE, POC: NEGATIVE
LEUKOCYTE ESTERASE, URINE, POC: NEGATIVE
NITRITE, URINE, POC: NEGATIVE
PH, URINE, POC: 5.5 (ref 4.6–8)
PROTEIN,URINE, POC: NEGATIVE
SPECIFIC GRAVITY, URINE, POC: 1.03 (ref 1–1.03)
URINALYSIS CLARITY, POC: ABNORMAL
URINALYSIS COLOR, POC: YELLOW
UROBILINOGEN, POC: NEGATIVE

## 2022-12-21 PROCEDURE — G8484 FLU IMMUNIZE NO ADMIN: HCPCS | Performed by: FAMILY MEDICINE

## 2022-12-21 PROCEDURE — G8427 DOCREV CUR MEDS BY ELIG CLIN: HCPCS | Performed by: FAMILY MEDICINE

## 2022-12-21 PROCEDURE — 3074F SYST BP LT 130 MM HG: CPT | Performed by: FAMILY MEDICINE

## 2022-12-21 PROCEDURE — G8417 CALC BMI ABV UP PARAM F/U: HCPCS | Performed by: FAMILY MEDICINE

## 2022-12-21 PROCEDURE — 1123F ACP DISCUSS/DSCN MKR DOCD: CPT | Performed by: FAMILY MEDICINE

## 2022-12-21 PROCEDURE — 81003 URINALYSIS AUTO W/O SCOPE: CPT | Performed by: FAMILY MEDICINE

## 2022-12-21 PROCEDURE — 11720 DEBRIDE NAIL 1-5: CPT | Performed by: FAMILY MEDICINE

## 2022-12-21 PROCEDURE — 1036F TOBACCO NON-USER: CPT | Performed by: FAMILY MEDICINE

## 2022-12-21 PROCEDURE — 3017F COLORECTAL CA SCREEN DOC REV: CPT | Performed by: FAMILY MEDICINE

## 2022-12-21 PROCEDURE — 3078F DIAST BP <80 MM HG: CPT | Performed by: FAMILY MEDICINE

## 2022-12-21 PROCEDURE — 99214 OFFICE O/P EST MOD 30 MIN: CPT | Performed by: FAMILY MEDICINE

## 2022-12-21 RX ORDER — ASPIRIN 81 MG/1
81 TABLET ORAL DAILY
COMMUNITY

## 2022-12-21 RX ORDER — TERBINAFINE HYDROCHLORIDE 250 MG/1
250 TABLET ORAL DAILY
Qty: 90 TABLET | Refills: 0 | Status: SHIPPED | OUTPATIENT
Start: 2022-12-21 | End: 2023-02-01

## 2022-12-21 RX ORDER — CIPROFLOXACIN 500 MG/1
500 TABLET, FILM COATED ORAL 2 TIMES DAILY
Qty: 14 TABLET | Refills: 0 | Status: SHIPPED | OUTPATIENT
Start: 2022-12-21 | End: 2022-12-28

## 2022-12-21 ASSESSMENT — PATIENT HEALTH QUESTIONNAIRE - PHQ9
SUM OF ALL RESPONSES TO PHQ QUESTIONS 1-9: 0
SUM OF ALL RESPONSES TO PHQ QUESTIONS 1-9: 0
1. LITTLE INTEREST OR PLEASURE IN DOING THINGS: 0
2. FEELING DOWN, DEPRESSED OR HOPELESS: 0
SUM OF ALL RESPONSES TO PHQ QUESTIONS 1-9: 0
SUM OF ALL RESPONSES TO PHQ9 QUESTIONS 1 & 2: 0
SUM OF ALL RESPONSES TO PHQ QUESTIONS 1-9: 0

## 2022-12-21 ASSESSMENT — ENCOUNTER SYMPTOMS
APNEA: 0
COUGH: 0
EYE REDNESS: 0

## 2022-12-21 NOTE — PROGRESS NOTES
[address] [place] [address]  _______________________________________  Wang Orellana MD                 [address] [place] [address],  [address]. [place], [address] [place] [address]                     Daniel Kam 2                                                                                    Phone: [phone]                                                                                    Fax: [phone]    Serena Gauthier is a 67 y.o. male who is seen for evaluation of   Chief Complaint   Patient presents with    Nail Problem     Fungus both feet/toenails    Hematuria    Oral Swelling     Bump on inner part of bottom lip       HPI:   Hematuria  This is a new problem. Episode onset: last week or so with blood in urine, mild pain at times, dysuria, has had similar in past. Pertinent negatives include no fever. Skin Problem  Episode onset: has fungus in all 10 toes, also disfigured nails, has debrided those as procedure note below indicated. Pertinent negatives include no congestion, cough, fatigue or fever. Gastroesophageal Reflux  He reports no coughing. Chronicity: stable on meds, need refills. Pertinent negatives include no fatigue. Hyperlipidemia  This is a chronic problem. Condition status: on meds, need refills and labs. Other  Episode onset: new lesion inside lower lip on right side, smooth and swollen for last 2 weeks, not tobacco user. Pertinent negatives include no congestion, coughing, fatigue or fever. Chief Complaint   Patient presents with    Nail Problem     Fungus both feet/toenails    Hematuria    Oral Swelling     Bump on inner part of bottom lip         Review of Systems:    Review of Systems   Constitutional:  Negative for activity change, appetite change, fatigue and fever. HENT:  Negative for congestion. Eyes:  Negative for redness. Respiratory:  Negative for apnea and cough. Genitourinary:  Positive for hematuria.      History:  Past Medical History:   Diagnosis Date  Calculus of kidney     \"they don't bother me\" 5/18/21    Colostomy in place [facility])     Colovesical fistula     COVID-19 vaccine series completed 02/25/2021    [employer]     Diverticulitis     Encounter for long-term (current) use of other medications 4/22/2014    GERD (gastroesophageal reflux disease)     High cholesterol 07/29/2014    no meds    Other testicular hypofunction 7/29/2014    Weight loss        Past Surgical History:   Procedure Laterality Date    COLONOSCOPY N/A 2/28/2021    COLONOSCOPY performed by Osvaldo Rodriguez MD at [address]  05/17/2021    COLONOSCOPY N/A 9/10/2021    COLONOSCOPY THRU STOMA performed by Carin Talamantes MD at [facility] N/A 5/17/2021    SIGMOIDOSCOPY FLEXIBLE performed by Carin Talamantes MD at [facility] N/A 9/10/2021    SIGMOIDOSCOPY FLEXIBLE/BMI 27 performed by Carin Talamantes MD at [address] [place] [address],[address]    OTHER SURGICAL HISTORY  11/08/2021    Hartmanns colostomy reversal    TONSILLECTOMY         Family History   Problem Relation Age of Onset    Heart Disease Father         CABG & STENTS     No Known Problems Mother     Heart Attack Father        Social History     Tobacco Use    Smoking status: Never    Smokeless tobacco: Never   Substance Use Topics    Alcohol use: Yes       No Known Allergies    Current Outpatient Medications   Medication Sig Dispense Refill    aspirin 81 MG EC tablet Take 81 mg by mouth daily      VITAMIN D, CHOLECALCIFEROL, PO Take by mouth      ciprofloxacin (CIPRO) 500 MG tablet Take 1 tablet by mouth 2 times daily for 7 days 14 tablet 0    terbinafine (LAMISIL) 250 MG tablet Take 1 tablet by mouth daily 90 tablet 0    meloxicam (MOBIC) 15 MG tablet TAKE 1 TABLET BY MOUTH DAILY 90 tablet 1    Omega-3 Fatty Acids (FISH OIL OMEGA-3 PO) Take by mouth      Probiotic Product (PROBIOTIC DAILY) CAPS Take by mouth      clobetasol (TEMOVATE) 0.05 % cream Apply topically 2 times daily      esomeprazole (NEXIUM) 40 MG delayed release capsule Take 20 mg by mouth daily      polyethylene glycol (GLYCOLAX) 17 g packet Take 17 g by mouth daily as needed for Constipation (Patient not taking: Reported on 12/21/2022)      senna-docusate (PERICOLACE) 8.6-50 MG per tablet Take 2 tablets by mouth 2 times daily Taking PRN (Patient not taking: Reported on 12/21/2022)       No current facility-administered medications for this visit. Vitals:    /60 (Site: Left Upper Arm, Position: Sitting)   Ht 5' 10\" (1.778 m)   Wt 214 lb (97.1 kg)   BMI 30.71 kg/m²     Physical Exam:  Physical Exam  Vitals and nursing note reviewed. Constitutional:       Appearance: Normal appearance. He is not ill-appearing or diaphoretic. HENT:      Head: Normocephalic and atraumatic. Nose: Nose normal.      Mouth/Throat:      Comments: Lip lesion, smooth nodule. Eyes:      Pupils: Pupils are equal, round, and reactive to light. Cardiovascular:      Rate and Rhythm: Normal rate and regular rhythm. Pulses: Normal pulses. Heart sounds: Normal heart sounds. Pulmonary:      Effort: Pulmonary effort is normal.      Breath sounds: Normal breath sounds. Musculoskeletal:         General: No swelling or tenderness. Normal range of motion. Cervical back: Normal range of motion and neck supple. Skin:     General: Skin is warm and dry. Findings: No erythema or rash. Neurological:      General: No focal deficit present. Mental Status: He is alert and oriented to person, place, and time. Mental status is at baseline. Psychiatric:         Mood and Affect: Mood normal.     Assessment/Plan:     ICD-10-CM    1. Acute cystitis with hematuria  N30.01 ciprofloxacin (CIPRO) 500 MG tablet      2.  Toenail fungus  B35.1 DEBRIDEMENT OF NAIL(S), 1-5     terbinafine (LAMISIL) 250 MG tablet 3. Screening for blood or protein in urine  Z13.89 AMB POC URINALYSIS DIP STICK AUTO W/O MICRO      4. Hypokalemia  E87.6       5. High blood sugar  R73.9       6. High cholesterol  E78.00       7. Osteoarthritis of both knees, unspecified osteoarthritis type  M17.0       8. Ulcerative (chronic) proctitis without complications (HCC)  L98.13       9. Iron deficiency anemia, unspecified iron deficiency anemia type  D50.9       10. Essential (primary) hypertension  I10       11. Hypomagnesemia  E83.42       12. Lip ulcer  K13.0         Lip ulcer, if not cleared in 2 weeks will refer for oral surgery to [facility]. Has no tobacco history and the lesion is smooth and round, not suspicious at present. Meds sent  Toenail fungus noted with pain in toes, after informed consent, nails x 10 were debrided by provider today with good results.    Fungus also treated with lamisil 250 x 90 days  Labs sent  Encourage diet and exercise   Encourage weight loss,  Encourage home sugar monitoring  Call if problems  Recheck 3 months     Casie Oliveros MD

## 2023-01-20 DIAGNOSIS — B35.1 TOENAIL FUNGUS: ICD-10-CM

## 2023-01-20 RX ORDER — TERBINAFINE HYDROCHLORIDE 250 MG/1
250 TABLET ORAL DAILY
Qty: 30 TABLET | Refills: 0 | OUTPATIENT
Start: 2023-01-20 | End: 2023-03-03

## 2023-01-23 DIAGNOSIS — B35.1 TOENAIL FUNGUS: ICD-10-CM

## 2023-01-23 RX ORDER — TERBINAFINE HYDROCHLORIDE 250 MG/1
250 TABLET ORAL DAILY
Qty: 30 TABLET | Refills: 0 | OUTPATIENT
Start: 2023-01-23 | End: 2023-03-06

## 2023-02-06 ENCOUNTER — OFFICE VISIT (OUTPATIENT)
Dept: FAMILY MEDICINE CLINIC | Facility: CLINIC | Age: 73
End: 2023-02-06
Payer: MEDICARE

## 2023-02-06 VITALS — WEIGHT: 219 LBS | HEIGHT: 70 IN | BODY MASS INDEX: 31.35 KG/M2

## 2023-02-06 DIAGNOSIS — N30.01 ACUTE CYSTITIS WITH HEMATURIA: Primary | ICD-10-CM

## 2023-02-06 DIAGNOSIS — R31.0 GROSS HEMATURIA: ICD-10-CM

## 2023-02-06 DIAGNOSIS — K51.20 ULCERATIVE (CHRONIC) PROCTITIS WITHOUT COMPLICATIONS (HCC): ICD-10-CM

## 2023-02-06 DIAGNOSIS — Z93.3 COLOSTOMY STATUS (HCC): ICD-10-CM

## 2023-02-06 DIAGNOSIS — N20.0 KIDNEY STONE: ICD-10-CM

## 2023-02-06 LAB
BILIRUBIN, URINE, POC: NEGATIVE
BLOOD URINE, POC: ABNORMAL
GLUCOSE URINE, POC: NEGATIVE
KETONES, URINE, POC: NEGATIVE
LEUKOCYTE ESTERASE, URINE, POC: NEGATIVE
NITRITE, URINE, POC: NEGATIVE
PH, URINE, POC: 6 (ref 4.6–8)
PROTEIN,URINE, POC: ABNORMAL
SPECIFIC GRAVITY, URINE, POC: 1.02 (ref 1–1.03)
URINALYSIS CLARITY, POC: ABNORMAL
URINALYSIS COLOR, POC: ABNORMAL
UROBILINOGEN, POC: NEGATIVE

## 2023-02-06 PROCEDURE — G8484 FLU IMMUNIZE NO ADMIN: HCPCS | Performed by: FAMILY MEDICINE

## 2023-02-06 PROCEDURE — 1036F TOBACCO NON-USER: CPT | Performed by: FAMILY MEDICINE

## 2023-02-06 PROCEDURE — 81003 URINALYSIS AUTO W/O SCOPE: CPT | Performed by: FAMILY MEDICINE

## 2023-02-06 PROCEDURE — G8427 DOCREV CUR MEDS BY ELIG CLIN: HCPCS | Performed by: FAMILY MEDICINE

## 2023-02-06 PROCEDURE — 99214 OFFICE O/P EST MOD 30 MIN: CPT | Performed by: FAMILY MEDICINE

## 2023-02-06 PROCEDURE — 3017F COLORECTAL CA SCREEN DOC REV: CPT | Performed by: FAMILY MEDICINE

## 2023-02-06 PROCEDURE — G8417 CALC BMI ABV UP PARAM F/U: HCPCS | Performed by: FAMILY MEDICINE

## 2023-02-06 PROCEDURE — 1123F ACP DISCUSS/DSCN MKR DOCD: CPT | Performed by: FAMILY MEDICINE

## 2023-02-06 RX ORDER — TAMSULOSIN HYDROCHLORIDE 0.4 MG/1
0.4 CAPSULE ORAL DAILY
Qty: 90 CAPSULE | Refills: 1 | Status: SHIPPED | OUTPATIENT
Start: 2023-02-06

## 2023-02-06 SDOH — ECONOMIC STABILITY: INCOME INSECURITY: HOW HARD IS IT FOR YOU TO PAY FOR THE VERY BASICS LIKE FOOD, HOUSING, MEDICAL CARE, AND HEATING?: PATIENT DECLINED

## 2023-02-06 SDOH — ECONOMIC STABILITY: FOOD INSECURITY: WITHIN THE PAST 12 MONTHS, THE FOOD YOU BOUGHT JUST DIDN'T LAST AND YOU DIDN'T HAVE MONEY TO GET MORE.: PATIENT DECLINED

## 2023-02-06 SDOH — ECONOMIC STABILITY: FOOD INSECURITY: WITHIN THE PAST 12 MONTHS, YOU WORRIED THAT YOUR FOOD WOULD RUN OUT BEFORE YOU GOT MONEY TO BUY MORE.: PATIENT DECLINED

## 2023-02-06 SDOH — ECONOMIC STABILITY: HOUSING INSECURITY
IN THE LAST 12 MONTHS, WAS THERE A TIME WHEN YOU DID NOT HAVE A STEADY PLACE TO SLEEP OR SLEPT IN A SHELTER (INCLUDING NOW)?: PATIENT REFUSED

## 2023-02-06 ASSESSMENT — PATIENT HEALTH QUESTIONNAIRE - PHQ9
SUM OF ALL RESPONSES TO PHQ QUESTIONS 1-9: 0
1. LITTLE INTEREST OR PLEASURE IN DOING THINGS: 0
SUM OF ALL RESPONSES TO PHQ QUESTIONS 1-9: 0
SUM OF ALL RESPONSES TO PHQ9 QUESTIONS 1 & 2: 0
2. FEELING DOWN, DEPRESSED OR HOPELESS: 0

## 2023-02-06 ASSESSMENT — ENCOUNTER SYMPTOMS
RESPIRATORY NEGATIVE: 1
COUGH: 0
CHOKING: 0
APNEA: 0
CHEST TIGHTNESS: 0
EYES NEGATIVE: 1

## 2023-02-06 NOTE — PROGRESS NOTES
[address] [place] [address]  _______________________________________  Katia Choudhary MD                 [address] [place] [address],  [address] [place], [address] [place] [address]                     Mad[place]                                                                                     Phone: [phone]                                                                                    Fax: [phone]    Zainab Barrow is a 67 y.o. male who is seen for evaluation of   Chief Complaint   Patient presents with    Lower Back Pain     Low right side with dark color urine     Cholesterol Problem       HPI:   Hematuria  Episode onset: has ha dfor last few dyas, mild lower back pain, no injury noted. has had stones in past. Irritative symptoms do not include frequency. Associated symptoms include dysuria. Other  Episode onset: colostomy, stable. Pertinent negatives include no coughing. Chief Complaint   Patient presents with    Lower Back Pain     Low right side with dark color urine     Cholesterol Problem         Review of Systems:    Review of Systems   Constitutional: Negative. HENT: Negative. Eyes: Negative. Respiratory: Negative. Negative for apnea, cough, choking and chest tightness. Endocrine: Negative. Genitourinary:  Positive for difficulty urinating, dysuria and hematuria. Negative for frequency and genital sores.      History:  Past Medical History:   Diagnosis Date    Calculus of kidney     \"they don't bother me\" 5/18/21    Colostomy in place [facility])     Colovesical fistula     COVID-19 vaccine series completed 02/25/2021    [employer]     Diverticulitis     Encounter for long-term (current) use of other medications 4/22/2014    GERD (gastroesophageal reflux disease)     High cholesterol 07/29/2014    no meds    Other testicular hypofunction 7/29/2014    Weight loss        Past Surgical History:   Procedure Laterality Date    COLONOSCOPY N/A 2/28/2021    COLONOSCOPY performed by Jazzy Anderson MD JAYA at [address]  05/17/2021    COLONOSCOPY N/A 9/10/2021    COLONOSCOPY THRU STOMA performed by Collin Sparks MD at [address] N/A 5/17/2021    SIGMOIDOSCOPY FLEXIBLE performed by Collin Sparks MD at [address] N/A 9/10/2021    SIGMOIDOSCOPY FLEXIBLE/BMI 27 performed by Collin Sparks MD at [address]    OTHER SURGICAL HISTORY  11/08/2021    Hartmanns colostomy reversal    TONSILLECTOMY         Family History   Problem Relation Age of Onset    Heart Disease Father         CABG & STENTS     No Known Problems Mother     Heart Attack Father        Social History     Tobacco Use    Smoking status: Never    Smokeless tobacco: Never   Substance Use Topics    Alcohol use: Yes       No Known Allergies    Current Outpatient Medications   Medication Sig Dispense Refill    tamsulosin (FLOMAX) 0.4 MG capsule Take 1 capsule by mouth daily 90 capsule 1    aspirin 81 MG EC tablet Take 81 mg by mouth daily      VITAMIN D, CHOLECALCIFEROL, PO Take by mouth      meloxicam (MOBIC) 15 MG tablet TAKE 1 TABLET BY MOUTH DAILY 90 tablet 1    Omega-3 Fatty Acids (FISH OIL OMEGA-3 PO) Take by mouth      Probiotic Product (PROBIOTIC DAILY) CAPS Take by mouth      clobetasol (TEMOVATE) 0.05 % cream Apply topically 2 times daily      esomeprazole (NEXIUM) 40 MG delayed release capsule Take 20 mg by mouth daily       No current facility-administered medications for this visit. Vitals:    Ht 5' 10\" (1.778 m)   Wt 219 lb (99.3 kg)   BMI 31.42 kg/m²     Physical Exam:  Physical Exam  Vitals and nursing note reviewed. Constitutional:       Appearance: Normal appearance. He is not ill-appearing or diaphoretic. HENT:      Head: Normocephalic and atraumatic. Nose: Nose normal.   Eyes:      Pupils: Pupils are equal, round, and reactive to light.    Cardiovascular: Rate and Rhythm: Normal rate and regular rhythm. Pulses: Normal pulses. Heart sounds: Normal heart sounds. Pulmonary:      Effort: Pulmonary effort is normal.      Breath sounds: Normal breath sounds. Musculoskeletal:         General: No swelling or tenderness. Normal range of motion. Cervical back: Normal range of motion and neck supple. Skin:     General: Skin is warm and dry. Findings: No erythema or rash. Neurological:      General: No focal deficit present. Mental Status: He is alert and oriented to person, place, and time. Mental status is at baseline. Psychiatric:         Mood and Affect: Mood normal.       Assessment/Plan:     ICD-10-CM    1. Acute cystitis with hematuria  N30.01 AMB POC URINALYSIS DIP STICK AUTO W/O MICRO     CT ABDOMEN PELVIS HEMATURIA Additional Contrast? Radiologist Recommendation      2. Gross hematuria  R31.0 CT ABDOMEN PELVIS HEMATURIA Additional Contrast? Radiologist Recommendation      3. Kidney stone  N20.0 CT ABDOMEN PELVIS HEMATURIA Additional Contrast? Radiologist Recommendation     tamsulosin (FLOMAX) 0.4 MG capsule      4. [facility] status ([facility]  Z93.3       5. Ulcerative (chronic) proctitis without complications ([facility])  N32.54         Blood in urine, has had stones in past, has low back pain as well. BP  higher today   Has blood on UA, needs stone protocol CT and add flomax. Consider urology referral based on results of the CT. Watching for other complications. Colostomy: stable  UC proctitis: stable.       Sanna Mart MD

## 2023-02-09 ENCOUNTER — HOSPITAL ENCOUNTER (OUTPATIENT)
Dept: CT IMAGING | Age: 73
Discharge: HOME | End: 2023-02-11
Payer: MEDICARE

## 2023-02-09 DIAGNOSIS — R31.0 GROSS HEMATURIA: ICD-10-CM

## 2023-02-09 DIAGNOSIS — N20.0 KIDNEY STONE: ICD-10-CM

## 2023-02-09 DIAGNOSIS — K51.20 ULCERATIVE (CHRONIC) PROCTITIS WITHOUT COMPLICATIONS (HCC): Primary | ICD-10-CM

## 2023-02-09 DIAGNOSIS — N30.01 ACUTE CYSTITIS WITH HEMATURIA: ICD-10-CM

## 2023-02-09 DIAGNOSIS — N20.0 KIDNEY STONES: ICD-10-CM

## 2023-02-09 LAB — CREAT BLD-MCNC: 0.89 MG/DL (ref 0.8–1.5)

## 2023-02-09 PROCEDURE — 82565 ASSAY OF CREATININE: CPT

## 2023-02-09 PROCEDURE — 74178 CT ABD&PLV WO CNTR FLWD CNTR: CPT

## 2023-02-09 PROCEDURE — 2580000003 HC RX 258: Performed by: FAMILY MEDICINE

## 2023-02-09 PROCEDURE — 6360000004 HC RX CONTRAST MEDICATION: Performed by: FAMILY MEDICINE

## 2023-02-09 RX ORDER — SODIUM CHLORIDE 0.9 % (FLUSH) 0.9 %
5-40 SYRINGE (ML) INJECTION 2 TIMES DAILY
Status: DISCONTINUED | OUTPATIENT
Start: 2023-02-09 | End: 2023-02-12 | Stop reason: HOSPADM

## 2023-02-09 RX ORDER — 0.9 % SODIUM CHLORIDE 0.9 %
100 INTRAVENOUS SOLUTION INTRAVENOUS ONCE
Status: COMPLETED | OUTPATIENT
Start: 2023-02-09 | End: 2023-02-09

## 2023-02-09 RX ADMIN — IOHEXOL 100 ML: 350 INJECTION, SOLUTION INTRAVENOUS at 13:32

## 2023-02-09 RX ADMIN — SODIUM CHLORIDE 100 ML: 9 INJECTION, SOLUTION INTRAVENOUS at 13:33

## 2023-02-09 RX ADMIN — SODIUM CHLORIDE, PRESERVATIVE FREE 10 ML: 5 INJECTION INTRAVENOUS at 13:33

## 2023-03-29 ENCOUNTER — OFFICE VISIT (OUTPATIENT)
Dept: UROLOGY | Age: 73
End: 2023-03-29
Payer: MEDICARE

## 2023-03-29 DIAGNOSIS — N20.0 RENAL STONE: ICD-10-CM

## 2023-03-29 DIAGNOSIS — N20.1 URETERAL STONE: Primary | ICD-10-CM

## 2023-03-29 LAB
BILIRUBIN, URINE, POC: NEGATIVE
BLOOD URINE, POC: NORMAL
GLUCOSE URINE, POC: NEGATIVE
KETONES, URINE, POC: NEGATIVE
LEUKOCYTE ESTERASE, URINE, POC: NEGATIVE
NITRITE, URINE, POC: NEGATIVE
PH, URINE, POC: 7 (ref 4.6–8)
PROTEIN,URINE, POC: NEGATIVE
SPECIFIC GRAVITY, URINE, POC: 1.02 (ref 1–1.03)
URINALYSIS CLARITY, POC: NORMAL
URINALYSIS COLOR, POC: NORMAL
UROBILINOGEN, POC: NORMAL

## 2023-03-29 PROCEDURE — G8427 DOCREV CUR MEDS BY ELIG CLIN: HCPCS | Performed by: UROLOGY

## 2023-03-29 PROCEDURE — G8484 FLU IMMUNIZE NO ADMIN: HCPCS | Performed by: UROLOGY

## 2023-03-29 PROCEDURE — 99213 OFFICE O/P EST LOW 20 MIN: CPT | Performed by: UROLOGY

## 2023-03-29 PROCEDURE — G8417 CALC BMI ABV UP PARAM F/U: HCPCS | Performed by: UROLOGY

## 2023-03-29 PROCEDURE — 1036F TOBACCO NON-USER: CPT | Performed by: UROLOGY

## 2023-03-29 PROCEDURE — 74018 RADEX ABDOMEN 1 VIEW: CPT | Performed by: UROLOGY

## 2023-03-29 PROCEDURE — 3017F COLORECTAL CA SCREEN DOC REV: CPT | Performed by: UROLOGY

## 2023-03-29 PROCEDURE — 81003 URINALYSIS AUTO W/O SCOPE: CPT | Performed by: UROLOGY

## 2023-03-29 PROCEDURE — 1123F ACP DISCUSS/DSCN MKR DOCD: CPT | Performed by: UROLOGY

## 2023-03-29 ASSESSMENT — ENCOUNTER SYMPTOMS
INDIGESTION: 1
EYES NEGATIVE: 1
RESPIRATORY NEGATIVE: 1
HEARTBURN: 1

## 2023-03-29 NOTE — PROGRESS NOTES
Urobilinogen, POC Negative     Nitrite, Urine, POC Negative Negative    Leukocyte Esterase, Urine, POC Negative Negative       UA - Micro  WBC - 0  RBC - 0  Bacteria - 0  Epith - 0    Physical Exam    Assessment and Plan    ICD-10-CM    1. Ureteral stone  N20.1 AMB POC URINALYSIS DIP STICK AUTO W/O MICRO     XR ABDOMEN (KUB) (SINGLE AP VIEW)      2. Renal stone  N20.0           Orders Placed This Encounter   Procedures    XR ABDOMEN (KUB) (SINGLE AP VIEW)     Standing Status:   Future     Number of Occurrences:   1     Standing Expiration Date:   3/29/2024    AMB POC URINALYSIS DIP STICK AUTO W/O MICRO     Passed stone. Will follow left renal stone. Follow-up and Dispositions    Return in about 1 year (around 3/29/2024) for KUB.

## 2023-04-01 ENCOUNTER — APPOINTMENT (OUTPATIENT)
Dept: GENERAL RADIOLOGY | Age: 73
DRG: 164 | End: 2023-04-01
Payer: MEDICARE

## 2023-04-01 ENCOUNTER — HOSPITAL ENCOUNTER (INPATIENT)
Age: 73
LOS: 2 days | Discharge: HOME | DRG: 164 | End: 2023-04-03
Attending: EMERGENCY MEDICINE | Admitting: FAMILY MEDICINE
Payer: MEDICARE

## 2023-04-01 ENCOUNTER — APPOINTMENT (OUTPATIENT)
Dept: NON INVASIVE DIAGNOSTICS | Age: 73
DRG: 164 | End: 2023-04-01
Payer: MEDICARE

## 2023-04-01 ENCOUNTER — APPOINTMENT (OUTPATIENT)
Dept: INTERVENTIONAL RADIOLOGY/VASCULAR | Age: 73
DRG: 164 | End: 2023-04-01
Payer: MEDICARE

## 2023-04-01 ENCOUNTER — APPOINTMENT (OUTPATIENT)
Dept: CT IMAGING | Age: 73
DRG: 164 | End: 2023-04-01
Payer: MEDICARE

## 2023-04-01 DIAGNOSIS — M17.0 PRIMARY OSTEOARTHRITIS OF BOTH KNEES: ICD-10-CM

## 2023-04-01 DIAGNOSIS — I26.02 ACUTE SADDLE PULMONARY EMBOLISM WITH ACUTE COR PULMONALE (HCC): ICD-10-CM

## 2023-04-01 DIAGNOSIS — I26.99 BILATERAL PULMONARY EMBOLISM (HCC): Primary | ICD-10-CM

## 2023-04-01 PROBLEM — Z90.49 S/P PARTIAL COLECTOMY: Status: ACTIVE | Noted: 2021-11-08

## 2023-04-01 PROBLEM — Z79.1 NSAID LONG-TERM USE: Chronic | Status: ACTIVE | Noted: 2023-04-01

## 2023-04-01 PROBLEM — Z86.16 PERSONAL HISTORY OF COVID-19: Status: ACTIVE | Noted: 2023-04-01

## 2023-04-01 PROBLEM — L23.7 POISON IVY DERMATITIS: Status: RESOLVED | Noted: 2017-02-27 | Resolved: 2023-04-01

## 2023-04-01 PROBLEM — I24.89 DEMAND ISCHEMIA: Status: ACTIVE | Noted: 2023-04-01

## 2023-04-01 PROBLEM — D64.9 ANEMIA: Status: ACTIVE | Noted: 2023-04-01

## 2023-04-01 PROBLEM — I24.8 DEMAND ISCHEMIA (HCC): Status: ACTIVE | Noted: 2023-04-01

## 2023-04-01 LAB
ALBUMIN SERPL-MCNC: 3.7 G/DL (ref 3.2–4.6)
ALBUMIN/GLOB SERPL: 1 (ref 0.4–1.6)
ALP SERPL-CCNC: 85 U/L (ref 50–136)
ALT SERPL-CCNC: 26 U/L (ref 12–65)
ANION GAP SERPL CALC-SCNC: 4 MMOL/L (ref 2–11)
APTT PPP: 181.1 SEC (ref 24.5–34.2)
AST SERPL-CCNC: 26 U/L (ref 15–37)
BILIRUB SERPL-MCNC: 0.3 MG/DL (ref 0.2–1.1)
BUN SERPL-MCNC: 15 MG/DL (ref 8–23)
CALCIUM SERPL-MCNC: 9.1 MG/DL (ref 8.3–10.4)
CHLORIDE SERPL-SCNC: 112 MMOL/L (ref 101–110)
CO2 SERPL-SCNC: 23 MMOL/L (ref 21–32)
CREAT SERPL-MCNC: 1.1 MG/DL (ref 0.8–1.5)
D DIMER PPP FEU-MCNC: 8.62 UG/ML(FEU)
EKG ATRIAL RATE: 90 BPM
EKG DIAGNOSIS: NORMAL
EKG P AXIS: 79 DEGREES
EKG P-R INTERVAL: 176 MS
EKG Q-T INTERVAL: 379 MS
EKG QRS DURATION: 107 MS
EKG QTC CALCULATION (BAZETT): 464 MS
EKG R AXIS: 56 DEGREES
EKG T AXIS: 43 DEGREES
EKG VENTRICULAR RATE: 90 BPM
ERYTHROCYTE [DISTWIDTH] IN BLOOD BY AUTOMATED COUNT: 13 % (ref 11.9–14.6)
GLOBULIN SER CALC-MCNC: 3.6 G/DL (ref 2.8–4.5)
GLUCOSE SERPL-MCNC: 171 MG/DL (ref 65–100)
HCT VFR BLD AUTO: 40 % (ref 41.1–50.3)
HGB BLD-MCNC: 12.8 G/DL (ref 13.6–17.2)
INR PPP: 1.2
LACTATE SERPL-SCNC: 2.2 MMOL/L (ref 0.4–2)
LACTATE SERPL-SCNC: 2.4 MMOL/L (ref 0.4–2)
MCH RBC QN AUTO: 32.3 PG (ref 26.1–32.9)
MCHC RBC AUTO-ENTMCNC: 32 G/DL (ref 31.4–35)
MCV RBC AUTO: 101 FL (ref 82–102)
NRBC # BLD: 0 K/UL (ref 0–0.2)
NT PRO BNP: 410 PG/ML (ref 5–125)
PLATELET # BLD AUTO: 163 K/UL (ref 150–450)
PMV BLD AUTO: 9.7 FL (ref 9.4–12.3)
POTASSIUM SERPL-SCNC: 4.1 MMOL/L (ref 3.5–5.1)
PROT SERPL-MCNC: 7.3 G/DL (ref 6.3–8.2)
PROTHROMBIN TIME: 15.3 SEC (ref 12.6–14.3)
RBC # BLD AUTO: 3.96 M/UL (ref 4.23–5.6)
SODIUM SERPL-SCNC: 139 MMOL/L (ref 133–143)
TROPONIN I SERPL HS-MCNC: 1292.4 PG/ML (ref 0–57)
TROPONIN I SERPL HS-MCNC: 640.4 PG/ML (ref 0–57)
UFH PPP CHRO-ACNC: >1.1 IU/ML (ref 0.3–0.7)
WBC # BLD AUTO: 5.9 K/UL (ref 4.3–11.1)

## 2023-04-01 PROCEDURE — 93005 ELECTROCARDIOGRAM TRACING: CPT | Performed by: EMERGENCY MEDICINE

## 2023-04-01 PROCEDURE — 85027 COMPLETE CBC AUTOMATED: CPT

## 2023-04-01 PROCEDURE — 36015 PLACE CATHETER IN ARTERY: CPT

## 2023-04-01 PROCEDURE — 83605 ASSAY OF LACTIC ACID: CPT

## 2023-04-01 PROCEDURE — B31S1ZZ FLUOROSCOPY OF RIGHT PULMONARY ARTERY USING LOW OSMOLAR CONTRAST: ICD-10-PCS | Performed by: RADIOLOGY

## 2023-04-01 PROCEDURE — 85379 FIBRIN DEGRADATION QUANT: CPT

## 2023-04-01 PROCEDURE — 36415 COLL VENOUS BLD VENIPUNCTURE: CPT

## 2023-04-01 PROCEDURE — 85520 HEPARIN ASSAY: CPT

## 2023-04-01 PROCEDURE — 6360000002 HC RX W HCPCS: Performed by: EMERGENCY MEDICINE

## 2023-04-01 PROCEDURE — B31U1ZZ FLUOROSCOPY OF PULMONARY TRUNK USING LOW OSMOLAR CONTRAST: ICD-10-PCS | Performed by: RADIOLOGY

## 2023-04-01 PROCEDURE — 80053 COMPREHEN METABOLIC PANEL: CPT

## 2023-04-01 PROCEDURE — 6360000004 HC RX CONTRAST MEDICATION: Performed by: EMERGENCY MEDICINE

## 2023-04-01 PROCEDURE — 2500000003 HC RX 250 WO HCPCS: Performed by: RADIOLOGY

## 2023-04-01 PROCEDURE — 83880 ASSAY OF NATRIURETIC PEPTIDE: CPT

## 2023-04-01 PROCEDURE — 02CQ3ZZ EXTIRPATION OF MATTER FROM RIGHT PULMONARY ARTERY, PERCUTANEOUS APPROACH: ICD-10-PCS | Performed by: RADIOLOGY

## 2023-04-01 PROCEDURE — 1100000003 HC PRIVATE W/ TELEMETRY

## 2023-04-01 PROCEDURE — 84484 ASSAY OF TROPONIN QUANT: CPT

## 2023-04-01 PROCEDURE — 85610 PROTHROMBIN TIME: CPT

## 2023-04-01 PROCEDURE — 71045 X-RAY EXAM CHEST 1 VIEW: CPT

## 2023-04-01 PROCEDURE — 2580000003 HC RX 258: Performed by: RADIOLOGY

## 2023-04-01 PROCEDURE — 02CP3ZZ EXTIRPATION OF MATTER FROM PULMONARY TRUNK, PERCUTANEOUS APPROACH: ICD-10-PCS | Performed by: RADIOLOGY

## 2023-04-01 PROCEDURE — 85730 THROMBOPLASTIN TIME PARTIAL: CPT

## 2023-04-01 PROCEDURE — 02CR3ZZ EXTIRPATION OF MATTER FROM LEFT PULMONARY ARTERY, PERCUTANEOUS APPROACH: ICD-10-PCS | Performed by: RADIOLOGY

## 2023-04-01 PROCEDURE — 2580000003 HC RX 258: Performed by: FAMILY MEDICINE

## 2023-04-01 PROCEDURE — 6360000002 HC RX W HCPCS: Performed by: RADIOLOGY

## 2023-04-01 PROCEDURE — 99285 EMERGENCY DEPT VISIT HI MDM: CPT

## 2023-04-01 PROCEDURE — 94761 N-INVAS EAR/PLS OXIMETRY MLT: CPT

## 2023-04-01 PROCEDURE — 2500000003 HC RX 250 WO HCPCS: Performed by: FAMILY MEDICINE

## 2023-04-01 PROCEDURE — 6360000004 HC RX CONTRAST MEDICATION: Performed by: RADIOLOGY

## 2023-04-01 PROCEDURE — 71260 CT THORAX DX C+: CPT | Performed by: EMERGENCY MEDICINE

## 2023-04-01 PROCEDURE — B31T1ZZ FLUOROSCOPY OF LEFT PULMONARY ARTERY USING LOW OSMOLAR CONTRAST: ICD-10-PCS | Performed by: RADIOLOGY

## 2023-04-01 RX ORDER — FENTANYL CITRATE 50 UG/ML
INJECTION, SOLUTION INTRAMUSCULAR; INTRAVENOUS
Status: COMPLETED | OUTPATIENT
Start: 2023-04-01 | End: 2023-04-01

## 2023-04-01 RX ORDER — DIPHENHYDRAMINE HYDROCHLORIDE 50 MG/ML
INJECTION INTRAMUSCULAR; INTRAVENOUS
Status: COMPLETED | OUTPATIENT
Start: 2023-04-01 | End: 2023-04-01

## 2023-04-01 RX ORDER — MAGNESIUM SULFATE IN WATER 40 MG/ML
2000 INJECTION, SOLUTION INTRAVENOUS PRN
Status: DISCONTINUED | OUTPATIENT
Start: 2023-04-01 | End: 2023-04-03 | Stop reason: HOSPADM

## 2023-04-01 RX ORDER — MIDAZOLAM HYDROCHLORIDE 1 MG/ML
INJECTION, SOLUTION INTRAMUSCULAR; INTRAVENOUS
Status: COMPLETED | OUTPATIENT
Start: 2023-04-01 | End: 2023-04-01

## 2023-04-01 RX ORDER — ASPIRIN 81 MG/1
324 TABLET, CHEWABLE ORAL ONCE
Status: DISCONTINUED | OUTPATIENT
Start: 2023-04-01 | End: 2023-04-01

## 2023-04-01 RX ORDER — SODIUM CHLORIDE 9 MG/ML
INJECTION, SOLUTION INTRAVENOUS PRN
Status: DISCONTINUED | OUTPATIENT
Start: 2023-04-01 | End: 2023-04-03 | Stop reason: HOSPADM

## 2023-04-01 RX ORDER — LIDOCAINE HYDROCHLORIDE 20 MG/ML
INJECTION, SOLUTION INFILTRATION; PERINEURAL
Status: COMPLETED | OUTPATIENT
Start: 2023-04-01 | End: 2023-04-01

## 2023-04-01 RX ORDER — HEPARIN SODIUM 1000 [USP'U]/ML
40 INJECTION, SOLUTION INTRAVENOUS; SUBCUTANEOUS PRN
Status: DISCONTINUED | OUTPATIENT
Start: 2023-04-01 | End: 2023-04-03

## 2023-04-01 RX ORDER — NALOXONE HYDROCHLORIDE 0.4 MG/ML
0.4 INJECTION, SOLUTION INTRAMUSCULAR; INTRAVENOUS; SUBCUTANEOUS PRN
Status: DISCONTINUED | OUTPATIENT
Start: 2023-04-01 | End: 2023-04-03 | Stop reason: HOSPADM

## 2023-04-01 RX ORDER — HEPARIN SODIUM 1000 [USP'U]/ML
80 INJECTION, SOLUTION INTRAVENOUS; SUBCUTANEOUS ONCE
Status: COMPLETED | OUTPATIENT
Start: 2023-04-01 | End: 2023-04-01

## 2023-04-01 RX ORDER — SODIUM CHLORIDE 0.9 % (FLUSH) 0.9 %
5-40 SYRINGE (ML) INJECTION EVERY 12 HOURS SCHEDULED
Status: DISCONTINUED | OUTPATIENT
Start: 2023-04-01 | End: 2023-04-03 | Stop reason: HOSPADM

## 2023-04-01 RX ORDER — SODIUM CHLORIDE 9 MG/ML
INJECTION, SOLUTION INTRAVENOUS CONTINUOUS PRN
Status: COMPLETED | OUTPATIENT
Start: 2023-04-01 | End: 2023-04-01

## 2023-04-01 RX ORDER — SODIUM CHLORIDE 9 MG/ML
INJECTION, SOLUTION INTRAVENOUS CONTINUOUS
Status: ACTIVE | OUTPATIENT
Start: 2023-04-01 | End: 2023-04-02

## 2023-04-01 RX ORDER — HEPARIN SODIUM 1000 [USP'U]/ML
80 INJECTION, SOLUTION INTRAVENOUS; SUBCUTANEOUS PRN
Status: DISCONTINUED | OUTPATIENT
Start: 2023-04-01 | End: 2023-04-03

## 2023-04-01 RX ORDER — HEPARIN SODIUM 200 [USP'U]/100ML
INJECTION, SOLUTION INTRAVENOUS CONTINUOUS PRN
Status: COMPLETED | OUTPATIENT
Start: 2023-04-01 | End: 2023-04-01

## 2023-04-01 RX ORDER — POLYETHYLENE GLYCOL 3350 17 G/17G
17 POWDER, FOR SOLUTION ORAL DAILY PRN
Status: DISCONTINUED | OUTPATIENT
Start: 2023-04-01 | End: 2023-04-03 | Stop reason: HOSPADM

## 2023-04-01 RX ORDER — OXYCODONE HYDROCHLORIDE 5 MG/1
5 TABLET ORAL EVERY 4 HOURS PRN
Status: DISCONTINUED | OUTPATIENT
Start: 2023-04-01 | End: 2023-04-03 | Stop reason: HOSPADM

## 2023-04-01 RX ORDER — MAGNESIUM HYDROXIDE/ALUMINUM HYDROXICE/SIMETHICONE 120; 1200; 1200 MG/30ML; MG/30ML; MG/30ML
30 SUSPENSION ORAL EVERY 6 HOURS PRN
Status: DISCONTINUED | OUTPATIENT
Start: 2023-04-01 | End: 2023-04-03 | Stop reason: HOSPADM

## 2023-04-01 RX ORDER — ONDANSETRON 2 MG/ML
4 INJECTION INTRAMUSCULAR; INTRAVENOUS EVERY 6 HOURS PRN
Status: DISCONTINUED | OUTPATIENT
Start: 2023-04-01 | End: 2023-04-03 | Stop reason: HOSPADM

## 2023-04-01 RX ORDER — HYDROCODONE BITARTRATE AND ACETAMINOPHEN 5; 325 MG/1; MG/1
1 TABLET ORAL EVERY 6 HOURS PRN
Status: DISCONTINUED | OUTPATIENT
Start: 2023-04-01 | End: 2023-04-03 | Stop reason: HOSPADM

## 2023-04-01 RX ORDER — ASPIRIN 81 MG/1
81 TABLET ORAL DAILY
Status: CANCELLED | OUTPATIENT
Start: 2023-04-02

## 2023-04-01 RX ORDER — HEPARIN SODIUM 10000 [USP'U]/100ML
5-30 INJECTION, SOLUTION INTRAVENOUS CONTINUOUS
Status: DISCONTINUED | OUTPATIENT
Start: 2023-04-01 | End: 2023-04-03

## 2023-04-01 RX ORDER — TAMSULOSIN HYDROCHLORIDE 0.4 MG/1
0.4 CAPSULE ORAL DAILY
Status: DISCONTINUED | OUTPATIENT
Start: 2023-04-02 | End: 2023-04-03 | Stop reason: HOSPADM

## 2023-04-01 RX ORDER — SODIUM CHLORIDE 0.9 % (FLUSH) 0.9 %
5-40 SYRINGE (ML) INJECTION PRN
Status: DISCONTINUED | OUTPATIENT
Start: 2023-04-01 | End: 2023-04-03 | Stop reason: HOSPADM

## 2023-04-01 RX ORDER — ONDANSETRON 4 MG/1
4 TABLET, ORALLY DISINTEGRATING ORAL EVERY 8 HOURS PRN
Status: DISCONTINUED | OUTPATIENT
Start: 2023-04-01 | End: 2023-04-03 | Stop reason: HOSPADM

## 2023-04-01 RX ORDER — ACETAMINOPHEN 325 MG/1
650 TABLET ORAL EVERY 6 HOURS PRN
Status: DISCONTINUED | OUTPATIENT
Start: 2023-04-01 | End: 2023-04-03 | Stop reason: HOSPADM

## 2023-04-01 RX ORDER — PANTOPRAZOLE SODIUM 40 MG/1
40 TABLET, DELAYED RELEASE ORAL
Status: DISCONTINUED | OUTPATIENT
Start: 2023-04-02 | End: 2023-04-03 | Stop reason: HOSPADM

## 2023-04-01 RX ORDER — POTASSIUM CHLORIDE 7.45 MG/ML
10 INJECTION INTRAVENOUS PRN
Status: DISCONTINUED | OUTPATIENT
Start: 2023-04-01 | End: 2023-04-03 | Stop reason: HOSPADM

## 2023-04-01 RX ORDER — ACETAMINOPHEN 650 MG/1
650 SUPPOSITORY RECTAL EVERY 6 HOURS PRN
Status: DISCONTINUED | OUTPATIENT
Start: 2023-04-01 | End: 2023-04-03 | Stop reason: HOSPADM

## 2023-04-01 RX ORDER — MORPHINE SULFATE 2 MG/ML
2 INJECTION, SOLUTION INTRAMUSCULAR; INTRAVENOUS EVERY 4 HOURS PRN
Status: DISCONTINUED | OUTPATIENT
Start: 2023-04-01 | End: 2023-04-03 | Stop reason: HOSPADM

## 2023-04-01 RX ORDER — POTASSIUM CHLORIDE 20 MEQ/1
40 TABLET, EXTENDED RELEASE ORAL PRN
Status: DISCONTINUED | OUTPATIENT
Start: 2023-04-01 | End: 2023-04-03 | Stop reason: HOSPADM

## 2023-04-01 RX ADMIN — FENTANYL CITRATE 25 MCG: 50 INJECTION, SOLUTION INTRAMUSCULAR; INTRAVENOUS at 18:40

## 2023-04-01 RX ADMIN — SODIUM CHLORIDE: 9 INJECTION, SOLUTION INTRAVENOUS at 20:24

## 2023-04-01 RX ADMIN — IOHEXOL 90 MG: 300 INJECTION, SOLUTION INTRAVENOUS at 19:01

## 2023-04-01 RX ADMIN — HEPARIN SODIUM 18 UNITS/KG/HR: 10000 INJECTION, SOLUTION INTRAVENOUS at 16:11

## 2023-04-01 RX ADMIN — FENTANYL CITRATE 25 MCG: 50 INJECTION, SOLUTION INTRAMUSCULAR; INTRAVENOUS at 18:43

## 2023-04-01 RX ADMIN — FENTANYL CITRATE 25 MCG: 50 INJECTION, SOLUTION INTRAMUSCULAR; INTRAVENOUS at 18:51

## 2023-04-01 RX ADMIN — HEPARIN SODIUM 3000 ML/HR: 200 INJECTION, SOLUTION INTRAVENOUS at 18:18

## 2023-04-01 RX ADMIN — FENTANYL CITRATE 25 MCG: 50 INJECTION, SOLUTION INTRAMUSCULAR; INTRAVENOUS at 18:26

## 2023-04-01 RX ADMIN — IOPAMIDOL 100 ML: 755 INJECTION, SOLUTION INTRAVENOUS at 15:24

## 2023-04-01 RX ADMIN — HEPARIN SODIUM 3000 ML/HR: 200 INJECTION, SOLUTION INTRAVENOUS at 18:58

## 2023-04-01 RX ADMIN — HEPARIN SODIUM 3000 ML/HR: 200 INJECTION, SOLUTION INTRAVENOUS at 18:38

## 2023-04-01 RX ADMIN — FENTANYL CITRATE 25 MCG: 50 INJECTION, SOLUTION INTRAMUSCULAR; INTRAVENOUS at 18:33

## 2023-04-01 RX ADMIN — LIDOCAINE HYDROCHLORIDE 8 ML: 20 INJECTION, SOLUTION INFILTRATION; PERINEURAL at 18:17

## 2023-04-01 RX ADMIN — MIDAZOLAM HYDROCHLORIDE 1 MG: 1 INJECTION, SOLUTION INTRAMUSCULAR; INTRAVENOUS at 18:11

## 2023-04-01 RX ADMIN — FENTANYL CITRATE 50 MCG: 50 INJECTION, SOLUTION INTRAMUSCULAR; INTRAVENOUS at 18:10

## 2023-04-01 RX ADMIN — DIPHENHYDRAMINE HYDROCHLORIDE 50 MG: 50 INJECTION, SOLUTION INTRAMUSCULAR; INTRAVENOUS at 18:09

## 2023-04-01 RX ADMIN — TUBERCULIN PURIFIED PROTEIN DERIVATIVE 5 UNITS: 5 INJECTION, SOLUTION INTRADERMAL at 20:24

## 2023-04-01 RX ADMIN — MIDAZOLAM HYDROCHLORIDE 0.5 MG: 1 INJECTION, SOLUTION INTRAMUSCULAR; INTRAVENOUS at 18:34

## 2023-04-01 RX ADMIN — MIDAZOLAM HYDROCHLORIDE 0.5 MG: 1 INJECTION, SOLUTION INTRAMUSCULAR; INTRAVENOUS at 18:27

## 2023-04-01 RX ADMIN — MIDAZOLAM HYDROCHLORIDE 0.5 MG: 1 INJECTION, SOLUTION INTRAMUSCULAR; INTRAVENOUS at 18:51

## 2023-04-01 RX ADMIN — SODIUM CHLORIDE 100 ML/HR: 9 INJECTION, SOLUTION INTRAVENOUS at 17:55

## 2023-04-01 RX ADMIN — SODIUM CHLORIDE, PRESERVATIVE FREE 10 ML: 5 INJECTION INTRAVENOUS at 20:24

## 2023-04-01 RX ADMIN — HEPARIN SODIUM 7800 UNITS: 1000 INJECTION, SOLUTION INTRAVENOUS; SUBCUTANEOUS at 16:10

## 2023-04-01 RX ADMIN — MIDAZOLAM HYDROCHLORIDE 0.5 MG: 1 INJECTION, SOLUTION INTRAMUSCULAR; INTRAVENOUS at 18:43

## 2023-04-01 ASSESSMENT — ENCOUNTER SYMPTOMS
COUGH: 0
NAUSEA: 0
VOMITING: 0
SHORTNESS OF BREATH: 1

## 2023-04-01 ASSESSMENT — LIFESTYLE VARIABLES
HOW MANY STANDARD DRINKS CONTAINING ALCOHOL DO YOU HAVE ON A TYPICAL DAY: PATIENT DOES NOT DRINK
HOW OFTEN DO YOU HAVE A DRINK CONTAINING ALCOHOL: NEVER

## 2023-04-01 ASSESSMENT — PAIN - FUNCTIONAL ASSESSMENT: PAIN_FUNCTIONAL_ASSESSMENT: NONE - DENIES PAIN

## 2023-04-01 NOTE — BRIEF OP NOTE
Department of Interventional Radiology  [phone]        Interventional Radiology Brief Procedure Note    Patient: Daphne Bassett MRN: [id number]  SSN: [id number]    YOB: 1950  Age: 67 y.o. Sex: male      Date of Procedure: 4/1/2023    Pre-Procedure Diagnosis: Bi PE, Hypoxia, SOB. Post-Procedure Diagnosis: SAME    Procedure(s):  Limitted PAGram w Thrombectomy    Brief Description of Procedure: R GSV access. Performed By: Anita Duarte MD     Assistants: None    Anesthesia:Moderate Sedation    Estimated Blood Loss: 30 cc    Specimens:  None    Implants:  None    Findings: Large volume clot removed. Complications: None    Recommendations: Suture removal tomorrow afternoon. Follow Up: Will check LE Venous US tomorrow.      Signed By: Anita Duarte MD     April 1, 2023

## 2023-04-01 NOTE — ED TRIAGE NOTES
Pt arrives from home via EMS with complaints of SOB and chest pain with exertion that started around 1200 today. Pt reported lightheadedness and orthos done by EMS were negative. Inconsistent STEMI noted by EMS. Pt given 900mL of fluid and aspirin 324mg en route. Pt denies n/v/pain at this time.

## 2023-04-01 NOTE — ED PROVIDER NOTES
1.5 MG/DL    Est, Glom Filt Rate >60 >60 ml/min/1.73m2    Calcium 9.1 8.3 - 10.4 MG/DL    Total Bilirubin 0.3 0.2 - 1.1 MG/DL    ALT 26 12 - 65 U/L    AST 26 15 - 37 U/L    Alk Phosphatase 85 50 - 136 U/L    Total Protein 7.3 6.3 - 8.2 g/dL    Albumin 3.7 3.2 - 4.6 g/dL    Globulin 3.6 2.8 - 4.5 g/dL    Albumin/Globulin Ratio 1.0 0.4 - 1.6     Troponin   Result Value Ref Range    Troponin, High Sensitivity 640.4 (HH) 0 - 57 pg/mL   Lactic Acid   Result Value Ref Range    Lactic Acid, Plasma 2.2 (H) 0.4 - 2.0 MMOL/L   D-Dimer, Quantitative   Result Value Ref Range    D-Dimer, Quant 8.62 (H) <0.56 ug/ml(FEU)   Brain Natriuretic Peptide   Result Value Ref Range    NT Pro- (H) 5 - 125 PG/ML   EKG 12 Lead   Result Value Ref Range    Ventricular Rate 90 BPM    Atrial Rate 90 BPM    P-R Interval 176 ms    QRS Duration 107 ms    Q-T Interval 379 ms    QTc Calculation (Bazett) 464 ms    P Axis 79 degrees    R Axis 56 degrees    T Axis 43 degrees    Diagnosis Sinus rhythm  Atrial premature complex           CT CHEST PULMONARY EMBOLISM W CONTRAST   Final Result      XR CHEST PORTABLE   Final Result                            Voice dictation software was used during the making of this note. This software is not perfect and grammatical and other typographical errors may be present. This note has not been completely proofread for errors.      Devon Hamman, MD  04/01/23 0062

## 2023-04-01 NOTE — OR NURSING
Transport here to take patient back to room 2233. Accompanied by Mirela Garrido, daughter, and Son-in-law.

## 2023-04-01 NOTE — PRE SEDATION
hydroxide-simethicone, morphine, naloxone, HYDROcodone-acetaminophen  Home Meds:   Prior to Admission medications    Medication Sig Start Date End Date Taking? Authorizing Provider   tamsulosin (FLOMAX) 0.4 MG capsule Take 1 capsule by mouth daily 2/6/23   Lázaro Sumner MD   aspirin 81 MG EC tablet Take 81 mg by mouth daily    Historical Provider, MD   VITAMIN D, CHOLECALCIFEROL, PO Take by mouth    Historical Provider, MD   meloxicam (MOBIC) 15 MG tablet TAKE 1 TABLET BY MOUTH DAILY 10/28/22   Lázaro Sumner MD   Omega-3 Fatty Acids (FISH OIL OMEGA-3 PO) Take by mouth    Historical Provider, MD   Probiotic Product (PROBIOTIC DAILY) CAPS Take by mouth    Historical Provider, MD   clobetasol (TEMOVATE) 0.05 % cream Apply topically 2 times daily 2/22/22   Ar Automatic Reconciliation   esomeprazole (NEXIUM) 40 MG delayed release capsule Take 20 mg by mouth daily    Ar Automatic Reconciliation       Pre-Sedation Documentation and Exam:   Vital signs have been reviewed (see flow sheet for vitals).     Mallampati Airway Assessment:  Mallampati Class II - (soft palate, fauces & uvula are visible)    ASA Classification:  Class 3 - A patient with severe systemic disease that limits activity but is not incapacitating    Sedation/ Anesthesia Plan:   intravenous sedation    Patient is an appropriate candidate for plan of sedation: yes    Electronically signed by Brady Gr MD on 4/1/2023 at 5:52 PM

## 2023-04-01 NOTE — H&P
Hospitalist History and Physical   Admit Date:  2023  1:56 PM   Name:  Gilson Gillis   Age:  67 y.o. Sex:  male  :  1950   MRN:  [id number]   Room:  [facility] [place] [facility]\""    Presenting/Chief Complaint: Shortness of Breath and Chest Pain     Reason(s) for Admission: Acute saddle pulmonary embolism with acute cor pulmonale (HCC) [I26.02]     History of Present Illness:   Gilson Gillis is a 67 y.o. male with medical history of Colovesicula fistula 2/2 divertiuclitis  s/p repair and partial colostomy, Ulcerative colitis, GERD, kidney/urethral stones, BPH, colostomy status,   who presented via EMS from home with SOB and chest pain with exertion that started today ~12pm associated with lightheadness. Orthstatic vitals done by EMS and negative. EKG with dynamic changes in route. Rec'd 900 cc fluid bolus and  en route. He noticied symptoms this morning when he was walking to a [employer] game and had to stop to catch his breath more than usual. When he was at home and had to walk to the bathroom, he felt SOB with lightheadedness walking only a few feet. After using the bathroom, he had to lay himself down on the floor and crawl to call for help. He described severe SOB but did not pass out. He recently had a dental procedure and did have some right lateral chest pain that whent away. He is fine currently without symptoms but with any movement becomes dyspneic. Has chronic LLE following an ankle injury in 1970s. No worsening. Has not noticied gradual worsening of CAMP. No personal h/o VTE. Mother was on warfarin for DVT. Not on any hormones. Uptodate on cancer screening. Non smoker. No recent travel. Had COVID 22. He recently had a dental procedure. LA 2.2 HS trop 640 ntprobnp 410 D dimer 8.62   CXR no acute   CTPE   Extensive bilateral pulmonary emboli seen involving the main pulmonary   arteries and subsegmental pulmonary arteries.  There is evidence of right   ventricular strain

## 2023-04-01 NOTE — ED NOTES
TRANSFER - OUT REPORT:    Verbal report given to Enrico Hagan on [facility]  being transferred to Room [id number] [phone] for routine progression of patient care       Report consisted of patient's Situation, Background, Assessment and   Recommendations(SBAR). Information from the following report(s) ED Encounter Summary was reviewed with the receiving nurse. [place] Assessment: Presents to emergency department  because of falls (Syncope, seizure, or loss of consciousness): No, Age > 79: Yes, Altered Mental Status, Intoxication with alcohol or substance confusion (Disorientation, impaired judgment, poor safety awaremess, or inability to follow instructions): No, Impaired Mobility: Ambulates or transfers with assistive devices or assistance; Unable to ambulate or transer.: No  Lines:   Peripheral IV 04/01/23 Left Wrist (Active)   Site Assessment Clean, dry & intact 04/01/23 1414   Line Status Blood return noted 04/01/23 1414   Phlebitis Assessment No symptoms 04/01/23 1414   Infiltration Assessment 0 04/01/23 1414       Peripheral IV 04/01/23 Right Antecubital (Active)   Site Assessment Clean, dry & intact 04/01/23 1437   Line Status Blood return noted; Flushed 04/01/23 1437   Phlebitis Assessment No symptoms 04/01/23 1437   Infiltration Assessment 0 04/01/23 1437        Opportunity for questions and clarification was provided.       Patient transported with:  Registered Nurse           Dennis Villareal RN  04/01/23 [phone]

## 2023-04-01 NOTE — OR NURSING
TRANSFER - OUT REPORT:    Verbal report given to Jacque Mcgarry Jr[address] Way on [place]  being transferred to room 22669 [id number] [phone] [id number] for routine progression of patient care       Report consisted of patient's Situation, Background, Assessment and   Recommendations(SBAR). Information from the following report(s) Nurse Handoff Report, Surgery Report, Intake/Output and MAR was reviewed with the receiving nurse. Ophiem Assessment: Presents to emergency department  because of falls (Syncope, seizure, or loss of consciousness): No, Age > 79: Yes, Altered Mental Status, Intoxication with alcohol or substance confusion (Disorientation, impaired judgment, poor safety awaremess, or inability to follow instructions): No, Impaired Mobility: Ambulates or transfers with assistive devices or assistance; Unable to ambulate or transer.: No  Lines:   Peripheral IV 04/01/23 Left Wrist (Active)   Site Assessment Clean, dry & intact 04/01/23 1650   Line Status Capped;Flushed 04/01/23 1650   Line Care Cap changed; Connections checked and tightened;Ports disinfected 04/01/23 1650   Phlebitis Assessment No symptoms 04/01/23 1650   Infiltration Assessment 0 04/01/23 1650   Alcohol Cap Used Yes 04/01/23 1650   Dressing Status Clean, dry & intact 04/01/23 1650   Dressing Type Transparent 04/01/23 1650       Peripheral IV 04/01/23 Right Antecubital (Active)   Site Assessment Clean, dry & intact 04/01/23 1650   Line Status Infusing 04/01/23 1601 [place] Connections checked and tightened;Ports disinfected 04/01/23 1650   Phlebitis Assessment No symptoms 04/01/23 1650   Infiltration Assessment 0 04/01/23 1650   Alcohol Cap Used Yes 04/01/23 1650   Dressing Status Clean, dry & intact 04/01/23 1650   Dressing Type Transparent 04/01/23 1650        Opportunity for questions and clarification was provided. Patient transported with:   With transport after recovery

## 2023-04-02 ENCOUNTER — APPOINTMENT (OUTPATIENT)
Dept: ULTRASOUND IMAGING | Age: 73
DRG: 164 | End: 2023-04-02
Payer: MEDICARE

## 2023-04-02 ENCOUNTER — APPOINTMENT (OUTPATIENT)
Dept: NON INVASIVE DIAGNOSTICS | Age: 73
DRG: 164 | End: 2023-04-02
Payer: MEDICARE

## 2023-04-02 LAB
ALBUMIN SERPL-MCNC: 3.2 G/DL (ref 3.2–4.6)
ALBUMIN/GLOB SERPL: 1 (ref 0.4–1.6)
ALP SERPL-CCNC: 78 U/L (ref 50–136)
ALT SERPL-CCNC: 21 U/L (ref 12–65)
ANION GAP SERPL CALC-SCNC: 3 MMOL/L (ref 2–11)
AST SERPL-CCNC: 23 U/L (ref 15–37)
BASOPHILS # BLD: 0.1 K/UL (ref 0–0.2)
BASOPHILS NFR BLD: 1 % (ref 0–2)
BILIRUB SERPL-MCNC: 0.2 MG/DL (ref 0.2–1.1)
BUN SERPL-MCNC: 14 MG/DL (ref 8–23)
CALCIUM SERPL-MCNC: 8.8 MG/DL (ref 8.3–10.4)
CHLORIDE SERPL-SCNC: 114 MMOL/L (ref 101–110)
CO2 SERPL-SCNC: 23 MMOL/L (ref 21–32)
CREAT SERPL-MCNC: 1 MG/DL (ref 0.8–1.5)
DIFFERENTIAL METHOD BLD: ABNORMAL
ECHO AO ASC DIAM: 3.8 CM
ECHO AO ASCENDING AORTA INDEX: 1.73 CM/M2
ECHO AO ROOT DIAM: 3.4 CM
ECHO AO ROOT INDEX: 1.55 CM/M2
ECHO AV AREA PEAK VELOCITY: 1.9 CM2
ECHO AV AREA VTI: 1.8 CM2
ECHO AV AREA/BSA PEAK VELOCITY: 0.9 CM2/M2
ECHO AV AREA/BSA VTI: 0.8 CM2/M2
ECHO AV MEAN GRADIENT: 8 MMHG
ECHO AV MEAN VELOCITY: 1.4 M/S
ECHO AV PEAK GRADIENT: 14 MMHG
ECHO AV PEAK VELOCITY: 1.9 M/S
ECHO AV VELOCITY RATIO: 0.58
ECHO AV VTI: 39.1 CM
ECHO BSA: 2.24 M2
ECHO EST RA PRESSURE: 15 MMHG
ECHO IVC PROX: 3 CM
ECHO LA AREA 2C: 25.9 CM2
ECHO LA AREA 4C: 26.9 CM2
ECHO LA DIAMETER INDEX: 1.95 CM/M2
ECHO LA DIAMETER: 4.3 CM
ECHO LA MAJOR AXIS: 6 CM
ECHO LA MINOR AXIS: 6.3 CM
ECHO LA TO AORTIC ROOT RATIO: 1.26
ECHO LA VOL 2C: 87 ML (ref 18–58)
ECHO LA VOL 4C: 96 ML (ref 18–58)
ECHO LA VOL BP: 93 ML (ref 18–58)
ECHO LA VOL/BSA BIPLANE: 42 ML/M2 (ref 16–34)
ECHO LA VOLUME INDEX A2C: 40 ML/M2 (ref 16–34)
ECHO LA VOLUME INDEX A4C: 44 ML/M2 (ref 16–34)
ECHO LV E' LATERAL VELOCITY: 8 CM/S
ECHO LV E' SEPTAL VELOCITY: 9 CM/S
ECHO LV EDV A2C: 129 ML
ECHO LV EDV A4C: 114 ML
ECHO LV EDV INDEX A4C: 52 ML/M2
ECHO LV EDV NDEX A2C: 59 ML/M2
ECHO LV EJECTION FRACTION A2C: 46 %
ECHO LV EJECTION FRACTION A4C: 58 %
ECHO LV EJECTION FRACTION BIPLANE: 53 % (ref 55–100)
ECHO LV ESV A2C: 69 ML
ECHO LV ESV A4C: 48 ML
ECHO LV ESV INDEX A2C: 31 ML/M2
ECHO LV ESV INDEX A4C: 22 ML/M2
ECHO LV FRACTIONAL SHORTENING: 28 % (ref 28–44)
ECHO LV INTERNAL DIMENSION DIASTOLE INDEX: 2.27 CM/M2
ECHO LV INTERNAL DIMENSION DIASTOLIC: 5 CM (ref 4.2–5.9)
ECHO LV INTERNAL DIMENSION SYSTOLIC INDEX: 1.64 CM/M2
ECHO LV INTERNAL DIMENSION SYSTOLIC: 3.6 CM
ECHO LV IVSD: 0.8 CM (ref 0.6–1)
ECHO LV MASS 2D: 158.2 G (ref 88–224)
ECHO LV MASS INDEX 2D: 71.9 G/M2 (ref 49–115)
ECHO LV POSTERIOR WALL DIASTOLIC: 1 CM (ref 0.6–1)
ECHO LV RELATIVE WALL THICKNESS RATIO: 0.4
ECHO LVOT AREA: 3.1 CM2
ECHO LVOT AV VTI INDEX: 0.57
ECHO LVOT DIAM: 2 CM
ECHO LVOT MEAN GRADIENT: 2 MMHG
ECHO LVOT PEAK GRADIENT: 5 MMHG
ECHO LVOT PEAK VELOCITY: 1.1 M/S
ECHO LVOT STROKE VOLUME INDEX: 32 ML/M2
ECHO LVOT SV: 70.3 ML
ECHO LVOT VTI: 22.4 CM
ECHO MV A VELOCITY: 1.1 M/S
ECHO MV E DECELERATION TIME (DT): 201 MS
ECHO MV E VELOCITY: 0.77 M/S
ECHO MV E/A RATIO: 0.7
ECHO MV E/E' LATERAL: 9.63
ECHO MV E/E' RATIO (AVERAGED): 9.09
ECHO MV E/E' SEPTAL: 8.56
ECHO MV REGURGITANT PEAK GRADIENT: 85 MMHG
ECHO MV REGURGITANT PEAK VELOCITY: 4.6 M/S
ECHO PV MAX VELOCITY: 0.9 M/S
ECHO PV PEAK GRADIENT: 3 MMHG
ECHO RIGHT VENTRICULAR SYSTOLIC PRESSURE (RVSP): 34 MMHG
ECHO RV BASAL DIMENSION: 3.8 CM
ECHO RV TAPSE: 2.4 CM (ref 1.7–?)
ECHO TV REGURGITANT MAX VELOCITY: 2.18 M/S
ECHO TV REGURGITANT PEAK GRADIENT: 19 MMHG
EOSINOPHIL # BLD: 0.1 K/UL (ref 0–0.8)
EOSINOPHIL NFR BLD: 3 % (ref 0.5–7.8)
ERYTHROCYTE [DISTWIDTH] IN BLOOD BY AUTOMATED COUNT: 13.2 % (ref 11.9–14.6)
GLOBULIN SER CALC-MCNC: 3.2 G/DL (ref 2.8–4.5)
GLUCOSE SERPL-MCNC: 104 MG/DL (ref 65–100)
HCT VFR BLD AUTO: 34.8 % (ref 41.1–50.3)
HGB BLD-MCNC: 11.3 G/DL (ref 13.6–17.2)
IMM GRANULOCYTES # BLD AUTO: 0 K/UL (ref 0–0.5)
IMM GRANULOCYTES NFR BLD AUTO: 0 % (ref 0–5)
LYMPHOCYTES # BLD: 1.3 K/UL (ref 0.5–4.6)
LYMPHOCYTES NFR BLD: 27 % (ref 13–44)
MAGNESIUM SERPL-MCNC: 1.8 MG/DL (ref 1.8–2.4)
MCH RBC QN AUTO: 32.2 PG (ref 26.1–32.9)
MCHC RBC AUTO-ENTMCNC: 32.5 G/DL (ref 31.4–35)
MCV RBC AUTO: 99.1 FL (ref 82–102)
MM INDURATION, POC: 0 MM (ref 0–5)
MONOCYTES # BLD: 0.4 K/UL (ref 0.1–1.3)
MONOCYTES NFR BLD: 7 % (ref 4–12)
NEUTS SEG # BLD: 3 K/UL (ref 1.7–8.2)
NEUTS SEG NFR BLD: 62 % (ref 43–78)
NRBC # BLD: 0 K/UL (ref 0–0.2)
PLATELET # BLD AUTO: 191 K/UL (ref 150–450)
PMV BLD AUTO: 9.4 FL (ref 9.4–12.3)
POTASSIUM SERPL-SCNC: 3.7 MMOL/L (ref 3.5–5.1)
PPD, POC: NEGATIVE
PROT SERPL-MCNC: 6.4 G/DL (ref 6.3–8.2)
RBC # BLD AUTO: 3.51 M/UL (ref 4.23–5.6)
SODIUM SERPL-SCNC: 140 MMOL/L (ref 133–143)
UFH PPP CHRO-ACNC: 0.26 IU/ML (ref 0.3–0.7)
UFH PPP CHRO-ACNC: 0.62 IU/ML (ref 0.3–0.7)
UFH PPP CHRO-ACNC: 0.82 IU/ML (ref 0.3–0.7)
WBC # BLD AUTO: 4.9 K/UL (ref 4.3–11.1)

## 2023-04-02 PROCEDURE — 85520 HEPARIN ASSAY: CPT

## 2023-04-02 PROCEDURE — 83735 ASSAY OF MAGNESIUM: CPT

## 2023-04-02 PROCEDURE — 93970 EXTREMITY STUDY: CPT

## 2023-04-02 PROCEDURE — 36415 COLL VENOUS BLD VENIPUNCTURE: CPT

## 2023-04-02 PROCEDURE — 93306 TTE W/DOPPLER COMPLETE: CPT

## 2023-04-02 PROCEDURE — 93306 TTE W/DOPPLER COMPLETE: CPT | Performed by: INTERNAL MEDICINE

## 2023-04-02 PROCEDURE — 2580000003 HC RX 258: Performed by: RADIOLOGY

## 2023-04-02 PROCEDURE — 2580000003 HC RX 258: Performed by: FAMILY MEDICINE

## 2023-04-02 PROCEDURE — 80053 COMPREHEN METABOLIC PANEL: CPT

## 2023-04-02 PROCEDURE — 6360000002 HC RX W HCPCS: Performed by: EMERGENCY MEDICINE

## 2023-04-02 PROCEDURE — 1100000003 HC PRIVATE W/ TELEMETRY

## 2023-04-02 PROCEDURE — 85025 COMPLETE CBC W/AUTO DIFF WBC: CPT

## 2023-04-02 PROCEDURE — 6370000000 HC RX 637 (ALT 250 FOR IP): Performed by: FAMILY MEDICINE

## 2023-04-02 RX ADMIN — HEPARIN SODIUM 3900 UNITS: 1000 INJECTION INTRAVENOUS; SUBCUTANEOUS at 09:30

## 2023-04-02 RX ADMIN — SODIUM CHLORIDE, PRESERVATIVE FREE 10 ML: 5 INJECTION INTRAVENOUS at 09:37

## 2023-04-02 RX ADMIN — HEPARIN SODIUM 17 UNITS/KG/HR: 10000 INJECTION, SOLUTION INTRAVENOUS at 09:34

## 2023-04-02 RX ADMIN — TAMSULOSIN HYDROCHLORIDE 0.4 MG: 0.4 CAPSULE ORAL at 09:30

## 2023-04-02 RX ADMIN — PANTOPRAZOLE SODIUM 40 MG: 40 TABLET, DELAYED RELEASE ORAL at 06:10

## 2023-04-02 RX ADMIN — SODIUM CHLORIDE, PRESERVATIVE FREE 10 ML: 5 INJECTION INTRAVENOUS at 21:05

## 2023-04-02 RX ADMIN — SODIUM CHLORIDE: 9 INJECTION, SOLUTION INTRAVENOUS at 04:06

## 2023-04-03 VITALS
HEART RATE: 62 BPM | BODY MASS INDEX: 31.37 KG/M2 | OXYGEN SATURATION: 100 % | RESPIRATION RATE: 20 BRPM | SYSTOLIC BLOOD PRESSURE: 146 MMHG | DIASTOLIC BLOOD PRESSURE: 75 MMHG | TEMPERATURE: 97.5 F | HEIGHT: 71 IN | WEIGHT: 224.1 LBS

## 2023-04-03 PROBLEM — I24.89 DEMAND ISCHEMIA: Status: RESOLVED | Noted: 2023-04-01 | Resolved: 2023-04-03

## 2023-04-03 PROBLEM — I24.8 DEMAND ISCHEMIA (HCC): Status: RESOLVED | Noted: 2023-04-01 | Resolved: 2023-04-03

## 2023-04-03 PROBLEM — I82.4Z1 DEEP VEIN THROMBOSIS (DVT) OF DISTAL VEIN OF RIGHT LOWER EXTREMITY (HCC): Status: ACTIVE | Noted: 2023-04-03

## 2023-04-03 LAB
ALBUMIN SERPL-MCNC: 3.1 G/DL (ref 3.2–4.6)
ALBUMIN/GLOB SERPL: 1 (ref 0.4–1.6)
ALP SERPL-CCNC: 69 U/L (ref 50–136)
ALT SERPL-CCNC: 19 U/L (ref 12–65)
ANION GAP SERPL CALC-SCNC: 3 MMOL/L (ref 2–11)
AST SERPL-CCNC: 23 U/L (ref 15–37)
BASOPHILS # BLD: 0.1 K/UL (ref 0–0.2)
BASOPHILS NFR BLD: 1 % (ref 0–2)
BILIRUB SERPL-MCNC: 0.2 MG/DL (ref 0.2–1.1)
BUN SERPL-MCNC: 14 MG/DL (ref 8–23)
CALCIUM SERPL-MCNC: 8.9 MG/DL (ref 8.3–10.4)
CHLORIDE SERPL-SCNC: 115 MMOL/L (ref 101–110)
CO2 SERPL-SCNC: 25 MMOL/L (ref 21–32)
CREAT SERPL-MCNC: 1 MG/DL (ref 0.8–1.5)
DIFFERENTIAL METHOD BLD: ABNORMAL
EOSINOPHIL # BLD: 0.2 K/UL (ref 0–0.8)
EOSINOPHIL NFR BLD: 5 % (ref 0.5–7.8)
ERYTHROCYTE [DISTWIDTH] IN BLOOD BY AUTOMATED COUNT: 13.2 % (ref 11.9–14.6)
GLOBULIN SER CALC-MCNC: 3.2 G/DL (ref 2.8–4.5)
GLUCOSE SERPL-MCNC: 111 MG/DL (ref 65–100)
HCT VFR BLD AUTO: 33.4 % (ref 41.1–50.3)
HGB BLD-MCNC: 10.5 G/DL (ref 13.6–17.2)
IMM GRANULOCYTES # BLD AUTO: 0 K/UL (ref 0–0.5)
IMM GRANULOCYTES NFR BLD AUTO: 0 % (ref 0–5)
LYMPHOCYTES # BLD: 1.4 K/UL (ref 0.5–4.6)
LYMPHOCYTES NFR BLD: 31 % (ref 13–44)
MAGNESIUM SERPL-MCNC: 1.8 MG/DL (ref 1.8–2.4)
MCH RBC QN AUTO: 31.7 PG (ref 26.1–32.9)
MCHC RBC AUTO-ENTMCNC: 31.4 G/DL (ref 31.4–35)
MCV RBC AUTO: 100.9 FL (ref 82–102)
MONOCYTES # BLD: 0.4 K/UL (ref 0.1–1.3)
MONOCYTES NFR BLD: 8 % (ref 4–12)
NEUTS SEG # BLD: 2.6 K/UL (ref 1.7–8.2)
NEUTS SEG NFR BLD: 55 % (ref 43–78)
NRBC # BLD: 0 K/UL (ref 0–0.2)
PLATELET # BLD AUTO: 169 K/UL (ref 150–450)
PMV BLD AUTO: 9.2 FL (ref 9.4–12.3)
POTASSIUM SERPL-SCNC: 3.8 MMOL/L (ref 3.5–5.1)
PROT SERPL-MCNC: 6.3 G/DL (ref 6.3–8.2)
RBC # BLD AUTO: 3.31 M/UL (ref 4.23–5.6)
SODIUM SERPL-SCNC: 143 MMOL/L (ref 133–143)
UFH PPP CHRO-ACNC: 0.72 IU/ML (ref 0.3–0.7)
UFH PPP CHRO-ACNC: 0.77 IU/ML (ref 0.3–0.7)
WBC # BLD AUTO: 4.7 K/UL (ref 4.3–11.1)

## 2023-04-03 PROCEDURE — 83735 ASSAY OF MAGNESIUM: CPT

## 2023-04-03 PROCEDURE — 85520 HEPARIN ASSAY: CPT

## 2023-04-03 PROCEDURE — 36415 COLL VENOUS BLD VENIPUNCTURE: CPT

## 2023-04-03 PROCEDURE — 97530 THERAPEUTIC ACTIVITIES: CPT

## 2023-04-03 PROCEDURE — 85025 COMPLETE CBC W/AUTO DIFF WBC: CPT

## 2023-04-03 PROCEDURE — 6360000002 HC RX W HCPCS: Performed by: EMERGENCY MEDICINE

## 2023-04-03 PROCEDURE — 6370000000 HC RX 637 (ALT 250 FOR IP): Performed by: FAMILY MEDICINE

## 2023-04-03 PROCEDURE — 2580000003 HC RX 258: Performed by: FAMILY MEDICINE

## 2023-04-03 PROCEDURE — 6370000000 HC RX 637 (ALT 250 FOR IP): Performed by: PHYSICIAN ASSISTANT

## 2023-04-03 PROCEDURE — 80053 COMPREHEN METABOLIC PANEL: CPT

## 2023-04-03 PROCEDURE — 97161 PT EVAL LOW COMPLEX 20 MIN: CPT

## 2023-04-03 RX ORDER — HYDROCODONE BITARTRATE AND ACETAMINOPHEN 5; 325 MG/1; MG/1
1 TABLET ORAL EVERY 6 HOURS PRN
Qty: 12 TABLET | Refills: 0 | Status: SHIPPED | OUTPATIENT
Start: 2023-04-03 | End: 2023-04-06

## 2023-04-03 RX ORDER — NALOXONE HYDROCHLORIDE 0.4 MG/ML
0.4 INJECTION, SOLUTION INTRAMUSCULAR; INTRAVENOUS; SUBCUTANEOUS PRN
Qty: 1 EACH | Refills: 0 | Status: SHIPPED | OUTPATIENT
Start: 2023-04-03

## 2023-04-03 RX ADMIN — TAMSULOSIN HYDROCHLORIDE 0.4 MG: 0.4 CAPSULE ORAL at 09:34

## 2023-04-03 RX ADMIN — HEPARIN SODIUM 15 UNITS/KG/HR: 10000 INJECTION, SOLUTION INTRAVENOUS at 02:14

## 2023-04-03 RX ADMIN — PANTOPRAZOLE SODIUM 40 MG: 40 TABLET, DELAYED RELEASE ORAL at 06:28

## 2023-04-03 RX ADMIN — APIXABAN 10 MG: 5 TABLET, FILM COATED ORAL at 10:21

## 2023-04-03 RX ADMIN — SODIUM CHLORIDE, PRESERVATIVE FREE 10 ML: 5 INJECTION INTRAVENOUS at 09:34

## 2023-04-03 NOTE — CARE COORDINATION
Pt with discharge orders this day. Chart screened by  for discharge planning. No CM needs identified at time of discharge. Milestones met. 04/03/23 9574   Service Assessment   Patient Orientation Alert and Oriented   Cognition Alert   History Provided By Patient   Primary Caregiver Self   Support Systems Family Members   PCP Verified by CM Yes   Last Visit to PCP Within last 3 months   Prior Functional Level Independent in ADLs/IADLs   Current Functional Level Independent in ADLs/IADLs   Can patient return to prior living arrangement Yes   Ability to make needs known: Good   Family able to assist with home care needs: Yes   Would you like for me to discuss the discharge plan with any other family members/significant others, and if so, who?  No   Condition of Participation: Discharge Planning   The Plan for Transition of Care is related to the following treatment goals: Home

## 2023-04-03 NOTE — DISCHARGE INSTRUCTIONS
Please wear your compression hose on both legs for three weeks. Interventional Radiology will schedule an appointment with you to follow up in 2-4 weeks. They will discuss management of your right leg clot at that time. Please take your Eliquis as prescribed. Start with 10mg by mouth twice a day. When that runs out, start taking the 5mg pills twice daily.

## 2023-04-03 NOTE — PLAN OF CARE
Problem: Discharge Planning  Goal: Discharge to home or other facility with appropriate resources  4/3/2023 1210 by Bartolome Arndt RN  Outcome: Progressing  Flowsheets (Taken 4/3/2023 3156)  Discharge to home or other facility with appropriate resources: Identify barriers to discharge with patient and caregiver  4/3/2023 0055 by Inessa Gan RN  Outcome: Progressing  Flowsheets (Taken 4/2/2023 1923)  Discharge to home or other facility with appropriate resources: Identify barriers to discharge with patient and caregiver     Problem: Safety - Adult  Goal: Free from fall injury  4/3/2023 1210 by Bartolome Arndt RN  Outcome: Progressing  4/3/2023 0055 by Inessa Gan RN  Outcome: Progressing     Problem: ABCDS Injury Assessment  Goal: Absence of physical injury  4/3/2023 1210 by Bartolome Arndt RN  Outcome: Progressing  4/3/2023 0055 by Inessa Gan RN  Outcome: Progressing     Problem: Pain  Goal: Verbalizes/displays adequate comfort level or baseline comfort level  4/3/2023 1210 by Bartolome Arndt RN  Outcome: Progressing  4/3/2023 0055 by Inessa Gan RN  Outcome: Progressing

## 2023-04-03 NOTE — PLAN OF CARE
Problem: Discharge Planning  Goal: Discharge to home or other facility with appropriate resources  4/3/2023 1212 by Opal Rylan, RN  Outcome: Adequate for Discharge  4/3/2023 1212 by Opal Record, RN  Outcome: Adequate for Discharge  4/3/2023 1210 by Opal Record, RN  Outcome: Progressing  Flowsheets (Taken 4/3/2023 9960)  Discharge to home or other facility with appropriate resources: Identify barriers to discharge with patient and caregiver  4/3/2023 0055 by Hawa Gonzalez RN  Outcome: Progressing  Flowsheets (Taken 4/2/2023 1923)  Discharge to home or other facility with appropriate resources: Identify barriers to discharge with patient and caregiver     Problem: ABCDS Injury Assessment  Goal: Absence of physical injury  4/3/2023 1212 by Amelia Fagan, RN  Outcome: Adequate for Discharge  4/3/2023 1212 by Opal Record, RN  Outcome: Adequate for Discharge  4/3/2023 1210 by Amelia Fagan, RN  Outcome: Progressing  4/3/2023 0055 by Hawa Gonzalez RN  Outcome: Progressing     Problem: Pain  Goal: Verbalizes/displays adequate comfort level or baseline comfort level  4/3/2023 1212 by Opal Rylan, RN  Outcome: Adequate for Discharge  4/3/2023 1212 by Amelia Fagan, RN  Outcome: Adequate for Discharge  4/3/2023 1210 by Amelia Fagan, RN  Outcome: Progressing  4/3/2023 0055 by Hawa Gonzalez RN  Outcome: Progressing

## 2023-04-03 NOTE — PLAN OF CARE
Problem: Discharge Planning  Goal: Discharge to home or other facility with appropriate resources  Outcome: Progressing  Flowsheets (Taken 4/2/2023 1923)  Discharge to home or other facility with appropriate resources: Identify barriers to discharge with patient and caregiver     Problem: Safety - Adult  Goal: Free from fall injury  Outcome: Progressing     Problem: ABCDS Injury Assessment  Goal: Absence of physical injury  Outcome: Progressing     Problem: Pain  Goal: Verbalizes/displays adequate comfort level or baseline comfort level  Outcome: Progressing

## 2023-04-03 NOTE — DISCHARGE SUMMARY
Hospitalist Discharge Summary   Admit Date:  2023  1:56 PM   DC Note date: 4/3/2023  Name:  Facundo Chau   Age:  67 y.o. 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 Orthostatic vitals done by EMS and negative. EKG with dynamic changes in route. Rec'd 900 cc fluid bolus and  en route. No personal h/o VTE. Mother was on warfarin for DVT. Not on any hormones. Uptodate on cancer screening. Non smoker. No recent travel. Had COVID 22. He recently had a dental procedure. LA 2.2 HS trop 640 ntprobnp 410 D dimer 8.62   CXR no acute   CTPE   Extensive bilateral pulmonary emboli seen involving the main pulmonary   arteries and subsegmental pulmonary arteries. There is evidence of right   ventricular strain with leftward deviation of the interventricular septum      Pt was admitted and IR was consulted. Pt underwent thrombectomy with IR on 23.  He tolerated the procedure

## 2023-04-03 NOTE — PROGRESS NOTES
ACUTE PHYSICAL THERAPY GOALS:   (Developed with and agreed upon by patient and/or caregiver. )    LTG:  (1.)Mr. Anh Cheney will move from supine to sit and sit to supine , scoot up and down, and roll side to side in bed with INDEPENDENT within 7 treatment day(s). (2.)Mr. Anh Cheney will transfer from bed to chair and chair to bed with INDEPENDENT using the least restrictive device within 7 treatment day(s). (3.)Mr. Anh Cheney will ambulate with INDEPENDENT for 300 feet with the least restrictive device within 7 treatment day(s). (4.)Mr. Anh Cheney will tolerate at least 23 min of dynamic standing activity to assist standing ADLs with the least restrictive device within 7 treatment days. (5.)Mr. Anh Cheney will climb at least 3 steps with INDEPENDENT with the least restrictive device within 7 treatment days. ________________________________________________________________________________________________      PHYSICAL THERAPY Initial Assessment, Daily Note, and AM  (Link to Caseload Tracking: PT Visit Days : 1  Acknowledge Orders  Time In/Out  PT Charge Capture  Rehab Caseload Tracker    Vandana Pittman is a 67 y.o. male   PRIMARY DIAGNOSIS: Acute saddle pulmonary embolism with acute cor pulmonale (HCC)  Bilateral pulmonary embolism (HCC) [I26.99]  Acute saddle pulmonary embolism with acute cor pulmonale (HCC) [I26.02]       Reason for Referral: Generalized Muscle Weakness (M62.81)  Other lack of cordination (R27.8)  Difficulty in walking, Not elsewhere classified (R26.2)  Other abnormalities of gait and mobility (R26.89)  Inpatient: Payor: MEDICARE / Plan: MEDICARE PART A AND B / Product Type: *No Product type* /     ASSESSMENT:     REHAB RECOMMENDATIONS:   Recommendation to date pending progress:  Setting:  No further skilled therapy after discharge from hospital    Equipment:    None     ASSESSMENT:  Mr. Anh Cheney presents in bedside chair. Upon entering, Pnt is agreeable to PT treatment.   he reports no
Department of Interventional Radiology  [phone]                                 Progress Note    Patient: Bowen Person MRN: [id number]  SSN: [id number]    YOB: 1950  Age: 67 y.o. Sex: male           Orders to continue Heparin overnight were written yesterday evening. OK to transition to [facility] now from my standpoint. US reviewed. Primarily non-occlussive clot in the RLE. Likely chronic given appearance and history. As of yesterday evening, he had no swelling in the RLE, so, no need for DVT thrombectomy. Recommend Compression Hose use for minimum of 3 weeks, as discussed yesterday. Rx for hose given yesterday. My office will call to make follow up appointment in the next 2-4 weeks. We can discuss any LE intervention at that time. Please call w any questions.      Jo Ann Minor MD              Objective:     Vitals:    04/02/23 0715 04/02/23 1039 04/02/23 1112 04/02/23 1523   BP: (!) 150/73  139/75    Pulse: 78  71 65   Resp: 18  18 18   Temp: 98.2 °F (36.8 °C)  98.4 °F (36.9 °C) 98.6 °F (37 °C)   TempSrc: Oral  Oral Oral   SpO2: 97%  95% 96%   Weight:  221 lb (100.2 kg)     Height:  5' 11\" (1.803 m)        Signed By: Jo Ann Minor MD     April 2, 2023
Discharge order received. Discharge instructions provided to patient. Patient is aware of medication regimen and all associated side effects. Patient is aware of follow up appointments (appointment made with Dr. Andrew Bryant for April 10 @ [phone]), diet restrictions, and activity restrictions. Opportunity provided to ask questions and answered. All belongings with patient upon discharge. Patient is aware of whom to call in case of emergency. Patient discharged to home in stable condition with his son.
It is with great anuradha we pray for your family today:        \" If you trust him to save your soul,  Why would you not also,trust him to care for your body and life? He has never refused to carry your burdens; He has never fainted under their weight. So then, be finished with anxious worry, and leave all your concerns behind,  Placing them in the hand of a gracious God. \"              Faithful God,    The weight of my illness is more than I can bear. I need your help. I place the full weight of my burdens in your loving care. Thank you for being so faithful to me.     Amen
Occupational Therapy Note:    Attempted to see patient this AM for occupational therapy evaluation session. Per RN pt has closure device that has to be taken out before mobility and it has not been taken out yet. Will follow and re-attempt as schedule permits/patient available.  Thank you,    Shell Crews
Physical Therapy Note:    Attempted to see patient this AM and PM for physical therapy evaluation session. Per RN pt has closure device in leg that has to be taken out before mobility and it has not been taken out yet. Was supposed to be removed around lunch today, however was still present on second attempt this afternoon. Will follow and re-attempt as schedule permits/patient available. Thank you,    Constance Morales, PT     Rehab Caseload Tracker
Pt arrived back to room 2233 with transporter and family members, with heparin gtt going at 25 units/kg/hr. No nurse accompanied patient to sign off on his heparin. Verified with on call hospitalist MD Wenceslao Hart that they still want heparin running at this rate.
Reviewed notes for new spiritual concerns      Will continue to assess how we can best serve this family        [place].       Per notes:       Pt is being admitted to floor from ER    Local    [place]
Spiritual Care Visit, initial visit. Request from patient. Visited with patient at bedside. Prayed for patient's healing and health. Visit by Markus Hughes, Staff .  Nena., Loulou.B., B.A.
posterior tibial vein. Asymptomatic, no swelling. Plan:   Transition to Eliquis.     Compression hose (RX provided)  F/u office (we will call the patient with appt)  Discharge home when ok with primary team    Signed By: Amanda Carcamo PA-C     April 3, 2023
No jugular venous distension. Lungs:   CTAB. No wheezing, rhonchi, or rales. Symmetric expansion. Abdomen:   Soft, nontender, nondistended. Extremities: No cyanosis or clubbing. Trace edema in left lower extremity  Skin:     No rashes and normal coloration. Warm and dry.     Neuro:  GCS 15, cranial nerves intact, no motor or sensory deficit  Psych:  AOx3, mood and affect appropriate    I have personally reviewed labs and tests:  Recent Labs:  Recent Results (from the past 48 hour(s))   EKG 12 Lead    Collection Time: 04/01/23  1:52 PM   Result Value Ref Range    Ventricular Rate 90 BPM    Atrial Rate 90 BPM    P-R Interval 176 ms    QRS Duration 107 ms    Q-T Interval 379 ms    QTc Calculation (Bazett) 464 ms    P Axis 79 degrees    R Axis 56 degrees    T Axis 43 degrees    Diagnosis       Sinus rhythm  Atrial premature complex    Confirmed by Elvis Bañuelos ([id number]) on 4/1/2023 5:05:11 PM     CBC    Collection Time: 04/01/23  2:00 PM   Result Value Ref Range    WBC 5.9 4.3 - 11.1 K/uL    RBC 3.96 (L) 4.23 - 5.6 M/uL    Hemoglobin 12.8 (L) 13.6 - 17.2 g/dL    Hematocrit 40.0 (L) 41.1 - 50.3 %    .0 82 - 102 FL    MCH 32.3 26.1 - 32.9 PG    MCHC 32.0 31.4 - 35.0 g/dL    RDW 13.0 11.9 - 14.6 %    Platelets 978 254 - 775 K/uL    MPV 9.7 9.4 - 12.3 FL    nRBC 0.00 0.0 - 0.2 K/uL   Comprehensive Metabolic Panel    Collection Time: 04/01/23  2:00 PM   Result Value Ref Range    Sodium 139 133 - 143 mmol/L    Potassium 4.1 3.5 - 5.1 mmol/L    Chloride 112 (H) 101 - 110 mmol/L    CO2 23 21 - 32 mmol/L    Anion Gap 4 2 - 11 mmol/L    Glucose 171 (H) 65 - 100 mg/dL    BUN 15 8 - 23 MG/DL    Creatinine 1.10 0.8 - 1.5 MG/DL    Est, Glom Filt Rate >60 >60 ml/min/1.73m2    Calcium 9.1 8.3 - 10.4 MG/DL    Total Bilirubin 0.3 0.2 - 1.1 MG/DL    ALT 26 12 - 65 U/L    AST 26 15 - 37 U/L    Alk Phosphatase 85 50 - 136 U/L    Total Protein 7.3 6.3 - 8.2 g/dL    Albumin 3.7 3.2 - 4.6 g/dL    Globulin 3.6 2.8 - 4.5 g/dL

## 2023-04-04 ENCOUNTER — TELEPHONE (OUTPATIENT)
Dept: FAMILY MEDICINE CLINIC | Facility: CLINIC | Age: 73
End: 2023-04-04

## 2023-04-04 ENCOUNTER — CARE COORDINATION (OUTPATIENT)
Dept: CARE COORDINATION | Facility: CLINIC | Age: 73
End: 2023-04-04

## 2023-04-04 NOTE — CARE COORDINATION
of home medications. Medications reviewed, 1111F entered: N/A    Was patient discharged with a pulse oximeter? no    Non-face-to-face services provided:  Obtained and reviewed discharge summary and/or continuity of care documents    Offered patient enrollment in the Remote Patient Monitoring (RPM) program for in-home monitoring: NA.    Care Transitions 24 Hour Call    Do you have a copy of your discharge instructions?: Yes  Do you have all of your prescriptions and are they filled?: Yes  Have you been contacted by a [address]?: No  Have you scheduled your follow up appointment?: Yes (Comment: PCP 4/10/2023)  How are you going to get to your appointment?: Car - family or friend to transport  Do you have support at home?: Alone  Do you feel like you have everything you need to keep you well at home?: Yes  Are you an active caregiver in your home?: No  Care Transitions Interventions  No Identified Needs         Discussed follow-up appointments. If no appointment was previously scheduled, appointment scheduling offered: Yes. Is follow up appointment scheduled within 7 days of discharge? Yes. Follow Up  Future Appointments   Date Time Provider Mikhail Hernandez   4/10/2023 11:45 AM Steven Castro MD [facility] AMB   4/3/2024 11:10 AM Leslie Guadalupe MD [facility] AMB        Goals Addressed                      This Visit's Progress      Medication Management (pt-stated)         I will take my medication as directed. Barriers: none  Plan for overcoming my barriers: Patient states will take all medication as directed  Confidence: 8/10  Anticipated Goal Completion Date: within 30 days               LPN Care Coordinator provided contact information. Plan for follow-up call in 5-7 days based on severity of symptoms and risk factors. Plan for next call: self management-diet, hydration, medication compliance, fall and safety precautions and follow up appointment.     Cecilia Jimenez LPN

## 2023-04-04 NOTE — TELEPHONE ENCOUNTER
Care Transitions Initial Follow Up Call    Outreach made within 2 business days of discharge: Yes    Patient: Daphne Bassett Patient : 1950   MRN: [id number]  Reason for Admission: There are no discharge diagnoses documented for the most recent discharge. Discharge Date: 4/3/23       Spoke with: Patient, Adrien Pelaez    Discharge department/facility: [facility] Interactive Patient Contact:  Was patient able to fill all prescriptions: Yes  Was patient instructed to bring all medications to the follow-up visit: Yes  Is patient taking all medications as directed in the discharge summary? Yes  Does patient understand their discharge instructions: Yes  Does patient have questions or concerns that need addressed prior to 7-14 day follow up office visit: yes - Should he continue taking Meloxicam. Per discharge instructions pt has been advised to discontinue Meloxicam. He expressed verbal understanding.     Scheduled appointment with PCP within 7-14 days    Follow Up  Future Appointments   Date Time Provider Mikhail Hernandez   4/10/2023 11:45 AM MD SEBASTIAN Petersen [facility] AMB   4/3/2024 11:10 AM MD Trent Thacker MA

## 2023-04-24 ENCOUNTER — OFFICE VISIT (OUTPATIENT)
Dept: FAMILY MEDICINE CLINIC | Facility: CLINIC | Age: 73
End: 2023-04-24
Payer: MEDICARE

## 2023-04-24 VITALS
BODY MASS INDEX: 29.54 KG/M2 | WEIGHT: 211 LBS | HEIGHT: 71 IN | DIASTOLIC BLOOD PRESSURE: 68 MMHG | SYSTOLIC BLOOD PRESSURE: 124 MMHG

## 2023-04-24 DIAGNOSIS — I82.5Z1 CHRONIC DEEP VEIN THROMBOSIS (DVT) OF DISTAL VEIN OF RIGHT LOWER EXTREMITY (HCC): ICD-10-CM

## 2023-04-24 DIAGNOSIS — I10 ESSENTIAL (PRIMARY) HYPERTENSION: ICD-10-CM

## 2023-04-24 DIAGNOSIS — M54.2 NECK PAIN: Primary | ICD-10-CM

## 2023-04-24 PROCEDURE — 99214 OFFICE O/P EST MOD 30 MIN: CPT | Performed by: FAMILY MEDICINE

## 2023-04-24 PROCEDURE — 3074F SYST BP LT 130 MM HG: CPT | Performed by: FAMILY MEDICINE

## 2023-04-24 PROCEDURE — 1123F ACP DISCUSS/DSCN MKR DOCD: CPT | Performed by: FAMILY MEDICINE

## 2023-04-24 PROCEDURE — 3078F DIAST BP <80 MM HG: CPT | Performed by: FAMILY MEDICINE

## 2023-04-24 PROCEDURE — 1036F TOBACCO NON-USER: CPT | Performed by: FAMILY MEDICINE

## 2023-04-24 PROCEDURE — 3017F COLORECTAL CA SCREEN DOC REV: CPT | Performed by: FAMILY MEDICINE

## 2023-04-24 PROCEDURE — G8427 DOCREV CUR MEDS BY ELIG CLIN: HCPCS | Performed by: FAMILY MEDICINE

## 2023-04-24 PROCEDURE — G8417 CALC BMI ABV UP PARAM F/U: HCPCS | Performed by: FAMILY MEDICINE

## 2023-04-24 PROCEDURE — 1111F DSCHRG MED/CURRENT MED MERGE: CPT | Performed by: FAMILY MEDICINE

## 2023-04-24 RX ORDER — TIZANIDINE 2 MG/1
2 TABLET ORAL NIGHTLY PRN
Qty: 15 TABLET | Refills: 0 | Status: SHIPPED | OUTPATIENT
Start: 2023-04-24

## 2023-04-24 ASSESSMENT — ENCOUNTER SYMPTOMS
ANAL BLEEDING: 0
ABDOMINAL DISTENTION: 0
EYE ITCHING: 0
RHINORRHEA: 0
SINUS PAIN: 0
BLURRED VISION: 0
EYE REDNESS: 0
EYE DISCHARGE: 0
APNEA: 0
CHEST TIGHTNESS: 0
BLOOD IN STOOL: 0
COUGH: 0
EYE PAIN: 0
BACK PAIN: 0
ABDOMINAL PAIN: 0
SINUS PRESSURE: 0
FACIAL SWELLING: 0

## 2023-04-24 ASSESSMENT — PATIENT HEALTH QUESTIONNAIRE - PHQ9
SUM OF ALL RESPONSES TO PHQ QUESTIONS 1-9: 0
2. FEELING DOWN, DEPRESSED OR HOPELESS: 0
SUM OF ALL RESPONSES TO PHQ QUESTIONS 1-9: 0
1. LITTLE INTEREST OR PLEASURE IN DOING THINGS: 0
SUM OF ALL RESPONSES TO PHQ QUESTIONS 1-9: 0
SUM OF ALL RESPONSES TO PHQ9 QUESTIONS 1 & 2: 0
SUM OF ALL RESPONSES TO PHQ QUESTIONS 1-9: 0

## 2023-04-24 NOTE — PROGRESS NOTES
[address] [place] [address]  _______________________________________  Wang Orellana MD                 [address] [place] [address] Drive,  [address]. Jerilyn, [address] [place], [place] [address]haven, [address]                                                                                    Phone: [phone]                                                                                    Fax: [phone]    Serena Gauthier is a 67 y.o. male who is seen for evaluation of   Chief Complaint   Patient presents with    Torticollis     X 2 days       HPI:   Torticollis   Episode onset: neck pain x 1-2 weeks since d/c from hospital, had dental work just before hospital and had abx but has not used, pain better today but still pain and less stiff than over the weekend. Pertinent negatives include no chest pain, fever or headaches. Hypertension  Episode onset: on meds, need refills, has had good control at home. Pertinent negatives include no anxiety, blurred vision, chest pain, headaches, malaise/fatigue, neck pain or palpitations. Other  Episode onset: prior DVT with PE, see below, si on meds. Pertinent negatives include no abdominal pain, arthralgias, chest pain, chills, congestion, coughing, diaphoresis, fatigue, fever, headaches, joint swelling, myalgias, neck pain or rash. Chief Complaint   Patient presents with    Torticollis     X 2 days         Review of Systems:    Review of Systems   Constitutional:  Negative for activity change, appetite change, chills, diaphoresis, fatigue, fever and malaise/fatigue. HENT:  Negative for congestion, ear discharge, ear pain, facial swelling, hearing loss, mouth sores, rhinorrhea, sinus pressure and sinus pain. Eyes:  Negative for blurred vision, pain, discharge, redness and itching. Respiratory:  Negative for apnea, cough and chest tightness. Cardiovascular:  Negative for chest pain, palpitations and leg swelling.    Gastrointestinal:  Negative for abdominal

## 2023-07-10 ENCOUNTER — OFFICE VISIT (OUTPATIENT)
Dept: FAMILY MEDICINE CLINIC | Facility: CLINIC | Age: 73
End: 2023-07-10
Payer: MEDICARE

## 2023-07-10 VITALS
DIASTOLIC BLOOD PRESSURE: 64 MMHG | HEIGHT: 71 IN | BODY MASS INDEX: 29.82 KG/M2 | WEIGHT: 213 LBS | SYSTOLIC BLOOD PRESSURE: 134 MMHG

## 2023-07-10 DIAGNOSIS — Z93.3 COLOSTOMY STATUS (HCC): ICD-10-CM

## 2023-07-10 DIAGNOSIS — R73.9 HIGH BLOOD SUGAR: ICD-10-CM

## 2023-07-10 DIAGNOSIS — K51.20 ULCERATIVE (CHRONIC) PROCTITIS WITHOUT COMPLICATIONS (HCC): ICD-10-CM

## 2023-07-10 DIAGNOSIS — I82.5Z1 CHRONIC DEEP VEIN THROMBOSIS (DVT) OF DISTAL VEIN OF RIGHT LOWER EXTREMITY (HCC): Primary | ICD-10-CM

## 2023-07-10 DIAGNOSIS — E78.00 HIGH CHOLESTEROL: ICD-10-CM

## 2023-07-10 DIAGNOSIS — I10 ESSENTIAL (PRIMARY) HYPERTENSION: ICD-10-CM

## 2023-07-10 LAB
ALBUMIN SERPL-MCNC: 3.8 G/DL (ref 3.2–4.6)
ALBUMIN/GLOB SERPL: 1.1 (ref 0.4–1.6)
ALP SERPL-CCNC: 92 U/L (ref 50–136)
ALT SERPL-CCNC: 21 U/L (ref 12–65)
ANION GAP SERPL CALC-SCNC: 3 MMOL/L (ref 2–11)
AST SERPL-CCNC: 20 U/L (ref 15–37)
BASOPHILS # BLD: 0 K/UL (ref 0–0.2)
BASOPHILS NFR BLD: 1 % (ref 0–2)
BILIRUB SERPL-MCNC: 0.3 MG/DL (ref 0.2–1.1)
BUN SERPL-MCNC: 14 MG/DL (ref 8–23)
CALCIUM SERPL-MCNC: 9.6 MG/DL (ref 8.3–10.4)
CHLORIDE SERPL-SCNC: 110 MMOL/L (ref 101–110)
CHOLEST SERPL-MCNC: 217 MG/DL
CO2 SERPL-SCNC: 25 MMOL/L (ref 21–32)
CREAT SERPL-MCNC: 1.2 MG/DL (ref 0.8–1.5)
DIFFERENTIAL METHOD BLD: ABNORMAL
EOSINOPHIL # BLD: 0.1 K/UL (ref 0–0.8)
EOSINOPHIL NFR BLD: 2 % (ref 0.5–7.8)
ERYTHROCYTE [DISTWIDTH] IN BLOOD BY AUTOMATED COUNT: 14.2 % (ref 11.9–14.6)
GLOBULIN SER CALC-MCNC: 3.5 G/DL (ref 2.8–4.5)
GLUCOSE SERPL-MCNC: 112 MG/DL (ref 65–100)
HCT VFR BLD AUTO: 39.9 % (ref 41.1–50.3)
HDLC SERPL-MCNC: 44 MG/DL (ref 40–60)
HDLC SERPL: 4.9
HGB BLD-MCNC: 12.8 G/DL (ref 13.6–17.2)
IMM GRANULOCYTES # BLD AUTO: 0 K/UL (ref 0–0.5)
IMM GRANULOCYTES NFR BLD AUTO: 0 % (ref 0–5)
LDLC SERPL CALC-MCNC: 141 MG/DL
LYMPHOCYTES # BLD: 1 K/UL (ref 0.5–4.6)
LYMPHOCYTES NFR BLD: 20 % (ref 13–44)
MCH RBC QN AUTO: 32.3 PG (ref 26.1–32.9)
MCHC RBC AUTO-ENTMCNC: 32.1 G/DL (ref 31.4–35)
MCV RBC AUTO: 100.8 FL (ref 82–102)
MONOCYTES # BLD: 0.4 K/UL (ref 0.1–1.3)
MONOCYTES NFR BLD: 8 % (ref 4–12)
NEUTS SEG # BLD: 3.4 K/UL (ref 1.7–8.2)
NEUTS SEG NFR BLD: 69 % (ref 43–78)
NRBC # BLD: 0 K/UL (ref 0–0.2)
PLATELET # BLD AUTO: 244 K/UL (ref 150–450)
PMV BLD AUTO: 10.3 FL (ref 9.4–12.3)
POTASSIUM SERPL-SCNC: 3.8 MMOL/L (ref 3.5–5.1)
PROT SERPL-MCNC: 7.3 G/DL (ref 6.3–8.2)
RBC # BLD AUTO: 3.96 M/UL (ref 4.23–5.6)
SODIUM SERPL-SCNC: 138 MMOL/L (ref 133–143)
TRIGL SERPL-MCNC: 160 MG/DL (ref 35–150)
VLDLC SERPL CALC-MCNC: 32 MG/DL (ref 6–23)
WBC # BLD AUTO: 4.9 K/UL (ref 4.3–11.1)

## 2023-07-10 PROCEDURE — 1036F TOBACCO NON-USER: CPT | Performed by: FAMILY MEDICINE

## 2023-07-10 PROCEDURE — 3075F SYST BP GE 130 - 139MM HG: CPT | Performed by: FAMILY MEDICINE

## 2023-07-10 PROCEDURE — 1123F ACP DISCUSS/DSCN MKR DOCD: CPT | Performed by: FAMILY MEDICINE

## 2023-07-10 PROCEDURE — 99214 OFFICE O/P EST MOD 30 MIN: CPT | Performed by: FAMILY MEDICINE

## 2023-07-10 PROCEDURE — 3017F COLORECTAL CA SCREEN DOC REV: CPT | Performed by: FAMILY MEDICINE

## 2023-07-10 PROCEDURE — G8417 CALC BMI ABV UP PARAM F/U: HCPCS | Performed by: FAMILY MEDICINE

## 2023-07-10 PROCEDURE — G8427 DOCREV CUR MEDS BY ELIG CLIN: HCPCS | Performed by: FAMILY MEDICINE

## 2023-07-10 PROCEDURE — 3078F DIAST BP <80 MM HG: CPT | Performed by: FAMILY MEDICINE

## 2023-07-10 ASSESSMENT — PATIENT HEALTH QUESTIONNAIRE - PHQ9
1. LITTLE INTEREST OR PLEASURE IN DOING THINGS: 0
SUM OF ALL RESPONSES TO PHQ QUESTIONS 1-9: 0
2. FEELING DOWN, DEPRESSED OR HOPELESS: 0
SUM OF ALL RESPONSES TO PHQ QUESTIONS 1-9: 0
SUM OF ALL RESPONSES TO PHQ QUESTIONS 1-9: 0
SUM OF ALL RESPONSES TO PHQ9 QUESTIONS 1 & 2: 0
SUM OF ALL RESPONSES TO PHQ QUESTIONS 1-9: 0

## 2023-07-10 ASSESSMENT — ENCOUNTER SYMPTOMS
FACIAL SWELLING: 0
SHORTNESS OF BREATH: 0
ABDOMINAL PAIN: 0
APNEA: 0
COUGH: 0
EYES NEGATIVE: 1
NAUSEA: 0
CHANGE IN BOWEL HABIT: 0
RESPIRATORY NEGATIVE: 1
CHOKING: 0

## 2023-07-11 LAB
EST. AVERAGE GLUCOSE BLD GHB EST-MCNC: 100 MG/DL
HBA1C MFR BLD: 5.1 % (ref 4.8–5.6)

## 2023-07-18 NOTE — TELEPHONE ENCOUNTER
Pt calls in indicating he has completed the Xarelto 20mg samples and is feeling better on it. Will neil RX sent to [id number] in [address] if he is to remain on it.

## 2023-08-09 DIAGNOSIS — N20.0 KIDNEY STONE: ICD-10-CM

## 2023-08-09 RX ORDER — TAMSULOSIN HYDROCHLORIDE 0.4 MG/1
0.4 CAPSULE ORAL DAILY
Qty: 30 CAPSULE | Refills: 1 | Status: SHIPPED | OUTPATIENT
Start: 2023-08-09

## 2023-08-09 RX ORDER — CLOBETASOL PROPIONATE 0.5 MG/G
OINTMENT TOPICAL
Qty: 15 G | Refills: 3 | Status: SHIPPED | OUTPATIENT
Start: 2023-08-09

## 2023-10-10 ENCOUNTER — OFFICE VISIT (OUTPATIENT)
Dept: FAMILY MEDICINE CLINIC | Facility: CLINIC | Age: 73
End: 2023-10-10
Payer: MEDICARE

## 2023-10-10 VITALS
DIASTOLIC BLOOD PRESSURE: 60 MMHG | HEIGHT: 71 IN | SYSTOLIC BLOOD PRESSURE: 126 MMHG | BODY MASS INDEX: 29.96 KG/M2 | WEIGHT: 214 LBS

## 2023-10-10 DIAGNOSIS — Z00.00 ROUTINE GENERAL MEDICAL EXAMINATION AT A HEALTH CARE FACILITY: ICD-10-CM

## 2023-10-10 DIAGNOSIS — I26.02 ACUTE SADDLE PULMONARY EMBOLISM WITH ACUTE COR PULMONALE (HCC): ICD-10-CM

## 2023-10-10 DIAGNOSIS — I27.82 CHRONIC SADDLE PULMONARY EMBOLISM, UNSPECIFIED WHETHER ACUTE COR PULMONALE PRESENT (HCC): ICD-10-CM

## 2023-10-10 DIAGNOSIS — I10 ESSENTIAL (PRIMARY) HYPERTENSION: ICD-10-CM

## 2023-10-10 DIAGNOSIS — I26.92 CHRONIC SADDLE PULMONARY EMBOLISM, UNSPECIFIED WHETHER ACUTE COR PULMONALE PRESENT (HCC): ICD-10-CM

## 2023-10-10 DIAGNOSIS — I82.5Z1 CHRONIC DEEP VEIN THROMBOSIS (DVT) OF DISTAL VEIN OF RIGHT LOWER EXTREMITY (HCC): Primary | ICD-10-CM

## 2023-10-10 DIAGNOSIS — N20.0 KIDNEY STONE: ICD-10-CM

## 2023-10-10 PROCEDURE — 1123F ACP DISCUSS/DSCN MKR DOCD: CPT | Performed by: FAMILY MEDICINE

## 2023-10-10 PROCEDURE — G8417 CALC BMI ABV UP PARAM F/U: HCPCS | Performed by: FAMILY MEDICINE

## 2023-10-10 PROCEDURE — 99214 OFFICE O/P EST MOD 30 MIN: CPT | Performed by: FAMILY MEDICINE

## 2023-10-10 PROCEDURE — G8484 FLU IMMUNIZE NO ADMIN: HCPCS | Performed by: FAMILY MEDICINE

## 2023-10-10 PROCEDURE — G8427 DOCREV CUR MEDS BY ELIG CLIN: HCPCS | Performed by: FAMILY MEDICINE

## 2023-10-10 PROCEDURE — 3017F COLORECTAL CA SCREEN DOC REV: CPT | Performed by: FAMILY MEDICINE

## 2023-10-10 PROCEDURE — 3074F SYST BP LT 130 MM HG: CPT | Performed by: FAMILY MEDICINE

## 2023-10-10 PROCEDURE — 3078F DIAST BP <80 MM HG: CPT | Performed by: FAMILY MEDICINE

## 2023-10-10 PROCEDURE — 1036F TOBACCO NON-USER: CPT | Performed by: FAMILY MEDICINE

## 2023-10-10 PROCEDURE — G0439 PPPS, SUBSEQ VISIT: HCPCS | Performed by: FAMILY MEDICINE

## 2023-10-10 RX ORDER — TAMSULOSIN HYDROCHLORIDE 0.4 MG/1
0.4 CAPSULE ORAL DAILY
Qty: 90 CAPSULE | Refills: 1 | Status: SHIPPED | OUTPATIENT
Start: 2023-10-10

## 2023-10-10 ASSESSMENT — ENCOUNTER SYMPTOMS
BACK PAIN: 0
EYE DISCHARGE: 0
EYE PAIN: 0
FACIAL SWELLING: 0
BLURRED VISION: 0
SINUS PRESSURE: 0
ANAL BLEEDING: 0
EYE ITCHING: 0
EYE REDNESS: 0
BLOOD IN STOOL: 0
COUGH: 0
APNEA: 0
ABDOMINAL DISTENTION: 0
SINUS PAIN: 0
ABDOMINAL PAIN: 0
RHINORRHEA: 0
CHEST TIGHTNESS: 0
ORTHOPNEA: 0

## 2023-10-10 ASSESSMENT — PATIENT HEALTH QUESTIONNAIRE - PHQ9
SUM OF ALL RESPONSES TO PHQ QUESTIONS 1-9: 0
1. LITTLE INTEREST OR PLEASURE IN DOING THINGS: 0
SUM OF ALL RESPONSES TO PHQ QUESTIONS 1-9: 0
2. FEELING DOWN, DEPRESSED OR HOPELESS: 0
SUM OF ALL RESPONSES TO PHQ9 QUESTIONS 1 & 2: 0

## 2023-10-10 ASSESSMENT — LIFESTYLE VARIABLES
HOW MANY STANDARD DRINKS CONTAINING ALCOHOL DO YOU HAVE ON A TYPICAL DAY: PATIENT DECLINED
HOW OFTEN DO YOU HAVE A DRINK CONTAINING ALCOHOL: PATIENT DECLINED

## 2023-10-10 NOTE — PROGRESS NOTES
Medicare Annual Wellness Visit    Scotty Pal is here for Medicare AWV, Cholesterol Problem, Anemia, Hypertension, and Foot Pain (Right heel)    Assessment & Plan   Chronic deep vein thrombosis (DVT) of distal vein of right lower extremity (HCC)  -     rivaroxaban (XARELTO) 20 MG TABS tablet; Take 1 tablet by mouth daily (with breakfast), Disp-90 tablet, R-1Normal  -     Vascular duplex lower extremity venous right; Future  Kidney stone  -     tamsulosin (FLOMAX) 0.4 MG capsule; Take 1 capsule by mouth daily, Disp-90 capsule, R-1Normal  Essential (primary) hypertension  Acute saddle pulmonary embolism with acute cor pulmonale (HCC)  Chronic saddle pulmonary embolism, unspecified whether acute cor pulmonale present (HCC)  -     CT CHEST PULMONARY EMBOLISM W CONTRAST; Future  Routine general medical examination at a health care facility    Recommendations for Preventive Services Due: see orders and patient instructions/AVS.  Recommended screening schedule for the next 5-10 years is provided to the patient in written form: see Patient Instructions/AVS.     Return for Medicare Annual Wellness Visit in 1 year. Subjective   The following acute and/or chronic problems were also addressed today:  Pt with DVT and PE in April, has been on anticoags since then, wonders if can stop at present. Will need repeat US and CT Chest with contrast to be sure all of the PE and DVT have resolved. Patient's complete Health Risk Assessment and screening values have been reviewed and are found in Flowsheets. The following problems were reviewed today and where indicated follow up appointments were made and/or referrals ordered.     Positive Risk Factor Screenings with Interventions:                     Safety:  Do you always fasten your seatbelt when you are in a car?: (!) No  Interventions:  Patient declined any further interventions or treatment                     Objective   Vitals:    10/10/23 1135   BP: 126/60
General: Skin is warm and dry. Findings: Lesion present. No erythema or rash. Comments: Paianful callus/corn on 3rd metatarsal area left foot, see below     Neurological:      General: No focal deficit present. Mental Status: He is alert and oriented to person, place, and time. Mental status is at baseline. Psychiatric:         Mood and Affect: Mood normal.         Assessment/Plan:     ICD-10-CM    1. Chronic deep vein thrombosis (DVT) of distal vein of right lower extremity ([facility])  I82.5Z1 rivaroxaban (XARELTO) 20 MG TABS tablet     Vascular duplex lower extremity venous right      2. Kidney stone  N20.0 tamsulosin (FLOMAX) 0.4 MG capsule      3. Essential (primary) hypertension  I10       4. Acute saddle pulmonary embolism with acute cor pulmonale ([facility])  I26.02       5. Chronic saddle pulmonary embolism, unspecified whether acute cor pulmonale present ([facility])  I26.92 CT CHEST PULMONARY EMBOLISM W CONTRAST    I27.82       6. Routine general medical examination at a health care facility  Z00.00         Prior PE with DVT, saddle embolus, is on xarelto, needs repeat US of leg as well as repeat CT of chest with contrast to see if we can stop the xarelto. Kidney stone: stable at present. HTN: Stble on meds, good control noted.    After eval foot pain found painful callus/corn area bottom left foot, after informed consent the skin lesion was removed sharply with 15 blade scalpel with relief of pain and good final result    Labs pending  F/u prn     Loretta Nguyen MD

## 2023-10-12 ENCOUNTER — HOSPITAL ENCOUNTER (OUTPATIENT)
Dept: CT IMAGING | Age: 73
Discharge: HOME | End: 2023-10-14
Payer: MEDICARE

## 2023-10-12 ENCOUNTER — HOSPITAL ENCOUNTER (OUTPATIENT)
Dept: ULTRASOUND IMAGING | Age: 73
Discharge: HOME | End: 2023-10-14
Payer: MEDICARE

## 2023-10-12 DIAGNOSIS — I26.92 CHRONIC SADDLE PULMONARY EMBOLISM, UNSPECIFIED WHETHER ACUTE COR PULMONALE PRESENT (HCC): ICD-10-CM

## 2023-10-12 DIAGNOSIS — I82.5Z1 CHRONIC DEEP VEIN THROMBOSIS (DVT) OF DISTAL VEIN OF RIGHT LOWER EXTREMITY (HCC): ICD-10-CM

## 2023-10-12 DIAGNOSIS — I27.82 CHRONIC SADDLE PULMONARY EMBOLISM, UNSPECIFIED WHETHER ACUTE COR PULMONALE PRESENT (HCC): ICD-10-CM

## 2023-10-12 LAB — CREAT BLD-MCNC: 0.8 MG/DL (ref 0.8–1.5)

## 2023-10-12 PROCEDURE — 93971 EXTREMITY STUDY: CPT

## 2023-10-12 PROCEDURE — 82565 ASSAY OF CREATININE: CPT

## 2023-10-12 PROCEDURE — 6360000004 HC RX CONTRAST MEDICATION: Performed by: FAMILY MEDICINE

## 2023-10-12 PROCEDURE — 71260 CT THORAX DX C+: CPT

## 2023-10-12 RX ADMIN — IOPAMIDOL 100 ML: 755 INJECTION, SOLUTION INTRAVENOUS at 12:54

## 2024-04-03 DIAGNOSIS — N20.0 KIDNEY STONE: ICD-10-CM

## 2024-04-03 RX ORDER — TAMSULOSIN HYDROCHLORIDE 0.4 MG/1
0.4 CAPSULE ORAL DAILY
Qty: 30 CAPSULE | Refills: 1 | Status: SHIPPED | OUTPATIENT
Start: 2024-04-03 | End: 2024-06-02

## 2024-04-15 ENCOUNTER — OFFICE VISIT (OUTPATIENT)
Dept: FAMILY MEDICINE CLINIC | Facility: CLINIC | Age: 74
End: 2024-04-15
Payer: MEDICARE

## 2024-04-15 VITALS
WEIGHT: 217 LBS | DIASTOLIC BLOOD PRESSURE: 64 MMHG | BODY MASS INDEX: 30.38 KG/M2 | SYSTOLIC BLOOD PRESSURE: 120 MMHG | HEIGHT: 71 IN

## 2024-04-15 DIAGNOSIS — K51.20 ULCERATIVE (CHRONIC) PROCTITIS WITHOUT COMPLICATIONS (HCC): ICD-10-CM

## 2024-04-15 DIAGNOSIS — L30.9 DERMATITIS: ICD-10-CM

## 2024-04-15 DIAGNOSIS — Z93.3 COLOSTOMY STATUS (HCC): ICD-10-CM

## 2024-04-15 DIAGNOSIS — E55.9 VITAMIN D DEFICIENCY: ICD-10-CM

## 2024-04-15 DIAGNOSIS — R73.9 HIGH BLOOD SUGAR: ICD-10-CM

## 2024-04-15 DIAGNOSIS — R53.83 FATIGUE, UNSPECIFIED TYPE: ICD-10-CM

## 2024-04-15 DIAGNOSIS — I82.5Z1 CHRONIC DEEP VEIN THROMBOSIS (DVT) OF DISTAL VEIN OF RIGHT LOWER EXTREMITY (HCC): ICD-10-CM

## 2024-04-15 DIAGNOSIS — I10 ESSENTIAL (PRIMARY) HYPERTENSION: Primary | ICD-10-CM

## 2024-04-15 DIAGNOSIS — E83.42 HYPOMAGNESEMIA: ICD-10-CM

## 2024-04-15 DIAGNOSIS — E78.00 HIGH CHOLESTEROL: ICD-10-CM

## 2024-04-15 PROBLEM — I26.02 ACUTE SADDLE PULMONARY EMBOLISM WITH ACUTE COR PULMONALE (HCC): Status: RESOLVED | Noted: 2023-04-01 | Resolved: 2024-04-15

## 2024-04-15 LAB
25(OH)D3 SERPL-MCNC: 17.5 NG/ML (ref 30–100)
ALBUMIN SERPL-MCNC: 4.2 G/DL (ref 3.2–4.6)
ALBUMIN/GLOB SERPL: 1.3 (ref 0.4–1.6)
ALP SERPL-CCNC: 90 U/L (ref 50–136)
ALT SERPL-CCNC: 27 U/L (ref 12–65)
ANION GAP SERPL CALC-SCNC: 4 MMOL/L (ref 2–11)
AST SERPL-CCNC: 24 U/L (ref 15–37)
BASOPHILS # BLD: 0.1 K/UL (ref 0–0.2)
BASOPHILS NFR BLD: 1 % (ref 0–2)
BILIRUB SERPL-MCNC: 0.3 MG/DL (ref 0.2–1.1)
BUN SERPL-MCNC: 17 MG/DL (ref 8–23)
CALCIUM SERPL-MCNC: 9.6 MG/DL (ref 8.3–10.4)
CHLORIDE SERPL-SCNC: 110 MMOL/L (ref 103–113)
CHOLEST SERPL-MCNC: 215 MG/DL
CO2 SERPL-SCNC: 24 MMOL/L (ref 21–32)
CREAT SERPL-MCNC: 1.4 MG/DL (ref 0.8–1.5)
DIFFERENTIAL METHOD BLD: ABNORMAL
EOSINOPHIL # BLD: 0.1 K/UL (ref 0–0.8)
EOSINOPHIL NFR BLD: 2 % (ref 0.5–7.8)
ERYTHROCYTE [DISTWIDTH] IN BLOOD BY AUTOMATED COUNT: 13 % (ref 11.9–14.6)
GLOBULIN SER CALC-MCNC: 3.3 G/DL (ref 2.8–4.5)
GLUCOSE SERPL-MCNC: 107 MG/DL (ref 65–100)
HCT VFR BLD AUTO: 41.8 % (ref 41.1–50.3)
HDLC SERPL-MCNC: 47 MG/DL (ref 40–60)
HDLC SERPL: 4.6
HGB BLD-MCNC: 13.4 G/DL (ref 13.6–17.2)
IMM GRANULOCYTES # BLD AUTO: 0 K/UL (ref 0–0.5)
IMM GRANULOCYTES NFR BLD AUTO: 0 % (ref 0–5)
LDLC SERPL CALC-MCNC: 126.6 MG/DL
LYMPHOCYTES # BLD: 1.4 K/UL (ref 0.5–4.6)
LYMPHOCYTES NFR BLD: 22 % (ref 13–44)
MAGNESIUM SERPL-MCNC: 2.2 MG/DL (ref 1.8–2.4)
MCH RBC QN AUTO: 32.9 PG (ref 26.1–32.9)
MCHC RBC AUTO-ENTMCNC: 32.1 G/DL (ref 31.4–35)
MCV RBC AUTO: 102.7 FL (ref 82–102)
MONOCYTES # BLD: 0.5 K/UL (ref 0.1–1.3)
MONOCYTES NFR BLD: 7 % (ref 4–12)
NEUTS SEG # BLD: 4.4 K/UL (ref 1.7–8.2)
NEUTS SEG NFR BLD: 68 % (ref 43–78)
NRBC # BLD: 0 K/UL (ref 0–0.2)
PLATELET # BLD AUTO: 262 K/UL (ref 150–450)
PMV BLD AUTO: 10.1 FL (ref 9.4–12.3)
POTASSIUM SERPL-SCNC: 4 MMOL/L (ref 3.5–5.1)
PROT SERPL-MCNC: 7.5 G/DL (ref 6.3–8.2)
RBC # BLD AUTO: 4.07 M/UL (ref 4.23–5.6)
SODIUM SERPL-SCNC: 138 MMOL/L (ref 136–146)
TRIGL SERPL-MCNC: 207 MG/DL (ref 35–150)
TSH, 3RD GENERATION: 1.64 UIU/ML (ref 0.36–3.74)
VLDLC SERPL CALC-MCNC: 41.4 MG/DL (ref 6–23)
WBC # BLD AUTO: 6.4 K/UL (ref 4.3–11.1)

## 2024-04-15 PROCEDURE — 3078F DIAST BP <80 MM HG: CPT | Performed by: FAMILY MEDICINE

## 2024-04-15 PROCEDURE — 3017F COLORECTAL CA SCREEN DOC REV: CPT | Performed by: FAMILY MEDICINE

## 2024-04-15 PROCEDURE — 99214 OFFICE O/P EST MOD 30 MIN: CPT | Performed by: FAMILY MEDICINE

## 2024-04-15 PROCEDURE — G8417 CALC BMI ABV UP PARAM F/U: HCPCS | Performed by: FAMILY MEDICINE

## 2024-04-15 PROCEDURE — G8427 DOCREV CUR MEDS BY ELIG CLIN: HCPCS | Performed by: FAMILY MEDICINE

## 2024-04-15 PROCEDURE — 3074F SYST BP LT 130 MM HG: CPT | Performed by: FAMILY MEDICINE

## 2024-04-15 PROCEDURE — 1123F ACP DISCUSS/DSCN MKR DOCD: CPT | Performed by: FAMILY MEDICINE

## 2024-04-15 PROCEDURE — 1036F TOBACCO NON-USER: CPT | Performed by: FAMILY MEDICINE

## 2024-04-15 RX ORDER — CLOBETASOL PROPIONATE 0.5 MG/G
OINTMENT TOPICAL 2 TIMES DAILY
Qty: 15 G | Refills: 3 | Status: SHIPPED | OUTPATIENT
Start: 2024-04-15

## 2024-04-15 SDOH — ECONOMIC STABILITY: FOOD INSECURITY: WITHIN THE PAST 12 MONTHS, YOU WORRIED THAT YOUR FOOD WOULD RUN OUT BEFORE YOU GOT MONEY TO BUY MORE.: NEVER TRUE

## 2024-04-15 SDOH — ECONOMIC STABILITY: INCOME INSECURITY: HOW HARD IS IT FOR YOU TO PAY FOR THE VERY BASICS LIKE FOOD, HOUSING, MEDICAL CARE, AND HEATING?: NOT HARD AT ALL

## 2024-04-15 SDOH — ECONOMIC STABILITY: HOUSING INSECURITY
IN THE LAST 12 MONTHS, WAS THERE A TIME WHEN YOU DID NOT HAVE A STEADY PLACE TO SLEEP OR SLEPT IN A SHELTER (INCLUDING NOW)?: NO

## 2024-04-15 SDOH — ECONOMIC STABILITY: FOOD INSECURITY: WITHIN THE PAST 12 MONTHS, THE FOOD YOU BOUGHT JUST DIDN'T LAST AND YOU DIDN'T HAVE MONEY TO GET MORE.: NEVER TRUE

## 2024-04-15 ASSESSMENT — ENCOUNTER SYMPTOMS
SINUS PRESSURE: 0
COUGH: 0
ORTHOPNEA: 0
BLOOD IN STOOL: 0
RHINORRHEA: 0
ABDOMINAL PAIN: 0
ANAL BLEEDING: 0
CHEST TIGHTNESS: 0
EYE DISCHARGE: 0
ABDOMINAL DISTENTION: 0
BLURRED VISION: 0
BACK PAIN: 0
EYE ITCHING: 0
FACIAL SWELLING: 0
EYE PAIN: 0
SINUS PAIN: 0
APNEA: 0
EYE REDNESS: 0

## 2024-04-15 ASSESSMENT — PATIENT HEALTH QUESTIONNAIRE - PHQ9
SUM OF ALL RESPONSES TO PHQ QUESTIONS 1-9: 0
1. LITTLE INTEREST OR PLEASURE IN DOING THINGS: NOT AT ALL
SUM OF ALL RESPONSES TO PHQ QUESTIONS 1-9: 0
2. FEELING DOWN, DEPRESSED OR HOPELESS: NOT AT ALL
SUM OF ALL RESPONSES TO PHQ QUESTIONS 1-9: 0
SUM OF ALL RESPONSES TO PHQ QUESTIONS 1-9: 0
SUM OF ALL RESPONSES TO PHQ9 QUESTIONS 1 & 2: 0

## 2024-04-15 NOTE — PROGRESS NOTES
[facility]  _______________________________________  Andrew Bowling MD                 [address] [place] [address]        Clemente Jean-[place]aptiste [place] [address]                                                                                    Phone: [phone]                                                                                    Fax: [phone]    Arpit Weber is a 73 y.o. male who is seen for evaluation of No chief complaint on file.      HPI:   Hypertension  This is a chronic problem. Progression since onset: on meds, need rfeills and labs. Pertinent negatives include no anxiety, blurred vision, chest pain, headaches, malaise/fatigue, neck pain, orthopnea or peripheral edema.   Other  Episode onset: pt with prior use of anticoags due to prior clot, etc. Pertinent negatives include no abdominal pain, arthralgias, chest pain, chills, congestion, coughing, diaphoresis, fatigue, fever, headaches, joint swelling, myalgias, neck pain or rash.   Urinary Frequency   Episode onset: BPH issues, managed wtih meds. Associated symptoms include frequency. Pertinent negatives include no chills, flank pain or hematuria.    No chief complaint on file.        Review of Systems:    Review of Systems   Constitutional:  Negative for activity change, appetite change, chills, diaphoresis, fatigue, fever and malaise/fatigue.   HENT:  Negative for congestion, ear discharge, ear pain, facial swelling, hearing loss, mouth sores, rhinorrhea, sinus pressure and sinus pain.    Eyes:  Negative for blurred vision, pain, discharge, redness and itching.   Respiratory:  Negative for apnea, cough and chest tightness.    Cardiovascular:  Negative for chest pain, orthopnea and leg swelling.   Gastrointestinal:  Negative for abdominal distention, abdominal pain, anal bleeding and blood in stool.   Endocrine: Negative for cold intolerance and heat intolerance.   Genitourinary:  Positive for

## 2024-04-16 DIAGNOSIS — E55.9 VITAMIN D DEFICIENCY: Primary | ICD-10-CM

## 2024-04-16 DIAGNOSIS — E78.00 HIGH CHOLESTEROL: ICD-10-CM

## 2024-04-16 LAB
EST. AVERAGE GLUCOSE BLD GHB EST-MCNC: 108 MG/DL
HBA1C MFR BLD: 5.4 % (ref 4.8–5.6)

## 2024-04-16 RX ORDER — ROSUVASTATIN CALCIUM 10 MG/1
10 TABLET, COATED ORAL NIGHTLY
Qty: 90 TABLET | Refills: 1 | Status: SHIPPED | OUTPATIENT
Start: 2024-04-16 | End: 2024-10-13

## 2024-04-16 RX ORDER — ERGOCALCIFEROL 1.25 MG/1
50000 CAPSULE ORAL WEEKLY
Qty: 12 CAPSULE | Refills: 1 | Status: SHIPPED | OUTPATIENT
Start: 2024-04-16 | End: 2024-10-01

## 2024-04-16 NOTE — TELEPHONE ENCOUNTER
Requested Prescriptions     Pending Prescriptions Disp Refills    vitamin D (ERGOCALCIFEROL) 1.25 MG (85720 UT) CAPS capsule 12 capsule 1     Sig: Take 1 capsule by mouth once a week    rosuvastatin (CRESTOR) 10 MG tablet 90 tablet 1     Sig: Take 1 tablet by mouth nightly      Andrew Messina

## 2024-04-16 NOTE — TELEPHONE ENCOUNTER
----- Message from Andrew Bowling MD sent at 4/16/2024 11:23 AM EDT -----  Vit D low, send 50k x 6 months  Chol is bad, send crestor 10 mg q hs # 90 refill x 1  Recheck labs next time,   Other labs are stable

## 2024-05-15 ENCOUNTER — HOSPITAL ENCOUNTER (INPATIENT)
Age: 74
LOS: 9 days | Discharge: HOME | DRG: 234 | End: 2024-05-24
Attending: INTERNAL MEDICINE | Admitting: THORACIC SURGERY (CARDIOTHORACIC VASCULAR SURGERY)
Payer: MEDICARE

## 2024-05-15 ENCOUNTER — APPOINTMENT (OUTPATIENT)
Dept: GENERAL RADIOLOGY | Age: 74
DRG: 234 | End: 2024-05-15
Payer: MEDICARE

## 2024-05-15 ENCOUNTER — HOSPITAL ENCOUNTER (EMERGENCY)
Age: 74
Discharge: ANOTHER HOSPITAL | DRG: 234 | End: 2024-05-15
Attending: EMERGENCY MEDICINE
Payer: MEDICARE

## 2024-05-15 ENCOUNTER — TELEPHONE (OUTPATIENT)
Dept: FAMILY MEDICINE CLINIC | Facility: CLINIC | Age: 74
End: 2024-05-15

## 2024-05-15 VITALS
HEIGHT: 70 IN | OXYGEN SATURATION: 97 % | RESPIRATION RATE: 15 BRPM | SYSTOLIC BLOOD PRESSURE: 125 MMHG | DIASTOLIC BLOOD PRESSURE: 72 MMHG | TEMPERATURE: 98.1 F | BODY MASS INDEX: 30.06 KG/M2 | WEIGHT: 210 LBS | HEART RATE: 66 BPM

## 2024-05-15 DIAGNOSIS — I21.4 NSTEMI (NON-ST ELEVATED MYOCARDIAL INFARCTION) (HCC): Primary | ICD-10-CM

## 2024-05-15 DIAGNOSIS — E78.00 HIGH CHOLESTEROL: ICD-10-CM

## 2024-05-15 DIAGNOSIS — I21.4 NSTEMI (NON-ST ELEVATION MYOCARDIAL INFARCTION) (HCC): ICD-10-CM

## 2024-05-15 DIAGNOSIS — Z95.1 S/P CABG X 4: ICD-10-CM

## 2024-05-15 DIAGNOSIS — R07.9 CHEST PAIN: ICD-10-CM

## 2024-05-15 DIAGNOSIS — R07.9 CHEST PAIN, UNSPECIFIED TYPE: Primary | ICD-10-CM

## 2024-05-15 LAB
ALBUMIN SERPL-MCNC: 4.4 G/DL (ref 3.2–4.6)
ALBUMIN/GLOB SERPL: 1.9 (ref 0.4–1.6)
ALP SERPL-CCNC: 83 U/L (ref 45–117)
ALT SERPL-CCNC: 19 U/L (ref 13–61)
ANION GAP SERPL CALC-SCNC: 14 MMOL/L (ref 2–11)
APTT PPP: 43.2 SEC (ref 23.3–37.4)
APTT PPP: 77.2 SEC (ref 23.3–37.4)
AST SERPL-CCNC: 29 U/L (ref 15–37)
BASOPHILS # BLD: 0.1 K/UL (ref 0–0.2)
BASOPHILS NFR BLD: 1 % (ref 0–2)
BILIRUB SERPL-MCNC: 0.5 MG/DL (ref 0.2–1.1)
BUN SERPL-MCNC: 8 MG/DL (ref 8–23)
CALCIUM SERPL-MCNC: 9.8 MG/DL (ref 8.3–10.4)
CHLORIDE SERPL-SCNC: 105 MMOL/L (ref 98–107)
CO2 SERPL-SCNC: 20 MMOL/L (ref 21–32)
CREAT SERPL-MCNC: 0.83 MG/DL (ref 0.8–1.5)
D DIMER PPP FEU-MCNC: <0.27 UG/ML(FEU)
DIFFERENTIAL METHOD BLD: ABNORMAL
EOSINOPHIL # BLD: 0 K/UL (ref 0–0.8)
EOSINOPHIL NFR BLD: 1 % (ref 0.5–7.8)
ERYTHROCYTE [DISTWIDTH] IN BLOOD BY AUTOMATED COUNT: 12.5 % (ref 11.9–14.6)
ERYTHROCYTE [DISTWIDTH] IN BLOOD BY AUTOMATED COUNT: 12.9 % (ref 11.9–14.6)
GLOBULIN SER CALC-MCNC: 2.3 G/DL (ref 2.8–4.5)
GLUCOSE SERPL-MCNC: 156 MG/DL (ref 65–100)
HCT VFR BLD AUTO: 38.3 % (ref 41.1–50.3)
HCT VFR BLD AUTO: 39.7 % (ref 41.1–50.3)
HGB BLD-MCNC: 12.8 G/DL (ref 13.6–17.2)
HGB BLD-MCNC: 12.9 G/DL (ref 13.6–17.2)
IMM GRANULOCYTES # BLD AUTO: 0 K/UL (ref 0–0.5)
IMM GRANULOCYTES NFR BLD AUTO: 0 % (ref 0–5)
INR PPP: 1.8
INR PPP: 2
LYMPHOCYTES # BLD: 1.5 K/UL (ref 0.5–4.6)
LYMPHOCYTES NFR BLD: 30 % (ref 13–44)
MAGNESIUM SERPL-MCNC: 1.9 MG/DL (ref 1.2–2.6)
MCH RBC QN AUTO: 33.1 PG (ref 26.1–32.9)
MCH RBC QN AUTO: 33.2 PG (ref 26.1–32.9)
MCHC RBC AUTO-ENTMCNC: 32.5 G/DL (ref 31.4–35)
MCHC RBC AUTO-ENTMCNC: 33.4 G/DL (ref 31.4–35)
MCV RBC AUTO: 101.8 FL (ref 82–102)
MCV RBC AUTO: 99.5 FL (ref 82–102)
MONOCYTES # BLD: 0.3 K/UL (ref 0.1–1.3)
MONOCYTES NFR BLD: 6 % (ref 4–12)
NEUTS SEG # BLD: 3.2 K/UL (ref 1.7–8.2)
NEUTS SEG NFR BLD: 62 % (ref 43–78)
NRBC # BLD: 0 K/UL (ref 0–0.2)
NRBC # BLD: 0 K/UL (ref 0–0.2)
PLATELET # BLD AUTO: 208 K/UL (ref 150–450)
PLATELET # BLD AUTO: 216 K/UL (ref 150–450)
PMV BLD AUTO: 9.6 FL (ref 9.4–12.3)
PMV BLD AUTO: 9.8 FL (ref 9.4–12.3)
POTASSIUM SERPL-SCNC: 3.4 MMOL/L (ref 3.5–5.1)
PROT SERPL-MCNC: 6.7 G/DL (ref 6.4–8.2)
PROTHROMBIN TIME: 22.1 SEC (ref 11.3–14.9)
PROTHROMBIN TIME: 23.6 SEC (ref 11.3–14.9)
RBC # BLD AUTO: 3.85 M/UL (ref 4.23–5.6)
RBC # BLD AUTO: 3.9 M/UL (ref 4.23–5.6)
SODIUM SERPL-SCNC: 139 MMOL/L (ref 133–143)
TROPONIN T SERPL HS-MCNC: 70.2 NG/L (ref 0–22)
TROPONIN T SERPL HS-MCNC: 87.3 NG/L (ref 0–22)
TROPONIN T SERPL HS-MCNC: 89 NG/L (ref 0–22)
TROPONIN T SERPL HS-MCNC: 91 NG/L (ref 0–22)
UFH PPP CHRO-ACNC: >1.1 IU/ML (ref 0.3–0.7)
WBC # BLD AUTO: 5 K/UL (ref 4.3–11.1)
WBC # BLD AUTO: 5.1 K/UL (ref 4.3–11.1)

## 2024-05-15 PROCEDURE — 6370000000 HC RX 637 (ALT 250 FOR IP): Performed by: PHYSICIAN ASSISTANT

## 2024-05-15 PROCEDURE — 85520 HEPARIN ASSAY: CPT

## 2024-05-15 PROCEDURE — 80053 COMPREHEN METABOLIC PANEL: CPT

## 2024-05-15 PROCEDURE — 83735 ASSAY OF MAGNESIUM: CPT

## 2024-05-15 PROCEDURE — 85025 COMPLETE CBC W/AUTO DIFF WBC: CPT

## 2024-05-15 PROCEDURE — 99285 EMERGENCY DEPT VISIT HI MDM: CPT

## 2024-05-15 PROCEDURE — 6370000000 HC RX 637 (ALT 250 FOR IP): Performed by: INTERNAL MEDICINE

## 2024-05-15 PROCEDURE — 2140000000 HC CCU INTERMEDIATE R&B

## 2024-05-15 PROCEDURE — 99223 1ST HOSP IP/OBS HIGH 75: CPT | Performed by: INTERNAL MEDICINE

## 2024-05-15 PROCEDURE — 85730 THROMBOPLASTIN TIME PARTIAL: CPT

## 2024-05-15 PROCEDURE — 96376 TX/PRO/DX INJ SAME DRUG ADON: CPT

## 2024-05-15 PROCEDURE — 85610 PROTHROMBIN TIME: CPT

## 2024-05-15 PROCEDURE — 6360000002 HC RX W HCPCS: Performed by: EMERGENCY MEDICINE

## 2024-05-15 PROCEDURE — 71045 X-RAY EXAM CHEST 1 VIEW: CPT

## 2024-05-15 PROCEDURE — 36415 COLL VENOUS BLD VENIPUNCTURE: CPT

## 2024-05-15 PROCEDURE — 96365 THER/PROPH/DIAG IV INF INIT: CPT

## 2024-05-15 PROCEDURE — 84484 ASSAY OF TROPONIN QUANT: CPT

## 2024-05-15 PROCEDURE — 2580000003 HC RX 258: Performed by: PHYSICIAN ASSISTANT

## 2024-05-15 PROCEDURE — 85027 COMPLETE CBC AUTOMATED: CPT

## 2024-05-15 PROCEDURE — 85379 FIBRIN DEGRADATION QUANT: CPT

## 2024-05-15 PROCEDURE — 93005 ELECTROCARDIOGRAM TRACING: CPT | Performed by: EMERGENCY MEDICINE

## 2024-05-15 PROCEDURE — 6370000000 HC RX 637 (ALT 250 FOR IP): Performed by: EMERGENCY MEDICINE

## 2024-05-15 RX ORDER — ONDANSETRON 2 MG/ML
4 INJECTION INTRAMUSCULAR; INTRAVENOUS EVERY 6 HOURS PRN
Status: DISCONTINUED | OUTPATIENT
Start: 2024-05-15 | End: 2024-05-20

## 2024-05-15 RX ORDER — SODIUM CHLORIDE 9 MG/ML
INJECTION, SOLUTION INTRAVENOUS CONTINUOUS
Status: ACTIVE | OUTPATIENT
Start: 2024-05-16 | End: 2024-05-16

## 2024-05-15 RX ORDER — HEPARIN SODIUM 1000 [USP'U]/ML
4000 INJECTION, SOLUTION INTRAVENOUS; SUBCUTANEOUS PRN
Status: DISCONTINUED | OUTPATIENT
Start: 2024-05-16 | End: 2024-05-20

## 2024-05-15 RX ORDER — MAGNESIUM SULFATE IN WATER 40 MG/ML
2000 INJECTION, SOLUTION INTRAVENOUS PRN
Status: DISCONTINUED | OUTPATIENT
Start: 2024-05-15 | End: 2024-05-20

## 2024-05-15 RX ORDER — HEPARIN SODIUM 1000 [USP'U]/ML
4000 INJECTION, SOLUTION INTRAVENOUS; SUBCUTANEOUS ONCE
Status: DISCONTINUED | OUTPATIENT
Start: 2024-05-16 | End: 2024-05-16 | Stop reason: SDUPTHER

## 2024-05-15 RX ORDER — TAMSULOSIN HYDROCHLORIDE 0.4 MG/1
0.4 CAPSULE ORAL DAILY
Status: DISCONTINUED | OUTPATIENT
Start: 2024-05-15 | End: 2024-05-24 | Stop reason: HOSPADM

## 2024-05-15 RX ORDER — ASPIRIN 81 MG/1
81 TABLET ORAL DAILY
Status: DISCONTINUED | OUTPATIENT
Start: 2024-05-16 | End: 2024-05-20

## 2024-05-15 RX ORDER — ROSUVASTATIN CALCIUM 20 MG/1
20 TABLET, COATED ORAL NIGHTLY
Status: DISCONTINUED | OUTPATIENT
Start: 2024-05-15 | End: 2024-05-17

## 2024-05-15 RX ORDER — HEPARIN SODIUM 1000 [USP'U]/ML
4000 INJECTION, SOLUTION INTRAVENOUS; SUBCUTANEOUS PRN
Status: DISCONTINUED | OUTPATIENT
Start: 2024-05-15 | End: 2024-05-15 | Stop reason: HOSPADM

## 2024-05-15 RX ORDER — HEPARIN SODIUM 1000 [USP'U]/ML
4000 INJECTION, SOLUTION INTRAVENOUS; SUBCUTANEOUS ONCE
Status: COMPLETED | OUTPATIENT
Start: 2024-05-15 | End: 2024-05-15

## 2024-05-15 RX ORDER — ONDANSETRON 4 MG/1
4 TABLET, ORALLY DISINTEGRATING ORAL EVERY 8 HOURS PRN
Status: DISCONTINUED | OUTPATIENT
Start: 2024-05-15 | End: 2024-05-20

## 2024-05-15 RX ORDER — HEPARIN SODIUM 10000 [USP'U]/100ML
5-30 INJECTION, SOLUTION INTRAVENOUS CONTINUOUS
Status: DISCONTINUED | OUTPATIENT
Start: 2024-05-16 | End: 2024-05-20

## 2024-05-15 RX ORDER — ROSUVASTATIN CALCIUM 20 MG/1
20 TABLET, COATED ORAL NIGHTLY
Status: DISCONTINUED | OUTPATIENT
Start: 2024-05-16 | End: 2024-05-15

## 2024-05-15 RX ORDER — ROSUVASTATIN CALCIUM 10 MG/1
10 TABLET, COATED ORAL NIGHTLY
Status: DISCONTINUED | OUTPATIENT
Start: 2024-05-15 | End: 2024-05-15

## 2024-05-15 RX ORDER — ASPIRIN 81 MG/1
324 TABLET, CHEWABLE ORAL ONCE
Status: COMPLETED | OUTPATIENT
Start: 2024-05-15 | End: 2024-05-15

## 2024-05-15 RX ORDER — HEPARIN SODIUM 10000 [USP'U]/100ML
5-30 INJECTION, SOLUTION INTRAVENOUS CONTINUOUS
Status: DISCONTINUED | OUTPATIENT
Start: 2024-05-15 | End: 2024-05-15 | Stop reason: HOSPADM

## 2024-05-15 RX ORDER — HEPARIN SODIUM 1000 [USP'U]/ML
2000 INJECTION, SOLUTION INTRAVENOUS; SUBCUTANEOUS PRN
Status: DISCONTINUED | OUTPATIENT
Start: 2024-05-16 | End: 2024-05-20

## 2024-05-15 RX ORDER — ACETAMINOPHEN 325 MG/1
650 TABLET ORAL EVERY 6 HOURS PRN
Status: DISCONTINUED | OUTPATIENT
Start: 2024-05-15 | End: 2024-05-20

## 2024-05-15 RX ORDER — SODIUM CHLORIDE 9 MG/ML
INJECTION, SOLUTION INTRAVENOUS PRN
Status: DISCONTINUED | OUTPATIENT
Start: 2024-05-15 | End: 2024-05-21

## 2024-05-15 RX ORDER — HEPARIN SODIUM 1000 [USP'U]/ML
2000 INJECTION, SOLUTION INTRAVENOUS; SUBCUTANEOUS PRN
Status: DISCONTINUED | OUTPATIENT
Start: 2024-05-15 | End: 2024-05-15 | Stop reason: HOSPADM

## 2024-05-15 RX ORDER — LISINOPRIL 5 MG/1
5 TABLET ORAL DAILY
Status: DISCONTINUED | OUTPATIENT
Start: 2024-05-15 | End: 2024-05-16

## 2024-05-15 RX ORDER — SODIUM CHLORIDE 0.9 % (FLUSH) 0.9 %
5-40 SYRINGE (ML) INJECTION EVERY 12 HOURS SCHEDULED
Status: DISCONTINUED | OUTPATIENT
Start: 2024-05-15 | End: 2024-05-20

## 2024-05-15 RX ORDER — SODIUM CHLORIDE 0.9 % (FLUSH) 0.9 %
5-40 SYRINGE (ML) INJECTION PRN
Status: DISCONTINUED | OUTPATIENT
Start: 2024-05-15 | End: 2024-05-21

## 2024-05-15 RX ORDER — POLYETHYLENE GLYCOL 3350 17 G/17G
17 POWDER, FOR SOLUTION ORAL DAILY PRN
Status: DISCONTINUED | OUTPATIENT
Start: 2024-05-15 | End: 2024-05-20

## 2024-05-15 RX ORDER — TIZANIDINE 2 MG/1
2 TABLET ORAL NIGHTLY PRN
Status: DISCONTINUED | OUTPATIENT
Start: 2024-05-15 | End: 2024-05-24 | Stop reason: HOSPADM

## 2024-05-15 RX ORDER — PANTOPRAZOLE SODIUM 40 MG/1
40 TABLET, DELAYED RELEASE ORAL
Status: DISCONTINUED | OUTPATIENT
Start: 2024-05-16 | End: 2024-05-20

## 2024-05-15 RX ADMIN — HEPARIN SODIUM 10 UNITS/KG/HR: 10000 INJECTION, SOLUTION INTRAVENOUS at 18:13

## 2024-05-15 RX ADMIN — LISINOPRIL 5 MG: 5 TABLET ORAL at 21:52

## 2024-05-15 RX ADMIN — TAMSULOSIN HYDROCHLORIDE 0.4 MG: 0.4 CAPSULE ORAL at 21:52

## 2024-05-15 RX ADMIN — ASPIRIN 81 MG 324 MG: 81 TABLET ORAL at 17:44

## 2024-05-15 RX ADMIN — HEPARIN SODIUM 4000 UNITS: 1000 INJECTION INTRAVENOUS; SUBCUTANEOUS at 17:45

## 2024-05-15 RX ADMIN — ROSUVASTATIN CALCIUM 20 MG: 20 TABLET, COATED ORAL at 21:52

## 2024-05-15 RX ADMIN — SODIUM CHLORIDE, PRESERVATIVE FREE 10 ML: 5 INJECTION INTRAVENOUS at 21:53

## 2024-05-15 ASSESSMENT — PAIN - FUNCTIONAL ASSESSMENT: PAIN_FUNCTIONAL_ASSESSMENT: 0-10

## 2024-05-15 ASSESSMENT — PAIN SCALES - GENERAL: PAINLEVEL_OUTOF10: 0

## 2024-05-15 NOTE — ED NOTES
Report called to SANDI Brito on 3rd floor at [facility].  # 20 jelco via the right AC and #22 jelco via the right hand intact without redness or swelling.  Pt transported via [facility] service to [facility].  Pt is attached to the cardiac monitor.  Denies any pain at the present time

## 2024-05-15 NOTE — ED PROVIDER NOTES
Emergency Department Provider Note       PCP: Andrew Bowlnig MD   Age: 73 y.o.   Sex: male     DISPOSITION Decision To Transfer 05/15/2024 05:39:39 PM       ICD-10-CM    1. NSTEMI (non-ST elevated myocardial infarction) (HCC)  I21.4           Medical Decision Making     Patient still chest pain-free.  First troponin was slightly elevated at 72nd and went up to 89.  I suspect he is probably had a small non-STEMI.  He has negative D-dimer.  He is anticoagulated on Xarelto.  I have given aspirin started a heparin bolus and will start a heparin drip.  I discussed case with Dr. Canas who accepted patient in transfer to [facility] [place].     1 or more acute illnesses that pose a threat to life or bodily function.   Drug therapy given requiring intensive monitoring for toxicity.  Discussion with external consultants.  Shared medical decision making was utilized in creating the patients health plan today.    I independently ordered and reviewed each unique test.  I reviewed external records: provider visit note from PCP.  I reviewed external records: provider visit note from outside specialist.  I reviewed external records: previous lab results from outside ED.     I independently interpreted the cardiac monitor rhythm strip sinus.  I interpreted the X-rays clear chest x-ray.  My Independent EKG Interpretation: sinus rhythm, no evidence of arrhythmia      ST Segments:Normal ST segments - NO STEMI   Rate: 80  The patient was admitted and I have discussed patient management with the admitting provider.      Critical care procedure note : 40 minutes of critical care time was performed in the emergency department. This was separate from any other procedures listed during the patients emergency department course. The failure to initiate these interventions on an urgent basis would likely have resulted in sudden, clinically significant or life-threatening deterioration in the patients condition.     History      The history is provided by the patient.   Patient has a history of hypertension, hypercholesterolemia, low magnesium levels and DVT. He is currently on Xarelto and aspirin.  Patient has been having brief episodes of chest tightness over the last week.  When he gets the episodes he does feel short of breath.  And the pain radiates across the entire chest.Sometimes it occurs when he is walking.  He does not have any history of cardiac disease that he knows of.  He is not having any current pain.    Physical Exam     Vitals signs and nursing note reviewed:  Vitals:    05/15/24 1540 05/15/24 1555 05/15/24 1610 05/15/24 1655   BP: (!) 154/62 132/72 139/70 129/65   Pulse: 74   69   Resp:    18   Temp:       TempSrc:       SpO2: 96% 97% 98% 97%   Weight:       Height:          Physical Exam  Vitals and nursing note reviewed.   Constitutional:       General: He is not in acute distress.     Appearance: He is not ill-appearing, toxic-appearing or diaphoretic.   HENT:      Head: Normocephalic and atraumatic.      Right Ear: Tympanic membrane normal. There is no impacted cerumen.      Left Ear: Tympanic membrane normal. There is no impacted cerumen.      Nose: No congestion or rhinorrhea.   Eyes:      General: No scleral icterus.  Neck:      Vascular: No carotid bruit.   Cardiovascular:      Rate and Rhythm: Normal rate and regular rhythm.   Pulmonary:      Effort: Pulmonary effort is normal. No respiratory distress.      Breath sounds: Normal breath sounds. No stridor. No wheezing, rhonchi or rales.   Chest:      Chest wall: No tenderness.   Abdominal:      Palpations: Abdomen is soft.      Tenderness: There is no abdominal tenderness. There is no guarding or rebound.      Hernia: No hernia is present.   Musculoskeletal:      Cervical back: Normal range of motion and neck supple. No rigidity or tenderness.   Lymphadenopathy:      Cervical: No cervical adenopathy.   Skin:     Capillary Refill: Capillary refill takes less  by mouth daily (with breakfast)    ROSUVASTATIN (CRESTOR) 10 MG TABLET    Take 1 tablet by mouth nightly    TAMSULOSIN (FLOMAX) 0.4 MG CAPSULE    Take 1 capsule by mouth daily    TIZANIDINE (ZANAFLEX) 2 MG TABLET    Take 1 tablet by mouth nightly as needed (muscle spasms)    VITAMIN D (ERGOCALCIFEROL) 1.25 MG (37821 UT) CAPS CAPSULE    Take 1 capsule by mouth once a week    VITAMIN D, CHOLECALCIFEROL, PO    Take by mouth        Results for orders placed or performed during the hospital encounter of 05/15/24   XR CHEST PORTABLE    Narrative    Chest X-ray    INDICATION: Chest pain    COMPARISON: April 1, 2023    TECHNIQUE: AP/PA view of the chest was obtained.    FINDINGS: The lungs are clear. There are no infiltrates or effusions.  The heart  size is normal.  The bony thorax is intact.        Impression    No acute findings in the chest     CBC with Auto Differential   Result Value Ref Range    WBC 5.1 4.3 - 11.1 K/uL    RBC 3.85 (L) 4.23 - 5.60 M/uL    Hemoglobin 12.8 (L) 13.6 - 17.2 g/dL    Hematocrit 38.3 (L) 41.1 - 50.3 %    MCV 99.5 82.0 - 102.0 FL    MCH 33.2 (H) 26.1 - 32.9 PG    MCHC 33.4 31.4 - 35.0 g/dL    RDW 12.5 11.9 - 14.6 %    Platelets 216 150 - 450 K/uL    MPV 9.6 9.4 - 12.3 FL    nRBC 0.00 0.0 - 0.2 K/uL    Differential Type AUTOMATED      Neutrophils % 62 43 - 78 %    Lymphocytes % 30 13 - 44 %    Monocytes % 6 4.0 - 12.0 %    Eosinophils % 1 0.5 - 7.8 %    Basophils % 1 0.0 - 2.0 %    Immature Granulocytes % 0 0.0 - 5.0 %    Neutrophils Absolute 3.2 1.7 - 8.2 K/UL    Lymphocytes Absolute 1.5 0.5 - 4.6 K/UL    Monocytes Absolute 0.3 0.1 - 1.3 K/UL    Eosinophils Absolute 0.0 0.0 - 0.8 K/UL    Basophils Absolute 0.1 0.0 - 0.2 K/UL    Immature Granulocytes Absolute 0.0 0.0 - 0.5 K/UL   Comprehensive Metabolic Panel w/ Reflex to MG   Result Value Ref Range    Sodium 139 133 - 143 mmol/L    Potassium 3.4 (L) 3.5 - 5.1 mmol/L    Chloride 105 98 - 107 mmol/L    CO2 20 (L) 21 - 32 mmol/L    Anion Gap 14

## 2024-05-15 NOTE — ED TRIAGE NOTES
Pt to the ED from home with giovani CP for the past week. Pt states that when he \"burps\" the pain goes away. Pt states that he has a history of acid reflux. Pt states that he is walking when the pain starts. Pt states that the pain is in his chest, back and down his right arm. No n/v/d. Inter SOB.

## 2024-05-15 NOTE — TELEPHONE ENCOUNTER
Mr Weber called this afternoon and said he felt like he needed to be seen for a reaction to a statin her recently started taking. I asked him what his symptoms were, and he said short of breath and chest tightness. I talked Dr Bowling and he told me to tell the patient that these are not side effects to the statin, it's possibly heart related and he needed to go to the ER to be checked. I relayed this back to the patient, and he said that he would go to the ER today.

## 2024-05-15 NOTE — ED NOTES
Pt states that he is having intermittent chest pain x 1 week.  States that whenever he has pain episodes, it is severe and radiates down his right arm

## 2024-05-16 ENCOUNTER — APPOINTMENT (OUTPATIENT)
Dept: NON INVASIVE DIAGNOSTICS | Age: 74
DRG: 234 | End: 2024-05-16
Attending: INTERNAL MEDICINE
Payer: MEDICARE

## 2024-05-16 ENCOUNTER — PREP FOR PROCEDURE (OUTPATIENT)
Dept: SURGERY | Age: 74
End: 2024-05-16

## 2024-05-16 ENCOUNTER — PREP FOR PROCEDURE (OUTPATIENT)
Dept: CARDIOTHORACIC SURGERY | Age: 74
End: 2024-05-16

## 2024-05-16 DIAGNOSIS — R07.2 PRECORDIAL CHEST PAIN: ICD-10-CM

## 2024-05-16 DIAGNOSIS — I21.4 NSTEMI (NON-ST ELEVATION MYOCARDIAL INFARCTION) (HCC): ICD-10-CM

## 2024-05-16 PROBLEM — I25.110 ATHEROSCLEROTIC HEART DISEASE OF NATIVE CORONARY ARTERY WITH UNSTABLE ANGINA PECTORIS (HCC): Status: ACTIVE | Noted: 2024-05-16

## 2024-05-16 LAB
ANION GAP SERPL CALC-SCNC: 9 MMOL/L (ref 9–18)
BUN SERPL-MCNC: 7 MG/DL (ref 8–23)
CALCIUM SERPL-MCNC: 9 MG/DL (ref 8.8–10.2)
CHLORIDE SERPL-SCNC: 108 MMOL/L (ref 98–107)
CO2 SERPL-SCNC: 23 MMOL/L (ref 20–28)
CREAT SERPL-MCNC: 0.81 MG/DL (ref 0.8–1.3)
ECHO AO ASC DIAM: 4 CM
ECHO AO ASCENDING AORTA INDEX: 1.88 CM/M2
ECHO AO ROOT DIAM: 3.7 CM
ECHO AO ROOT INDEX: 1.74 CM/M2
ECHO AV AREA PEAK VELOCITY: 2 CM2
ECHO AV AREA VTI: 2.3 CM2
ECHO AV AREA/BSA PEAK VELOCITY: 0.9 CM2/M2
ECHO AV AREA/BSA VTI: 1.1 CM2/M2
ECHO AV MEAN GRADIENT: 6 MMHG
ECHO AV MEAN VELOCITY: 1.2 M/S
ECHO AV PEAK GRADIENT: 12 MMHG
ECHO AV PEAK VELOCITY: 1.7 M/S
ECHO AV VELOCITY RATIO: 0.59
ECHO AV VTI: 39.8 CM
ECHO BSA: 2.17 M2
ECHO EST RA PRESSURE: 8 MMHG
ECHO IVC PROX: 2.3 CM
ECHO LA AREA 2C: 33.6 CM2
ECHO LA AREA 4C: 23.6 CM2
ECHO LA DIAMETER INDEX: 2.72 CM/M2
ECHO LA DIAMETER: 5.8 CM
ECHO LA MAJOR AXIS: 6.3 CM
ECHO LA MINOR AXIS: 7.1 CM
ECHO LA TO AORTIC ROOT RATIO: 1.57
ECHO LA VOL BP: 102 ML (ref 18–58)
ECHO LA VOL MOD A2C: 129 ML (ref 18–58)
ECHO LA VOL MOD A4C: 72 ML (ref 18–58)
ECHO LA VOL/BSA BIPLANE: 48 ML/M2 (ref 16–34)
ECHO LA VOLUME INDEX MOD A2C: 61 ML/M2 (ref 16–34)
ECHO LA VOLUME INDEX MOD A4C: 34 ML/M2 (ref 16–34)
ECHO LV E' LATERAL VELOCITY: 12 CM/S
ECHO LV E' SEPTAL VELOCITY: 9 CM/S
ECHO LV EDV A2C: 130 ML
ECHO LV EDV A4C: 142 ML
ECHO LV EDV INDEX A4C: 67 ML/M2
ECHO LV EDV NDEX A2C: 61 ML/M2
ECHO LV EJECTION FRACTION A2C: 58 %
ECHO LV EJECTION FRACTION A4C: 72 %
ECHO LV EJECTION FRACTION BIPLANE: 65 % (ref 55–100)
ECHO LV ESV A2C: 54 ML
ECHO LV ESV A4C: 40 ML
ECHO LV ESV INDEX A2C: 25 ML/M2
ECHO LV ESV INDEX A4C: 19 ML/M2
ECHO LV FRACTIONAL SHORTENING: 29 % (ref 28–44)
ECHO LV INTERNAL DIMENSION DIASTOLE INDEX: 1.97 CM/M2
ECHO LV INTERNAL DIMENSION DIASTOLIC: 4.2 CM (ref 4.2–5.9)
ECHO LV INTERNAL DIMENSION SYSTOLIC INDEX: 1.41 CM/M2
ECHO LV INTERNAL DIMENSION SYSTOLIC: 3 CM
ECHO LV IVSD: 1 CM (ref 0.6–1)
ECHO LV MASS 2D: 137.2 G (ref 88–224)
ECHO LV MASS INDEX 2D: 64.4 G/M2 (ref 49–115)
ECHO LV POSTERIOR WALL DIASTOLIC: 1 CM (ref 0.6–1)
ECHO LV RELATIVE WALL THICKNESS RATIO: 0.48
ECHO LVOT AREA: 3.5 CM2
ECHO LVOT AV VTI INDEX: 0.66
ECHO LVOT DIAM: 2.1 CM
ECHO LVOT MEAN GRADIENT: 2 MMHG
ECHO LVOT PEAK GRADIENT: 4 MMHG
ECHO LVOT PEAK VELOCITY: 1 M/S
ECHO LVOT STROKE VOLUME INDEX: 42.4 ML/M2
ECHO LVOT SV: 90.4 ML
ECHO LVOT VTI: 26.1 CM
ECHO MV A VELOCITY: 0.94 M/S
ECHO MV AREA VTI: 2.6 CM2
ECHO MV E DECELERATION TIME (DT): 234 MS
ECHO MV E VELOCITY: 1.09 M/S
ECHO MV E/A RATIO: 1.16
ECHO MV E/E' LATERAL: 9.08
ECHO MV E/E' RATIO (AVERAGED): 10.6
ECHO MV E/E' SEPTAL: 12.11
ECHO MV LVOT VTI INDEX: 1.36
ECHO MV MAX VELOCITY: 1.1 M/S
ECHO MV MEAN GRADIENT: 2 MMHG
ECHO MV MEAN VELOCITY: 0.6 M/S
ECHO MV PEAK GRADIENT: 5 MMHG
ECHO MV REGURGITANT PEAK GRADIENT: 108 MMHG
ECHO MV REGURGITANT PEAK VELOCITY: 5.2 M/S
ECHO MV REGURGITANT VTIA: 187 CM
ECHO MV VTI: 35.4 CM
ECHO PV ACCELERATION TIME (AT): 155 MS
ECHO PV MAX VELOCITY: 1.1 M/S
ECHO PV PEAK GRADIENT: 4 MMHG
ECHO RIGHT VENTRICULAR SYSTOLIC PRESSURE (RVSP): 44 MMHG
ECHO RV BASAL DIMENSION: 4.1 CM
ECHO RV FREE WALL PEAK S': 17 CM/S
ECHO RV INTERNAL DIMENSION: 4.4 CM
ECHO RV TAPSE: 1.8 CM (ref 1.7–?)
ECHO TV REGURGITANT MAX VELOCITY: 3 M/S
ECHO TV REGURGITANT PEAK GRADIENT: 36 MMHG
EKG ATRIAL RATE: 80 BPM
EKG DIAGNOSIS: NORMAL
EKG P AXIS: 40 DEGREES
EKG P-R INTERVAL: 165 MS
EKG Q-T INTERVAL: 380 MS
EKG QRS DURATION: 106 MS
EKG QTC CALCULATION (BAZETT): 439 MS
EKG R AXIS: 31 DEGREES
EKG T AXIS: 6 DEGREES
EKG VENTRICULAR RATE: 80 BPM
GLUCOSE SERPL-MCNC: 144 MG/DL (ref 70–99)
POTASSIUM SERPL-SCNC: 3.5 MMOL/L (ref 3.5–5.1)
SODIUM SERPL-SCNC: 140 MMOL/L (ref 136–145)
UFH PPP CHRO-ACNC: >1.1 IU/ML (ref 0.3–0.7)

## 2024-05-16 PROCEDURE — C1894 INTRO/SHEATH, NON-LASER: HCPCS | Performed by: INTERNAL MEDICINE

## 2024-05-16 PROCEDURE — 2709999900 HC NON-CHARGEABLE SUPPLY: Performed by: INTERNAL MEDICINE

## 2024-05-16 PROCEDURE — 6360000004 HC RX CONTRAST MEDICATION: Performed by: INTERNAL MEDICINE

## 2024-05-16 PROCEDURE — C8929 TTE W OR WO FOL WCON,DOPPLER: HCPCS

## 2024-05-16 PROCEDURE — 2580000003 HC RX 258: Performed by: PHYSICIAN ASSISTANT

## 2024-05-16 PROCEDURE — 93306 TTE W/DOPPLER COMPLETE: CPT | Performed by: INTERNAL MEDICINE

## 2024-05-16 PROCEDURE — 6370000000 HC RX 637 (ALT 250 FOR IP): Performed by: INTERNAL MEDICINE

## 2024-05-16 PROCEDURE — 2580000003 HC RX 258: Performed by: INTERNAL MEDICINE

## 2024-05-16 PROCEDURE — 6360000002 HC RX W HCPCS: Performed by: PHYSICIAN ASSISTANT

## 2024-05-16 PROCEDURE — 80048 BASIC METABOLIC PNL TOTAL CA: CPT

## 2024-05-16 PROCEDURE — 6360000002 HC RX W HCPCS: Performed by: INTERNAL MEDICINE

## 2024-05-16 PROCEDURE — 2500000003 HC RX 250 WO HCPCS: Performed by: INTERNAL MEDICINE

## 2024-05-16 PROCEDURE — 93458 L HRT ARTERY/VENTRICLE ANGIO: CPT | Performed by: INTERNAL MEDICINE

## 2024-05-16 PROCEDURE — 6360000002 HC RX W HCPCS: Performed by: NURSE PRACTITIONER

## 2024-05-16 PROCEDURE — 99153 MOD SED SAME PHYS/QHP EA: CPT | Performed by: INTERNAL MEDICINE

## 2024-05-16 PROCEDURE — 93010 ELECTROCARDIOGRAM REPORT: CPT | Performed by: INTERNAL MEDICINE

## 2024-05-16 PROCEDURE — 6370000000 HC RX 637 (ALT 250 FOR IP): Performed by: PHYSICIAN ASSISTANT

## 2024-05-16 PROCEDURE — 2140000000 HC CCU INTERMEDIATE R&B

## 2024-05-16 PROCEDURE — 36415 COLL VENOUS BLD VENIPUNCTURE: CPT

## 2024-05-16 PROCEDURE — C1769 GUIDE WIRE: HCPCS | Performed by: INTERNAL MEDICINE

## 2024-05-16 PROCEDURE — 99152 MOD SED SAME PHYS/QHP 5/>YRS: CPT | Performed by: INTERNAL MEDICINE

## 2024-05-16 PROCEDURE — 85520 HEPARIN ASSAY: CPT

## 2024-05-16 RX ORDER — SODIUM CHLORIDE 9 MG/ML
INJECTION, SOLUTION INTRAVENOUS PRN
Status: CANCELLED | OUTPATIENT
Start: 2024-05-16

## 2024-05-16 RX ORDER — SODIUM CHLORIDE 0.9 % (FLUSH) 0.9 %
5-40 SYRINGE (ML) INJECTION EVERY 12 HOURS SCHEDULED
Status: CANCELLED | OUTPATIENT
Start: 2024-05-16

## 2024-05-16 RX ORDER — HEPARIN SODIUM 200 [USP'U]/100ML
INJECTION, SOLUTION INTRAVENOUS CONTINUOUS PRN
Status: COMPLETED | OUTPATIENT
Start: 2024-05-16 | End: 2024-05-16

## 2024-05-16 RX ORDER — SODIUM CHLORIDE 0.9 % (FLUSH) 0.9 %
5-40 SYRINGE (ML) INJECTION PRN
Status: CANCELLED | OUTPATIENT
Start: 2024-05-16

## 2024-05-16 RX ORDER — SODIUM CHLORIDE 9 MG/ML
INJECTION, SOLUTION INTRAVENOUS CONTINUOUS
Status: CANCELLED | OUTPATIENT
Start: 2024-05-16

## 2024-05-16 RX ORDER — ASPIRIN 81 MG/1
81 TABLET, CHEWABLE ORAL ONCE
Status: CANCELLED | OUTPATIENT
Start: 2024-05-16 | End: 2024-05-16

## 2024-05-16 RX ORDER — MIDAZOLAM HYDROCHLORIDE 1 MG/ML
INJECTION INTRAMUSCULAR; INTRAVENOUS PRN
Status: DISCONTINUED | OUTPATIENT
Start: 2024-05-16 | End: 2024-05-16 | Stop reason: HOSPADM

## 2024-05-16 RX ORDER — HEPARIN SODIUM 1000 [USP'U]/ML
4000 INJECTION, SOLUTION INTRAVENOUS; SUBCUTANEOUS ONCE
Status: COMPLETED | OUTPATIENT
Start: 2024-05-16 | End: 2024-05-16

## 2024-05-16 RX ORDER — LORAZEPAM 0.5 MG/1
0.5 TABLET ORAL
Status: CANCELLED | OUTPATIENT
Start: 2024-05-16 | End: 2024-05-17

## 2024-05-16 RX ORDER — HYDROMORPHONE HYDROCHLORIDE 2 MG/ML
INJECTION, SOLUTION INTRAMUSCULAR; INTRAVENOUS; SUBCUTANEOUS PRN
Status: DISCONTINUED | OUTPATIENT
Start: 2024-05-16 | End: 2024-05-16 | Stop reason: HOSPADM

## 2024-05-16 RX ORDER — LIDOCAINE HYDROCHLORIDE 10 MG/ML
INJECTION, SOLUTION INFILTRATION; PERINEURAL PRN
Status: DISCONTINUED | OUTPATIENT
Start: 2024-05-16 | End: 2024-05-16 | Stop reason: HOSPADM

## 2024-05-16 RX ADMIN — HEPARIN SODIUM 4000 UNITS: 1000 INJECTION INTRAVENOUS; SUBCUTANEOUS at 21:23

## 2024-05-16 RX ADMIN — TIZANIDINE 2 MG: 2 TABLET ORAL at 20:17

## 2024-05-16 RX ADMIN — SODIUM CHLORIDE, PRESERVATIVE FREE 10 ML: 5 INJECTION INTRAVENOUS at 20:17

## 2024-05-16 RX ADMIN — SODIUM CHLORIDE, PRESERVATIVE FREE 0.45 ML: 5 INJECTION INTRAVENOUS at 09:15

## 2024-05-16 RX ADMIN — TAMSULOSIN HYDROCHLORIDE 0.4 MG: 0.4 CAPSULE ORAL at 08:10

## 2024-05-16 RX ADMIN — ASPIRIN 81 MG: 81 TABLET, COATED ORAL at 08:10

## 2024-05-16 RX ADMIN — ACETAMINOPHEN 650 MG: 325 TABLET ORAL at 20:18

## 2024-05-16 RX ADMIN — SODIUM CHLORIDE: 9 INJECTION, SOLUTION INTRAVENOUS at 00:35

## 2024-05-16 RX ADMIN — METOPROLOL TARTRATE 25 MG: 25 TABLET, FILM COATED ORAL at 20:17

## 2024-05-16 RX ADMIN — LISINOPRIL 5 MG: 5 TABLET ORAL at 08:10

## 2024-05-16 RX ADMIN — PANTOPRAZOLE SODIUM 40 MG: 40 TABLET, DELAYED RELEASE ORAL at 05:05

## 2024-05-16 RX ADMIN — SODIUM CHLORIDE, PRESERVATIVE FREE 5 ML: 5 INJECTION INTRAVENOUS at 08:13

## 2024-05-16 RX ADMIN — HEPARIN SODIUM 10 UNITS/KG/HR: 10000 INJECTION, SOLUTION INTRAVENOUS at 21:24

## 2024-05-16 RX ADMIN — ROSUVASTATIN CALCIUM 20 MG: 20 TABLET, COATED ORAL at 20:17

## 2024-05-16 ASSESSMENT — PAIN DESCRIPTION - LOCATION: LOCATION: HEAD

## 2024-05-16 ASSESSMENT — PAIN SCALES - GENERAL: PAINLEVEL_OUTOF10: 4

## 2024-05-16 NOTE — PROGRESS NOTES
TRANSFER - IN REPORT:    Verbal report received from Kraig RN/AR RN on Arpit Weber  being received from [facility] for routine progression of patient care      Report consisted of patient's Situation, Background, Assessment and   Recommendations(SBAR).     Information from the following report(s) Nurse Handoff Report was reviewed with the receiving nurse.    Opportunity for questions and clarification was provided.      Assessment completed upon patient's arrival to unit and care assumed.

## 2024-05-16 NOTE — PROGRESS NOTES
Radial compression band removed at 1830 after slowly reducing air from 12 cc to zero as per hospital protocol.  No bleeding or hematoma noted. 2 x 2 gauze with tegaderm placed over puncture site. The affected extremity is warm and dry to the touch. Frequent vital signs printed and placed on bedside chart.  Patient instructed to call if any bleeding noted on gauze.  Patient verbalized understanding the nursing instructions.

## 2024-05-16 NOTE — CONSULTS
MD Jamel Guerrero MD           CONSULT NOTE    Arpit VERDUZCOLor Weber         5/15/2024        1950    REFERRING PHYSICIAN:  Dr. Miles      HISTORY OF PRESENT ILLNESS:  The patient is a 73 y.o. male who presented to the ER at [facility] with chest pain. Chest pain first occurred last week with radiation to both arms but resolved without treatment. He had recurrent pain and presented to the ER. His troponin was elevated consistent with NSTEMI. He was transferred to [facility]. He underwent cardiac catheterization today that showed severe multivessel disease with occluded RCA. Echocardiogram showed a normal LV EF with no significant valvular disease.   He has history of a saddle PE in 4/2023, following Covid-19 infection in 1/2023.     Cardiac risk factors are as follows:  Family History of Premature CAD  Hypertension and History of COVID-19    Primary symptom for surgery:  NSTEMI  Most recent EF_60_%  Chronic  Prior cardiac arrhythmia  none      No history of stroke, TIA, prior cardiac surgery, prior vascular surgery, anesthetic complication, lung disease, GI bleeding     Pt is on Xarelto, last dose 5/15. He did not tolerate Eliquis previously.       Past Medical History:   Diagnosis Date    Calculus of kidney     \"they don't bother me\" 5/18/21    Colostomy in place (HCC)     Colovesical fistula     COVID-19 vaccine series completed 02/25/2021    Pfizer     Diverticulitis     Encounter for long-term (current) use of other medications 4/22/2014    GERD (gastroesophageal reflux disease)     High cholesterol 07/29/2014    no meds    Other testicular hypofunction 7/29/2014    Weight loss        Past Surgical History:   Procedure Laterality Date    COLONOSCOPY N/A 2/28/2021    COLONOSCOPY performed by Galo Sage MD at [facility] ENDOSCOPY    COLONOSCOPY  05/17/2021    COLONOSCOPY N/A 9/10/2021    COLONOSCOPY THRU STOMA performed by Candice Mckoy MD at [facility] ENDOSCOPY    FLEXIBLE  2.0 1.8  --        Lab Results   Component Value Date/Time    CHOL 215 04/15/2024 12:29 PM    HDL 47 04/15/2024 12:29 PM    .6 04/15/2024 12:29 PM    VLDL 41.4 04/15/2024 12:29 PM    VLDL 15 02/21/2022 03:20 PM     The patient has NYHA Class IV symptoms.     The mortality risk for CABG surgery is       In this split/shared patient encounter, I personally reviewed the patient's past medical history, conducted a current medical history and review of systems, and conducted a medically appropriate physical exam. I reviewed the available labs, prior imaging, and current medication list.     Michelle Mathur PA-C    Assessment:     Active Hospital Problems    *NSTEMI  CAD  Dyslipidemia  History of DVT  History of PE      Plan:     Arpit Weber is to see preoperative teaching film that thoroughly discusses procedure, risks, and possible complications. Risks, benefits, and alternatives were discussed to include, but not limited to:     Bleeding  Arrhythmia   Infection including mediastinitis  Myocardial infarction  Need for reoperation  Renal failure  Respiratory failure  Stroke  Potential death      I have recommended CABG surgery after Xarelto washout.   The patient agrees to proceed.     Procedure Type: Isolated CABG   PERIOPERATIVE OUTCOME ESTIMATE %   Operative Mortality 0.826%   Morbidity & Mortality 3.85%   Stroke 0.614%   Renal Failure 0.446%   Reoperation 1.74%   Prolonged Ventilation 1.84%   Deep Sternal Wound Infection 0.101%   Long Hospital Stay (>14 days) 2.03%   Short Hospital Stay (<6 days)* 62.2%       In this split/shared patient encounter, I personally viewed the cardiac catheterization films and echocardiogram. I reviewed the current and past patient medical history obtained by Michelle Mathur PA-C. I reviewed the available labs and current medication list. I discussed the cardiac catheterization and echocardiogram results and educated the patient/family on treatment options. I

## 2024-05-16 NOTE — PROGRESS NOTES
TRANSFER - OUT REPORT:    Verbal report given to Kristina JUNIOR on Arpit Weber  being transferred to [facility] for routine progression of patient care       Report consisted of patient's Situation, Background, Assessment and   Recommendations(SBAR).     Information from the following report(s) Nurse Handoff Report was reviewed with the receiving nurse.           Lines:   Peripheral IV 05/15/24 Right Antecubital (Active)   Site Assessment Clean, dry & intact 05/16/24 0430   Line Status Infusing 05/16/24 0430   Line Care Connections checked and tightened 05/16/24 0430   Phlebitis Assessment No symptoms 05/16/24 0430   Infiltration Assessment 0 05/16/24 0430   Dressing Status Clean, dry & intact 05/16/24 0430   Dressing Type Transparent 05/16/24 0430        Opportunity for questions and clarification was provided.

## 2024-05-16 NOTE — H&P
[facility] Cardiology History & Physical      Date of  Admission: 5/15/2024  8:24 PM     Primary Cardiologist: None  Admitting Physician: Dr Canas    CC: Chest discomfort    HPI:  Arpit Weber is a 73 y.o. male     No prior history of coronary disease.  Prior history of colovesicular fistula secondary to diverticulitis status post surgical repair in 2021.  Other history of stated ulcerative colitis per records (patient denies history), hyperlipidemia, prior history of nephrolithiasis, history of saddle PE from 4/2023 and has been maintained on long-term anticoagulation.  Last noted echo during PE presentation from 4/2/2023 with preserved EF and normal RV size and function; mild aortic stenosis.    Patient presented to the ED at [facility] due to episodes of chest discomfort which she describes as chest tightness; states had an episode [place] radiating to both arms; had 1 episode the next day.  States he thought it was related to prior heartburn he had but the symptoms were much more intense.  Had another episode earlier today and hence presented to the ED.  Longest episode is lasted around 5 to 10 minutes which was last week.  Initial troponin T noted at 70 with subsequent check at 87.  EKG with nonspecific ST segment changes.  Currently chest pain-free.     Past Medical History:   Diagnosis Date    Calculus of kidney     \"they don't bother me\" 5/18/21    Colostomy in place (HCC)     Colovesical fistula     COVID-19 vaccine series completed 02/25/2021    Pfizer     Diverticulitis     Encounter for long-term (current) use of other medications 4/22/2014    GERD (gastroesophageal reflux disease)     High cholesterol 07/29/2014    no meds    Other testicular hypofunction 7/29/2014    Weight loss       Past Surgical History:   Procedure Laterality Date    COLONOSCOPY N/A 2/28/2021    COLONOSCOPY performed by Galo Sage MD at [facility] ENDOSCOPY    COLONOSCOPY   38.3 (L) 41.1 - 50.3 %    MCV 99.5 82.0 - 102.0 FL    MCH 33.2 (H) 26.1 - 32.9 PG    MCHC 33.4 31.4 - 35.0 g/dL    RDW 12.5 11.9 - 14.6 %    Platelets 216 150 - 450 K/uL    MPV 9.6 9.4 - 12.3 FL    nRBC 0.00 0.0 - 0.2 K/uL    Differential Type AUTOMATED      Neutrophils % 62 43 - 78 %    Lymphocytes % 30 13 - 44 %    Monocytes % 6 4.0 - 12.0 %    Eosinophils % 1 0.5 - 7.8 %    Basophils % 1 0.0 - 2.0 %    Immature Granulocytes % 0 0.0 - 5.0 %    Neutrophils Absolute 3.2 1.7 - 8.2 K/UL    Lymphocytes Absolute 1.5 0.5 - 4.6 K/UL    Monocytes Absolute 0.3 0.1 - 1.3 K/UL    Eosinophils Absolute 0.0 0.0 - 0.8 K/UL    Basophils Absolute 0.1 0.0 - 0.2 K/UL    Immature Granulocytes Absolute 0.0 0.0 - 0.5 K/UL   Comprehensive Metabolic Panel w/ Reflex to MG    Collection Time: 05/15/24  3:18 PM   Result Value Ref Range    Sodium 139 133 - 143 mmol/L    Potassium 3.4 (L) 3.5 - 5.1 mmol/L    Chloride 105 98 - 107 mmol/L    CO2 20 (L) 21 - 32 mmol/L    Anion Gap 14 (H) 2 - 11 mmol/L    Glucose 156 (H) 65 - 100 mg/dL    BUN 8 8 - 23 MG/DL    Creatinine 0.83 0.8 - 1.5 MG/DL    Est, Glom Filt Rate >90 >60 ml/min/1.73m2    Calcium 9.8 8.3 - 10.4 MG/DL    Total Bilirubin 0.5 0.2 - 1.1 MG/DL    ALT 19 13.0 - 61.0 U/L    AST 29 15 - 37 U/L    Alk Phosphatase 83 45.0 - 117.0 U/L    Total Protein 6.7 6.4 - 8.2 g/dL    Albumin 4.4 3.2 - 4.6 g/dL    Globulin 2.3 (L) 2.8 - 4.5 g/dL    Albumin/Globulin Ratio 1.9 (H) 0.4 - 1.6     Troponin now then q90 min for 2 occurances    Collection Time: 05/15/24  3:18 PM   Result Value Ref Range    Troponin T 70.2 (H) 0 - 22 ng/L   Magnesium    Collection Time: 05/15/24  3:18 PM   Result Value Ref Range    Magnesium 1.9 1.2 - 2.6 mg/dL   D-Dimer, Quantitative    Collection Time: 05/15/24  4:05 PM   Result Value Ref Range    D-Dimer, Quant <0.27 <0.56 ug/ml(FEU)   APTT    Collection Time: 05/15/24  4:05 PM   Result Value Ref Range    APTT 43.2 (H) 23.3 - 37.4 SEC   Protime-INR    Collection Time:

## 2024-05-16 NOTE — PROGRESS NOTES
TRANSFER - IN REPORT:    Verbal report received from SANDI Galeana on Arpit Weber being received from [facility]  for routine progression of care.     Report consisted of patient’s Situation, Background, Assessment and Recommendations(SBAR).     Information from the following report(s) SBAR, Kardex, ED Summary, MAR, and Recent Results was reviewed. Opportunity for questions and clarification was provided.      Assessment completed upon patient’s arrival to unit and care assumed.     Patient received to room 306. Patient connected to monitor and assessment completed. Plan of care reviewed. Patient oriented to room and call light. Patient aware to use call light to communicate any chest pain or needs.     Admission skin assessment completed with second RN and reveals the following: heels and sacrum intact, no redness present. Trace swelling to BLE.        4 Eyes Skin Assessment     NAME:  Arpit Weber  YOB: 1950  MEDICAL RECORD NUMBER:  [id number]    The patient is being assessed for  Admission    I agree that at least one RN has performed a thorough Head to Toe Skin Assessment on the patient. ALL assessment sites listed below have been assessed.      Areas assessed by both nurses:    Head, Face, Ears, Shoulders, Back, Chest, Arms, Elbows, Hands, Sacrum. Buttock, Coccyx, Ischium, and Legs. Feet and Heels        Does the Patient have a Wound? No noted wound(s)       Giancarlo Prevention initiated by RN: Yes  Wound Care Orders initiated by RN: No    Pressure Injury (Stage 3,4, Unstageable, DTI, NWPT, and Complex wounds) if present, place Wound referral order by RN under : No    New Ostomies, if present place, Ostomy referral order under : No     Nurse 1 eSignature: Electronically signed by Alisha Garcia RN on 5/15/24 at 11:32 PM EDT    **SHARE this note so that the co-signing nurse can place an eSignature**    Nurse 2 eSignature: Electronically signed by Mary

## 2024-05-16 NOTE — PROGRESS NOTES
[facility] RR with Dr Miles.  Surgical consult.    Heparin 5000u  Versed 3mg  Dilaudid 1mg  TR @ 12    Report to receiving RN.  Pt transferred to CPRU.

## 2024-05-16 NOTE — PROGRESS NOTES
TRANSFER - IN REPORT:    Verbal report received from Freda JUNIOR on Arpit Weber being received from cath lab for routine progression of care.     Report consisted of patient’s Situation, Background, Assessment and Recommendations(SBAR).     Information from the following report(s) Procedure Summary and Cardiac Rhythm SB  was reviewed. Opportunity for questions and clarification was provided.      Assessment completed upon patient’s arrival to unit and care assumed.     Patient received to room 306. Patient connected to monitor and assessment completed. Plan of care reviewed. Patient oriented to room and call light. Patient aware to use call light to communicate any chest pain or needs.

## 2024-05-16 NOTE — PROGRESS NOTES
Verbal orders from Oswald Canas MD to stop heparin gtt. Heparin gtt stopped, unable to stop on MAR, says \"read only\".

## 2024-05-17 ENCOUNTER — APPOINTMENT (OUTPATIENT)
Dept: ULTRASOUND IMAGING | Age: 74
DRG: 234 | End: 2024-05-17
Attending: INTERNAL MEDICINE
Payer: MEDICARE

## 2024-05-17 PROBLEM — R07.2 PRECORDIAL CHEST PAIN: Status: ACTIVE | Noted: 2024-05-16

## 2024-05-17 PROBLEM — E78.5 DYSLIPIDEMIA: Status: ACTIVE | Noted: 2024-05-17

## 2024-05-17 LAB
ANION GAP SERPL CALC-SCNC: 10 MMOL/L (ref 9–18)
APPEARANCE UR: CLEAR
APTT PPP: 59.6 SEC (ref 23.3–37.4)
APTT PPP: 61.1 SEC (ref 23.3–37.4)
APTT PPP: 74.5 SEC (ref 23.3–37.4)
BILIRUB UR QL: NEGATIVE
BUN SERPL-MCNC: 10 MG/DL (ref 8–23)
CALCIUM SERPL-MCNC: 9 MG/DL (ref 8.8–10.2)
CHLORIDE SERPL-SCNC: 109 MMOL/L (ref 98–107)
CO2 SERPL-SCNC: 24 MMOL/L (ref 20–28)
COLOR UR: NORMAL
CREAT SERPL-MCNC: 0.76 MG/DL (ref 0.8–1.3)
ECHO BSA: 2.17 M2
ERYTHROCYTE [DISTWIDTH] IN BLOOD BY AUTOMATED COUNT: 12.8 % (ref 11.9–14.6)
GLUCOSE SERPL-MCNC: 108 MG/DL (ref 70–99)
GLUCOSE UR STRIP.AUTO-MCNC: NEGATIVE MG/DL
HCT VFR BLD AUTO: 37.9 % (ref 41.1–50.3)
HGB BLD-MCNC: 12.4 G/DL (ref 13.6–17.2)
HGB UR QL STRIP: NEGATIVE
KETONES UR QL STRIP.AUTO: NEGATIVE MG/DL
LEUKOCYTE ESTERASE UR QL STRIP.AUTO: NEGATIVE
MCH RBC QN AUTO: 33.5 PG (ref 26.1–32.9)
MCHC RBC AUTO-ENTMCNC: 32.7 G/DL (ref 31.4–35)
MCV RBC AUTO: 102.4 FL (ref 82–102)
NITRITE UR QL STRIP.AUTO: NEGATIVE
NRBC # BLD: 0 K/UL (ref 0–0.2)
PH UR STRIP: 7 (ref 5–9)
PLATELET # BLD AUTO: 195 K/UL (ref 150–450)
PMV BLD AUTO: 9.8 FL (ref 9.4–12.3)
POTASSIUM SERPL-SCNC: 4 MMOL/L (ref 3.5–5.1)
PROT UR STRIP-MCNC: NEGATIVE MG/DL
RBC # BLD AUTO: 3.7 M/UL (ref 4.23–5.6)
SODIUM SERPL-SCNC: 142 MMOL/L (ref 136–145)
SP GR UR REFRACTOMETRY: 1.01 (ref 1–1.02)
UROBILINOGEN UR QL STRIP.AUTO: 1 EU/DL (ref 0.2–1)
WBC # BLD AUTO: 5.1 K/UL (ref 4.3–11.1)

## 2024-05-17 PROCEDURE — 93880 EXTRACRANIAL BILAT STUDY: CPT | Performed by: RADIOLOGY

## 2024-05-17 PROCEDURE — 93970 EXTREMITY STUDY: CPT | Performed by: RADIOLOGY

## 2024-05-17 PROCEDURE — 93880 EXTRACRANIAL BILAT STUDY: CPT

## 2024-05-17 PROCEDURE — 81003 URINALYSIS AUTO W/O SCOPE: CPT

## 2024-05-17 PROCEDURE — 6370000000 HC RX 637 (ALT 250 FOR IP): Performed by: PHYSICIAN ASSISTANT

## 2024-05-17 PROCEDURE — 99232 SBSQ HOSP IP/OBS MODERATE 35: CPT | Performed by: INTERNAL MEDICINE

## 2024-05-17 PROCEDURE — 6370000000 HC RX 637 (ALT 250 FOR IP): Performed by: INTERNAL MEDICINE

## 2024-05-17 PROCEDURE — 2700000000 HC OXYGEN THERAPY PER DAY

## 2024-05-17 PROCEDURE — 94760 N-INVAS EAR/PLS OXIMETRY 1: CPT

## 2024-05-17 PROCEDURE — 93970 EXTREMITY STUDY: CPT

## 2024-05-17 PROCEDURE — 85730 THROMBOPLASTIN TIME PARTIAL: CPT

## 2024-05-17 PROCEDURE — 2140000000 HC CCU INTERMEDIATE R&B

## 2024-05-17 PROCEDURE — 85027 COMPLETE CBC AUTOMATED: CPT

## 2024-05-17 PROCEDURE — 36415 COLL VENOUS BLD VENIPUNCTURE: CPT

## 2024-05-17 PROCEDURE — 6360000002 HC RX W HCPCS: Performed by: PHYSICIAN ASSISTANT

## 2024-05-17 PROCEDURE — 2580000003 HC RX 258: Performed by: PHYSICIAN ASSISTANT

## 2024-05-17 PROCEDURE — 80048 BASIC METABOLIC PNL TOTAL CA: CPT

## 2024-05-17 RX ORDER — AMIODARONE HYDROCHLORIDE 200 MG/1
600 TABLET ORAL EVERY 12 HOURS
Status: COMPLETED | OUTPATIENT
Start: 2024-05-19 | End: 2024-05-20

## 2024-05-17 RX ORDER — NITROGLYCERIN 0.4 MG/1
0.4 TABLET SUBLINGUAL EVERY 5 MIN PRN
Status: DISCONTINUED | OUTPATIENT
Start: 2024-05-17 | End: 2024-05-20

## 2024-05-17 RX ORDER — ROSUVASTATIN CALCIUM 20 MG/1
40 TABLET, COATED ORAL NIGHTLY
Status: DISCONTINUED | OUTPATIENT
Start: 2024-05-17 | End: 2024-05-20 | Stop reason: SDUPTHER

## 2024-05-17 RX ADMIN — ROSUVASTATIN CALCIUM 40 MG: 20 TABLET, COATED ORAL at 20:16

## 2024-05-17 RX ADMIN — NITROGLYCERIN 0.4 MG: 0.4 TABLET SUBLINGUAL at 06:05

## 2024-05-17 RX ADMIN — METOPROLOL TARTRATE 25 MG: 25 TABLET, FILM COATED ORAL at 08:56

## 2024-05-17 RX ADMIN — PANTOPRAZOLE SODIUM 40 MG: 40 TABLET, DELAYED RELEASE ORAL at 06:10

## 2024-05-17 RX ADMIN — ACETAMINOPHEN 650 MG: 325 TABLET ORAL at 06:09

## 2024-05-17 RX ADMIN — TAMSULOSIN HYDROCHLORIDE 0.4 MG: 0.4 CAPSULE ORAL at 08:56

## 2024-05-17 RX ADMIN — ASPIRIN 81 MG: 81 TABLET, COATED ORAL at 08:56

## 2024-05-17 RX ADMIN — NITROGLYCERIN 0.4 MG: 0.4 TABLET SUBLINGUAL at 15:01

## 2024-05-17 RX ADMIN — HEPARIN SODIUM 2000 UNITS: 1000 INJECTION INTRAVENOUS; SUBCUTANEOUS at 19:17

## 2024-05-17 RX ADMIN — SODIUM CHLORIDE, PRESERVATIVE FREE 10 ML: 5 INJECTION INTRAVENOUS at 20:17

## 2024-05-17 RX ADMIN — SODIUM CHLORIDE, PRESERVATIVE FREE 10 ML: 5 INJECTION INTRAVENOUS at 08:57

## 2024-05-17 RX ADMIN — METOPROLOL TARTRATE 25 MG: 25 TABLET, FILM COATED ORAL at 20:16

## 2024-05-17 RX ADMIN — NITROGLYCERIN 0.5 INCH: 20 OINTMENT TOPICAL at 18:15

## 2024-05-17 RX ADMIN — HEPARIN SODIUM 2000 UNITS: 1000 INJECTION INTRAVENOUS; SUBCUTANEOUS at 05:06

## 2024-05-17 RX ADMIN — NITROGLYCERIN 0.5 INCH: 20 OINTMENT TOPICAL at 06:05

## 2024-05-17 RX ADMIN — ACETAMINOPHEN 650 MG: 325 TABLET ORAL at 20:16

## 2024-05-17 RX ADMIN — TIZANIDINE 2 MG: 2 TABLET ORAL at 20:16

## 2024-05-17 RX ADMIN — NITROGLYCERIN 0.5 INCH: 20 OINTMENT TOPICAL at 12:55

## 2024-05-17 RX ADMIN — HEPARIN SODIUM 14 UNITS/KG/HR: 10000 INJECTION, SOLUTION INTRAVENOUS at 20:24

## 2024-05-17 ASSESSMENT — PAIN SCALES - GENERAL
PAINLEVEL_OUTOF10: 3
PAINLEVEL_OUTOF10: 0
PAINLEVEL_OUTOF10: 2
PAINLEVEL_OUTOF10: 0
PAINLEVEL_OUTOF10: 3

## 2024-05-17 ASSESSMENT — PAIN DESCRIPTION - LOCATION
LOCATION: HEAD
LOCATION: CHEST

## 2024-05-17 NOTE — CARE COORDINATION
Pt presented to the [place] ED c/o chest pain that radiated to both arms but resolved without treatment. He was transferred to [facility] for continuation of care. Pt underwent cardiac catheterization that showed severe multivessel disease with occluded RCA. CTS consulted. CABG planned 2/2 Xarelto washout. PTA, pt indep with his ADLs. Lives in his own home next to his son and DIL. Drives. On RA. Denies DME needs or h/o recent falls. PCP established. [place] Medicare verified and able to afford home meds. Will discern TESFAYE needs post surgery.       05/17/24 1216   Service Assessment   Patient Orientation Alert and Oriented;Person;Place;Situation;Self   History Provided By Patient   Primary Caregiver Self   Support Systems Children;Family Members;[employer];Friends/Neighbors   PCP Verified by CM Yes  (Tawnya)   Prior Functional Level Independent in ADLs/IADLs   Current Functional Level Independent in ADLs/IADLs   Can patient return to prior living arrangement Yes   Ability to make needs known: Good   Family able to assist with home care needs: Yes   Would you like for me to discuss the discharge plan with any other family members/significant others, and if so, who? Yes  (Family)   Financial Resources Medicare   Community Resources None   Social/Functional History   Lives With Alone   Type of Home House   Home Layout One level   Bathroom Toilet Standard   Bathroom Accessibility Accessible   Home Equipment None   Receives Help From Family   ADL Assistance Independent   Homemaking Assistance Independent   Ambulation Assistance Needs assistance   Transfer Assistance Independent   Active  Yes   Occupation Part time employment   Discharge Planning   Current Services Prior To Admission None   Potential Assistance Purchasing Medications No   Services At/After Discharge   Transition of Care Consult (CM Consult) Discharge Planning    Resource Information Provided? No   Mode of Transport at Discharge Other (see

## 2024-05-17 NOTE — PROGRESS NOTES
CTS-pt has had some recurrent chest pain. CABG surgery planned for Monday after Xarelto washout. On IV Heparin and NTG paste.     Michelle Mathur PA-C

## 2024-05-17 NOTE — PROGRESS NOTES
[facility] CARDIOLOGY PROGRESS NOTE    5/17/2024 6:42 AM    Admit Date: 5/15/2024        Subjective:   Stable overnight without angina, CHF, or palpitations but CP going to bathroom overnight. Vitals stable and controlled. No other complaints overnight. Tolerating meds well.       Objective:      Vitals:    05/17/24 0412 05/17/24 0542 05/17/24 0605 05/17/24 0610   BP: 112/77  136/70 119/67   Pulse: 65  68    Resp: 17      Temp: 98.1 °F (36.7 °C)      TempSrc: Oral      SpO2: 99%      Weight:  95.2 kg (209 lb 12.8 oz)     Height:           Physical Exam:  Neck- supple, no JVD  CV- regular rate and rhythm no MRG  Lung- clear bilaterally  Abd- soft, nontender, nondistended  Ext- no edema  Skin- warm and dry    Data Review:   Pertinent lab and noninvasive imaging over the past 24 hours reviewed and evaluated.    Recent Labs     05/15/24  1518 05/15/24  1605 05/15/24  2111 05/16/24  0220 05/17/24  0327     --   --  140 142   K 3.4*  --   --  3.5 4.0   MG 1.9  --   --   --   --    BUN 8  --   --  7* 10   WBC 5.1  --  5.0  --  5.1   HGB 12.8*  --  12.9*  --  12.4*   HCT 38.3*  --  39.7*  --  37.9*     --  208  --  195   INR  --  2.0 1.8  --   --      Echo 5/15/24:  Left Ventricle Normal left ventricular systolic function with a visually estimated EF of 55 - 60%. Left ventricle size is normal. Normal wall thickness. Normal wall motion. Normal diastolic function.   Left Atrium Left atrium is dilated.   Right Ventricle Right ventricle size is normal. Normal systolic function.   Right Atrium Right atrium size is normal.   Aortic Valve Valve structure is normal. No regurgitation. No stenosis.   Mitral Valve Valve structure is normal. No regurgitation. No stenosis noted.   Tricuspid Valve Valve structure is normal. Trace regurgitation. No stenosis noted.   Pulmonic Valve Valve structure is normal. No regurgitation. No stenosis noted.   Aorta Dilated ascending aorta. Ao ascending diameter is 4.0 cm.   IVC/Hepatic

## 2024-05-18 LAB
ANION GAP SERPL CALC-SCNC: 9 MMOL/L (ref 9–18)
APTT PPP: 100.3 SEC (ref 23.3–37.4)
APTT PPP: 103.1 SEC (ref 23.3–37.4)
BASOPHILS # BLD: 0 K/UL (ref 0–0.2)
BASOPHILS NFR BLD: 1 % (ref 0–2)
BUN SERPL-MCNC: 8 MG/DL (ref 8–23)
CALCIUM SERPL-MCNC: 8.9 MG/DL (ref 8.8–10.2)
CHLORIDE SERPL-SCNC: 109 MMOL/L (ref 98–107)
CO2 SERPL-SCNC: 24 MMOL/L (ref 20–28)
CREAT SERPL-MCNC: 0.78 MG/DL (ref 0.8–1.3)
DIFFERENTIAL METHOD BLD: ABNORMAL
EOSINOPHIL # BLD: 0.1 K/UL (ref 0–0.8)
EOSINOPHIL NFR BLD: 2 % (ref 0.5–7.8)
ERYTHROCYTE [DISTWIDTH] IN BLOOD BY AUTOMATED COUNT: 12.8 % (ref 11.9–14.6)
GLUCOSE SERPL-MCNC: 117 MG/DL (ref 70–99)
HCT VFR BLD AUTO: 35.8 % (ref 41.1–50.3)
HGB BLD-MCNC: 11.5 G/DL (ref 13.6–17.2)
IMM GRANULOCYTES # BLD AUTO: 0 K/UL (ref 0–0.5)
IMM GRANULOCYTES NFR BLD AUTO: 0 % (ref 0–5)
LYMPHOCYTES # BLD: 1.6 K/UL (ref 0.5–4.6)
LYMPHOCYTES NFR BLD: 35 % (ref 13–44)
MAGNESIUM SERPL-MCNC: 2 MG/DL (ref 1.8–2.4)
MCH RBC QN AUTO: 32.9 PG (ref 26.1–32.9)
MCHC RBC AUTO-ENTMCNC: 32.1 G/DL (ref 31.4–35)
MCV RBC AUTO: 102.3 FL (ref 82–102)
MONOCYTES # BLD: 0.4 K/UL (ref 0.1–1.3)
MONOCYTES NFR BLD: 8 % (ref 4–12)
MRSA DNA SPEC QL NAA+PROBE: NOT DETECTED
NEUTS SEG # BLD: 2.6 K/UL (ref 1.7–8.2)
NEUTS SEG NFR BLD: 54 % (ref 43–78)
NRBC # BLD: 0 K/UL (ref 0–0.2)
PLATELET # BLD AUTO: 179 K/UL (ref 150–450)
PMV BLD AUTO: 9.8 FL (ref 9.4–12.3)
POTASSIUM SERPL-SCNC: 3.7 MMOL/L (ref 3.5–5.1)
RBC # BLD AUTO: 3.5 M/UL (ref 4.23–5.6)
S AUREUS CPE NOSE QL NAA+PROBE: NOT DETECTED
SODIUM SERPL-SCNC: 142 MMOL/L (ref 136–145)
WBC # BLD AUTO: 4.8 K/UL (ref 4.3–11.1)

## 2024-05-18 PROCEDURE — 2700000000 HC OXYGEN THERAPY PER DAY

## 2024-05-18 PROCEDURE — 99232 SBSQ HOSP IP/OBS MODERATE 35: CPT | Performed by: INTERNAL MEDICINE

## 2024-05-18 PROCEDURE — 6370000000 HC RX 637 (ALT 250 FOR IP): Performed by: INTERNAL MEDICINE

## 2024-05-18 PROCEDURE — 87641 MR-STAPH DNA AMP PROBE: CPT

## 2024-05-18 PROCEDURE — 83735 ASSAY OF MAGNESIUM: CPT

## 2024-05-18 PROCEDURE — 85730 THROMBOPLASTIN TIME PARTIAL: CPT

## 2024-05-18 PROCEDURE — 6360000002 HC RX W HCPCS: Performed by: PHYSICIAN ASSISTANT

## 2024-05-18 PROCEDURE — 6370000000 HC RX 637 (ALT 250 FOR IP): Performed by: PHYSICIAN ASSISTANT

## 2024-05-18 PROCEDURE — 2140000000 HC CCU INTERMEDIATE R&B

## 2024-05-18 PROCEDURE — 94760 N-INVAS EAR/PLS OXIMETRY 1: CPT

## 2024-05-18 PROCEDURE — 80048 BASIC METABOLIC PNL TOTAL CA: CPT

## 2024-05-18 PROCEDURE — 36415 COLL VENOUS BLD VENIPUNCTURE: CPT

## 2024-05-18 PROCEDURE — 2580000003 HC RX 258: Performed by: PHYSICIAN ASSISTANT

## 2024-05-18 PROCEDURE — 85025 COMPLETE CBC W/AUTO DIFF WBC: CPT

## 2024-05-18 RX ORDER — POTASSIUM CHLORIDE 20 MEQ/1
40 TABLET, EXTENDED RELEASE ORAL ONCE
Status: COMPLETED | OUTPATIENT
Start: 2024-05-18 | End: 2024-05-18

## 2024-05-18 RX ADMIN — SODIUM CHLORIDE, PRESERVATIVE FREE 10 ML: 5 INJECTION INTRAVENOUS at 08:41

## 2024-05-18 RX ADMIN — HEPARIN SODIUM 14 UNITS/KG/HR: 10000 INJECTION, SOLUTION INTRAVENOUS at 15:32

## 2024-05-18 RX ADMIN — METOPROLOL TARTRATE 25 MG: 25 TABLET, FILM COATED ORAL at 08:40

## 2024-05-18 RX ADMIN — NITROGLYCERIN 0.5 INCH: 20 OINTMENT TOPICAL at 18:02

## 2024-05-18 RX ADMIN — NITROGLYCERIN 0.4 MG: 0.4 TABLET SUBLINGUAL at 16:14

## 2024-05-18 RX ADMIN — ASPIRIN 81 MG: 81 TABLET, COATED ORAL at 08:40

## 2024-05-18 RX ADMIN — NITROGLYCERIN 0.5 INCH: 20 OINTMENT TOPICAL at 00:03

## 2024-05-18 RX ADMIN — TAMSULOSIN HYDROCHLORIDE 0.4 MG: 0.4 CAPSULE ORAL at 08:40

## 2024-05-18 RX ADMIN — PANTOPRAZOLE SODIUM 40 MG: 40 TABLET, DELAYED RELEASE ORAL at 05:22

## 2024-05-18 RX ADMIN — SODIUM CHLORIDE, PRESERVATIVE FREE 10 ML: 5 INJECTION INTRAVENOUS at 21:43

## 2024-05-18 RX ADMIN — NITROGLYCERIN 0.5 INCH: 20 OINTMENT TOPICAL at 12:34

## 2024-05-18 RX ADMIN — ROSUVASTATIN CALCIUM 40 MG: 20 TABLET, COATED ORAL at 21:43

## 2024-05-18 RX ADMIN — METOPROLOL TARTRATE 25 MG: 25 TABLET, FILM COATED ORAL at 21:39

## 2024-05-18 RX ADMIN — NITROGLYCERIN 0.5 INCH: 20 OINTMENT TOPICAL at 05:21

## 2024-05-18 RX ADMIN — POTASSIUM CHLORIDE 40 MEQ: 1500 TABLET, EXTENDED RELEASE ORAL at 08:40

## 2024-05-18 ASSESSMENT — PAIN DESCRIPTION - LOCATION: LOCATION: CHEST

## 2024-05-18 ASSESSMENT — PAIN DESCRIPTION - DESCRIPTORS: DESCRIPTORS: STABBING

## 2024-05-18 ASSESSMENT — PAIN SCALES - GENERAL
PAINLEVEL_OUTOF10: 3
PAINLEVEL_OUTOF10: 0
PAINLEVEL_OUTOF10: 0

## 2024-05-18 ASSESSMENT — PAIN DESCRIPTION - ORIENTATION: ORIENTATION: MID

## 2024-05-18 NOTE — PROGRESS NOTES
Pt complained of chest pain  and Nitro given. NP aware and will continue to monitor. Pain relieved after 1 Nitro.

## 2024-05-18 NOTE — PROGRESS NOTES
[facility] CARDIOLOGY PROGRESS NOTE    5/18/2024 7:48 AM    Admit Date: 5/15/2024        Subjective:   Stable overnight without angina, CHF, or palpitations. Vitals stable and controlled. No other complaints overnight. Tolerating meds well.       Objective:      Vitals:    05/18/24 0003 05/18/24 0400 05/18/24 0521 05/18/24 0637   BP:  126/69 106/63    Pulse: 61 54 58    Resp:  14     Temp:  97.7 °F (36.5 °C)     TempSrc:  Oral     SpO2:  99%     Weight:    94.7 kg (208 lb 12.8 oz)   Height:           Physical Exam:  Neck- supple, no JVD  CV- regular rate and rhythm no MRG  Lung- clear bilaterally  Abd- soft, nontender, nondistended  Ext- no edema  Skin- warm and dry    Data Review:   Pertinent lab and noninvasive imaging over the past 24 hours reviewed and evaluated.    Recent Labs     05/15/24  1518 05/15/24  1605 05/15/24  2111 05/16/24  0220 05/17/24  0327 05/18/24  0113     --   --    < > 142 142   K 3.4*  --   --    < > 4.0 3.7   MG 1.9  --   --   --   --  2.0   BUN 8  --   --    < > 10 8   WBC 5.1  --  5.0  --  5.1 4.8   HGB 12.8*  --  12.9*  --  12.4* 11.5*   HCT 38.3*  --  39.7*  --  37.9* 35.8*     --  208  --  195 179   INR  --  2.0 1.8  --   --   --     < > = values in this interval not displayed.     Echo 5/15/24:  Left Ventricle Normal left ventricular systolic function with a visually estimated EF of 55 - 60%. Left ventricle size is normal. Normal wall thickness. Normal wall motion. Normal diastolic function.   Left Atrium Left atrium is dilated.   Right Ventricle Right ventricle size is normal. Normal systolic function.   Right Atrium Right atrium size is normal.   Aortic Valve Valve structure is normal. No regurgitation. No stenosis.   Mitral Valve Valve structure is normal. No regurgitation. No stenosis noted.   Tricuspid Valve Valve structure is normal. Trace regurgitation. No stenosis noted.   Pulmonic Valve Valve structure is normal. No regurgitation. No stenosis noted.   Aorta

## 2024-05-19 ENCOUNTER — ANESTHESIA EVENT (OUTPATIENT)
Dept: SURGERY | Age: 74
End: 2024-05-19
Payer: MEDICARE

## 2024-05-19 LAB
ANION GAP SERPL CALC-SCNC: 10 MMOL/L (ref 9–18)
APTT PPP: 101.9 SEC (ref 23.3–37.4)
BASOPHILS # BLD: 0.1 K/UL (ref 0–0.2)
BASOPHILS NFR BLD: 1 % (ref 0–2)
BUN SERPL-MCNC: 11 MG/DL (ref 8–23)
CALCIUM SERPL-MCNC: 9 MG/DL (ref 8.8–10.2)
CHLORIDE SERPL-SCNC: 108 MMOL/L (ref 98–107)
CO2 SERPL-SCNC: 22 MMOL/L (ref 20–28)
CREAT SERPL-MCNC: 0.73 MG/DL (ref 0.8–1.3)
DIFFERENTIAL METHOD BLD: ABNORMAL
EOSINOPHIL # BLD: 0.2 K/UL (ref 0–0.8)
EOSINOPHIL NFR BLD: 4 % (ref 0.5–7.8)
ERYTHROCYTE [DISTWIDTH] IN BLOOD BY AUTOMATED COUNT: 12.7 % (ref 11.9–14.6)
GLUCOSE SERPL-MCNC: 107 MG/DL (ref 70–99)
HCT VFR BLD AUTO: 38.5 % (ref 41.1–50.3)
HGB BLD-MCNC: 12.2 G/DL (ref 13.6–17.2)
HISTORY CHECK: NORMAL
IMM GRANULOCYTES # BLD AUTO: 0 K/UL (ref 0–0.5)
IMM GRANULOCYTES NFR BLD AUTO: 0 % (ref 0–5)
LYMPHOCYTES # BLD: 1.2 K/UL (ref 0.5–4.6)
LYMPHOCYTES NFR BLD: 24 % (ref 13–44)
MAGNESIUM SERPL-MCNC: 2 MG/DL (ref 1.8–2.4)
MCH RBC QN AUTO: 32.7 PG (ref 26.1–32.9)
MCHC RBC AUTO-ENTMCNC: 31.7 G/DL (ref 31.4–35)
MCV RBC AUTO: 103.2 FL (ref 82–102)
MONOCYTES # BLD: 0.5 K/UL (ref 0.1–1.3)
MONOCYTES NFR BLD: 9 % (ref 4–12)
NEUTS SEG # BLD: 3.2 K/UL (ref 1.7–8.2)
NEUTS SEG NFR BLD: 62 % (ref 43–78)
NRBC # BLD: 0 K/UL (ref 0–0.2)
PLATELET # BLD AUTO: 202 K/UL (ref 150–450)
PMV BLD AUTO: 10.1 FL (ref 9.4–12.3)
POTASSIUM SERPL-SCNC: 4.2 MMOL/L (ref 3.5–5.1)
RBC # BLD AUTO: 3.73 M/UL (ref 4.23–5.6)
SODIUM SERPL-SCNC: 139 MMOL/L (ref 136–145)
WBC # BLD AUTO: 5.1 K/UL (ref 4.3–11.1)

## 2024-05-19 PROCEDURE — 6360000002 HC RX W HCPCS: Performed by: PHYSICIAN ASSISTANT

## 2024-05-19 PROCEDURE — 6370000000 HC RX 637 (ALT 250 FOR IP): Performed by: INTERNAL MEDICINE

## 2024-05-19 PROCEDURE — 86850 RBC ANTIBODY SCREEN: CPT

## 2024-05-19 PROCEDURE — 86901 BLOOD TYPING SEROLOGIC RH(D): CPT

## 2024-05-19 PROCEDURE — 6370000000 HC RX 637 (ALT 250 FOR IP): Performed by: PHYSICIAN ASSISTANT

## 2024-05-19 PROCEDURE — 2140000000 HC CCU INTERMEDIATE R&B

## 2024-05-19 PROCEDURE — 36415 COLL VENOUS BLD VENIPUNCTURE: CPT

## 2024-05-19 PROCEDURE — 2580000003 HC RX 258: Performed by: PHYSICIAN ASSISTANT

## 2024-05-19 PROCEDURE — 6360000002 HC RX W HCPCS: Performed by: CASE MANAGER/CARE COORDINATOR

## 2024-05-19 PROCEDURE — 80048 BASIC METABOLIC PNL TOTAL CA: CPT

## 2024-05-19 PROCEDURE — 85025 COMPLETE CBC W/AUTO DIFF WBC: CPT

## 2024-05-19 PROCEDURE — 83735 ASSAY OF MAGNESIUM: CPT

## 2024-05-19 PROCEDURE — 2700000000 HC OXYGEN THERAPY PER DAY

## 2024-05-19 PROCEDURE — 99232 SBSQ HOSP IP/OBS MODERATE 35: CPT | Performed by: INTERNAL MEDICINE

## 2024-05-19 PROCEDURE — 86900 BLOOD TYPING SEROLOGIC ABO: CPT

## 2024-05-19 PROCEDURE — 94760 N-INVAS EAR/PLS OXIMETRY 1: CPT

## 2024-05-19 PROCEDURE — 85730 THROMBOPLASTIN TIME PARTIAL: CPT

## 2024-05-19 PROCEDURE — 86923 COMPATIBILITY TEST ELECTRIC: CPT

## 2024-05-19 RX ORDER — NITROGLYCERIN 20 MG/100ML
5-200 INJECTION INTRAVENOUS CONTINUOUS
Status: DISCONTINUED | OUTPATIENT
Start: 2024-05-19 | End: 2024-05-21

## 2024-05-19 RX ADMIN — NITROGLYCERIN 10 MCG/MIN: 20 INJECTION INTRAVENOUS at 06:05

## 2024-05-19 RX ADMIN — TAMSULOSIN HYDROCHLORIDE 0.4 MG: 0.4 CAPSULE ORAL at 09:10

## 2024-05-19 RX ADMIN — METOPROLOL TARTRATE 25 MG: 25 TABLET, FILM COATED ORAL at 20:08

## 2024-05-19 RX ADMIN — NITROGLYCERIN 15 MCG/MIN: 20 INJECTION INTRAVENOUS at 06:11

## 2024-05-19 RX ADMIN — METOPROLOL TARTRATE 25 MG: 25 TABLET, FILM COATED ORAL at 09:10

## 2024-05-19 RX ADMIN — HEPARIN SODIUM 14 UNITS/KG/HR: 10000 INJECTION, SOLUTION INTRAVENOUS at 10:56

## 2024-05-19 RX ADMIN — ROSUVASTATIN CALCIUM 40 MG: 20 TABLET, COATED ORAL at 20:08

## 2024-05-19 RX ADMIN — NITROGLYCERIN 0.5 INCH: 20 OINTMENT TOPICAL at 03:20

## 2024-05-19 RX ADMIN — SODIUM CHLORIDE, PRESERVATIVE FREE 10 ML: 5 INJECTION INTRAVENOUS at 20:10

## 2024-05-19 RX ADMIN — AMIODARONE HYDROCHLORIDE 600 MG: 200 TABLET ORAL at 17:53

## 2024-05-19 RX ADMIN — NITROGLYCERIN 5 MCG/MIN: 20 INJECTION INTRAVENOUS at 04:45

## 2024-05-19 RX ADMIN — PANTOPRAZOLE SODIUM 40 MG: 40 TABLET, DELAYED RELEASE ORAL at 04:46

## 2024-05-19 RX ADMIN — ASPIRIN 81 MG: 81 TABLET, COATED ORAL at 09:10

## 2024-05-19 ASSESSMENT — PAIN SCALES - GENERAL
PAINLEVEL_OUTOF10: 0
PAINLEVEL_OUTOF10: 3
PAINLEVEL_OUTOF10: 4
PAINLEVEL_OUTOF10: 0
PAINLEVEL_OUTOF10: 2

## 2024-05-19 ASSESSMENT — PAIN DESCRIPTION - LOCATION: LOCATION: CHEST

## 2024-05-19 NOTE — ANESTHESIA PRE PROCEDURE
Department of Anesthesiology  Preprocedure Note       Name:  Arpit Weber   Age:  73 y.o.  :  1950                                          MRN:  [id number]         Date:  2024      Surgeon: Surgeon(s):  Jimbo Laird MD    Procedure: Procedure(s):  CORONARY ARTERY BYPASS GRAFTING/OR #4/PATIENT IS INPATIENT/ DR SEE REQUESTED    Medications prior to admission:   Prior to Admission medications    Medication Sig Start Date End Date Taking? Authorizing Provider   vitamin D (ERGOCALCIFEROL) 1.25 MG (42660 UT) CAPS capsule Take 1 capsule by mouth once a week 4/16/24 10/1/24  Andrew Bowling MD   rosuvastatin (CRESTOR) 10 MG tablet Take 1 tablet by mouth nightly 4/16/24 10/13/24  Andrew Bowling MD   rivaroxaban (XARELTO) 20 MG TABS tablet Take 1 tablet by mouth daily (with breakfast) 4/15/24   Andrew Bowling MD   clobetasol (TEMOVATE) 0.05 % ointment Apply topically 2 times daily 4/15/24   Andrew Bowling MD   tamsulosin (FLOMAX) 0.4 MG capsule Take 1 capsule by mouth daily 4/3/24 6/2/24  Andrew Bowling MD   tiZANidine (ZANAFLEX) 2 MG tablet Take 1 tablet by mouth nightly as needed (muscle spasms) 23   Andrew Bowling MD   aspirin 81 MG EC tablet Take 1 tablet by mouth daily    ProviderTanya MD   VITAMIN D, CHOLECALCIFEROL, PO Take by mouth    Tanya Navas MD   Omega-3 Fatty Acids (FISH OIL OMEGA-3 PO) Take by mouth    Tanya Navas MD   clobetasol (TEMOVATE) 0.05 % cream Apply topically 2 times daily 22   Automatic Reconciliation, Ar   esomeprazole (NEXIUM) 40 MG delayed release capsule Take 20 mg by mouth daily    Automatic Reconciliation, Ar       Current medications:    Current Facility-Administered Medications   Medication Dose Route Frequency Provider Last Rate Last Admin   • nitroGLYCERIN 200 mcg/mL in dextrose 5%  5-200 mcg/min IntraVENous Continuous Geri Davis, LIUDMILA - NP 9 mL/hr at 24 0623 30

## 2024-05-19 NOTE — PROGRESS NOTES
I have examined the patient, no changes in patient's medical status.  Necessity for procedure is still present and the H&P is still current.

## 2024-05-19 NOTE — PLAN OF CARE
0320 Pt complained of chest pain.  1 nitro given  0325- Chest pain relieved   /64 after administration   NP notified  Nitro gtt ordered(see orders)  Started nitro gtt @ 5mcg  Nitro paste taking off @0545    Complained of chest pain @ 0600  /68  0605-Nitro drip increased to 10mcg.  NP notified   BP-138/67  0610 -Increased nitro gtt to 15  /64  0612-Chest pain relieved

## 2024-05-19 NOTE — PROGRESS NOTES
[facility] CARDIOLOGY PROGRESS NOTE    5/19/2024 7:56 AM    Admit Date: 5/15/2024        Subjective:   Stable at present with no angina, CHF, or palpitations, but still having intermittent chest pain with movement in bed or walking around in the room.  Nitropaste converted to nitro drip and currently feeling better.  Vitals stable and controlled. No other complaints overnight. Tolerating meds well.  Titrating nitro to maximal tolerated dose and awaits CABG tomorrow.      Objective:      Vitals:    05/19/24 0437 05/19/24 0605 05/19/24 0610 05/19/24 0736   BP: 118/67 135/68 138/67 (!) 142/64   Pulse: 53 64 59 62   Resp:       Temp:       TempSrc:       SpO2:       Weight:       Height:           Physical Exam:  Neck- supple, no JVD  CV- regular rate and rhythm no MRG  Lung- clear bilaterally  Abd- soft, nontender, nondistended  Ext- no edema  Skin- warm and dry    Data Review:   Pertinent lab and noninvasive imaging over the past 24 hours reviewed and evaluated.    Recent Labs     05/18/24  0113 05/19/24  0535    139   K 3.7 4.2   MG 2.0 2.0   BUN 8 11   WBC 4.8 5.1   HGB 11.5* 12.2*   HCT 35.8* 38.5*    202     Echo 5/15/24:  Left Ventricle Normal left ventricular systolic function with a visually estimated EF of 55 - 60%. Left ventricle size is normal. Normal wall thickness. Normal wall motion. Normal diastolic function.   Left Atrium Left atrium is dilated.   Right Ventricle Right ventricle size is normal. Normal systolic function.   Right Atrium Right atrium size is normal.   Aortic Valve Valve structure is normal. No regurgitation. No stenosis.   Mitral Valve Valve structure is normal. No regurgitation. No stenosis noted.   Tricuspid Valve Valve structure is normal. Trace regurgitation. No stenosis noted.   Pulmonic Valve Valve structure is normal. No regurgitation. No stenosis noted.   Aorta Dilated ascending aorta. Ao ascending diameter is 4.0 cm.   IVC/Hepatic Veins IVC diameter is greater than 21

## 2024-05-20 ENCOUNTER — APPOINTMENT (OUTPATIENT)
Dept: GENERAL RADIOLOGY | Age: 74
DRG: 234 | End: 2024-05-20
Attending: INTERNAL MEDICINE
Payer: MEDICARE

## 2024-05-20 ENCOUNTER — ANESTHESIA (OUTPATIENT)
Dept: SURGERY | Age: 74
End: 2024-05-20
Payer: MEDICARE

## 2024-05-20 PROBLEM — Z99.11 ENCOUNTER FOR WEANING FROM VENTILATOR (HCC): Status: ACTIVE | Noted: 2024-05-20

## 2024-05-20 PROBLEM — R09.02 HYPOXEMIA: Status: ACTIVE | Noted: 2024-05-20

## 2024-05-20 PROBLEM — Z95.1 S/P CABG X 4: Status: ACTIVE | Noted: 2024-05-20

## 2024-05-20 PROBLEM — J98.11 ATELECTASIS: Status: ACTIVE | Noted: 2024-05-20

## 2024-05-20 PROBLEM — I25.10 CAD, MULTIPLE VESSEL: Status: ACTIVE | Noted: 2024-05-20

## 2024-05-20 LAB
ACT AVERAGE - AAVG: 111 SEC
ACT AVERAGE - AAVG: 488 SEC
ACT AVERAGE - AAVG: 514 SEC
ACT AVERAGE - AAVG: 523 SEC
ACT AVERAGE - AAVG: 553 SEC
ANION GAP BLD CALC-SCNC: ABNORMAL MMOL/L
ANION GAP SERPL CALC-SCNC: 10 MMOL/L (ref 9–18)
ANION GAP SERPL CALC-SCNC: 11 MMOL/L (ref 9–18)
APTT PPP: 28.9 SEC (ref 23.3–37.4)
ARTERIAL PATENCY WRIST A: ABNORMAL
BASE DEFICIT BLD-SCNC: 0.1 MMOL/L
BASE DEFICIT BLD-SCNC: 0.9 MMOL/L
BASE DEFICIT BLD-SCNC: 1.5 MMOL/L
BASE DEFICIT BLD-SCNC: 1.8 MMOL/L
BASE DEFICIT BLD-SCNC: 2.2 MMOL/L
BASE DEFICIT BLD-SCNC: 2.2 MMOL/L
BASE DEFICIT BLD-SCNC: 4 MMOL/L
BASE EXCESS BLD CALC-SCNC: 0.3 MMOL/L
BASELINE ACT: 133 SEC
BASOPHILS # BLD: 0.1 K/UL (ref 0–0.2)
BASOPHILS NFR BLD: 1 % (ref 0–2)
BDY SITE: ABNORMAL
BUN SERPL-MCNC: 10 MG/DL (ref 8–23)
BUN SERPL-MCNC: 9 MG/DL (ref 8–23)
CA-I BLD-MCNC: 1.12 MMOL/L (ref 1.12–1.32)
CA-I BLD-MCNC: 1.16 MMOL/L (ref 1.12–1.32)
CA-I BLD-MCNC: 1.18 MMOL/L (ref 1.12–1.32)
CA-I BLD-MCNC: 1.21 MMOL/L (ref 1.12–1.32)
CA-I BLD-MCNC: 1.22 MMOL/L (ref 1.12–1.32)
CA-I BLD-MCNC: 1.25 MMOL/L (ref 1.12–1.32)
CA-I BLD-MCNC: 1.33 MMOL/L (ref 1.12–1.32)
CALCIUM SERPL-MCNC: 8.7 MG/DL (ref 8.8–10.2)
CALCIUM SERPL-MCNC: 9.5 MG/DL (ref 8.8–10.2)
CHLORIDE SERPL-SCNC: 107 MMOL/L (ref 98–107)
CHLORIDE SERPL-SCNC: 109 MMOL/L (ref 98–107)
CO2 BLD-SCNC: 22 MMOL/L (ref 13–23)
CO2 BLD-SCNC: 23 MMOL/L (ref 13–23)
CO2 BLD-SCNC: 24 MMOL/L (ref 13–23)
CO2 SERPL-SCNC: 23 MMOL/L (ref 20–28)
CO2 SERPL-SCNC: 23 MMOL/L (ref 20–28)
CREAT SERPL-MCNC: 0.8 MG/DL (ref 0.8–1.3)
CREAT SERPL-MCNC: 0.8 MG/DL (ref 0.8–1.3)
DIFFERENTIAL METHOD BLD: ABNORMAL
EKG ATRIAL RATE: 70 BPM
EKG DIAGNOSIS: NORMAL
EKG P AXIS: 17 DEGREES
EKG P-R INTERVAL: 168 MS
EKG Q-T INTERVAL: 410 MS
EKG QRS DURATION: 94 MS
EKG QTC CALCULATION (BAZETT): 442 MS
EKG R AXIS: -3 DEGREES
EKG T AXIS: 14 DEGREES
EKG VENTRICULAR RATE: 70 BPM
EOSINOPHIL # BLD: 0.2 K/UL (ref 0–0.8)
EOSINOPHIL NFR BLD: 3 % (ref 0.5–7.8)
ERYTHROCYTE [DISTWIDTH] IN BLOOD BY AUTOMATED COUNT: 12.5 % (ref 11.9–14.6)
ERYTHROCYTE [DISTWIDTH] IN BLOOD BY AUTOMATED COUNT: 12.6 % (ref 11.9–14.6)
ERYTHROCYTE [DISTWIDTH] IN BLOOD BY AUTOMATED COUNT: 12.6 % (ref 11.9–14.6)
FIBRINOGEN PPP-MCNC: 254 MG/DL (ref 190–501)
GAS FLOW.O2 O2 DELIVERY SYS: ABNORMAL
GLUCOSE BLD STRIP.AUTO-MCNC: 108 MG/DL (ref 65–100)
GLUCOSE BLD STRIP.AUTO-MCNC: 110 MG/DL (ref 65–100)
GLUCOSE BLD STRIP.AUTO-MCNC: 111 MG/DL (ref 65–100)
GLUCOSE BLD STRIP.AUTO-MCNC: 120 MG/DL (ref 65–100)
GLUCOSE BLD STRIP.AUTO-MCNC: 122 MG/DL (ref 65–100)
GLUCOSE BLD STRIP.AUTO-MCNC: 123 MG/DL (ref 65–100)
GLUCOSE BLD STRIP.AUTO-MCNC: 129 MG/DL (ref 65–100)
GLUCOSE BLD STRIP.AUTO-MCNC: 130 MG/DL (ref 65–100)
GLUCOSE BLD STRIP.AUTO-MCNC: 132 MG/DL (ref 65–100)
GLUCOSE BLD STRIP.AUTO-MCNC: 135 MG/DL (ref 65–100)
GLUCOSE BLD STRIP.AUTO-MCNC: 139 MG/DL (ref 65–100)
GLUCOSE BLD STRIP.AUTO-MCNC: 149 MG/DL (ref 65–100)
GLUCOSE BLD STRIP.AUTO-MCNC: 150 MG/DL (ref 65–100)
GLUCOSE BLD STRIP.AUTO-MCNC: 157 MG/DL (ref 65–100)
GLUCOSE BLD STRIP.AUTO-MCNC: 159 MG/DL (ref 65–100)
GLUCOSE BLD STRIP.AUTO-MCNC: 182 MG/DL (ref 65–100)
GLUCOSE BLD STRIP.AUTO-MCNC: 198 MG/DL (ref 65–100)
GLUCOSE SERPL-MCNC: 110 MG/DL (ref 70–99)
GLUCOSE SERPL-MCNC: 135 MG/DL (ref 70–99)
HCO3 BLD-SCNC: 22.5 MMOL/L (ref 22–26)
HCO3 BLD-SCNC: 22.9 MMOL/L (ref 22–26)
HCO3 BLD-SCNC: 23.2 MMOL/L (ref 22–26)
HCO3 BLD-SCNC: 23.7 MMOL/L (ref 22–26)
HCO3 BLD-SCNC: 23.8 MMOL/L (ref 22–26)
HCO3 BLD-SCNC: 24.1 MMOL/L (ref 22–26)
HCO3 BLD-SCNC: 24.7 MMOL/L (ref 22–26)
HCO3 BLD-SCNC: 24.8 MMOL/L (ref 22–26)
HCT VFR BLD AUTO: 34.8 % (ref 41.1–50.3)
HCT VFR BLD AUTO: 36.6 % (ref 41.1–50.3)
HCT VFR BLD AUTO: 38.4 % (ref 41.1–50.3)
HEPARIN BOLUS-PATIENT: NORMAL UNITS
HEPARIN BOLUS-PUMP: 0 UNITS
HEPARIN BOLUS-TOTAL: NORMAL UNITS
HEPARIN REQUIRED-PATIENT: 334
HEPARIN REQUIRED-PATIENT: 334
HEPARIN REQUIRED-TOTAL: 385 UNITS
HEPARIN REQUIRED-TOTAL: 385 UNITS
HEPARIN TEST CONCENTRATE: 3 MG/KG
HEPARIN TEST CONCENTRATE: 3 MG/KG
HGB BLD-MCNC: 11.2 G/DL (ref 13.6–17.2)
HGB BLD-MCNC: 11.9 G/DL (ref 13.6–17.2)
HGB BLD-MCNC: 12.1 G/DL (ref 13.6–17.2)
IMM GRANULOCYTES # BLD AUTO: 0 K/UL (ref 0–0.5)
IMM GRANULOCYTES NFR BLD AUTO: 0 % (ref 0–5)
INR PPP: 1.2
INSPIRATION.DURATION SETTING TIME VENT: 0.9 SEC
IPAP/PIP/HIGH PEEP: 18
LYMPHOCYTES # BLD: 1.3 K/UL (ref 0.5–4.6)
LYMPHOCYTES NFR BLD: 22 % (ref 13–44)
MAGNESIUM SERPL-MCNC: 1.9 MG/DL (ref 1.8–2.4)
MAGNESIUM SERPL-MCNC: 2.2 MG/DL (ref 1.8–2.4)
MAGNESIUM SERPL-MCNC: 2.6 MG/DL (ref 1.8–2.4)
MCH RBC QN AUTO: 32.4 PG (ref 26.1–32.9)
MCH RBC QN AUTO: 33 PG (ref 26.1–32.9)
MCH RBC QN AUTO: 33.1 PG (ref 26.1–32.9)
MCHC RBC AUTO-ENTMCNC: 31.5 G/DL (ref 31.4–35)
MCHC RBC AUTO-ENTMCNC: 32.2 G/DL (ref 31.4–35)
MCHC RBC AUTO-ENTMCNC: 32.5 G/DL (ref 31.4–35)
MCV RBC AUTO: 101.7 FL (ref 82–102)
MCV RBC AUTO: 102.7 FL (ref 82–102)
MCV RBC AUTO: 102.9 FL (ref 82–102)
MONOCYTES # BLD: 0.5 K/UL (ref 0.1–1.3)
MONOCYTES NFR BLD: 9 % (ref 4–12)
NEUTS SEG # BLD: 3.9 K/UL (ref 1.7–8.2)
NEUTS SEG NFR BLD: 65 % (ref 43–78)
NRBC # BLD: 0 K/UL (ref 0–0.2)
O2/TOTAL GAS SETTING VFR VENT: 60 %
PAW @ MEAN EXP FLOW ON VENT: 11 CMH2O
PCO2 BLD: 28.9 MMHG (ref 35–45)
PCO2 BLD: 38.3 MMHG (ref 35–45)
PCO2 BLD: 39.5 MMHG (ref 35–45)
PCO2 BLD: 41.2 MMHG (ref 35–45)
PCO2 BLD: 44.8 MMHG (ref 35–45)
PCO2 BLD: 44.8 MMHG (ref 35–45)
PCO2 BLD: 46.8 MMHG (ref 35–45)
PCO2 BLD: 49.9 MMHG (ref 35–45)
PEEP RESPIRATORY: 8 CMH2O
PH BLD: 7.27 (ref 7.35–7.45)
PH BLD: 7.32 (ref 7.35–7.45)
PH BLD: 7.33 (ref 7.35–7.45)
PH BLD: 7.35 (ref 7.35–7.45)
PH BLD: 7.37 (ref 7.35–7.45)
PH BLD: 7.38 (ref 7.35–7.45)
PH BLD: 7.42 (ref 7.35–7.45)
PH BLD: 7.5 (ref 7.35–7.45)
PLATELET # BLD AUTO: 120 K/UL (ref 150–450)
PLATELET # BLD AUTO: 139 K/UL (ref 150–450)
PLATELET # BLD AUTO: 188 K/UL (ref 150–450)
PMV BLD AUTO: 10.2 FL (ref 9.4–12.3)
PMV BLD AUTO: 10.4 FL (ref 9.4–12.3)
PMV BLD AUTO: 9.9 FL (ref 9.4–12.3)
PO2 BLD: 159 MMHG (ref 75–100)
PO2 BLD: 180 MMHG (ref 75–100)
PO2 BLD: 230 MMHG (ref 75–100)
PO2 BLD: 270 MMHG (ref 75–100)
PO2 BLD: 337 MMHG (ref 75–100)
PO2 BLD: 362 MMHG (ref 75–100)
PO2 BLD: 478 MMHG (ref 75–100)
PO2 BLD: 485 MMHG (ref 75–100)
POTASSIUM BLD-SCNC: 3.9 MMOL/L (ref 3.5–5.1)
POTASSIUM BLD-SCNC: 4 MMOL/L (ref 3.5–5.1)
POTASSIUM BLD-SCNC: 4.2 MMOL/L (ref 3.5–5.1)
POTASSIUM BLD-SCNC: 4.3 MMOL/L (ref 3.5–5.1)
POTASSIUM BLD-SCNC: 4.4 MMOL/L (ref 3.5–5.1)
POTASSIUM BLD-SCNC: 4.7 MMOL/L (ref 3.5–5.1)
POTASSIUM BLD-SCNC: 4.8 MMOL/L (ref 3.5–5.1)
POTASSIUM SERPL-SCNC: 3.9 MMOL/L (ref 3.5–5.1)
POTASSIUM SERPL-SCNC: 4 MMOL/L (ref 3.5–5.1)
POTASSIUM SERPL-SCNC: 4.4 MMOL/L (ref 3.5–5.1)
PRESSURE SUPPORT SETTING VENT: 8 CMH2O
PROJECTED HEPARIN CONCENTATION: 3 MG/KG
PROTAMINE DOSE-PUMP: 45 MG
PROTAMINE DOSE-PUMP: 45 MG
PROTAMINE DOSE-TOTAL: 339 MG
PROTAMINE DOSE-TOTAL: 339 MG
PROTHROMBIN TIME: 16.3 SEC (ref 11.3–14.9)
RBC # BLD AUTO: 3.39 M/UL (ref 4.23–5.6)
RBC # BLD AUTO: 3.6 M/UL (ref 4.23–5.6)
RBC # BLD AUTO: 3.73 M/UL (ref 4.23–5.6)
RESPIRATORY RATE, POC: 18 (ref 5–40)
SAO2 % BLD: 100 %
SAO2 % BLD: 99.3 % (ref 95–98)
SERVICE CMNT-IMP: ABNORMAL
SLOPE: 84
SODIUM BLD-SCNC: 138 MMOL/L (ref 136–145)
SODIUM BLD-SCNC: 139 MMOL/L (ref 136–145)
SODIUM BLD-SCNC: 139 MMOL/L (ref 136–145)
SODIUM BLD-SCNC: 140 MMOL/L (ref 136–145)
SODIUM BLD-SCNC: 140 MMOL/L (ref 136–145)
SODIUM BLD-SCNC: 141 MMOL/L (ref 136–145)
SODIUM BLD-SCNC: 141 MMOL/L (ref 136–145)
SODIUM SERPL-SCNC: 140 MMOL/L (ref 136–145)
SODIUM SERPL-SCNC: 143 MMOL/L (ref 136–145)
SPECIMEN SITE: ABNORMAL
SPECIMEN TYPE: ABNORMAL
VENTILATION MODE VENT: ABNORMAL
VT SETTING VENT: 450 ML
WBC # BLD AUTO: 15.4 K/UL (ref 4.3–11.1)
WBC # BLD AUTO: 15.7 K/UL (ref 4.3–11.1)
WBC # BLD AUTO: 5.9 K/UL (ref 4.3–11.1)

## 2024-05-20 PROCEDURE — 2720000010 HC SURG SUPPLY STERILE: Performed by: THORACIC SURGERY (CARDIOTHORACIC VASCULAR SURGERY)

## 2024-05-20 PROCEDURE — 2580000003 HC RX 258: Performed by: PHYSICIAN ASSISTANT

## 2024-05-20 PROCEDURE — 6360000002 HC RX W HCPCS: Performed by: PHYSICIAN ASSISTANT

## 2024-05-20 PROCEDURE — 021209W BYPASS CORONARY ARTERY, THREE ARTERIES FROM AORTA WITH AUTOLOGOUS VENOUS TISSUE, OPEN APPROACH: ICD-10-PCS | Performed by: THORACIC SURGERY (CARDIOTHORACIC VASCULAR SURGERY)

## 2024-05-20 PROCEDURE — 6370000000 HC RX 637 (ALT 250 FOR IP): Performed by: INTERNAL MEDICINE

## 2024-05-20 PROCEDURE — 85025 COMPLETE CBC W/AUTO DIFF WBC: CPT

## 2024-05-20 PROCEDURE — 06BQ4ZZ EXCISION OF LEFT SAPHENOUS VEIN, PERCUTANEOUS ENDOSCOPIC APPROACH: ICD-10-PCS | Performed by: THORACIC SURGERY (CARDIOTHORACIC VASCULAR SURGERY)

## 2024-05-20 PROCEDURE — 3700000000 HC ANESTHESIA ATTENDED CARE: Performed by: THORACIC SURGERY (CARDIOTHORACIC VASCULAR SURGERY)

## 2024-05-20 PROCEDURE — 3700000001 HC ADD 15 MINUTES (ANESTHESIA): Performed by: THORACIC SURGERY (CARDIOTHORACIC VASCULAR SURGERY)

## 2024-05-20 PROCEDURE — 6370000000 HC RX 637 (ALT 250 FOR IP): Performed by: PHYSICIAN ASSISTANT

## 2024-05-20 PROCEDURE — 6360000002 HC RX W HCPCS: Performed by: NURSE ANESTHETIST, CERTIFIED REGISTERED

## 2024-05-20 PROCEDURE — 84295 ASSAY OF SERUM SODIUM: CPT

## 2024-05-20 PROCEDURE — P9047 ALBUMIN (HUMAN), 25%, 50ML: HCPCS

## 2024-05-20 PROCEDURE — 82962 GLUCOSE BLOOD TEST: CPT

## 2024-05-20 PROCEDURE — 6360000002 HC RX W HCPCS: Performed by: THORACIC SURGERY (CARDIOTHORACIC VASCULAR SURGERY)

## 2024-05-20 PROCEDURE — 03HC33Z INSERTION OF INFUSION DEVICE INTO LEFT RADIAL ARTERY, PERCUTANEOUS APPROACH: ICD-10-PCS | Performed by: ANESTHESIOLOGY

## 2024-05-20 PROCEDURE — C1760 CLOSURE DEV, VASC: HCPCS | Performed by: THORACIC SURGERY (CARDIOTHORACIC VASCULAR SURGERY)

## 2024-05-20 PROCEDURE — 6360000002 HC RX W HCPCS

## 2024-05-20 PROCEDURE — 80048 BASIC METABOLIC PNL TOTAL CA: CPT

## 2024-05-20 PROCEDURE — C1713 ANCHOR/SCREW BN/BN,TIS/BN: HCPCS | Performed by: THORACIC SURGERY (CARDIOTHORACIC VASCULAR SURGERY)

## 2024-05-20 PROCEDURE — 99232 SBSQ HOSP IP/OBS MODERATE 35: CPT | Performed by: INTERNAL MEDICINE

## 2024-05-20 PROCEDURE — 36415 COLL VENOUS BLD VENIPUNCTURE: CPT

## 2024-05-20 PROCEDURE — C1751 CATH, INF, PER/CENT/MIDLINE: HCPCS | Performed by: THORACIC SURGERY (CARDIOTHORACIC VASCULAR SURGERY)

## 2024-05-20 PROCEDURE — 5A1935Z RESPIRATORY VENTILATION, LESS THAN 24 CONSECUTIVE HOURS: ICD-10-PCS | Performed by: ANESTHESIOLOGY

## 2024-05-20 PROCEDURE — 6370000000 HC RX 637 (ALT 250 FOR IP): Performed by: THORACIC SURGERY (CARDIOTHORACIC VASCULAR SURGERY)

## 2024-05-20 PROCEDURE — 2580000003 HC RX 258: Performed by: NURSE ANESTHETIST, CERTIFIED REGISTERED

## 2024-05-20 PROCEDURE — 03HY32Z INSERTION OF MONITORING DEVICE INTO UPPER ARTERY, PERCUTANEOUS APPROACH: ICD-10-PCS | Performed by: THORACIC SURGERY (CARDIOTHORACIC VASCULAR SURGERY)

## 2024-05-20 PROCEDURE — 2700000000 HC OXYGEN THERAPY PER DAY

## 2024-05-20 PROCEDURE — 85027 COMPLETE CBC AUTOMATED: CPT

## 2024-05-20 PROCEDURE — 3600000018 HC SURGERY OHS ADDTL 15MIN: Performed by: THORACIC SURGERY (CARDIOTHORACIC VASCULAR SURGERY)

## 2024-05-20 PROCEDURE — 2500000003 HC RX 250 WO HCPCS: Performed by: NURSE ANESTHETIST, CERTIFIED REGISTERED

## 2024-05-20 PROCEDURE — 85730 THROMBOPLASTIN TIME PARTIAL: CPT

## 2024-05-20 PROCEDURE — 85347 COAGULATION TIME ACTIVATED: CPT

## 2024-05-20 PROCEDURE — 93005 ELECTROCARDIOGRAM TRACING: CPT | Performed by: PHYSICIAN ASSISTANT

## 2024-05-20 PROCEDURE — 93010 ELECTROCARDIOGRAM REPORT: CPT | Performed by: INTERNAL MEDICINE

## 2024-05-20 PROCEDURE — 82330 ASSAY OF CALCIUM: CPT

## 2024-05-20 PROCEDURE — 2580000003 HC RX 258

## 2024-05-20 PROCEDURE — 37799 UNLISTED PX VASCULAR SURGERY: CPT

## 2024-05-20 PROCEDURE — 99223 1ST HOSP IP/OBS HIGH 75: CPT | Performed by: INTERNAL MEDICINE

## 2024-05-20 PROCEDURE — 85384 FIBRINOGEN ACTIVITY: CPT

## 2024-05-20 PROCEDURE — C1729 CATH, DRAINAGE: HCPCS | Performed by: THORACIC SURGERY (CARDIOTHORACIC VASCULAR SURGERY)

## 2024-05-20 PROCEDURE — 71045 X-RAY EXAM CHEST 1 VIEW: CPT

## 2024-05-20 PROCEDURE — C1769 GUIDE WIRE: HCPCS | Performed by: THORACIC SURGERY (CARDIOTHORACIC VASCULAR SURGERY)

## 2024-05-20 PROCEDURE — 2500000003 HC RX 250 WO HCPCS: Performed by: PHYSICIAN ASSISTANT

## 2024-05-20 PROCEDURE — 2100000001 HC CVICU R&B

## 2024-05-20 PROCEDURE — 94761 N-INVAS EAR/PLS OXIMETRY MLT: CPT

## 2024-05-20 PROCEDURE — 84132 ASSAY OF SERUM POTASSIUM: CPT

## 2024-05-20 PROCEDURE — 5A1221Z PERFORMANCE OF CARDIAC OUTPUT, CONTINUOUS: ICD-10-PCS | Performed by: THORACIC SURGERY (CARDIOTHORACIC VASCULAR SURGERY)

## 2024-05-20 PROCEDURE — 82803 BLOOD GASES ANY COMBINATION: CPT

## 2024-05-20 PROCEDURE — 82947 ASSAY GLUCOSE BLOOD QUANT: CPT

## 2024-05-20 PROCEDURE — 4A023N7 MEASUREMENT OF CARDIAC SAMPLING AND PRESSURE, LEFT HEART, PERCUTANEOUS APPROACH: ICD-10-PCS | Performed by: THORACIC SURGERY (CARDIOTHORACIC VASCULAR SURGERY)

## 2024-05-20 PROCEDURE — P9041 ALBUMIN (HUMAN),5%, 50ML: HCPCS | Performed by: NURSE ANESTHETIST, CERTIFIED REGISTERED

## 2024-05-20 PROCEDURE — 94002 VENT MGMT INPAT INIT DAY: CPT

## 2024-05-20 PROCEDURE — B24BZZ4 ULTRASONOGRAPHY OF HEART WITH AORTA, TRANSESOPHAGEAL: ICD-10-PCS | Performed by: THORACIC SURGERY (CARDIOTHORACIC VASCULAR SURGERY)

## 2024-05-20 PROCEDURE — 3600000008 HC SURGERY OHS BASE: Performed by: THORACIC SURGERY (CARDIOTHORACIC VASCULAR SURGERY)

## 2024-05-20 PROCEDURE — 05HM33Z INSERTION OF INFUSION DEVICE INTO RIGHT INTERNAL JUGULAR VEIN, PERCUTANEOUS APPROACH: ICD-10-PCS | Performed by: ANESTHESIOLOGY

## 2024-05-20 PROCEDURE — 02100Z9 BYPASS CORONARY ARTERY, ONE ARTERY FROM LEFT INTERNAL MAMMARY, OPEN APPROACH: ICD-10-PCS | Performed by: THORACIC SURGERY (CARDIOTHORACIC VASCULAR SURGERY)

## 2024-05-20 PROCEDURE — 85520 HEPARIN ASSAY: CPT

## 2024-05-20 PROCEDURE — 2500000003 HC RX 250 WO HCPCS

## 2024-05-20 PROCEDURE — 85610 PROTHROMBIN TIME: CPT

## 2024-05-20 PROCEDURE — 83735 ASSAY OF MAGNESIUM: CPT

## 2024-05-20 PROCEDURE — 2580000003 HC RX 258: Performed by: THORACIC SURGERY (CARDIOTHORACIC VASCULAR SURGERY)

## 2024-05-20 PROCEDURE — 2709999900 HC NON-CHARGEABLE SUPPLY: Performed by: THORACIC SURGERY (CARDIOTHORACIC VASCULAR SURGERY)

## 2024-05-20 PROCEDURE — 85530 HEPARIN-PROTAMINE TOLERANCE: CPT

## 2024-05-20 RX ORDER — CHLORHEXIDINE GLUCONATE ORAL RINSE 1.2 MG/ML
10 SOLUTION DENTAL 2 TIMES DAILY
Status: DISCONTINUED | OUTPATIENT
Start: 2024-05-20 | End: 2024-05-21

## 2024-05-20 RX ORDER — MIDAZOLAM HYDROCHLORIDE 2 MG/2ML
1 INJECTION, SOLUTION INTRAMUSCULAR; INTRAVENOUS
Status: DISCONTINUED | OUTPATIENT
Start: 2024-05-20 | End: 2024-05-21

## 2024-05-20 RX ORDER — MORPHINE SULFATE 4 MG/ML
4 INJECTION, SOLUTION INTRAMUSCULAR; INTRAVENOUS
Status: DISCONTINUED | OUTPATIENT
Start: 2024-05-20 | End: 2024-05-21

## 2024-05-20 RX ORDER — EPHEDRINE SULFATE 5 MG/ML
INJECTION INTRAVENOUS PRN
Status: DISCONTINUED | OUTPATIENT
Start: 2024-05-20 | End: 2024-05-20 | Stop reason: SDUPTHER

## 2024-05-20 RX ORDER — SODIUM CHLORIDE 0.9 % (FLUSH) 0.9 %
5-40 SYRINGE (ML) INJECTION EVERY 12 HOURS SCHEDULED
Status: DISCONTINUED | OUTPATIENT
Start: 2024-05-20 | End: 2024-05-21

## 2024-05-20 RX ORDER — ASPIRIN 81 MG/1
81 TABLET ORAL DAILY
Status: DISCONTINUED | OUTPATIENT
Start: 2024-05-21 | End: 2024-05-24 | Stop reason: HOSPADM

## 2024-05-20 RX ORDER — SODIUM CHLORIDE 0.9 % (FLUSH) 0.9 %
5-40 SYRINGE (ML) INJECTION PRN
Status: DISCONTINUED | OUTPATIENT
Start: 2024-05-20 | End: 2024-05-21

## 2024-05-20 RX ORDER — SODIUM CHLORIDE 9 MG/ML
INJECTION, SOLUTION INTRAVENOUS PRN
Status: DISCONTINUED | OUTPATIENT
Start: 2024-05-20 | End: 2024-05-21

## 2024-05-20 RX ORDER — VECURONIUM BROMIDE 1 MG/ML
INJECTION, POWDER, LYOPHILIZED, FOR SOLUTION INTRAVENOUS PRN
Status: DISCONTINUED | OUTPATIENT
Start: 2024-05-20 | End: 2024-05-20 | Stop reason: SDUPTHER

## 2024-05-20 RX ORDER — DEXMEDETOMIDINE HYDROCHLORIDE 4 UG/ML
.1-1.5 INJECTION, SOLUTION INTRAVENOUS CONTINUOUS
Status: DISCONTINUED | OUTPATIENT
Start: 2024-05-20 | End: 2024-05-21

## 2024-05-20 RX ORDER — SODIUM CHLORIDE 9 MG/ML
INJECTION, SOLUTION INTRAVENOUS CONTINUOUS PRN
Status: DISCONTINUED | OUTPATIENT
Start: 2024-05-20 | End: 2024-05-20 | Stop reason: SDUPTHER

## 2024-05-20 RX ORDER — ROCURONIUM BROMIDE 10 MG/ML
INJECTION, SOLUTION INTRAVENOUS PRN
Status: DISCONTINUED | OUTPATIENT
Start: 2024-05-20 | End: 2024-05-20 | Stop reason: SDUPTHER

## 2024-05-20 RX ORDER — ACETAMINOPHEN 325 MG/1
650 TABLET ORAL EVERY 4 HOURS PRN
Status: DISCONTINUED | OUTPATIENT
Start: 2024-05-20 | End: 2024-05-21

## 2024-05-20 RX ORDER — SODIUM CHLORIDE, SODIUM LACTATE, POTASSIUM CHLORIDE, CALCIUM CHLORIDE 600; 310; 30; 20 MG/100ML; MG/100ML; MG/100ML; MG/100ML
INJECTION, SOLUTION INTRAVENOUS CONTINUOUS PRN
Status: DISCONTINUED | OUTPATIENT
Start: 2024-05-20 | End: 2024-05-20 | Stop reason: SDUPTHER

## 2024-05-20 RX ORDER — ALBUMIN, HUMAN INJ 5% 5 %
SOLUTION INTRAVENOUS PRN
Status: DISCONTINUED | OUTPATIENT
Start: 2024-05-20 | End: 2024-05-20 | Stop reason: SDUPTHER

## 2024-05-20 RX ORDER — AMIODARONE HYDROCHLORIDE 200 MG/1
200 TABLET ORAL 2 TIMES DAILY
Status: DISCONTINUED | OUTPATIENT
Start: 2024-05-20 | End: 2024-05-22

## 2024-05-20 RX ORDER — NITROGLYCERIN 20 MG/100ML
INJECTION INTRAVENOUS CONTINUOUS PRN
Status: DISCONTINUED | OUTPATIENT
Start: 2024-05-20 | End: 2024-05-20 | Stop reason: SDUPTHER

## 2024-05-20 RX ORDER — DEXTROSE MONOHYDRATE, SODIUM CHLORIDE, AND POTASSIUM CHLORIDE 50; 1.49; 4.5 G/1000ML; G/1000ML; G/1000ML
INJECTION, SOLUTION INTRAVENOUS CONTINUOUS
Status: DISCONTINUED | OUTPATIENT
Start: 2024-05-20 | End: 2024-05-21

## 2024-05-20 RX ORDER — FAMOTIDINE 20 MG/1
20 TABLET, FILM COATED ORAL 2 TIMES DAILY
Status: DISCONTINUED | OUTPATIENT
Start: 2024-05-20 | End: 2024-05-21

## 2024-05-20 RX ORDER — OXYCODONE HYDROCHLORIDE AND ACETAMINOPHEN 5; 325 MG/1; MG/1
1 TABLET ORAL EVERY 4 HOURS PRN
Status: DISCONTINUED | OUTPATIENT
Start: 2024-05-20 | End: 2024-05-21

## 2024-05-20 RX ORDER — ONDANSETRON 2 MG/ML
4 INJECTION INTRAMUSCULAR; INTRAVENOUS EVERY 4 HOURS PRN
Status: DISCONTINUED | OUTPATIENT
Start: 2024-05-20 | End: 2024-05-21

## 2024-05-20 RX ORDER — INSULIN LISPRO 100 [IU]/ML
0-6 INJECTION, SOLUTION INTRAVENOUS; SUBCUTANEOUS
Status: DISCONTINUED | OUTPATIENT
Start: 2024-05-21 | End: 2024-05-21

## 2024-05-20 RX ORDER — DEXTROSE MONOHYDRATE 100 MG/ML
INJECTION, SOLUTION INTRAVENOUS CONTINUOUS PRN
Status: DISCONTINUED | OUTPATIENT
Start: 2024-05-20 | End: 2024-05-21

## 2024-05-20 RX ORDER — KETOROLAC TROMETHAMINE 15 MG/ML
15 INJECTION, SOLUTION INTRAMUSCULAR; INTRAVENOUS EVERY 6 HOURS PRN
Status: COMPLETED | OUTPATIENT
Start: 2024-05-20 | End: 2024-05-21

## 2024-05-20 RX ORDER — MORPHINE SULFATE 10 MG/ML
INJECTION, SOLUTION INTRAMUSCULAR; INTRAVENOUS PRN
Status: DISCONTINUED | OUTPATIENT
Start: 2024-05-20 | End: 2024-05-20 | Stop reason: SDUPTHER

## 2024-05-20 RX ORDER — INSULIN LISPRO 100 [IU]/ML
0-6 INJECTION, SOLUTION INTRAVENOUS; SUBCUTANEOUS NIGHTLY
Status: DISCONTINUED | OUTPATIENT
Start: 2024-05-21 | End: 2024-05-21

## 2024-05-20 RX ORDER — MIDAZOLAM HYDROCHLORIDE 1 MG/ML
INJECTION INTRAMUSCULAR; INTRAVENOUS PRN
Status: DISCONTINUED | OUTPATIENT
Start: 2024-05-20 | End: 2024-05-20 | Stop reason: SDUPTHER

## 2024-05-20 RX ORDER — ATORVASTATIN CALCIUM 80 MG/1
80 TABLET, FILM COATED ORAL NIGHTLY
Status: DISCONTINUED | OUTPATIENT
Start: 2024-05-20 | End: 2024-05-21

## 2024-05-20 RX ORDER — MORPHINE SULFATE 4 MG/ML
3 INJECTION, SOLUTION INTRAMUSCULAR; INTRAVENOUS
Status: DISCONTINUED | OUTPATIENT
Start: 2024-05-20 | End: 2024-05-21

## 2024-05-20 RX ORDER — MAGNESIUM SULFATE 1 G/100ML
1000 INJECTION INTRAVENOUS PRN
Status: DISCONTINUED | OUTPATIENT
Start: 2024-05-20 | End: 2024-05-21

## 2024-05-20 RX ORDER — FAMOTIDINE 20 MG/1
20 TABLET, FILM COATED ORAL 2 TIMES DAILY
Status: DISCONTINUED | OUTPATIENT
Start: 2024-05-20 | End: 2024-05-20

## 2024-05-20 RX ORDER — FENTANYL CITRATE 0.05 MG/ML
INJECTION, SOLUTION INTRAMUSCULAR; INTRAVENOUS PRN
Status: DISCONTINUED | OUTPATIENT
Start: 2024-05-20 | End: 2024-05-20 | Stop reason: SDUPTHER

## 2024-05-20 RX ORDER — DEXMEDETOMIDINE HYDROCHLORIDE 4 UG/ML
INJECTION, SOLUTION INTRAVENOUS CONTINUOUS PRN
Status: DISCONTINUED | OUTPATIENT
Start: 2024-05-20 | End: 2024-05-20 | Stop reason: SDUPTHER

## 2024-05-20 RX ORDER — LIDOCAINE HYDROCHLORIDE 20 MG/ML
INJECTION, SOLUTION EPIDURAL; INFILTRATION; INTRACAUDAL; PERINEURAL PRN
Status: DISCONTINUED | OUTPATIENT
Start: 2024-05-20 | End: 2024-05-20 | Stop reason: SDUPTHER

## 2024-05-20 RX ORDER — HEPARIN SODIUM 1000 [USP'U]/ML
INJECTION, SOLUTION INTRAVENOUS; SUBCUTANEOUS PRN
Status: DISCONTINUED | OUTPATIENT
Start: 2024-05-20 | End: 2024-05-20 | Stop reason: SDUPTHER

## 2024-05-20 RX ORDER — NITROGLYCERIN 20 MG/100ML
0-100 INJECTION INTRAVENOUS CONTINUOUS PRN
Status: DISCONTINUED | OUTPATIENT
Start: 2024-05-20 | End: 2024-05-21

## 2024-05-20 RX ORDER — ETOMIDATE 2 MG/ML
INJECTION INTRAVENOUS PRN
Status: DISCONTINUED | OUTPATIENT
Start: 2024-05-20 | End: 2024-05-20 | Stop reason: SDUPTHER

## 2024-05-20 RX ORDER — PAPAVERINE HYDROCHLORIDE 30 MG/ML
INJECTION INTRAMUSCULAR; INTRAVENOUS PRN
Status: DISCONTINUED | OUTPATIENT
Start: 2024-05-20 | End: 2024-05-20 | Stop reason: ALTCHOICE

## 2024-05-20 RX ORDER — POTASSIUM CHLORIDE 29.8 MG/ML
20 INJECTION INTRAVENOUS PRN
Status: DISCONTINUED | OUTPATIENT
Start: 2024-05-20 | End: 2024-05-21

## 2024-05-20 RX ORDER — NICARDIPINE HYDROCHLORIDE 2.5 MG/ML
INJECTION INTRAVENOUS PRN
Status: DISCONTINUED | OUTPATIENT
Start: 2024-05-20 | End: 2024-05-20 | Stop reason: SDUPTHER

## 2024-05-20 RX ADMIN — ETOMIDATE 16 MG: 2 INJECTION, SOLUTION INTRAVENOUS at 09:28

## 2024-05-20 RX ADMIN — MIDAZOLAM 2 MG: 1 INJECTION INTRAMUSCULAR; INTRAVENOUS at 09:15

## 2024-05-20 RX ADMIN — ROCURONIUM BROMIDE 30 MG: 10 INJECTION, SOLUTION INTRAVENOUS at 13:13

## 2024-05-20 RX ADMIN — EPINEPHRINE 0.01 MCG/KG/MIN: 1 INJECTION INTRAMUSCULAR; INTRAVENOUS; SUBCUTANEOUS at 13:42

## 2024-05-20 RX ADMIN — METOPROLOL TARTRATE 25 MG: 25 TABLET, FILM COATED ORAL at 06:18

## 2024-05-20 RX ADMIN — FENTANYL CITRATE 150 MCG: 0.05 INJECTION, SOLUTION INTRAMUSCULAR; INTRAVENOUS at 10:25

## 2024-05-20 RX ADMIN — VECURONIUM BROMIDE 4 MG: 1 INJECTION, POWDER, LYOPHILIZED, FOR SOLUTION INTRAVENOUS at 10:20

## 2024-05-20 RX ADMIN — DEXTROSE MONOHYDRATE, SODIUM CHLORIDE, AND POTASSIUM CHLORIDE: 50; 4.5; 1.49 INJECTION, SOLUTION INTRAVENOUS at 15:33

## 2024-05-20 RX ADMIN — KETOROLAC TROMETHAMINE 15 MG: 15 INJECTION, SOLUTION INTRAMUSCULAR; INTRAVENOUS at 17:18

## 2024-05-20 RX ADMIN — HEPARIN SODIUM 40000 UNITS: 1000 INJECTION INTRAVENOUS; SUBCUTANEOUS at 11:33

## 2024-05-20 RX ADMIN — FAMOTIDINE 20 MG: 20 TABLET, FILM COATED ORAL at 21:20

## 2024-05-20 RX ADMIN — FENTANYL CITRATE 250 MCG: 0.05 INJECTION, SOLUTION INTRAMUSCULAR; INTRAVENOUS at 09:28

## 2024-05-20 RX ADMIN — LIDOCAINE HYDROCHLORIDE 100 MG: 20 INJECTION, SOLUTION EPIDURAL; INFILTRATION; INTRACAUDAL; PERINEURAL at 09:28

## 2024-05-20 RX ADMIN — Medication 2000 MG: at 09:58

## 2024-05-20 RX ADMIN — SODIUM CHLORIDE, SODIUM LACTATE, POTASSIUM CHLORIDE, AND CALCIUM CHLORIDE: 600; 310; 30; 20 INJECTION, SOLUTION INTRAVENOUS at 09:10

## 2024-05-20 RX ADMIN — FENTANYL CITRATE 250 MCG: 0.05 INJECTION, SOLUTION INTRAMUSCULAR; INTRAVENOUS at 11:08

## 2024-05-20 RX ADMIN — PHENYLEPHRINE HYDROCHLORIDE 100 MCG: 10 INJECTION INTRAVENOUS at 11:56

## 2024-05-20 RX ADMIN — ROCURONIUM BROMIDE 20 MG: 10 INJECTION, SOLUTION INTRAVENOUS at 13:10

## 2024-05-20 RX ADMIN — ALBUMIN (HUMAN) 250 ML: 12.5 SOLUTION INTRAVENOUS at 14:24

## 2024-05-20 RX ADMIN — ASPIRIN 81 MG: 81 TABLET, COATED ORAL at 06:18

## 2024-05-20 RX ADMIN — MIDAZOLAM 3 MG: 1 INJECTION INTRAMUSCULAR; INTRAVENOUS at 10:32

## 2024-05-20 RX ADMIN — NITROGLYCERIN 20 MCG/MIN: 20 INJECTION INTRAVENOUS at 09:10

## 2024-05-20 RX ADMIN — TUBERCULIN PURIFIED PROTEIN DERIVATIVE 5 UNITS: 5 INJECTION, SOLUTION INTRADERMAL at 21:19

## 2024-05-20 RX ADMIN — DEXMEDETOMIDINE HYDROCHLORIDE 0.5 MCG/KG/HR: 4 INJECTION, SOLUTION INTRAVENOUS at 13:13

## 2024-05-20 RX ADMIN — MIDAZOLAM 2 MG: 1 INJECTION INTRAMUSCULAR; INTRAVENOUS at 13:13

## 2024-05-20 RX ADMIN — ATORVASTATIN CALCIUM 80 MG: 80 TABLET, FILM COATED ORAL at 21:20

## 2024-05-20 RX ADMIN — FENTANYL CITRATE 100 MCG: 0.05 INJECTION, SOLUTION INTRAMUSCULAR; INTRAVENOUS at 09:29

## 2024-05-20 RX ADMIN — AMINOCAPROIC ACID 1 G/HR: 250 INJECTION, SOLUTION INTRAVENOUS at 10:30

## 2024-05-20 RX ADMIN — NICARDIPINE HYDROCHLORIDE 0.1 MG: 25 INJECTION INTRAVENOUS at 11:43

## 2024-05-20 RX ADMIN — FENTANYL CITRATE 100 MCG: 0.05 INJECTION, SOLUTION INTRAMUSCULAR; INTRAVENOUS at 11:39

## 2024-05-20 RX ADMIN — SODIUM CHLORIDE, SODIUM LACTATE, POTASSIUM CHLORIDE, CALCIUM CHLORIDE: 600; 310; 30; 20 INJECTION, SOLUTION INTRAVENOUS at 09:45

## 2024-05-20 RX ADMIN — VECURONIUM BROMIDE 3 MG: 1 INJECTION, POWDER, LYOPHILIZED, FOR SOLUTION INTRAVENOUS at 11:39

## 2024-05-20 RX ADMIN — SODIUM CHLORIDE: 9 INJECTION, SOLUTION INTRAVENOUS at 09:45

## 2024-05-20 RX ADMIN — MORPHINE SULFATE 2 MG: 10 INJECTION, SOLUTION INTRAMUSCULAR; INTRAVENOUS at 14:00

## 2024-05-20 RX ADMIN — AMIODARONE HYDROCHLORIDE 600 MG: 200 TABLET ORAL at 06:18

## 2024-05-20 RX ADMIN — PHENYLEPHRINE HYDROCHLORIDE 50 MCG: 10 INJECTION INTRAVENOUS at 11:58

## 2024-05-20 RX ADMIN — PANTOPRAZOLE SODIUM 40 MG: 40 TABLET, DELAYED RELEASE ORAL at 06:18

## 2024-05-20 RX ADMIN — FENTANYL CITRATE 250 MCG: 0.05 INJECTION, SOLUTION INTRAMUSCULAR; INTRAVENOUS at 10:32

## 2024-05-20 RX ADMIN — Medication 2000 MG: at 13:55

## 2024-05-20 RX ADMIN — EPHEDRINE SULFATE 5 MG: 5 INJECTION INTRAVENOUS at 09:44

## 2024-05-20 RX ADMIN — VECURONIUM BROMIDE 3 MG: 1 INJECTION, POWDER, LYOPHILIZED, FOR SOLUTION INTRAVENOUS at 10:54

## 2024-05-20 RX ADMIN — CEFAZOLIN SODIUM 2000 MG: 100 INJECTION, POWDER, LYOPHILIZED, FOR SOLUTION INTRAVENOUS at 21:17

## 2024-05-20 RX ADMIN — Medication 3 AMPULE: at 06:20

## 2024-05-20 RX ADMIN — SODIUM CHLORIDE, PRESERVATIVE FREE 10 ML: 5 INJECTION INTRAVENOUS at 21:00

## 2024-05-20 RX ADMIN — Medication 1 AMPULE: at 21:20

## 2024-05-20 RX ADMIN — SODIUM CHLORIDE: 9 INJECTION, SOLUTION INTRAVENOUS at 15:44

## 2024-05-20 RX ADMIN — ROCURONIUM BROMIDE 50 MG: 10 INJECTION, SOLUTION INTRAVENOUS at 09:28

## 2024-05-20 ASSESSMENT — PULMONARY FUNCTION TESTS
PIF_VALUE: 18
PIF_VALUE: 16

## 2024-05-20 ASSESSMENT — PAIN SCALES - GENERAL
PAINLEVEL_OUTOF10: 0
PAINLEVEL_OUTOF10: 4
PAINLEVEL_OUTOF10: 6
PAINLEVEL_OUTOF10: 2
PAINLEVEL_OUTOF10: 0
PAINLEVEL_OUTOF10: 4

## 2024-05-20 ASSESSMENT — PAIN DESCRIPTION - LOCATION
LOCATION: CHEST
LOCATION: CHEST
LOCATION: CHEST;INCISION

## 2024-05-20 ASSESSMENT — PAIN DESCRIPTION - DESCRIPTORS
DESCRIPTORS: ACHING
DESCRIPTORS: BURNING

## 2024-05-20 ASSESSMENT — PAIN DESCRIPTION - PAIN TYPE: TYPE: ACUTE PAIN

## 2024-05-20 ASSESSMENT — PAIN - FUNCTIONAL ASSESSMENT: PAIN_FUNCTIONAL_ASSESSMENT: 0-10

## 2024-05-20 ASSESSMENT — PAIN DESCRIPTION - ORIENTATION
ORIENTATION: MID;ANTERIOR
ORIENTATION: MID

## 2024-05-20 NOTE — PROGRESS NOTES
Patient out from operating room and placed on the ventilator on documented settings. Patient is orally intubated with a # 8.0 ETT secured at the 22 cm ana at the lip. Breath sounds are diminished. Trachea is midline. Chest excursion is symmetric.   Patient is also Negative for cyanosis.  Patient has a Left Radial arterial line. Patient has 2 Chest tubes.  All alarms are set and audible. Resuscitation bag is  at the head of the bed.      Ventilator Settings  Mode FIO2 Rate Tidal Volume Pressure PEEP I:E Ratio     SIMV 60 18 450 8 cm H2O  8 1:2.7     Peak airway pressure:   18  Minute ventilation:   8.4        Blaze Devries RCP

## 2024-05-20 NOTE — PROGRESS NOTES
TRANSFER - OUT REPORT:    Verbal report given to Kiarra JUNIOR on Arpit Weber  being transferred to OR for ordered procedure       Report consisted of patient's Situation, Background, Assessment and   Recommendations(SBAR).     Information from the following report(s) Nurse Handoff Report, Adult Overview, MAR, Recent Results, and Cardiac Rhythm SR  was reviewed with the receiving nurse.           Lines:   Peripheral IV 05/15/24 Right Antecubital (Active)   Site Assessment Clean, dry & intact 05/20/24 0400   Line Status Infusing 05/20/24 0400   Line Care Connections checked and tightened 05/20/24 0400   Phlebitis Assessment No symptoms 05/20/24 0400   Infiltration Assessment 0 05/20/24 0400   Alcohol Cap Used Yes 05/20/24 0400   Dressing Status Clean, dry & intact 05/20/24 0400   Dressing Type Transparent 05/20/24 0400       Peripheral IV 05/17/24 Left;Proximal;Dorsal Forearm (Active)   Site Assessment Clean, dry & intact 05/20/24 0725   Line Status Infusing 05/20/24 0725   Line Care Connections checked and tightened 05/20/24 0725   Phlebitis Assessment No symptoms 05/20/24 0725   Infiltration Assessment 0 05/20/24 0725   Alcohol Cap Used No 05/20/24 0725   Dressing Status New dressing applied 05/20/24 0725   Dressing Type Transparent 05/20/24 0725   Dressing Intervention New 05/20/24 0400        Opportunity for questions and clarification was provided.      Patient transported with:  Monitor, O2 @ 2lpm, and Registered Nurse

## 2024-05-20 NOTE — PROGRESS NOTES
TIMEOUT performed prior to extubation on Arpit Weber with RT, primary RN, and charge RN present on 5/20/2024 at 16:45    Per anesthesia team on admission, patient's intubation was no acute fracture or dislocation    ABG results as follows:  No results found for: \"IBD\"      The patient is hemodynamically stable and can demonstrate the following on a consistent basis:  follow commands , elevate head off the pillow, nods appropriately to questions.      Dr. Smith notified of weaning parameters and order to extubate obtained.     Patient extubated by (RT name) Migel to (Type of O2 Device) airvo at (Amount of of O2) 40L 40% without complication.

## 2024-05-20 NOTE — PROGRESS NOTES
am  5/20/2024 6:14 AM    Admit Date: 5/15/2024    Admit Diagnosis: Chest pain [R07.9]      Subjective:    Patient : Patient awake and alert this morning with no specific complaints he is status post cath with apparent multivessel coronary artery disease and has planned CABG today    Objective:    BP (!) 97/57   Pulse 68   Temp 98.4 °F (36.9 °C)   Resp 18   Ht 1.778 m (5' 10\")   Wt 94.7 kg (208 lb 12.8 oz)   SpO2 99%   BMI 29.96 kg/m²     ROS:  General ROS: negative for - chills  Hematological and Lymphatic ROS: negative for - blood clots or jaundice  Respiratory ROS: no cough, shortness of breath, or wheezing  Cardiovascular ROS: negative for - chest pain  Gastrointestinal ROS: no abdominal pain, change in bowel habits, or black or bloody stools  Neurological ROS: no TIA or stroke symptoms    Physical Exam:      Physical Examination: General appearance - Appearance: alert, well appearing, and in no distress.   Neck/lymph - supple, no significant adenopathy  Chest/CV - clear to auscultation, no wheezes, rales or rhonchi, symmetric air entry  Heart - normal rate, regular rhythm, normal S1, S2, no murmurs, rubs, clicks or gallops  Abdomen/GI - soft, nontender, nondistended, no masses or organomegaly   Musculoskeletal - no joint tenderness, deformity or swelling  Extremities - peripheral pulses normal, no pedal edema, no clubbing or cyanosis  Skin - normal coloration and turgor, no rashes, no suspicious skin lesions noted    Current Facility-Administered Medications   Medication Dose Route Frequency    nitroGLYCERIN 200 mcg/mL in dextrose 5%  5-200 mcg/min IntraVENous Continuous    nitroGLYCERIN (NITROSTAT) SL tablet 0.4 mg  0.4 mg SubLINGual Q5 Min PRN    rosuvastatin (CRESTOR) tablet 40 mg  40 mg Oral Nightly    ceFAZolin (ANCEF) 2000 mg in sterile water 20 mL IV syringe  2,000 mg IntraVENous On Call to OR    amiodarone (CORDARONE) tablet 600 mg  600 mg Oral Q12H    alcohol 62% (NOZIN) nasal  3 ampule   3 ampule Nasal Once    metoprolol tartrate (LOPRESSOR) tablet 25 mg  25 mg Oral BID    aspirin EC tablet 81 mg  81 mg Oral Daily    pantoprazole (PROTONIX) tablet 40 mg  40 mg Oral QAM AC    tamsulosin (FLOMAX) capsule 0.4 mg  0.4 mg Oral Daily    tiZANidine (ZANAFLEX) tablet 2 mg  2 mg Oral Nightly PRN    sodium chloride flush 0.9 % injection 5-40 mL  5-40 mL IntraVENous 2 times per day    sodium chloride flush 0.9 % injection 5-40 mL  5-40 mL IntraVENous PRN    0.9 % sodium chloride infusion   IntraVENous PRN    magnesium sulfate 2000 mg in 50 mL IVPB premix  2,000 mg IntraVENous PRN    ondansetron (ZOFRAN-ODT) disintegrating tablet 4 mg  4 mg Oral Q8H PRN    Or    ondansetron (ZOFRAN) injection 4 mg  4 mg IntraVENous Q6H PRN    polyethylene glycol (GLYCOLAX) packet 17 g  17 g Oral Daily PRN    acetaminophen (TYLENOL) tablet 650 mg  650 mg Oral Q6H PRN    Or    acetaminophen (TYLENOL) suppository 650 mg  650 mg Rectal Q6H PRN    heparin (porcine) injection 4,000 Units  4,000 Units IntraVENous PRN    heparin (porcine) injection 2,000 Units  2,000 Units IntraVENous PRN       Data Review: data included in this note has been independently reviewed by the author     TELEMETRY: Normal sinus rhythm    Assessment/Plan:     Patient Active Problem List   Diagnosis    S/P partial colectomy    Colovesical fistula    High cholesterol    Osteoarthritis of both knees    Encounter for long-term (current) use of other medications    Colostomy status ([facility])    Ulcerative (chronic) proctitis without complications ([facility])    Anemia    NSAID long-term use    Personal history of COVID-19    Deep vein thrombosis (DVT) of distal vein of right lower extremity ([facility])    Chest pain    Atherosclerotic heart disease of native coronary artery with unstable angina pectoris (HCC)    NSTEMI (non-ST elevation myocardial infarction) ([facility])    Dyslipidemia    Precordial chest pain     PLAN  NSTEMI (non-ST elevation myocardial infarction) ([facility])-severe CAD

## 2024-05-20 NOTE — ANESTHESIA PROCEDURE NOTES
Arterial Line:    An arterial line was placed using surface landmarks, in the OR for the following indication(s): .    A 20 gauge (size) (length), Arrow (type) catheter was placed, Seldinger technique used, into the left radial artery, secured by Tegaderm and tape.  Anesthesia type: Local  Local infiltration: Injection    Events:  patient tolerated procedure well with no complications.5/20/2024 9:17 AM5/20/2024 9:21 AM  Resident/CRNA: Juanita Leonardo APRN - CRNA  Preanesthetic Checklist  Completed: patient identified, IV checked, site marked, risks and benefits discussed, surgical/procedural consents, equipment checked, pre-op evaluation, timeout performed, anesthesia consent given, oxygen available, monitors applied/VS acknowledged, fire risk safety assessment completed and verbalized and blood product R/B/A discussed and consented

## 2024-05-20 NOTE — ANESTHESIA POSTPROCEDURE EVALUATION
Department of Anesthesiology  Postprocedure Note    Patient: Arpit Weber  MRN: [id number]  YOB: 1950  Date of evaluation: 5/20/2024    Procedure Summary       Date: 05/20/24 Room / Location: [facility] MAIN OR  / [facility] MAIN OR    Anesthesia Start: 0910 Anesthesia Stop: 1451    Procedures:       CORONARY ARTERY BYPASS GRAFT (CABG X 4), LIMA; ENDOSCOPIC VEIN HARVEST, LEFT GREATER SAPHENOUS VEIN (Chest)      TRANSESOPHAGEAL ECHOCARDIOGRAM (Esophagus) Diagnosis:       Atherosclerotic heart disease of native coronary artery with unstable angina pectoris (HCC)      NSTEMI (non-ST elevation myocardial infarction) (HCC)      (Atherosclerotic heart disease of native coronary artery with unstable angina pectoris (HCC) [I25.110])      (NSTEMI (non-ST elevation myocardial infarction) (HCC) [I21.4])    Providers: Jimbo Laird MD Responsible Provider: Esme Gtz MD    Anesthesia Type: General ASA Status: 4            Anesthesia Type: General    Gennaro Phase I: Gennaro Score: 10    Gennaro Phase II: Gennaro Score: 10    Anesthesia Post Evaluation    Patient location during evaluation: ICU  Patient participation: complete - patient cannot participate  Level of consciousness: sedated and ventilated  Airway patency: patent  Cardiovascular status: vasoactive/inotropes  Respiratory status: ventilator and intubated  Hydration status: stable    No notable events documented.

## 2024-05-20 NOTE — ANESTHESIA PROCEDURE NOTES
Airway  Date/Time: 5/20/2024 9:31 AM  Urgency: elective    Airway not difficult    General Information and Staff    Patient location during procedure: OR  Resident/CRNA: Juanita Leonardo APRN - CRNA  Performed: resident/CRNA   Performed by: Juanita Leonardo APRN - CRNA  Authorized by: Esme Gtz MD      Indications and Patient Condition  Indications for airway management: anesthesia  Spontaneous Ventilation: absent  Sedation level: deep  Preoxygenated: yes  Patient position: sniffing  MILS not maintained throughout  Mask difficulty assessment: vent by bag mask + OA or adjuvant +/- NMBA    Final Airway Details  Final airway type: endotracheal airway      Successful airway: ETT  Cuffed: yes   Successful intubation technique: direct laryngoscopy  Facilitating devices/methods: intubating stylet  Endotracheal tube insertion site: oral  Blade: Mahin  Blade size: #4  ETT size (mm): 8.0  Cormack-Lehane Classification: grade I - full view of glottis  Placement verified by: chest auscultation and capnometry   Measured from: lips  Number of attempts at approach: 1  Ventilation between attempts: bag mask  Number of other approaches attempted: 0    no

## 2024-05-20 NOTE — PERIOP NOTE
TRANSFER - OUT REPORT:    Verbal report given to SANDI KO on Arpit Weber  being transferred to CVICU for routine progression of patient care       Report consisted of patient's Situation, Background, Assessment and   Recommendations(SBAR).     Information from the following report(s) Nurse Handoff Report and Surgery Report was reviewed with the receiving nurse.           Lines:   Peripheral IV 05/15/24 Right Antecubital (Active)   Site Assessment Clean, dry & intact 05/20/24 0400   Line Status Infusing 05/20/24 0400   Line Care Connections checked and tightened 05/20/24 0400   Phlebitis Assessment No symptoms 05/20/24 0400   Infiltration Assessment 0 05/20/24 0400   Alcohol Cap Used Yes 05/20/24 0400   Dressing Status Clean, dry & intact 05/20/24 0400   Dressing Type Transparent 05/20/24 0400       Peripheral IV 05/17/24 Left;Proximal;Dorsal Forearm (Active)   Site Assessment Clean, dry & intact 05/20/24 0725   Line Status Infusing 05/20/24 0725   Line Care Connections checked and tightened 05/20/24 0725   Phlebitis Assessment No symptoms 05/20/24 0725   Infiltration Assessment 0 05/20/24 0725   Alcohol Cap Used No 05/20/24 0725   Dressing Status New dressing applied 05/20/24 0725   Dressing Type Transparent 05/20/24 0725   Dressing Intervention New 05/20/24 0400       Arterial Line 05/20/24 Radial (Active)       Introducer 05/20/24 (Active)        Opportunity for questions and clarification was provided.      Patient transported with:  Monitor, O2 @ 15 lpm, and Registered Nurse, CRNA, ISAAC

## 2024-05-20 NOTE — ANESTHESIA PROCEDURE NOTES
Central Venous Line:    A central venous line was placed using ultrasound guidance, in the OR for the following indication(s): central venous access and CVP monitoring.5/20/2024 9:40 AM5/20/2024 9:50 AM    Sterility preparation included the following: provider used sterile gloves, gown, hat and mask and maximum sterile barriers used during central venous catheter insertion.    The patient was placed in Trendelenburg position.The right internal jugular vein was prepped.    The site was prepped with Chloraprep.  A 9 Fr (size), introducer double lumen was placed.    During the procedure, the following specific steps were taken: target vein identified, needle advanced into vein and blood aspirated and guidewire advanced into vein.    Intravenous verification was obtained by ultrasound, venous blood return and manometry.    Post insertion care included: all ports aspirated, all ports flushed easily, guidewire removed intact, line sutured in place and dressing applied.    During the procedure the patient experienced: patient tolerated procedure well with no complications and EBL < 5mL.      Outcomes: uncomplicated and patient tolerated procedure well  Anesthesia type: general..No    Additional notes:  Potential access sites were examined with ultrasound and the acceptable patent access site was selected (site noted above). The needle path and vessel access were visualized in real time using ultrasonography and an image of the wire in the vessel was recorded for permanent record. Vessel cannulation was confirmed with manometry. Catheter was advanced without difficulty and guidewire was removed. Blood return was confirmed in all lumens. Catheter was sutured and sterile tegaderm was applied. Patient tolerated the procedure well and chest x-ray to be obtained postoperatively.   Staffing  Performed: Anesthesiologist   Anesthesiologist: Esme Gtz MD  Performed by: Esme Gtz MD  Authorized by: Esme Gtz MD

## 2024-05-20 NOTE — ANESTHESIA PROCEDURE NOTES
Procedure Performed: LATONIA       Start Time:  5/20/2024 9:55 AM       End Time:   5/20/2024 2:30 PM    Preanesthesia Checklist:  Patient identified, IV assessed, risks and benefits discussed, monitors and equipment assessed, procedure being performed at surgeon's request and anesthesia consent obtained.    General Procedure Information  Diagnostic Indications for Echo:  assessment of surgical repair, hemodynamic monitoring and assessment of valve function  Physician Requesting Echo: Jimbo Laird MD  Location performed:  OR  Intubated  Bite block placed  Heart visualized  Probe Insertion:  Easy  Probe Type:  Mulitplane  Modalities:  2D only, color flow mapping, continuous wave Doppler and pulse wave Doppler    Echocardiographic and Doppler Measurements    Ventricles    Right Ventricle:  Cavity size normal.  Global function normal.    Left Ventricle:  Cavity size normal.  Global Function normal.  Ejection Fraction 50%.          Valves    Aortic Valve:  Annulus normal.  Stenosis not present.  Regurgitation none.  Leaflet motions normal.      Mitral Valve:  Annulus normal.  Stenosis not present.  Regurgitation mild.  Leaflet motions normal.          Aorta    Aortic Arch:  Size normal.    Descending Aorta:  Size normal.        Atria    Right Atrium:  Size normal.    Left Atrium:  Size normal.  Thrombus not present.  Left atrial appendage normal.      Septa    Atrial Septum:  Intra-atrial septal morphology normal.      Ventricular Septum:  Intra-ventricular septum morphology normal.          Other Findings  Pericardium:  normal      Anesthesia Information  Performed Personally  Anesthesiologist:  Esme Gtz MD      Echocardiogram Comments:       LATONIA used for basic intraop monitoring. LATONIA probe inserted first pass and atraumatically. Global LV and RV function appeared normal. No appreciable RWMA. Aortic and mitral valves appeared grossly normal with no significant stenosis. Mild mitral regurgitation noted. No

## 2024-05-20 NOTE — PROGRESS NOTES
---       Treat   - STM of AP   - splint? Thumb stabilization splint? Hand-based thumb spica?   TRANSFER - IN REPORT:    Verbal report received from Mary RAMIREZ on Arpit Weber  being received from CVOR for routine post-op      Report consisted of patient's Situation, Background, Assessment and   Recommendations(SBAR).     Information from the following report(s) Nurse Handoff Report was reviewed with the receiving nurse.    Opportunity for questions and clarification was provided.      Assessment completed upon patient's arrival to unit and care assumed.

## 2024-05-20 NOTE — CONSULTS
Cardiovascular ICU Consult Note: 5/20/2024  Arpit Weber                                                     Admission Date: 5/15/2024     The patient's chart is reviewed and the patient is discussed with the staff.    Subjective:     Patient is seen at the request of Jimbo Laird MD  for respiratory management status post cardiac surgery.  Patient had Chest pain.  He was admitted with non-STEMI and eventually had heart catheterization required cardiac surgery and he had CABG x 4 done by Dr. Laird today.  Currently is sedated in CV-ICU and orally intubated receiving  mechanical ventilation.  He has history of COVID last year and a few months later he had DVT and bilateral PE required pulmonary thrombectomy by IR.  Has been placed on Xarelto since that time.  This was held last week prior to surgery.    We have been asked to see in the CV-ICU for mechanical ventilation management and weaning.    Current Outpatient Medications   Medication Instructions    aspirin 81 mg, Oral, DAILY    clobetasol (TEMOVATE) 0.05 % cream Topical, 2 TIMES DAILY    clobetasol (TEMOVATE) 0.05 % ointment Topical, 2 TIMES DAILY    esomeprazole (NEXIUM) 20 mg, Oral, DAILY    Omega-3 Fatty Acids (FISH OIL OMEGA-3 PO) Oral    rivaroxaban (XARELTO) 20 mg, Oral, DAILY WITH BREAKFAST    rosuvastatin (CRESTOR) 10 mg, Oral, NIGHTLY    tamsulosin (FLOMAX) 0.4 mg, Oral, DAILY    tiZANidine (ZANAFLEX) 2 mg, Oral, NIGHTLY PRN    vitamin D (ERGOCALCIFEROL) 50,000 Units, Oral, WEEKLY    VITAMIN D, CHOLECALCIFEROL, PO Oral      Review of Systems: unable to determine due to intubated status  Past Medical History:   Diagnosis Date    Calculus of kidney     \"they don't bother me\" 5/18/21    Colostomy in place (HCC)     Colovesical fistula     COVID-19 vaccine series completed 02/25/2021    Pfizer     Diverticulitis     Encounter for long-term (current) use of other medications 4/22/2014    GERD (gastroesophageal reflux disease)     High  Transportation     Lack of Transportation (Medical): No     Lack of Transportation (Non-Medical): No   Physical Activity: Patient Declined (10/10/2023)    Exercise Vital Sign     Days of Exercise per Week: Patient declined     Minutes of Exercise per Session: Patient declined   Stress: Not on file   Social Connections: Not on file   Intimate Partner Violence: Not on file   Housing Stability: Low Risk  (5/16/2024)    Housing Stability Vital Sign     Unable to Pay for Housing in the Last Year: No     Number of Places Lived in the Last Year: 1     Unstable Housing in the Last Year: No     Family History   Problem Relation Age of Onset    Heart Disease Father         CABG & STENTS     No Known Problems Mother     Heart Attack Father      No Known Allergies  Objective:   Blood pressure (!) 94/43, pulse 59, temperature 97.7 °F (36.5 °C), temperature source Bladder, resp. rate 21, height 1.778 m (5' 10\"), weight 94.3 kg (208 lb), SpO2 99 %.   Intake/Output Summary (Last 24 hours) at 5/20/2024 1550  Last data filed at 5/20/2024 1546  Gross per 24 hour   Intake 3442.74 ml   Output 1800 ml   Net 1642.74 ml     Physical Exam:          Constitutional:  Sedated, orally intubated and mechanically ventilated.  EENMT:  Sclera clear, pupils equal, oral mucosa moist and orally intubated  Respiratory: CTA  Cardiovascular:  RRR with no M,G,R;  Gastrointestinal:  soft; no bowel sounds  Musculoskeletal:  warm with no cyanosis, no lower extremity edema.  [place] site left leg with ace wrap.  Sedated with no movements.  SKIN:  no jaundice or ecchymosis   Neurologic:  sedated but no gross neuro deficits  Psychiatric:  sedated and unable to assess at this time    CXR: I personally reviewed the images    5/20/2024  Linear atelectasis are noted        LINES:  ETT, reyes, swan connie, arterial line, chest tubes times 2 in epigastric area without air leak.    DRIPS:   Dose (mcg/kg/hr) Dexmedetomidine: 0.5 mcg/kg/hr  Dose (mcg/min) Epinephrine: 0

## 2024-05-20 NOTE — PROGRESS NOTES
4 Eyes Skin Assessment     NAME:  Arpit Weber  YOB: 1950  MEDICAL RECORD NUMBER:  [id number]    The patient is being assessed for  Post-Op Surgical    I agree that at least one RN has performed a thorough Head to Toe Skin Assessment on the patient. ALL assessment sites listed below have been assessed.      Areas assessed by both nurses:    Head, Face, Ears, Shoulders, Back, Chest, Arms, Elbows, Hands, Sacrum. Buttock, Coccyx, Ischium, Legs. Feet and Heels, and Under Medical Devices         Does the Patient have a Wound? No noted wound(s)       Giancarlo Prevention initiated by RN: Yes  Wound Care Orders initiated by RN: No    Pressure Injury (Stage 3,4, Unstageable, DTI, NWPT, and Complex wounds) if present, place Wound referral order by RN under : No    New Ostomies, if present place, Ostomy referral order under : No     Nurse 1 eSignature: Electronically signed by Lucie Head RN on 5/20/24 at 6:15 PM EDT    **SHARE this note so that the co-signing nurse can place an eSignature**    Nurse 2 eSignature: Electronically signed by ISRAEL MAR RN on 5/20/24 at 6:17 PM EDT

## 2024-05-20 NOTE — PROGRESS NOTES
Respiratory Mechanics completed and documented in Flowsheets.    Patient extubated to a 40L/40% HFNC. Patient is  able to communicate and is  negative for stridor. Breath sounds are diminished. No complications with extubation.     Blaze Devries, BEEP

## 2024-05-20 NOTE — BRIEF OP NOTE
Brief Postoperative Note      Patient: Arpit Weber  YOB: 1950  MRN: [id number]    Date of Procedure: 5/20/2024    Pre-Op Diagnosis Codes:     * Atherosclerotic heart disease of native coronary artery with unstable angina pectoris ([facility]) [I25.110]     * NSTEMI (non-ST elevation myocardial infarction) ([facility]) [I21.4]    Post-Op Diagnosis: Same       Procedure(s):  CORONARY ARTERY BYPASS GRAFT (CABG X 4), LIMA; ENDOSCOPIC VEIN HARVEST, LEFT GREATER SAPHENOUS VEIN  TRANSESOPHAGEAL ECHOCARDIOGRAM  SVG to the PDA, sequential SVG to DIAG and to OM, LIMA to the LAD    Surgeon(s):  Jimbo Laird MD    Assistant:  Surgical Assistant: Yamilex Pierre    Anesthesia: General    Estimated Blood Loss (mL): Minimal    Complications: None    Specimens:   * No specimens in log *    Implants:  * No implants in log *      Drains:   Chest Tube Left Pleural 1 (Active)   Chest Tube Airleak No 05/20/24 1442   Status Continuous Suction 05/20/24 1442   Suction -20 cm H2O 05/20/24 1442   Y Connector Used Yes 05/20/24 1442   Drainage Description Sanguinous 05/20/24 1442   Dressing Status New dressing applied 05/20/24 1442   Chest Tube Dressing Petroleum Jelly 05/20/24 1442   Site Assessment Clean, dry & intact 05/20/24 1442   Surrounding Skin Clean, dry & intact 05/20/24 1442   Output (ml) 105 ml 05/20/24 1600       Chest Tube Other (Comment) Mediastinal 2 (Active)   Chest Tube Airleak No 05/20/24 1442   Status Continuous Suction 05/20/24 1442   Suction -20 cm H2O 05/20/24 1442   Y Connector Used Yes 05/20/24 1442   Drainage Description Sanguinous 05/20/24 1442   Dressing Status New dressing applied 05/20/24 1442   Chest Tube Dressing Petroleum Jelly 05/20/24 1442   Site Assessment Clean, dry & intact 05/20/24 1442   Surrounding Skin Clean, dry & intact 05/20/24 1442       NG/OG/NJ/NE Tube Orogastric 18 fr Center mouth (Active)   Surrounding Skin Clean, dry & intact 05/20/24 1442   Securement device Tape 05/20/24 1442    Status Suction-low intermittent 05/20/24 1442   Placement Verified Respiratory Status 05/20/24 1442       Urinary Catheter 05/20/24 2 Way;Romano-Temperature (Active)   Catheter Indications Perioperative use for selected surgical procedures 05/20/24 1442   Site Assessment No urethral drainage 05/20/24 1442   Urine Color Yellow 05/20/24 1442   Collection Container Standard 05/20/24 1442   Securement Method Tape 05/20/24 1442   Catheter Care  Perineal wipes 05/20/24 1442   Catheter Best Practices  Drainage tube clipped to bed;Catheter secured to thigh;Tamper seal intact;Bag below bladder;Bag not on floor;Lack of dependent loop in tubing;Drainage bag less than half full 05/20/24 1442   Status Draining;Patent 05/20/24 1442   Output (mL) 175 mL 05/20/24 1600       Findings:  Infection Present At Time Of Surgery (PATOS) (choose all levels that have infection present):  No infection present  Other Findings:      Electronically signed by MEET BAGLEY MD on 5/20/2024 at 4:51 PM

## 2024-05-21 ENCOUNTER — APPOINTMENT (OUTPATIENT)
Dept: GENERAL RADIOLOGY | Age: 74
DRG: 234 | End: 2024-05-21
Attending: INTERNAL MEDICINE
Payer: MEDICARE

## 2024-05-21 LAB
ANION GAP SERPL CALC-SCNC: 7 MMOL/L (ref 9–18)
BUN SERPL-MCNC: 11 MG/DL (ref 8–23)
CALCIUM SERPL-MCNC: 8.5 MG/DL (ref 8.8–10.2)
CHLORIDE SERPL-SCNC: 111 MMOL/L (ref 98–107)
CO2 SERPL-SCNC: 21 MMOL/L (ref 20–28)
CREAT SERPL-MCNC: 0.78 MG/DL (ref 0.8–1.3)
EKG ATRIAL RATE: 72 BPM
EKG DIAGNOSIS: NORMAL
EKG P AXIS: 25 DEGREES
EKG P-R INTERVAL: 168 MS
EKG Q-T INTERVAL: 394 MS
EKG QRS DURATION: 92 MS
EKG QTC CALCULATION (BAZETT): 431 MS
EKG R AXIS: -11 DEGREES
EKG T AXIS: -2 DEGREES
EKG VENTRICULAR RATE: 72 BPM
ERYTHROCYTE [DISTWIDTH] IN BLOOD BY AUTOMATED COUNT: 12.6 % (ref 11.9–14.6)
ERYTHROCYTE [DISTWIDTH] IN BLOOD BY AUTOMATED COUNT: 12.7 % (ref 11.9–14.6)
GLUCOSE BLD STRIP.AUTO-MCNC: 105 MG/DL (ref 65–100)
GLUCOSE BLD STRIP.AUTO-MCNC: 106 MG/DL (ref 65–100)
GLUCOSE BLD STRIP.AUTO-MCNC: 115 MG/DL (ref 65–100)
GLUCOSE BLD STRIP.AUTO-MCNC: 119 MG/DL (ref 65–100)
GLUCOSE BLD STRIP.AUTO-MCNC: 122 MG/DL (ref 65–100)
GLUCOSE BLD STRIP.AUTO-MCNC: 123 MG/DL (ref 65–100)
GLUCOSE BLD STRIP.AUTO-MCNC: 123 MG/DL (ref 65–100)
GLUCOSE BLD STRIP.AUTO-MCNC: 127 MG/DL (ref 65–100)
GLUCOSE BLD STRIP.AUTO-MCNC: 131 MG/DL (ref 65–100)
GLUCOSE BLD STRIP.AUTO-MCNC: 98 MG/DL (ref 65–100)
GLUCOSE SERPL-MCNC: 117 MG/DL (ref 70–99)
HCT VFR BLD AUTO: 33.5 % (ref 41.1–50.3)
HCT VFR BLD AUTO: 34.1 % (ref 41.1–50.3)
HGB BLD-MCNC: 10.8 G/DL (ref 13.6–17.2)
HGB BLD-MCNC: 11.2 G/DL (ref 13.6–17.2)
MAGNESIUM SERPL-MCNC: 2.1 MG/DL (ref 1.8–2.4)
MAGNESIUM SERPL-MCNC: 2.1 MG/DL (ref 1.8–2.4)
MCH RBC QN AUTO: 32.9 PG (ref 26.1–32.9)
MCH RBC QN AUTO: 33.3 PG (ref 26.1–32.9)
MCHC RBC AUTO-ENTMCNC: 32.2 G/DL (ref 31.4–35)
MCHC RBC AUTO-ENTMCNC: 32.8 G/DL (ref 31.4–35)
MCV RBC AUTO: 101.5 FL (ref 82–102)
MCV RBC AUTO: 102.1 FL (ref 82–102)
MM INDURATION, POC: 0 MM (ref 0–5)
NRBC # BLD: 0 K/UL (ref 0–0.2)
NRBC # BLD: 0 K/UL (ref 0–0.2)
PLATELET # BLD AUTO: 142 K/UL (ref 150–450)
PLATELET # BLD AUTO: 152 K/UL (ref 150–450)
PMV BLD AUTO: 10.1 FL (ref 9.4–12.3)
PMV BLD AUTO: 10.4 FL (ref 9.4–12.3)
POTASSIUM SERPL-SCNC: 4.4 MMOL/L (ref 3.5–5.1)
POTASSIUM SERPL-SCNC: 4.4 MMOL/L (ref 3.5–5.1)
PPD, POC: NEGATIVE
RBC # BLD AUTO: 3.28 M/UL (ref 4.23–5.6)
RBC # BLD AUTO: 3.36 M/UL (ref 4.23–5.6)
SERVICE CMNT-IMP: ABNORMAL
SERVICE CMNT-IMP: NORMAL
SODIUM SERPL-SCNC: 139 MMOL/L (ref 136–145)
WBC # BLD AUTO: 11 K/UL (ref 4.3–11.1)
WBC # BLD AUTO: 12.1 K/UL (ref 4.3–11.1)

## 2024-05-21 PROCEDURE — 2580000003 HC RX 258: Performed by: PHYSICIAN ASSISTANT

## 2024-05-21 PROCEDURE — 99233 SBSQ HOSP IP/OBS HIGH 50: CPT | Performed by: INTERNAL MEDICINE

## 2024-05-21 PROCEDURE — 82962 GLUCOSE BLOOD TEST: CPT

## 2024-05-21 PROCEDURE — 6370000000 HC RX 637 (ALT 250 FOR IP): Performed by: THORACIC SURGERY (CARDIOTHORACIC VASCULAR SURGERY)

## 2024-05-21 PROCEDURE — 93005 ELECTROCARDIOGRAM TRACING: CPT | Performed by: PHYSICIAN ASSISTANT

## 2024-05-21 PROCEDURE — 93010 ELECTROCARDIOGRAM REPORT: CPT | Performed by: INTERNAL MEDICINE

## 2024-05-21 PROCEDURE — 94761 N-INVAS EAR/PLS OXIMETRY MLT: CPT

## 2024-05-21 PROCEDURE — 6360000002 HC RX W HCPCS: Performed by: PHYSICIAN ASSISTANT

## 2024-05-21 PROCEDURE — 97530 THERAPEUTIC ACTIVITIES: CPT

## 2024-05-21 PROCEDURE — 6370000000 HC RX 637 (ALT 250 FOR IP): Performed by: PHYSICIAN ASSISTANT

## 2024-05-21 PROCEDURE — P9041 ALBUMIN (HUMAN),5%, 50ML: HCPCS

## 2024-05-21 PROCEDURE — 71045 X-RAY EXAM CHEST 1 VIEW: CPT

## 2024-05-21 PROCEDURE — 83735 ASSAY OF MAGNESIUM: CPT

## 2024-05-21 PROCEDURE — 80048 BASIC METABOLIC PNL TOTAL CA: CPT

## 2024-05-21 PROCEDURE — 2140000001 HC CVICU INTERMEDIATE R&B

## 2024-05-21 PROCEDURE — 37799 UNLISTED PX VASCULAR SURGERY: CPT

## 2024-05-21 PROCEDURE — 99232 SBSQ HOSP IP/OBS MODERATE 35: CPT | Performed by: INTERNAL MEDICINE

## 2024-05-21 PROCEDURE — 97162 PT EVAL MOD COMPLEX 30 MIN: CPT

## 2024-05-21 PROCEDURE — 6360000002 HC RX W HCPCS: Performed by: THORACIC SURGERY (CARDIOTHORACIC VASCULAR SURGERY)

## 2024-05-21 PROCEDURE — 6360000002 HC RX W HCPCS

## 2024-05-21 RX ORDER — FUROSEMIDE 40 MG/1
40 TABLET ORAL DAILY
Status: COMPLETED | OUTPATIENT
Start: 2024-05-22 | End: 2024-05-24

## 2024-05-21 RX ORDER — INSULIN LISPRO 100 [IU]/ML
0-6 INJECTION, SOLUTION INTRAVENOUS; SUBCUTANEOUS NIGHTLY
Status: DISCONTINUED | OUTPATIENT
Start: 2024-05-21 | End: 2024-05-22

## 2024-05-21 RX ORDER — ALBUMIN, HUMAN INJ 5% 5 %
25 SOLUTION INTRAVENOUS ONCE
Status: COMPLETED | OUTPATIENT
Start: 2024-05-21 | End: 2024-05-21

## 2024-05-21 RX ORDER — SODIUM CHLORIDE 0.9 % (FLUSH) 0.9 %
5-40 SYRINGE (ML) INJECTION PRN
Status: DISCONTINUED | OUTPATIENT
Start: 2024-05-21 | End: 2024-05-24 | Stop reason: HOSPADM

## 2024-05-21 RX ORDER — POTASSIUM CHLORIDE 20 MEQ/1
40 TABLET, EXTENDED RELEASE ORAL 2 TIMES DAILY PRN
Status: DISCONTINUED | OUTPATIENT
Start: 2024-05-21 | End: 2024-05-24 | Stop reason: HOSPADM

## 2024-05-21 RX ORDER — LANOLIN ALCOHOL/MO/W.PET/CERES
400 CREAM (GRAM) TOPICAL 4 TIMES DAILY PRN
Status: DISCONTINUED | OUTPATIENT
Start: 2024-05-21 | End: 2024-05-24 | Stop reason: HOSPADM

## 2024-05-21 RX ORDER — DEXTROSE MONOHYDRATE 100 MG/ML
INJECTION, SOLUTION INTRAVENOUS CONTINUOUS PRN
Status: DISCONTINUED | OUTPATIENT
Start: 2024-05-21 | End: 2024-05-24 | Stop reason: HOSPADM

## 2024-05-21 RX ORDER — SENNA AND DOCUSATE SODIUM 50; 8.6 MG/1; MG/1
1 TABLET, FILM COATED ORAL 2 TIMES DAILY
Status: DISCONTINUED | OUTPATIENT
Start: 2024-05-21 | End: 2024-05-21

## 2024-05-21 RX ORDER — SODIUM CHLORIDE 0.9 % (FLUSH) 0.9 %
5-40 SYRINGE (ML) INJECTION EVERY 12 HOURS SCHEDULED
Status: DISCONTINUED | OUTPATIENT
Start: 2024-05-21 | End: 2024-05-24 | Stop reason: HOSPADM

## 2024-05-21 RX ORDER — ROSUVASTATIN CALCIUM 20 MG/1
20 TABLET, COATED ORAL NIGHTLY
Status: CANCELLED | OUTPATIENT
Start: 2024-05-21

## 2024-05-21 RX ORDER — POTASSIUM CHLORIDE 20 MEQ/1
20 TABLET, EXTENDED RELEASE ORAL 2 TIMES DAILY PRN
Status: DISCONTINUED | OUTPATIENT
Start: 2024-05-21 | End: 2024-05-24 | Stop reason: HOSPADM

## 2024-05-21 RX ORDER — ONDANSETRON 2 MG/ML
4 INJECTION INTRAMUSCULAR; INTRAVENOUS EVERY 6 HOURS PRN
Status: DISCONTINUED | OUTPATIENT
Start: 2024-05-21 | End: 2024-05-24 | Stop reason: HOSPADM

## 2024-05-21 RX ORDER — POLYETHYLENE GLYCOL 3350 17 G/17G
17 POWDER, FOR SOLUTION ORAL DAILY PRN
Status: DISCONTINUED | OUTPATIENT
Start: 2024-05-21 | End: 2024-05-24 | Stop reason: HOSPADM

## 2024-05-21 RX ORDER — ACETAMINOPHEN 325 MG/1
650 TABLET ORAL EVERY 4 HOURS PRN
Status: DISCONTINUED | OUTPATIENT
Start: 2024-05-21 | End: 2024-05-24 | Stop reason: HOSPADM

## 2024-05-21 RX ORDER — ALBUMIN, HUMAN INJ 5% 5 %
SOLUTION INTRAVENOUS
Status: COMPLETED
Start: 2024-05-21 | End: 2024-05-21

## 2024-05-21 RX ORDER — ONDANSETRON 4 MG/1
4 TABLET, ORALLY DISINTEGRATING ORAL EVERY 8 HOURS PRN
Status: DISCONTINUED | OUTPATIENT
Start: 2024-05-21 | End: 2024-05-24 | Stop reason: HOSPADM

## 2024-05-21 RX ORDER — FAMOTIDINE 20 MG/1
20 TABLET, FILM COATED ORAL 2 TIMES DAILY
Status: DISCONTINUED | OUTPATIENT
Start: 2024-05-21 | End: 2024-05-24 | Stop reason: HOSPADM

## 2024-05-21 RX ORDER — INSULIN LISPRO 100 [IU]/ML
0-12 INJECTION, SOLUTION INTRAVENOUS; SUBCUTANEOUS
Status: DISCONTINUED | OUTPATIENT
Start: 2024-05-21 | End: 2024-05-22

## 2024-05-21 RX ORDER — LANOLIN ALCOHOL/MO/W.PET/CERES
400 CREAM (GRAM) TOPICAL 3 TIMES DAILY PRN
Status: DISCONTINUED | OUTPATIENT
Start: 2024-05-21 | End: 2024-05-24 | Stop reason: HOSPADM

## 2024-05-21 RX ORDER — SENNA AND DOCUSATE SODIUM 50; 8.6 MG/1; MG/1
2 TABLET, FILM COATED ORAL 2 TIMES DAILY
Status: DISCONTINUED | OUTPATIENT
Start: 2024-05-21 | End: 2024-05-22

## 2024-05-21 RX ORDER — OXYCODONE HYDROCHLORIDE AND ACETAMINOPHEN 5; 325 MG/1; MG/1
1 TABLET ORAL EVERY 4 HOURS PRN
Status: DISCONTINUED | OUTPATIENT
Start: 2024-05-21 | End: 2024-05-21

## 2024-05-21 RX ORDER — TRAMADOL HYDROCHLORIDE 50 MG/1
100 TABLET ORAL EVERY 6 HOURS PRN
Status: DISCONTINUED | OUTPATIENT
Start: 2024-05-21 | End: 2024-05-24 | Stop reason: HOSPADM

## 2024-05-21 RX ORDER — POTASSIUM CHLORIDE 20 MEQ/1
10 TABLET, EXTENDED RELEASE ORAL DAILY
Status: COMPLETED | OUTPATIENT
Start: 2024-05-22 | End: 2024-05-24

## 2024-05-21 RX ADMIN — TAMSULOSIN HYDROCHLORIDE 0.4 MG: 0.4 CAPSULE ORAL at 09:56

## 2024-05-21 RX ADMIN — AMIODARONE HYDROCHLORIDE 200 MG: 200 TABLET ORAL at 20:04

## 2024-05-21 RX ADMIN — ALBUMIN, HUMAN INJ 5% 25 G: 5 SOLUTION at 05:08

## 2024-05-21 RX ADMIN — AMIODARONE HYDROCHLORIDE 200 MG: 200 TABLET ORAL at 09:56

## 2024-05-21 RX ADMIN — KETOROLAC TROMETHAMINE 15 MG: 15 INJECTION, SOLUTION INTRAMUSCULAR; INTRAVENOUS at 04:07

## 2024-05-21 RX ADMIN — SODIUM CHLORIDE, PRESERVATIVE FREE 10 ML: 5 INJECTION INTRAVENOUS at 09:57

## 2024-05-21 RX ADMIN — Medication 1 AMPULE: at 20:04

## 2024-05-21 RX ADMIN — ALBUMIN (HUMAN) 25 G: 12.5 INJECTION, SOLUTION INTRAVENOUS at 05:08

## 2024-05-21 RX ADMIN — DOCUSATE SODIUM 50 MG AND SENNOSIDES 8.6 MG 2 TABLET: 8.6; 5 TABLET, FILM COATED ORAL at 20:03

## 2024-05-21 RX ADMIN — FAMOTIDINE 20 MG: 20 TABLET, FILM COATED ORAL at 20:04

## 2024-05-21 RX ADMIN — Medication 1 AMPULE: at 09:59

## 2024-05-21 RX ADMIN — TRAMADOL HYDROCHLORIDE 100 MG: 50 TABLET ORAL at 20:03

## 2024-05-21 RX ADMIN — FAMOTIDINE 20 MG: 20 TABLET, FILM COATED ORAL at 09:56

## 2024-05-21 RX ADMIN — KETOROLAC TROMETHAMINE 15 MG: 15 INJECTION, SOLUTION INTRAMUSCULAR; INTRAVENOUS at 10:01

## 2024-05-21 RX ADMIN — OXYCODONE HYDROCHLORIDE AND ACETAMINOPHEN 1 TABLET: 5; 325 TABLET ORAL at 00:07

## 2024-05-21 RX ADMIN — SODIUM CHLORIDE, PRESERVATIVE FREE 10 ML: 5 INJECTION INTRAVENOUS at 20:04

## 2024-05-21 RX ADMIN — ASPIRIN 81 MG: 81 TABLET, COATED ORAL at 09:55

## 2024-05-21 RX ADMIN — CEFAZOLIN SODIUM 2000 MG: 100 INJECTION, POWDER, LYOPHILIZED, FOR SOLUTION INTRAVENOUS at 05:59

## 2024-05-21 ASSESSMENT — PAIN SCALES - GENERAL
PAINLEVEL_OUTOF10: 3
PAINLEVEL_OUTOF10: 0
PAINLEVEL_OUTOF10: 0
PAINLEVEL_OUTOF10: 3
PAINLEVEL_OUTOF10: 4
PAINLEVEL_OUTOF10: 2
PAINLEVEL_OUTOF10: 4
PAINLEVEL_OUTOF10: 3
PAINLEVEL_OUTOF10: 3
PAINLEVEL_OUTOF10: 4

## 2024-05-21 ASSESSMENT — PAIN DESCRIPTION - LOCATION
LOCATION: CHEST
LOCATION: CHEST
LOCATION: INCISION
LOCATION: CHEST

## 2024-05-21 ASSESSMENT — PAIN DESCRIPTION - PAIN TYPE: TYPE: SURGICAL PAIN

## 2024-05-21 ASSESSMENT — PAIN DESCRIPTION - FREQUENCY: FREQUENCY: INTERMITTENT

## 2024-05-21 ASSESSMENT — PAIN DESCRIPTION - ORIENTATION: ORIENTATION: MID

## 2024-05-21 ASSESSMENT — PAIN DESCRIPTION - ONSET: ONSET: GRADUAL

## 2024-05-21 ASSESSMENT — PAIN - FUNCTIONAL ASSESSMENT: PAIN_FUNCTIONAL_ASSESSMENT: ACTIVITIES ARE NOT PREVENTED

## 2024-05-21 ASSESSMENT — PAIN DESCRIPTION - DESCRIPTORS: DESCRIPTORS: SORE

## 2024-05-21 NOTE — INTERDISCIPLINARY ROUNDS
Multi-D Rounds/Checklist (leapfrog):  Lines: can any be removed?:  per protocol     Chest Tube Left Pleural 1 (Active)       Chest Tube Other (Comment) Mediastinal 2 (Active)       Urinary Catheter 05/20/24 2 Way;Romano-Temperature (Active)     Introducer 05/20/24 (Active)       CVC  05/20/24 Right Internal jugular (Active)     DVT Prophylaxis: Ordered  Vent: N/A  Nutrition Ordered/appropriate: Ordered  Can antibiotics or other drugs be stopped? N/A Yes/No  Inpat Anti-Infectives (From admission, onward)      None          Consults needed: None  A: Is pain control adequate? (has PRNs? Stop drip?) Yes  B: Sedation break and SBT? Yes  C: Is sedation choice appropriate? Yes  D: Delirium/CAM-ICU? No  E: Mobility goals/appropriateness? Yes  F: Family update and plan? child is surrogate decision maker and is being updated daily by primary attending and nursing staff.    Sowmya Owen, APRN - CNP

## 2024-05-21 NOTE — PROGRESS NOTES
TRANSFER - IN REPORT:    Verbal report received from SANDI Caro on Arpit Weber  being received from CVICU for routine progression of patient care      Report consisted of patient's Situation, Background, Assessment and   Recommendations(SBAR).     Information from the following report(s) Nurse Handoff Report, MAR, and Recent Results was reviewed with the receiving nurse.    Opportunity for questions and clarification was provided.      Assessment will be completed upon patient's arrival to unit and care assumed.

## 2024-05-21 NOTE — PROGRESS NOTES
Cardiovascular ICU Pulmoanry Daily Note: 5/21/2024  Arpit Weber                                                     Admission Date: 5/15/2024     Hospital course:  Patient is seen at the request of Jimbo Laird MD  for respiratory management status post cardiac surgery.  Patient had Chest pain.  He was admitted with non-STEMI and eventually had heart catheterization required cardiac surgery and he had CABG x 4 done by Dr. Laird today.  Currently is sedated in CV-ICU and orally intubated receiving  mechanical ventilation.  He has history of COVID last year and a few months later he had DVT and bilateral PE required pulmonary thrombectomy by IR.  Has been placed on Xarelto since that time.  This was held last week prior to surgery.    We have been asked to see in the CV-ICU for mechanical ventilation management and weaning.    The patient's chart is reviewed and the patient is discussed with the staff.    Subjective:     Patient is off vent since yesterday. Awake and alert and on few liter oxygen. Minimal pain from surgery. Doing well.       Current Outpatient Medications   Medication Instructions    aspirin 81 mg, Oral, DAILY    clobetasol (TEMOVATE) 0.05 % cream Topical, 2 TIMES DAILY    clobetasol (TEMOVATE) 0.05 % ointment Topical, 2 TIMES DAILY    esomeprazole (NEXIUM) 20 mg, Oral, DAILY    Omega-3 Fatty Acids (FISH OIL OMEGA-3 PO) Oral    rivaroxaban (XARELTO) 20 mg, Oral, DAILY WITH BREAKFAST    rosuvastatin (CRESTOR) 10 mg, Oral, NIGHTLY    tamsulosin (FLOMAX) 0.4 mg, Oral, DAILY    tiZANidine (ZANAFLEX) 2 mg, Oral, NIGHTLY PRN    vitamin D (ERGOCALCIFEROL) 50,000 Units, Oral, WEEKLY    VITAMIN D, CHOLECALCIFEROL, PO Oral      Review of Systems: as per HPI  Past Medical History:   Diagnosis Date    Calculus of kidney     \"they don't bother me\" 5/18/21    Colostomy in place (HCC)     Colovesical fistula     COVID-19 vaccine series completed 02/25/2021    Pfizer     Diverticulitis      ecchymosis   Neurologic:    no gross neuro deficits  Psychiatric:  awake and calm    CXR: I personally reviewed the images    5/21 vs 5/20  Less atelectasis in left base. No ett today        5/20/2024  Linear atelectasis are noted        LINES:  reyes, , chest tubes times 2 in epigastric area without air leak.         Recent Labs     05/20/24  1459 05/20/24 1911 05/20/24 2329 05/21/24  0415   WBC 15.7* 15.4* 12.1* 11.0   HGB 11.2* 11.9* 11.2* 10.8*   HCT 34.8* 36.6* 34.1* 33.5*   * 139* 142* 152   INR 1.2  --   --   --        Recent Labs     05/20/24  0423 05/20/24 1459 05/20/24 1911 05/20/24 2329 05/21/24  0415    143  --   --  139   K 4.0 3.9 4.4 4.4 4.4    109*  --   --  111*   CO2 23 23  --   --  21   BUN 10 9  --   --  11   CREATININE 0.80 0.80  --   --  0.78*   MG 1.9 2.6* 2.2 2.1 2.1       No results for input(s): \"PROCAL\", \"LACTATE\" in the last 72 hours.  05/15/24    ECHO (TTE) COMPLETE (PRN CONTRAST/BUBBLE/STRAIN/3D) 05/16/2024 11:53 AM (Final)    Interpretation Summary    Left Ventricle: Normal left ventricular systolic function with a visually estimated EF of 55 - 60%. Left ventricle size is normal. Normal wall thickness. Normal wall motion. Normal diastolic function.    Left Atrium: Left atrium is dilated.    Aorta: Dilated ascending aorta. Ao ascending diameter is 4.0 cm.    Image quality is adequate. Contrast used: Definity.    Signed by: Christos Regan MD on 5/16/2024 11:53 AM    Assessment and Plan :  (Medical Decision Making)   Impression: 73 y.o. y/o male s/p CV surgery for Chest pain    Encounter for weaning from Ventilator:  Hypoxemia:   --off vent  --taper oxygen  --work on IS, mobility and weaning O2.      Atelectasis:   --IS, mobilize  --cxr with less atelectasis in left lung    S/P Cardiovascular surgery: ( CABG X 4 )  Monitor chest tube output, removal per surgery.    History of DVT/bilateral PE status post thrombectomy a year ago.  He has been on Xarelto as

## 2024-05-21 NOTE — PROGRESS NOTES
am  5/21/2024 6:16 AM    Admit Date: 5/15/2024    Admit Diagnosis: Chest pain [R07.9]  CAD, multiple vessel [I25.10]      Subjective:    Patient : Patient awake and alert this morning with no specific complaints he is status post CABG.  He remains in normal sinus rhythm.  He has had some issues with hypotension and required some use of pressors over the evening  Objective:    BP (!) 96/53   Pulse 66   Temp 99.5 °F (37.5 °C)   Resp 20   Ht 1.778 m (5' 10\")   Wt 98.7 kg (217 lb 9.5 oz)   SpO2 100%   BMI 31.22 kg/m²     ROS:  General ROS: negative for - chills  Hematological and Lymphatic ROS: negative for - blood clots or jaundice  Respiratory ROS: no cough, shortness of breath, or wheezing  Cardiovascular ROS: negative for - chest pain  Gastrointestinal ROS: no abdominal pain, change in bowel habits, or black or bloody stools  Neurological ROS: no TIA or stroke symptoms    Physical Exam:      Physical Examination: General appearance - Appearance: alert, well appearing, and in no distress.   Neck/lymph - supple, no significant adenopathy  Chest/CV - clear to auscultation, no wheezes, rales or rhonchi, symmetric air entry  Heart - normal rate, regular rhythm, normal S1, S2, no murmurs, rubs, clicks or gallops  Abdomen/GI - soft, nontender, nondistended, no masses or organomegaly   Musculoskeletal - no joint tenderness, deformity or swelling  Extremities - peripheral pulses normal, no pedal edema, no clubbing or cyanosis  Skin - normal coloration and turgor, no rashes, no suspicious skin lesions noted    Current Facility-Administered Medications   Medication Dose Route Frequency    alcohol 62% (NOZIN) nasal  1 ampule  1 ampule Nasal Q12H    dexmedeTOMIDine (PRECEDEX) 400 mcg in sodium chloride 0.9 % 100 mL infusion  0.1-1.5 mcg/kg/hr IntraVENous Continuous    tuberculin injection 5 Units  5 Units IntraDERmal Once    dextrose 5 % and 0.45 % NaCl with KCl 20 mEq infusion   IntraVENous Continuous    sodium  Q6H PRN    famotidine (PEPCID) tablet 20 mg  20 mg Oral BID    Or    famotidine (PEPCID) 20 mg in sodium chloride (PF) 0.9 % 10 mL injection  20 mg IntraVENous BID    nitroGLYCERIN 200 mcg/mL in dextrose 5%  5-200 mcg/min IntraVENous Continuous    tamsulosin (FLOMAX) capsule 0.4 mg  0.4 mg Oral Daily    tiZANidine (ZANAFLEX) tablet 2 mg  2 mg Oral Nightly PRN    sodium chloride flush 0.9 % injection 5-40 mL  5-40 mL IntraVENous PRN    0.9 % sodium chloride infusion   IntraVENous PRN       Data Review: data included in this note has been independently reviewed by the author     TELEMETRY: Normal sinus rhythm    Assessment/Plan:     Patient Active Problem List   Diagnosis    S/P partial colectomy    Colovesical fistula    High cholesterol    Osteoarthritis of both knees    Encounter for long-term (current) use of other medications    Colostomy status ([facility])    Ulcerative (chronic) proctitis without complications ([facility])    Anemia    NSAID long-term use    Personal history of COVID-19    Deep vein thrombosis (DVT) of distal vein of right lower extremity ([facility])    Chest pain    Atherosclerotic heart disease of native coronary artery with unstable angina pectoris ([facility])    NSTEMI (non-ST elevation myocardial infarction) ([facility])    Dyslipidemia    Precordial chest pain    CAD, multiple vessel    Encounter for weaning from ventilator ([facility])    Hypoxemia    S/P CABG x 4    Atelectasis     PLAN  NSTEMI (non-ST elevation myocardial infarction) ([facility])-severe CAD by catheterization.  Post CABG consider long-term colchicine and Plavix for graft patency.  If he is on Plavix along with his Xarelto then likely reduced dose Xarelto and no aspirin therapy.  If it is chosen that he remain on aspirin then aspirin and Xarelto with no Plavix would be in his regimen     Active Hospital Problems    Dyslipidemia- high intensity statin, outpatient surveillance       Chest pain-stable overnight with no recurrent angina, await CABG.  Continue heparin

## 2024-05-21 NOTE — PROGRESS NOTES
ACUTE PHYSICAL THERAPY GOALS:   (Developed with and agreed upon by patient and/or caregiver.)  LTG:  (1.)Mr. Weber will move from supine to sit and sit to supine , scoot up and down and roll side to side in flat bed without siderails with  CONTACT GUARD ASSIST within 7 day(s).    (2.)Mr. Weber will perform all functional transfers with  MODIFIED INDEPENDENCE using the least restrictive/no device within 7 day(s).    (3.)Mr. Weber will ambulate with  MODIFIED INDEPENDENCE for 500+ feet with normal vital sign response with the least restrictive/no device within 7 day(s).   (4.)Mr. Weber will ambulate up/down 3 steps with bilateral railing with  MODIFIED INDEPENDENCE with no device within  7  day(s).    PHYSICAL THERAPY: Daily Note PM   (Link to Caseload Tracking: PT Visit Days : 1  Time In/Out PT Charge Capture  Rehab Caseload Tracker  Orders    Arpit Weber is a 73 y.o. male   PRIMARY DIAGNOSIS: Chest pain  Chest pain [R07.9]  CAD, multiple vessel [I25.10]  Procedure(s) (LRB):  CORONARY ARTERY BYPASS GRAFT (CABG X 4), LIMA; ENDOSCOPIC VEIN HARVEST, LEFT GREATER SAPHENOUS VEIN (N/A)  TRANSESOPHAGEAL ECHOCARDIOGRAM (N/A)  1 Day Post-Op  Inpatient: Payor: MEDICARE / Plan: MEDICARE PART A AND B / Product Type: *No Product type* /     ASSESSMENT:     REHAB RECOMMENDATIONS:   Recommendation to date pending progress:  Setting:  Home Health Therapy    Equipment:    To Be Determined     ASSESSMENT:  Mr. Weber was sitting in recliner and agreeable for PT.  He scooted with mod A then stood with min A and cueing for sequencing.  Patient ambulated 120' with cardiac RW and CGA.  Antalgic gait.  HR increased from 77 to 82 and SpO2 remained >90% on room air.  After a short rest, patient performed ex's with demonstration and cueing.  No significant progress from this morning, will continue working towards goals.     SUBJECTIVE:   Mr. Weber states, \"They just took out those tubes.\"     Social/Functional  Lives With: Alone  Type of Home: House  Home Layout: One level  Home Access: Stairs to enter with rails  Entrance Stairs - Number of Steps: 3  Bathroom Toilet: Standard  Bathroom Accessibility: Accessible  Home Equipment: Cane, Walker - Rolling  Has the patient had two or more falls in the past year or any fall with injury in the past year?: No  Receives Help From: Family  ADL Assistance: Independent  Homemaking Assistance: Independent  Ambulation Assistance: Independent  Transfer Assistance: Independent  Active : Yes  Occupation: Part time employment  OBJECTIVE:     PAIN: VITALS / O2: PRECAUTION / LINES / DRAINS:   Pre Treatment:   Pain Assessment: 0-10  Pain Level: 3  Pain Location: Chest  Non-Pharmaceutical Pain Intervention(s): Repositioned  Response to Pain Intervention: None (pain unchanged or no improvement)      Post Treatment: 3 Vitals        Oxygen    Continuous Pulse Oximetry and Telemetry     RESTRICTIONS/PRECAUTIONS:  Position Activity Restriction  Sternal Precautions: No Pushing, No Pulling, 5# Lifting Restrictions     MOBILITY:  I Mod I S SBA CGA Min Mod Max Total  NT x2 Comments:   Bed Mobility    Rolling [] [] [] [] [] [] [] [] [] [x] []    Supine to Sit [] [] [] [] [] [] [] [] [] [x] []    Scooting [] [] [] [] [] [] [x] [] [] [] []    Sit to Supine [] [] [] [] [] [] [] [] [] [x] []    Transfers    Sit to Stand [] [] [] [] [] [x] [] [] [] [] []    Bed to Chair [] [] [] [] [] [] [] [] [] [] []    Stand to Sit [] [] [] [] [] [x] [] [] [] [] []     [] [] [] [] [] [] [] [] [] [] []    I=Independent, Mod I=Modified Independent, S=Supervision, SBA=Standby Assistance, CGA=Contact Guard Assistance,   Min=Minimal Assistance, Mod=Moderate Assistance, Max=Maximal Assistance, Total=Total Assistance, NT=Not Tested    BALANCE: Good Fair+ Fair Fair- Poor NT Comments   Sitting Static [x] [] [] [] [] []    Sitting Dynamic [] [] [] [] [] []              Standing Static [] [] [x] [] [] []    Standing Dynamic []  [] [] [x] [] []      GAIT: I Mod I S SBA CGA Min Mod Max Total  NT x2 Comments:   Level of Assistance [] [] [] [] [x] [] [] [] [] [] []    Distance 120  feet    DME Cardiac Walker    Gait Quality Antalgic, Decreased gold , Decreased step clearance, Decreased step length, and Trunk flexion    Weightbearing Status      Stairs      I=Independent, Mod I=Modified Independent, S=Supervision, SBA=Standby Assistance, CGA=Contact Guard Assistance,   Min=Minimal Assistance, Mod=Moderate Assistance, Max=Maximal Assistance, Total=Total Assistance, NT=Not Tested    PLAN:   FREQUENCY AND DURATION: BID for duration of hospital stay or until stated goals are met, whichever comes first.    TREATMENT:   TREATMENT:   Therapeutic Activity (25 Minutes): Therapeutic activity included Scooting, Transfer Training, Ambulation on level ground, Sitting balance , Standing balance, and exercises to improve functional Activity tolerance, Balance, Mobility, and Strength.    TREATMENT GRID:     DATE: 5/21/24 am  5/21 pm         Ambulation             Hip Flexion X20 AB  x10 AB         Long Arc Quads X20 AB  x10 AB         Hip ab/ad    2x10 AB         Heel Raises    x20 AB         Toe Raises    x20 AB          shld shrugs   X10 AB                                      Key:  A=active, AA=active assisted, P=passive, B=bilaterally, R=right, L=left              DF=dorsiflexion, PF=plantarflexion    AFTER TREATMENT PRECAUTIONS: Alarm Activated, Bed/Chair Locked, Call light within reach, Chair, Needs within reach, and RN notified    INTERDISCIPLINARY COLLABORATION:  RN/ PCT and PT/ PTA    EDUCATION: Education Given To: Patient  Education Provided: Role of Therapy;Plan of Care;Transfer Training;Precautions  Education Outcome: Continued education needed    TIME IN/OUT:  Time In: 1325  Time Out: 1350  Minutes: 25    A Dahlia Garcia, PT

## 2024-05-21 NOTE — PROGRESS NOTES
4 Eyes Skin Assessment     NAME:  Arpit Weber  YOB: 1950  MEDICAL RECORD NUMBER:  [id number]    The patient is being assessed for  Post-Op Surgical    I agree that at least one RN has performed a thorough Head to Toe Skin Assessment on the patient. ALL assessment sites listed below have been assessed.      Areas assessed by both nurses:    Sacrum. Buttock, Coccyx, Ischium        Does the Patient have a Wound? No noted wound(s)       Giancarlo Prevention initiated by RN: No  Wound Care Orders initiated by RN: No    Pressure Injury (Stage 3,4, Unstageable, DTI, NWPT, and Complex wounds) if present, place Wound referral order by RN under : No    New Ostomies, if present place, Ostomy referral order under : No     *Sacrum has pinpoint white spot. Gluteal cleft does have slight redness but is blanchable.    Nurse 1 eSignature: Electronically signed by Gavin Benavidez RN on 5/21/24 at 3:19 PM EDT    **SHARE this note so that the co-signing nurse can place an eSignature**    Nurse 2 eSignature: Electronically signed by Lissett Cruz RN on 5/21/24 at 3:58 PM EDT

## 2024-05-21 NOTE — OP NOTE
[address]  [address]                            OPERATIVE REPORT      PATIENT NAME: THOMAS ALFONSO          : 1950  MED REC NO: [id number]                       ROOM: 2233  ACCOUNT NO: [id number]                       ADMIT DATE: 05/15/2024  PROVIDER: Jimbo Laird MD    DATE OF SERVICE:  2024    PREOPERATIVE DIAGNOSES:  Coronary artery occlusive disease.    POSTOPERATIVE DIAGNOSES:  Coronary artery occlusive disease.    PROCEDURES PERFORMED:  Four-vessel coronary artery bypass grafting using reversed saphenous vein graft to the posterior descending coronary, sequential saphenous vein graft to the diagonal and the obtuse marginal coronary, and left internal mammary artery to the left anterior descending coronary; endoscopic vein harvest; (arterial line placed by Anesthesia); temporary right ventricular pacing wire.    SURGEON:  Jimbo Laird MD    ASSISTANT:  ***    ANESTHESIA:  General.    ESTIMATED BLOOD LOSS:  Minimal.    SPECIMENS REMOVED:  ***    INTRAOPERATIVE FINDINGS:  ***     COMPLICATIONS:  ***    IMPLANTS:  ***    INDICATIONS:  This is a 73-year-old gentleman, who came into the hospital with unstable angina and angina at rest.  He had been on Xarelto for previous pulmonary embolus following COVID.  He was put at bed rest with easing of the chest pain and we allowed throughout the washout.  Surgical revascularization was recommended.    Ultrasound was done, the heart was exposed through a median sternotomy.  The heart was normal in size and contracted well.  Four coronaries were grafted, placing an individual reversed vein to the posterior descending coronary and sequential vein graft was placed first to the diagonal coronary and then into the circumflex marginal coronary and finally, the internal mammary artery used to bypass the mid left anterior descending coronary.  The patient came off bypass without

## 2024-05-21 NOTE — PROGRESS NOTES
ACUTE PHYSICAL THERAPY GOALS:   (Developed with and agreed upon by patient and/or caregiver.)    LTG:  (1.)Mr. Weber will move from supine to sit and sit to supine , scoot up and down and roll side to side in flat bed without siderails with  CONTACT GUARD ASSIST within 7 day(s).    (2.)Mr. Weber will perform all functional transfers with  MODIFIED INDEPENDENCE using the least restrictive/no device within 7 day(s).    (3.)Mr. Weber will ambulate with  MODIFIED INDEPENDENCE for 500+ feet with normal vital sign response with the least restrictive/no device within 7 day(s).   (4.)Mr. Weber will ambulate up/down 3 steps with bilateral railing with  MODIFIED INDEPENDENCE with no device within  7  day(s).         PHYSICAL THERAPY Initial Assessment, Daily Note, and AM  (Link to Caseload Tracking: PT Visit Days : 1  Acknowledge Orders  Time In/Out  PT Charge Capture  Rehab Caseload Tracker    Arpit Weber is a 73 y.o. male   PRIMARY DIAGNOSIS: Chest pain  Chest pain [R07.9]  CAD, multiple vessel [I25.10]  Procedure(s) (LRB):  CORONARY ARTERY BYPASS GRAFT (CABG X 4), LIMA; ENDOSCOPIC VEIN HARVEST, LEFT GREATER SAPHENOUS VEIN (N/A)  TRANSESOPHAGEAL ECHOCARDIOGRAM (N/A)  1 Day Post-Op  Reason for Referral: Other abnormalities of gait and mobility (R26.89)  Inpatient: Payor: MEDICARE / Plan: MEDICARE PART A AND B / Product Type: *No Product type* /     ASSESSMENT:     REHAB RECOMMENDATIONS:   Recommendation to date pending progress:  Setting:  Home Health Therapy    Equipment:    To Be Determined     ASSESSMENT:  Mr. Weber lives alone, but his son and family live next door on the same property.  He is independent in home and community, works, drives.  He does report a history of L LE injuries which affect his gait pattern.  Patient underwent above surgery and endorses no pain at rest.  He was given mod A to scoot to edge of chair and min Ax2 to stand.  He required cueing to adhere to sternal precautions  Level: 0      Post Treatment: 2 Vitals        Oxygen      Chest Tube, Continuous Pulse Oximetry, Romano Catheter, Telemetry , and pacing wires    RESTRICTIONS/PRECAUTIONS:           Sternal Precautions: No Pushing, No Pulling, 5# Lifting Restrictions        GROSS EVALUATION:  LE Intact Impaired (Comments):   AROM []  Generally decreased, functional   PROM []    Strength [x]     Balance [] Sitting - Static: Good  Standing - Static: Fair   Posture [] Forward Head  Rounded Shoulders   Sensation [x]     Coordination [x]      Tone [x]     Edema []    Activity Tolerance [] Patient limited by fatigue, Patient limited by pain    []      COGNITION/  PERCEPTION: Intact Impaired (Comments): NT but appears intact based on conversation, observation.   Orientation []     Vision []     Hearing []     Cognition  []       MOBILITY: I Mod I S SBA CGA Min Mod Max Total  NT x2 Comments:   Bed Mobility    Rolling [] [] [] [] [] [] [] [] [] [x] []    Supine to Sit [] [] [] [] [] [] [] [] [] [x] []    Scooting [] [] [] [] [] [] [x] [] [] [] [x]    Sit to Supine [] [] [] [] [] [] [] [] [] [x] []    Transfers    Sit to Stand [] [] [] [] [] [x] [] [] [] [] [x]    Bed to Chair [] [] [] [] [] [] [] [] [] [] []    Stand to Sit [] [] [] [] [] [x] [] [] [] [] []     [] [] [] [] [] [] [] [] [] [] []    I=Independent, Mod I=Modified Independent, S=Supervision, SBA=Standby Assistance, CGA=Contact Guard Assistance,   Min=Minimal Assistance, Mod=Moderate Assistance, Max=Maximal Assistance, Total=Total Assistance, NT=Not Tested    GAIT: I Mod I S SBA CGA Min Mod Max Total  NT x2 Comments:   Level of Assistance [] [] [] [] [x] [] [] [] [] [] []    Distance 150  feet    DME Cardiac Walker    Gait Quality Decreased gold , Decreased step clearance, and Decreased step length    Weightbearing Status      Stairs      I=Independent, Mod I=Modified Independent, S=Supervision, SBA=Standby Assistance, CGA=Contact Guard Assistance,   Min=Minimal Assistance,

## 2024-05-22 ENCOUNTER — APPOINTMENT (OUTPATIENT)
Dept: GENERAL RADIOLOGY | Age: 74
DRG: 234 | End: 2024-05-22
Attending: INTERNAL MEDICINE
Payer: MEDICARE

## 2024-05-22 LAB
ANION GAP SERPL CALC-SCNC: 9 MMOL/L (ref 9–18)
BUN SERPL-MCNC: 12 MG/DL (ref 8–23)
CALCIUM SERPL-MCNC: 9 MG/DL (ref 8.8–10.2)
CHLORIDE SERPL-SCNC: 107 MMOL/L (ref 98–107)
CO2 SERPL-SCNC: 22 MMOL/L (ref 20–28)
CREAT SERPL-MCNC: 0.71 MG/DL (ref 0.8–1.3)
ERYTHROCYTE [DISTWIDTH] IN BLOOD BY AUTOMATED COUNT: 12.8 % (ref 11.9–14.6)
GLUCOSE BLD STRIP.AUTO-MCNC: 109 MG/DL (ref 65–100)
GLUCOSE SERPL-MCNC: 123 MG/DL (ref 70–99)
HCT VFR BLD AUTO: 30.7 % (ref 41.1–50.3)
HGB BLD-MCNC: 10 G/DL (ref 13.6–17.2)
MAGNESIUM SERPL-MCNC: 1.9 MG/DL (ref 1.8–2.4)
MCH RBC QN AUTO: 33.4 PG (ref 26.1–32.9)
MCHC RBC AUTO-ENTMCNC: 32.6 G/DL (ref 31.4–35)
MCV RBC AUTO: 102.7 FL (ref 82–102)
MM INDURATION, POC: 0 MM (ref 0–5)
NRBC # BLD: 0 K/UL (ref 0–0.2)
PLATELET # BLD AUTO: 131 K/UL (ref 150–450)
PMV BLD AUTO: 10.2 FL (ref 9.4–12.3)
POTASSIUM SERPL-SCNC: 4 MMOL/L (ref 3.5–5.1)
PPD, POC: NEGATIVE
RBC # BLD AUTO: 2.99 M/UL (ref 4.23–5.6)
SERVICE CMNT-IMP: ABNORMAL
SODIUM SERPL-SCNC: 139 MMOL/L (ref 136–145)
WBC # BLD AUTO: 8.8 K/UL (ref 4.3–11.1)

## 2024-05-22 PROCEDURE — 71046 X-RAY EXAM CHEST 2 VIEWS: CPT

## 2024-05-22 PROCEDURE — 99232 SBSQ HOSP IP/OBS MODERATE 35: CPT | Performed by: INTERNAL MEDICINE

## 2024-05-22 PROCEDURE — 2580000003 HC RX 258: Performed by: PHYSICIAN ASSISTANT

## 2024-05-22 PROCEDURE — 80048 BASIC METABOLIC PNL TOTAL CA: CPT

## 2024-05-22 PROCEDURE — 85027 COMPLETE CBC AUTOMATED: CPT

## 2024-05-22 PROCEDURE — 82962 GLUCOSE BLOOD TEST: CPT

## 2024-05-22 PROCEDURE — 2140000001 HC CVICU INTERMEDIATE R&B

## 2024-05-22 PROCEDURE — 6370000000 HC RX 637 (ALT 250 FOR IP): Performed by: INTERNAL MEDICINE

## 2024-05-22 PROCEDURE — 83735 ASSAY OF MAGNESIUM: CPT

## 2024-05-22 PROCEDURE — 6370000000 HC RX 637 (ALT 250 FOR IP): Performed by: PHYSICIAN ASSISTANT

## 2024-05-22 PROCEDURE — 6370000000 HC RX 637 (ALT 250 FOR IP): Performed by: THORACIC SURGERY (CARDIOTHORACIC VASCULAR SURGERY)

## 2024-05-22 PROCEDURE — 36415 COLL VENOUS BLD VENIPUNCTURE: CPT

## 2024-05-22 PROCEDURE — 97530 THERAPEUTIC ACTIVITIES: CPT

## 2024-05-22 PROCEDURE — 2580000003 HC RX 258: Performed by: THORACIC SURGERY (CARDIOTHORACIC VASCULAR SURGERY)

## 2024-05-22 PROCEDURE — 2500000003 HC RX 250 WO HCPCS: Performed by: THORACIC SURGERY (CARDIOTHORACIC VASCULAR SURGERY)

## 2024-05-22 RX ORDER — AMIODARONE HYDROCHLORIDE 200 MG/1
400 TABLET ORAL 2 TIMES DAILY
Status: DISCONTINUED | OUTPATIENT
Start: 2024-05-22 | End: 2024-05-23

## 2024-05-22 RX ORDER — AMIODARONE HYDROCHLORIDE 200 MG/1
400 TABLET ORAL 2 TIMES DAILY
Status: DISCONTINUED | OUTPATIENT
Start: 2024-05-22 | End: 2024-05-22

## 2024-05-22 RX ORDER — DILTIAZEM HYDROCHLORIDE 5 MG/ML
10 INJECTION INTRAVENOUS ONCE
Status: COMPLETED | OUTPATIENT
Start: 2024-05-22 | End: 2024-05-22

## 2024-05-22 RX ORDER — ROSUVASTATIN CALCIUM 20 MG/1
20 TABLET, COATED ORAL NIGHTLY
Status: DISCONTINUED | OUTPATIENT
Start: 2024-05-22 | End: 2024-05-24 | Stop reason: HOSPADM

## 2024-05-22 RX ADMIN — POTASSIUM CHLORIDE 20 MEQ: 1500 TABLET, EXTENDED RELEASE ORAL at 12:22

## 2024-05-22 RX ADMIN — FAMOTIDINE 20 MG: 20 TABLET, FILM COATED ORAL at 20:49

## 2024-05-22 RX ADMIN — METOPROLOL TARTRATE 12.5 MG: 25 TABLET, FILM COATED ORAL at 17:01

## 2024-05-22 RX ADMIN — ACETAMINOPHEN 650 MG: 325 TABLET ORAL at 09:13

## 2024-05-22 RX ADMIN — SODIUM CHLORIDE, PRESERVATIVE FREE 10 ML: 5 INJECTION INTRAVENOUS at 20:49

## 2024-05-22 RX ADMIN — POTASSIUM CHLORIDE 20 MEQ: 1500 TABLET, EXTENDED RELEASE ORAL at 09:11

## 2024-05-22 RX ADMIN — METOPROLOL TARTRATE 12.5 MG: 25 TABLET, FILM COATED ORAL at 09:13

## 2024-05-22 RX ADMIN — DILTIAZEM HYDROCHLORIDE 10 MG: 5 INJECTION, SOLUTION INTRAVENOUS at 19:29

## 2024-05-22 RX ADMIN — ASPIRIN 81 MG: 81 TABLET, COATED ORAL at 09:12

## 2024-05-22 RX ADMIN — TAMSULOSIN HYDROCHLORIDE 0.4 MG: 0.4 CAPSULE ORAL at 09:12

## 2024-05-22 RX ADMIN — FAMOTIDINE 20 MG: 20 TABLET, FILM COATED ORAL at 09:12

## 2024-05-22 RX ADMIN — AMIODARONE HYDROCHLORIDE 400 MG: 200 TABLET ORAL at 17:19

## 2024-05-22 RX ADMIN — FUROSEMIDE 40 MG: 40 TABLET ORAL at 09:12

## 2024-05-22 RX ADMIN — POTASSIUM CHLORIDE 10 MEQ: 1500 TABLET, EXTENDED RELEASE ORAL at 09:10

## 2024-05-22 RX ADMIN — ROSUVASTATIN CALCIUM 20 MG: 20 TABLET, COATED ORAL at 20:49

## 2024-05-22 RX ADMIN — Medication 1 AMPULE: at 09:09

## 2024-05-22 RX ADMIN — SODIUM CHLORIDE, PRESERVATIVE FREE 10 ML: 5 INJECTION INTRAVENOUS at 09:19

## 2024-05-22 RX ADMIN — AMIODARONE HYDROCHLORIDE 200 MG: 200 TABLET ORAL at 09:13

## 2024-05-22 RX ADMIN — DOCUSATE SODIUM 50 MG AND SENNOSIDES 8.6 MG 2 TABLET: 8.6; 5 TABLET, FILM COATED ORAL at 09:10

## 2024-05-22 RX ADMIN — Medication 1 AMPULE: at 20:49

## 2024-05-22 RX ADMIN — SODIUM CHLORIDE 5 MG/HR: 900 INJECTION, SOLUTION INTRAVENOUS at 19:40

## 2024-05-22 ASSESSMENT — PAIN SCALES - GENERAL
PAINLEVEL_OUTOF10: 0
PAINLEVEL_OUTOF10: 1

## 2024-05-22 ASSESSMENT — PAIN DESCRIPTION - ONSET: ONSET: GRADUAL

## 2024-05-22 ASSESSMENT — PAIN - FUNCTIONAL ASSESSMENT: PAIN_FUNCTIONAL_ASSESSMENT: ACTIVITIES ARE NOT PREVENTED

## 2024-05-22 ASSESSMENT — PAIN DESCRIPTION - PAIN TYPE: TYPE: SURGICAL PAIN

## 2024-05-22 ASSESSMENT — PAIN DESCRIPTION - ORIENTATION: ORIENTATION: ANTERIOR;MID

## 2024-05-22 ASSESSMENT — PAIN DESCRIPTION - FREQUENCY: FREQUENCY: INTERMITTENT

## 2024-05-22 ASSESSMENT — PAIN DESCRIPTION - DESCRIPTORS: DESCRIPTORS: ACHING;SORE

## 2024-05-22 ASSESSMENT — PAIN DESCRIPTION - LOCATION: LOCATION: CHEST;INCISION

## 2024-05-22 NOTE — CARE COORDINATION
CM spoke to patient at bedside to inform him PT/OT are recommending home health care. Patient stated that he is in agreement. CM gave patient home health care choice list. Patient chose [facility].     CM sent referral accordingly.

## 2024-05-22 NOTE — PROGRESS NOTES
Pacing wires pulled per ERVIN Cordova. Patient instructed to one hour bedrest with every 15 minute blood pressure checks for one hour. Pt voices understanding.

## 2024-05-22 NOTE — PROGRESS NOTES
Today's Date: 5/22/2024  Date of Admission: 5/15/2024    POD 2 Days Post-Op    Chart Reviewed.  Subjective:     Patient feels better today. Appetite fair, he had a BM this morning.     Medications Reviewed.    Objective:       Vitals:    05/21/24 2249 05/22/24 0304 05/22/24 0600 05/22/24 0723   BP: (!) 115/59 (!) 108/54  (!) 123/58   Pulse: 79 78  71   Resp: 23   18   Temp: 97.7 °F (36.5 °C) 98.6 °F (37 °C)  99.1 °F (37.3 °C)   TempSrc: Oral   Oral   SpO2: 93% 94%  96%   Weight:   98 kg (216 lb 0.8 oz)    Height:           Intake and Output  Current Shift: 05/22 0701 - 05/22 1900  In: 120 [P.O.:120]  Out: -    Last 3 Shifts: 05/20 1901 - 05/22 0700  In: 1473.5 [P.O.:800; I.V.:673.5]  Out: 1385 [Urine:1095]    Physical Exam:  General: Well Developed, Well Nourished, No Acute Distress, Alert & Oriented x 3, Appropriate mood  Neck: supple, no JVD  Heart: S1S2 with RRR without murmurs or gallops  Lungs: mostly clear throughout auscultation bilaterally  Abd: soft, nontender, nondistended, with good bowel sounds  Ext: + edema bilaterally  Sternal incision: clean, dry, and intact  Skin: warm and dry    LABS  Data Review:   Recent Labs     05/20/24  1459 05/20/24  1911 05/21/24  0415 05/22/24  0443     --  139 139   K 3.9   < > 4.4 4.0   MG 2.6*   < > 2.1 1.9   BUN 9  --  11 12   WBC 15.7*   < > 11.0 8.8   HGB 11.2*   < > 10.8* 10.0*   HCT 34.8*   < > 33.5* 30.7*   *   < > 152 131*   INR 1.2  --   --   --     < > = values in this interval not displayed.       Estimated Creatinine Clearance: 109 mL/min (A) (based on SCr of 0.71 mg/dL (L)).      Assessment/Plan:      *S/P CABG x 4  On ASA, BB, statin, stable on room air, pacing wires removed, continue PT     Active Hospital Problems    Dyslipidemia  Continue statin       Chest pain  S/p CABG       CAD, multiple vessel  S/p CABG      Encounter for weaning from ventilator (HCC)      Hypoxemia  Resolved, stable on room air       Atelectasis  IS

## 2024-05-22 NOTE — PROGRESS NOTES
ACUTE PHYSICAL THERAPY GOALS:   (Developed with and agreed upon by patient and/or caregiver.)  LTG:  (1.)Mr. Weber will move from supine to sit and sit to supine , scoot up and down and roll side to side in flat bed without siderails with  CONTACT GUARD ASSIST within 7 day(s).    (2.)Mr. Weber will perform all functional transfers with  MODIFIED INDEPENDENCE using the least restrictive/no device within 7 day(s).    (3.)Mr. Weber will ambulate with  MODIFIED INDEPENDENCE for 500+ feet with normal vital sign response with the least restrictive/no device within 7 day(s).   (4.)Mr. Weber will ambulate up/down 3 steps with bilateral railing with  MODIFIED INDEPENDENCE with no device within  7  day(s).    PHYSICAL THERAPY: Daily Note PM   (Link to Caseload Tracking: PT Visit Days : 2  Time In/Out PT Charge Capture  Rehab Caseload Tracker  Orders    Arpit Weber is a 73 y.o. male   PRIMARY DIAGNOSIS: S/P CABG x 4  Chest pain [R07.9]  CAD, multiple vessel [I25.10]  Procedure(s) (LRB):  CORONARY ARTERY BYPASS GRAFT (CABG X 4), LIMA; ENDOSCOPIC VEIN HARVEST, LEFT GREATER SAPHENOUS VEIN (N/A)  TRANSESOPHAGEAL ECHOCARDIOGRAM (N/A)  2 Days Post-Op  Inpatient: Payor: MEDICARE / Plan: MEDICARE PART A AND B / Product Type: *No Product type* /     ASSESSMENT:     REHAB RECOMMENDATIONS:   Recommendation to date pending progress:  Setting:  Home Health Therapy    Equipment:    To Be Determined     ASSESSMENT:  Mr. Weber was sitting up in recliner on contact and agreeable to work with therapy. The PT session today focused on transfers, amb and ex.  Pt needed CGa to stand and sit to recliner as well as CGA for amb being able to amb 250' with RW. No LOB.  Cues for cardiac precautions, breathing technique and posture.   Progress toward goals demonstrated by increased amb distance and requiring less assistance with transfers.      SUBJECTIVE:   Mr. Weber states \"I broke my right ankle playing football years

## 2024-05-22 NOTE — PLAN OF CARE
Problem: Discharge Planning  Goal: Discharge to home or other facility with appropriate resources  5/22/2024 0812 by Gavin Benavidez RN  Outcome: Progressing  Flowsheets (Taken 5/22/2024 0812)  Discharge to home or other facility with appropriate resources:   Identify barriers to discharge with patient and caregiver   Arrange for needed discharge resources and transportation as appropriate   Identify discharge learning needs (meds, wound care, etc)   Refer to discharge planning if patient needs post-hospital services based on physician order or complex needs related to functional status, cognitive ability or social support system  5/21/2024 2326 by Gale Olguin, RN  Outcome: Progressing  Flowsheets (Taken 5/21/2024 1930)  Discharge to home or other facility with appropriate resources: Identify barriers to discharge with patient and caregiver     Problem: Skin/Tissue Integrity  Goal: Absence of new skin breakdown  Description: 1.  Monitor for areas of redness and/or skin breakdown  2.  Assess vascular access sites hourly  3.  Every 4-6 hours minimum:  Change oxygen saturation probe site  4.  Every 4-6 hours:  If on nasal continuous positive airway pressure, respiratory therapy assess nares and determine need for appliance change or resting period.  5/22/2024 0812 by Gavin Benavidez, RN  Outcome: Progressing  5/21/2024 2326 by Gale Olguin, RN  Outcome: Progressing     Problem: Safety - Adult  Goal: Free from fall injury  5/22/2024 0812 by Gavin Benavidez, RN  Outcome: Progressing  5/21/2024 2326 by Gale Olguin, RN  Outcome: Progressing  Flowsheets (Taken 5/21/2024 2111)  Free From Fall Injury: Instruct family/caregiver on patient safety

## 2024-05-22 NOTE — PROGRESS NOTES
Cardiovascular ICU Pulmoanry Daily Note: 5/22/2024  Arpit Weber                                                     Admission Date: 5/15/2024     Hospital course:  Patient is seen at the request of Jimbo Laird MD  for respiratory management status post cardiac surgery.  Patient had Chest pain.  He was admitted with non-STEMI and eventually had heart catheterization required cardiac surgery and he had CABG x 4 done by Dr. Laird..  He has history of COVID last year and a few months later he had DVT and bilateral PE required pulmonary thrombectomy by IR.  Has been placed on Xarelto since that time.  This was held last week prior to surgery. Now 2 Days Post-Op     Subjective:     Transferred to step down. He is on RA with sat 96%  Walking in the room with assistance, pulls ~ 1500 cc      Current Outpatient Medications   Medication Instructions    aspirin 81 mg, Oral, DAILY    clobetasol (TEMOVATE) 0.05 % cream Topical, 2 TIMES DAILY    clobetasol (TEMOVATE) 0.05 % ointment Topical, 2 TIMES DAILY    esomeprazole (NEXIUM) 20 mg, Oral, DAILY    Omega-3 Fatty Acids (FISH OIL OMEGA-3 PO) Oral    rivaroxaban (XARELTO) 20 mg, Oral, DAILY WITH BREAKFAST    rosuvastatin (CRESTOR) 10 mg, Oral, NIGHTLY    tamsulosin (FLOMAX) 0.4 mg, Oral, DAILY    tiZANidine (ZANAFLEX) 2 mg, Oral, NIGHTLY PRN    vitamin D (ERGOCALCIFEROL) 50,000 Units, Oral, WEEKLY    VITAMIN D, CHOLECALCIFEROL, PO Oral      Review of Systems: as per HPI  Past Medical History:   Diagnosis Date    Calculus of kidney     \"they don't bother me\" 5/18/21    Colostomy in place (HCC)     Colovesical fistula     COVID-19 vaccine series completed 02/25/2021    Pfizer     Diverticulitis     Encounter for long-term (current) use of other medications 4/22/2014    GERD (gastroesophageal reflux disease)     High cholesterol 07/29/2014    no meds    Other testicular hypofunction 7/29/2014    Weight loss      Past Surgical History:   Procedure Laterality  Date    CARDIAC PROCEDURE N/A 5/16/2024    Left heart cath / coronary angiography performed by Bulmaro Miles MD at [facility] CARDIAC CATH LAB    COLONOSCOPY N/A 2/28/2021    COLONOSCOPY performed by Galo Sage MD at [facility]OSCOPY    COLONOSCOPY  05/17/2021    COLONOSCOPY N/A 9/10/2021    COLONOSCOPY THRU STOMA performed by Candice Mckoy MD at [facility]OSCOPY    CORONARY ARTERY BYPASS GRAFT N/A 5/20/2024    CORONARY ARTERY BYPASS GRAFT (CABG X 4), LIMA; ENDOSCOPIC VEIN HARVEST, LEFT GREATER SAPHENOUS VEIN performed by Jimbo Laird MD at [facility] MAIN OR    FLEXIBLE SIGMOIDOSCOPY N/A 5/17/2021    SIGMOIDOSCOPY FLEXIBLE performed by Candice Mckoy MD at [facility] ENDOSCOPY    FLEXIBLE SIGMOIDOSCOPY N/A 9/10/2021    SIGMOIDOSCOPY FLEXIBLE/BMI 27 performed by Candice Mckoy MD at [facility] ENDOSCOPY    IR THROMB MECH VEIN  4/1/2023    IR THROMB MECH VEIN 4/1/2023 [facility] RADIOLOGY SPECIALS    ORTHOPEDIC SURGERY      LEFT LEG/ANKLE FX    OTHER SURGICAL HISTORY  11/08/2021    Hartmanns colostomy reversal    TONSILLECTOMY      TRANSESOPHAGEAL ECHOCARDIOGRAM N/A 5/20/2024    TRANSESOPHAGEAL ECHOCARDIOGRAM performed by Jimbo Laidr MD at [facility] MAIN OR     Social History     Socioeconomic History    Marital status:      Spouse name: Not on file    Number of children: Not on file    Years of education: Not on file    Highest education level: Not on file   Occupational History    Not on file   Tobacco Use    Smoking status: Never    Smokeless tobacco: Never   Substance and Sexual Activity    Alcohol use: Yes    Drug use: No    Sexual activity: Not on file   Other Topics Concern    Not on file   Social History Narrative    Not on file     Social Determinants of Health     Financial Resource Strain: Low Risk  (4/15/2024)    Overall Financial Resource Strain (CARDIA)     Difficulty of Paying Living Expenses: Not hard at all   Food Insecurity: No Food Insecurity (5/16/2024)    Hunger Vital Sign

## 2024-05-22 NOTE — PROGRESS NOTES
ACUTE PHYSICAL THERAPY GOALS:   (Developed with and agreed upon by patient and/or caregiver.)  LTG:  (1.)Mr. Weber will move from supine to sit and sit to supine , scoot up and down and roll side to side in flat bed without siderails with  CONTACT GUARD ASSIST within 7 day(s).    (2.)Mr. Weber will perform all functional transfers with  MODIFIED INDEPENDENCE using the least restrictive/no device within 7 day(s).    (3.)Mr. Weber will ambulate with  MODIFIED INDEPENDENCE for 500+ feet with normal vital sign response with the least restrictive/no device within 7 day(s).   (4.)Mr. Weber will ambulate up/down 3 steps with bilateral railing with  MODIFIED INDEPENDENCE with no device within  7  day(s).    PHYSICAL THERAPY: Daily Note AM   (Link to Caseload Tracking: PT Visit Days : 2  Time In/Out PT Charge Capture  Rehab Caseload Tracker  Orders    Arpit Weber is a 73 y.o. male   PRIMARY DIAGNOSIS: S/P CABG x 4  Chest pain [R07.9]  CAD, multiple vessel [I25.10]  Procedure(s) (LRB):  CORONARY ARTERY BYPASS GRAFT (CABG X 4), LIMA; ENDOSCOPIC VEIN HARVEST, LEFT GREATER SAPHENOUS VEIN (N/A)  TRANSESOPHAGEAL ECHOCARDIOGRAM (N/A)  2 Days Post-Op  Inpatient: Payor: MEDICARE / Plan: MEDICARE PART A AND B / Product Type: *No Product type* /     ASSESSMENT:     REHAB RECOMMENDATIONS:   Recommendation to date pending progress:  Setting:  Home Health Therapy    Equipment:    To Be Determined     ASSESSMENT:  Mr. Weber was sitting up in recliner on contact and agreeable to work with therapy. The PT session today focused on transfers, amb and ex.  Pt needed min a to stand and sit to recliner. He needed CGA for amb being able to amb 250' with RW and no LOB.  Cues for precautions and posture.   Progress toward goals demonstrated by increased amb distance and requiring less assistance with transfers.      SUBJECTIVE:   Mr. Weber states \"I'm feeling better today\".     Social/Functional Lives With: Alone  Type  of Home: House  Home Layout: One level  Home Access: Stairs to enter with rails  Entrance Stairs - Number of Steps: 3  Bathroom Toilet: Standard  Bathroom Accessibility: Accessible  Home Equipment: Cane, Walker - Rolling  Has the patient had two or more falls in the past year or any fall with injury in the past year?: No  Receives Help From: Family  ADL Assistance: Independent  Homemaking Assistance: Independent  Ambulation Assistance: Independent  Transfer Assistance: Independent  Active : Yes  Occupation: Part time employment  OBJECTIVE:     PAIN: VITALS / O2: PRECAUTION / LINES / DRAINS:   Pre Treatment:          Post Treatment:  Vitals        Oxygen    Continuous Pulse Oximetry and Telemetry     RESTRICTIONS/PRECAUTIONS:  Position Activity Restriction  Sternal Precautions: No Pushing, No Pulling, 5# Lifting Restrictions     MOBILITY:  I Mod I S SBA CGA Min Mod Max Total  NT x2 Comments:   Bed Mobility    Rolling [] [] [] [] [] [] [] [] [] [x] []    Supine to Sit [] [] [] [] [] [] [] [] [] [x] []    Scooting [] [] [] [] [] [] [x] [] [] [] []    Sit to Supine [] [] [] [] [] [] [] [] [] [x] []    Transfers    Sit to Stand [] [] [] [] [] [x] [] [] [] [] []    Bed to Chair [] [] [] [] [] [] [] [] [] [] []    Stand to Sit [] [] [] [] [] [x] [] [] [] [] []     [] [] [] [] [] [] [] [] [] [] []    I=Independent, Mod I=Modified Independent, S=Supervision, SBA=Standby Assistance, CGA=Contact Guard Assistance,   Min=Minimal Assistance, Mod=Moderate Assistance, Max=Maximal Assistance, Total=Total Assistance, NT=Not Tested    BALANCE: Good Fair+ Fair Fair- Poor NT Comments   Sitting Static [x] [] [] [] [] []    Sitting Dynamic [] [x] [] [] [] []              Standing Static [] [x] [] [] [] []    Standing Dynamic [] [] [x] [] [] []      GAIT: I Mod I S SBA CGA Min Mod Max Total  NT x2 Comments:   Level of Assistance [] [] [] [] [x] [] [] [] [] [] []    Distance 250  feet    DME Rolling Walker    Gait Quality Antalgic,

## 2024-05-22 NOTE — PROGRESS NOTES
[facility] CARDIOLOGY PROGRESS NOTE    5/22/2024 6:29 AM    Admit Date: 5/15/2024        Subjective:   Stable overnight without angina, CHF, or palpitations. Vitals stable and controlled.  Chest wall sore but no other complaints overnight. Tolerating meds well.       Objective:      Vitals:    05/21/24 2033 05/21/24 2249 05/22/24 0304 05/22/24 0600   BP:  (!) 115/59 (!) 108/54    Pulse:  79 78    Resp: 19 23     Temp:  97.7 °F (36.5 °C) 98.6 °F (37 °C)    TempSrc:  Oral     SpO2:  93% 94%    Weight:    98 kg (216 lb 0.8 oz)   Height:           Physical Exam:  Neck- supple, no JVD at 45 degrees  CV- regular rate and rhythm no MRG  Lung- clear bilaterally entry apically, dull/decreased bibasilar no crackles or wheezing  Abd- soft, nontender, nondistended  Ext- no distal edema  Skin- warm and dry    Data Review:   Pertinent lab and noninvasive imaging over the past 24 hours reviewed and evaluated.    Recent Labs     05/20/24  1459 05/20/24  1911 05/21/24  0415 05/22/24  0443     --  139 139   K 3.9   < > 4.4 4.0   MG 2.6*   < > 2.1 1.9   BUN 9  --  11 12   WBC 15.7*   < > 11.0 8.8   HGB 11.2*   < > 10.8* 10.0*   HCT 34.8*   < > 33.5* 30.7*   *   < > 152 131*   INR 1.2  --   --   --     < > = values in this interval not displayed.     Lab Results   Component Value Date    .6 (H) 04/15/2024       Assessment and Plan:       S/P CABG x 4- Four-vessel coronary artery bypass grafting using reversed saphenous vein graft to the posterior descending coronary, sequential saphenous vein graft to the diagonal and the obtuse marginal coronary, and left internal mammary artery to the left anterior descending coronary per Dr. Laird 5/20/2024.  I-S use encouraged.  Mobilize as tolerated.    Active Hospital Problems    Dyslipidemia-high intensity statin with outpatient surveillance      *Chest pain-chest wall pain but no angina.  See below      CAD, multiple vessel      Rwrizeejf-O-W use encouraged, diuresis as  needed.  Mobilize.  O2 as needed.      Dexmxiofqfp-N-O use encouraged.  Mobilize per CT surgery      NSTEMI (non-ST elevation myocardial infarction) (HCC)-resolved, no overnight angina or heart failure.  Maximize medications after bypass.  Mobilize.       BETY SERRANO MD

## 2024-05-23 PROBLEM — I48.91 ATRIAL FIBRILLATION WITH RVR (HCC): Status: ACTIVE | Noted: 2024-05-23

## 2024-05-23 LAB
ABO + RH BLD: NORMAL
ANION GAP SERPL CALC-SCNC: 10 MMOL/L (ref 9–18)
BASOPHILS # BLD: 0 K/UL (ref 0–0.2)
BASOPHILS NFR BLD: 1 % (ref 0–2)
BLD PROD TYP BPU: NORMAL
BLD PROD TYP BPU: NORMAL
BLOOD BANK DISPENSE STATUS: NORMAL
BLOOD BANK DISPENSE STATUS: NORMAL
BLOOD GROUP ANTIBODIES SERPL: NORMAL
BPU ID: NORMAL
BPU ID: NORMAL
BUN SERPL-MCNC: 10 MG/DL (ref 8–23)
CALCIUM SERPL-MCNC: 9.1 MG/DL (ref 8.8–10.2)
CHLORIDE SERPL-SCNC: 105 MMOL/L (ref 98–107)
CO2 SERPL-SCNC: 23 MMOL/L (ref 20–28)
CREAT SERPL-MCNC: 0.72 MG/DL (ref 0.8–1.3)
CROSSMATCH RESULT: NORMAL
CROSSMATCH RESULT: NORMAL
DIFFERENTIAL METHOD BLD: ABNORMAL
EOSINOPHIL # BLD: 0.1 K/UL (ref 0–0.8)
EOSINOPHIL NFR BLD: 1 % (ref 0.5–7.8)
ERYTHROCYTE [DISTWIDTH] IN BLOOD BY AUTOMATED COUNT: 12.7 % (ref 11.9–14.6)
GLUCOSE SERPL-MCNC: 107 MG/DL (ref 70–99)
HCT VFR BLD AUTO: 30.9 % (ref 41.1–50.3)
HGB BLD-MCNC: 10 G/DL (ref 13.6–17.2)
IMM GRANULOCYTES # BLD AUTO: 0.1 K/UL (ref 0–0.5)
IMM GRANULOCYTES NFR BLD AUTO: 1 % (ref 0–5)
LYMPHOCYTES # BLD: 0.8 K/UL (ref 0.5–4.6)
LYMPHOCYTES NFR BLD: 12 % (ref 13–44)
MAGNESIUM SERPL-MCNC: 1.9 MG/DL (ref 1.8–2.4)
MCH RBC QN AUTO: 32.9 PG (ref 26.1–32.9)
MCHC RBC AUTO-ENTMCNC: 32.4 G/DL (ref 31.4–35)
MCV RBC AUTO: 101.6 FL (ref 82–102)
MM INDURATION, POC: 0 MM (ref 0–5)
MONOCYTES # BLD: 0.7 K/UL (ref 0.1–1.3)
MONOCYTES NFR BLD: 10 % (ref 4–12)
NEUTS SEG # BLD: 5.1 K/UL (ref 1.7–8.2)
NEUTS SEG NFR BLD: 75 % (ref 43–78)
NRBC # BLD: 0 K/UL (ref 0–0.2)
PLATELET # BLD AUTO: 140 K/UL (ref 150–450)
PMV BLD AUTO: 10.3 FL (ref 9.4–12.3)
POTASSIUM SERPL-SCNC: 3.9 MMOL/L (ref 3.5–5.1)
PPD, POC: NEGATIVE
RBC # BLD AUTO: 3.04 M/UL (ref 4.23–5.6)
SODIUM SERPL-SCNC: 138 MMOL/L (ref 136–145)
SPECIMEN EXP DATE BLD: NORMAL
UNIT DIVISION: 0
UNIT DIVISION: 0
WBC # BLD AUTO: 6.7 K/UL (ref 4.3–11.1)

## 2024-05-23 PROCEDURE — 6370000000 HC RX 637 (ALT 250 FOR IP): Performed by: PHYSICIAN ASSISTANT

## 2024-05-23 PROCEDURE — 99232 SBSQ HOSP IP/OBS MODERATE 35: CPT | Performed by: INTERNAL MEDICINE

## 2024-05-23 PROCEDURE — 97530 THERAPEUTIC ACTIVITIES: CPT

## 2024-05-23 PROCEDURE — 6370000000 HC RX 637 (ALT 250 FOR IP): Performed by: INTERNAL MEDICINE

## 2024-05-23 PROCEDURE — 2580000003 HC RX 258: Performed by: THORACIC SURGERY (CARDIOTHORACIC VASCULAR SURGERY)

## 2024-05-23 PROCEDURE — 83735 ASSAY OF MAGNESIUM: CPT

## 2024-05-23 PROCEDURE — 2140000001 HC CVICU INTERMEDIATE R&B

## 2024-05-23 PROCEDURE — 2580000003 HC RX 258: Performed by: PHYSICIAN ASSISTANT

## 2024-05-23 PROCEDURE — 36415 COLL VENOUS BLD VENIPUNCTURE: CPT

## 2024-05-23 PROCEDURE — 85025 COMPLETE CBC W/AUTO DIFF WBC: CPT

## 2024-05-23 PROCEDURE — 80048 BASIC METABOLIC PNL TOTAL CA: CPT

## 2024-05-23 PROCEDURE — 2500000003 HC RX 250 WO HCPCS: Performed by: THORACIC SURGERY (CARDIOTHORACIC VASCULAR SURGERY)

## 2024-05-23 RX ORDER — POTASSIUM CHLORIDE 20 MEQ/1
40 TABLET, EXTENDED RELEASE ORAL ONCE
Status: DISCONTINUED | OUTPATIENT
Start: 2024-05-23 | End: 2024-05-24 | Stop reason: HOSPADM

## 2024-05-23 RX ORDER — MAGNESIUM SULFATE IN WATER 40 MG/ML
2000 INJECTION, SOLUTION INTRAVENOUS ONCE
Status: DISCONTINUED | OUTPATIENT
Start: 2024-05-23 | End: 2024-05-23

## 2024-05-23 RX ORDER — AMIODARONE HYDROCHLORIDE 200 MG/1
400 TABLET ORAL 3 TIMES DAILY
Status: DISCONTINUED | OUTPATIENT
Start: 2024-05-23 | End: 2024-05-24 | Stop reason: HOSPADM

## 2024-05-23 RX ADMIN — POTASSIUM CHLORIDE 20 MEQ: 1500 TABLET, EXTENDED RELEASE ORAL at 11:48

## 2024-05-23 RX ADMIN — ACETAMINOPHEN 650 MG: 325 TABLET ORAL at 17:00

## 2024-05-23 RX ADMIN — POTASSIUM CHLORIDE 10 MEQ: 1500 TABLET, EXTENDED RELEASE ORAL at 11:51

## 2024-05-23 RX ADMIN — Medication 1 AMPULE: at 11:46

## 2024-05-23 RX ADMIN — FUROSEMIDE 40 MG: 40 TABLET ORAL at 11:47

## 2024-05-23 RX ADMIN — ROSUVASTATIN CALCIUM 20 MG: 20 TABLET, COATED ORAL at 19:39

## 2024-05-23 RX ADMIN — SODIUM CHLORIDE, PRESERVATIVE FREE 10 ML: 5 INJECTION INTRAVENOUS at 11:51

## 2024-05-23 RX ADMIN — ACETAMINOPHEN 650 MG: 325 TABLET ORAL at 03:19

## 2024-05-23 RX ADMIN — ACETAMINOPHEN 650 MG: 325 TABLET ORAL at 11:46

## 2024-05-23 RX ADMIN — METOPROLOL TARTRATE 25 MG: 25 TABLET, FILM COATED ORAL at 19:39

## 2024-05-23 RX ADMIN — RIVAROXABAN 20 MG: 20 TABLET, FILM COATED ORAL at 17:00

## 2024-05-23 RX ADMIN — METOPROLOL TARTRATE 25 MG: 25 TABLET, FILM COATED ORAL at 11:47

## 2024-05-23 RX ADMIN — AMIODARONE HYDROCHLORIDE 400 MG: 200 TABLET ORAL at 11:47

## 2024-05-23 RX ADMIN — SODIUM CHLORIDE, PRESERVATIVE FREE 10 ML: 5 INJECTION INTRAVENOUS at 19:39

## 2024-05-23 RX ADMIN — AMIODARONE HYDROCHLORIDE 400 MG: 200 TABLET ORAL at 17:00

## 2024-05-23 RX ADMIN — TAMSULOSIN HYDROCHLORIDE 0.4 MG: 0.4 CAPSULE ORAL at 11:47

## 2024-05-23 RX ADMIN — FAMOTIDINE 20 MG: 20 TABLET, FILM COATED ORAL at 19:39

## 2024-05-23 RX ADMIN — Medication 1 AMPULE: at 19:39

## 2024-05-23 RX ADMIN — ASPIRIN 81 MG: 81 TABLET, COATED ORAL at 11:47

## 2024-05-23 RX ADMIN — SODIUM CHLORIDE 10 MG/HR: 900 INJECTION, SOLUTION INTRAVENOUS at 04:22

## 2024-05-23 RX ADMIN — FAMOTIDINE 20 MG: 20 TABLET, FILM COATED ORAL at 11:47

## 2024-05-23 RX ADMIN — POTASSIUM CHLORIDE 20 MEQ: 1500 TABLET, EXTENDED RELEASE ORAL at 17:46

## 2024-05-23 RX ADMIN — AMIODARONE HYDROCHLORIDE 400 MG: 200 TABLET ORAL at 23:03

## 2024-05-23 ASSESSMENT — PAIN DESCRIPTION - FREQUENCY
FREQUENCY: INTERMITTENT
FREQUENCY: INTERMITTENT

## 2024-05-23 ASSESSMENT — PAIN SCALES - GENERAL
PAINLEVEL_OUTOF10: 4
PAINLEVEL_OUTOF10: 0
PAINLEVEL_OUTOF10: 3
PAINLEVEL_OUTOF10: 1
PAINLEVEL_OUTOF10: 1

## 2024-05-23 ASSESSMENT — PAIN DESCRIPTION - ONSET
ONSET: GRADUAL
ONSET: PROGRESSIVE

## 2024-05-23 ASSESSMENT — PAIN DESCRIPTION - PAIN TYPE
TYPE: SURGICAL PAIN
TYPE: SURGICAL PAIN

## 2024-05-23 ASSESSMENT — PAIN DESCRIPTION - LOCATION
LOCATION: CHEST
LOCATION: LEG
LOCATION: LEG

## 2024-05-23 ASSESSMENT — PAIN - FUNCTIONAL ASSESSMENT
PAIN_FUNCTIONAL_ASSESSMENT: ACTIVITIES ARE NOT PREVENTED
PAIN_FUNCTIONAL_ASSESSMENT: ACTIVITIES ARE NOT PREVENTED

## 2024-05-23 ASSESSMENT — PAIN DESCRIPTION - DESCRIPTORS
DESCRIPTORS: ACHING

## 2024-05-23 ASSESSMENT — PAIN DESCRIPTION - ORIENTATION
ORIENTATION: LEFT;RIGHT
ORIENTATION: ANTERIOR
ORIENTATION: LEFT;RIGHT

## 2024-05-23 NOTE — PROGRESS NOTES
[facility] CARDIOLOGY PROGRESS NOTE    5/23/2024 6:33 AM    Admit Date: 5/15/2024        Subjective:   Stable overnight without angina, CHF, but converted into A-fib with mildly rapid rate yesterday afternoon.  Nursing staff had trouble obtaining IV access but finally obtain access and started low-dose Cardizem drip.  Heart rate in the upper 90s to low 100s but blood pressure soft and marginal.  He feels weak but denies chest pain or heart failure symptoms.  Chest wall sore but no other complaints overnight. Tolerating meds well.       Objective:      Vitals:    05/23/24 0500 05/23/24 0510 05/23/24 0600 05/23/24 0606   BP: (!) 96/52   99/65   Pulse: 89 (!) 109  91   Resp:       Temp:       TempSrc:       SpO2:       Weight:   97.5 kg (214 lb 15.2 oz)    Height:           Physical Exam:  Neck- supple, no JVD at 45 degrees  CV-irregularly irregular, mildly rapid  Lung- clear bilaterally entry apically, dull/decreased bibasilar no crackles or wheezing  Abd- soft, nontender, nondistended  Ext- no distal edema  Skin- warm and dry    Data Review:   Pertinent lab and noninvasive imaging over the past 24 hours reviewed and evaluated.    Recent Labs     05/20/24  1459 05/20/24  1911 05/22/24  0443 05/23/24  0328      < > 139 138   K 3.9   < > 4.0 3.9   MG 2.6*   < > 1.9 1.9   BUN 9   < > 12 10   WBC 15.7*   < > 8.8 6.7   HGB 11.2*   < > 10.0* 10.0*   HCT 34.8*   < > 30.7* 30.9*   *   < > 131* 140*   INR 1.2  --   --   --     < > = values in this interval not displayed.     Lab Results   Component Value Date    .6 (H) 04/15/2024       Assessment and Plan:       S/P CABG x 4- Four-vessel coronary artery bypass grafting using reversed saphenous vein graft to the posterior descending coronary, sequential saphenous vein graft to the diagonal and the obtuse marginal coronary, and left internal mammary artery to the left anterior descending coronary per Dr. Laird 5/20/2024.  I-S use encouraged.  Mobilize as

## 2024-05-23 NOTE — PROGRESS NOTES
Cardiac Rehab: Spoke with patient regarding referral to cardiac rehab. Patient meets admission criteria based on NSTEMI with CABGx4 (5/20/24).  Written information about Cardiac Rehab given and reviewed with patient. Discussed lifestyle modifications to promote cardiac wellness. Patient indicated that he wants to participate in the cardiac rehab program and his orientation appt has been scheduled for the [facility] Cardiac Rehab program. His Cardiologist is Dr. Miles.      Thank you,  Nasreen Bowens, RN, BSN  Cardiopulmonary Rehabilitation Nurse Liaison  [facility]

## 2024-05-23 NOTE — PROGRESS NOTES
Today's Date: 5/23/2024  Date of Admission: 5/15/2024    POD 3 Days Post-Op    Chart Reviewed.  Subjective:     Patient feels ok. Appetite remains poor because food doesn't taste as it should.     Medications Reviewed.    Objective:       Vitals:    05/23/24 0600 05/23/24 0606 05/23/24 0700 05/23/24 0702   BP:  99/65 110/60    Pulse:  91 100 (!) 108   Resp:    22   Temp:    97.5 °F (36.4 °C)   TempSrc:    Temporal   SpO2:    95%   Weight: 97.5 kg (214 lb 15.2 oz)      Height:           Intake and Output  Current Shift: 05/23 0701 - 05/23 1900  In: 325 [P.O.:325]  Out: -    Last 3 Shifts: 05/21 1901 - 05/23 0700  In: 1432.8 [P.O.:1360; I.V.:72.8]  Out: 1525 [Urine:1525]    Physical Exam:  General: Well Developed, Well Nourished, No Acute Distress, Alert & Oriented x 3, Appropriate mood  Neck: supple, no JVD  Heart: S1S2 with tachycardic rate, irregular rhythm  Lungs: Clear throughout auscultation bilaterally  Abd: soft, nontender, nondistended, with good bowel sounds  Ext: no edema bilaterally  Sternal incision: clean, dry, and intact  Skin: warm and dry    LABS  Data Review:   Recent Labs     05/20/24  1459 05/20/24  1911 05/22/24  0443 05/23/24  0328      < > 139 138   K 3.9   < > 4.0 3.9   MG 2.6*   < > 1.9 1.9   BUN 9   < > 12 10   WBC 15.7*   < > 8.8 6.7   HGB 11.2*   < > 10.0* 10.0*   HCT 34.8*   < > 30.7* 30.9*   *   < > 131* 140*   INR 1.2  --   --   --     < > = values in this interval not displayed.       Estimated Creatinine Clearance: 107 mL/min (A) (based on SCr of 0.72 mg/dL (L)).      Assessment/Plan:      *S/P CABG x 4  On ASA, BB, statin, developed a-fib with RVR yesterday evening and remains in a-fib, on IV Cardizem, stable on room air, pacing wires removed, continue PT      Active Hospital Problems    Dyslipidemia  Continue statin        Chest pain  S/p CABG        CAD, multiple vessel  S/p CABG       Encounter for weaning from ventilator (HCC)       Hypoxemia  Resolved,

## 2024-05-23 NOTE — PROGRESS NOTES
ACUTE PHYSICAL THERAPY GOALS:   (Developed with and agreed upon by patient and/or caregiver.)  LTG:  (1.)Mr. Weber will move from supine to sit and sit to supine , scoot up and down and roll side to side in flat bed without siderails with  CONTACT GUARD ASSIST within 7 day(s).    (2.)Mr. Weber will perform all functional transfers with  MODIFIED INDEPENDENCE using the least restrictive/no device within 7 day(s).    (3.)Mr. Weber will ambulate with  MODIFIED INDEPENDENCE for 500+ feet with normal vital sign response with the least restrictive/no device within 7 day(s).   (4.)Mr. Weber will ambulate up/down 3 steps with bilateral railing with  MODIFIED INDEPENDENCE with no device within  7  day(s).    PHYSICAL THERAPY: Daily Note AM   (Link to Caseload Tracking: PT Visit Days : 3  Time In/Out PT Charge Capture  Rehab Caseload Tracker  Orders    Arpit Weber is a 73 y.o. male   PRIMARY DIAGNOSIS: S/P CABG x 4  Chest pain [R07.9]  CAD, multiple vessel [I25.10]  Procedure(s) (LRB):  CORONARY ARTERY BYPASS GRAFT (CABG X 4), LIMA; ENDOSCOPIC VEIN HARVEST, LEFT GREATER SAPHENOUS VEIN (N/A)  TRANSESOPHAGEAL ECHOCARDIOGRAM (N/A)  3 Days Post-Op  Inpatient: Payor: MEDICARE / Plan: MEDICARE PART A AND B / Product Type: *No Product type* /     ASSESSMENT:     REHAB RECOMMENDATIONS:   Recommendation to date pending progress:  Setting:  Home Health Therapy    Equipment:    To Be Determined     ASSESSMENT:  Mr. Weber was sitting up in recliner on contact and agreeable to work with therapy. The PT session focused on transfers, amb and ex.  Pt needed SBA for sit <> stand as well as for amb. He was able to amb 350' with no AD.   No LOB.  Progress toward goals demonstrated by requiring less assistance overall.      SUBJECTIVE:   Mr. 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 That's why I walk like this.\"     Social/Functional Lives With: Alone  Type of Home: House  Home Layout: One level  Home  Access: Stairs to enter with rails  Entrance Stairs - Number of Steps: 3  Bathroom Toilet: Standard  Bathroom Accessibility: Accessible  Home Equipment: Cane, Walker - Rolling  Has the patient had two or more falls in the past year or any fall with injury in the past year?: No  Receives Help From: Family  ADL Assistance: Independent  Homemaking Assistance: Independent  Ambulation Assistance: Independent  Transfer Assistance: Independent  Active : Yes  Occupation: Part time employment  OBJECTIVE:     PAIN: VITALS / O2: PRECAUTION / LINES / DRAINS:   Pre Treatment:    0      Post Treatment: 0 Vitals        Oxygen    Telemetry     RESTRICTIONS/PRECAUTIONS:  Position Activity Restriction  Sternal Precautions: No Pushing, No Pulling, 5# Lifting Restrictions     MOBILITY:  I Mod I S SBA CGA Min Mod Max Total  NT x2 Comments:   Bed Mobility    Rolling [] [] [] [] [] [] [] [] [] [x] []    Supine to Sit [] [] [] [] [] [] [] [] [] [x] []    Scooting [] [] [] [x] [] [] [] [] [] [] []    Sit to Supine [] [] [] [] [] [] [] [] [] [x] []    Transfers    Sit to Stand [] [] [] [x] [] [] [] [] [] [] []    Bed to Chair [] [] [] [] [] [] [] [] [] [x] []    Stand to Sit [] [] [] [x] [] [] [] [] [] [] []     [] [] [] [] [] [] [] [] [] [] []    I=Independent, Mod I=Modified Independent, S=Supervision, SBA=Standby Assistance, CGA=Contact Guard Assistance,   Min=Minimal Assistance, Mod=Moderate Assistance, Max=Maximal Assistance, Total=Total Assistance, NT=Not Tested    BALANCE: Good Fair+ Fair Fair- Poor NT Comments   Sitting Static [x] [] [] [] [] []    Sitting Dynamic [] [x] [] [] [] []              Standing Static [] [x] [] [] [] []    Standing Dynamic [] [x] [x] [] [] []      GAIT: I Mod I S SBA CGA Min Mod Max Total  NT x2 Comments:   Level of Assistance [] [] [] [x] [] [] [] [] [] [] []    Distance 250  feet    DME None    Gait Quality Antalgic, Decreased gold , Decreased step clearance, Decreased step length, and Trunk

## 2024-05-23 NOTE — PROGRESS NOTES
ACUTE PHYSICAL THERAPY GOALS:   (Developed with and agreed upon by patient and/or caregiver.)  LTG:  (1.)Mr. Weber will move from supine to sit and sit to supine , scoot up and down and roll side to side in flat bed without siderails with  CONTACT GUARD ASSIST within 7 day(s).    (2.)Mr. Weber will perform all functional transfers with  MODIFIED INDEPENDENCE using the least restrictive/no device within 7 day(s).    (3.)Mr. Weber will ambulate with  MODIFIED INDEPENDENCE for 500+ feet with normal vital sign response with the least restrictive/no device within 7 day(s).   (4.)Mr. Weber will ambulate up/down 3 steps with bilateral railing with  MODIFIED INDEPENDENCE with no device within  7  day(s).    PHYSICAL THERAPY: Daily Note AM   (Link to Caseload Tracking: PT Visit Days : 3  Time In/Out PT Charge Capture  Rehab Caseload Tracker  Orders    Arpit Weber is a 73 y.o. male   PRIMARY DIAGNOSIS: S/P CABG x 4  Chest pain [R07.9]  CAD, multiple vessel [I25.10]  Procedure(s) (LRB):  CORONARY ARTERY BYPASS GRAFT (CABG X 4), LIMA; ENDOSCOPIC VEIN HARVEST, LEFT GREATER SAPHENOUS VEIN (N/A)  TRANSESOPHAGEAL ECHOCARDIOGRAM (N/A)  3 Days Post-Op  Inpatient: Payor: MEDICARE / Plan: MEDICARE PART A AND B / Product Type: *No Product type* /     ASSESSMENT:     REHAB RECOMMENDATIONS:   Recommendation to date pending progress:  Setting:  Home Health Therapy    Equipment:    To Be Determined     ASSESSMENT:  Mr. Weber was sitting up in recliner on contact and agreeable to work with therapy. The PT session this AM focused on transfers, amb and ex.  Pt needed CGA to stand and SBA for sittin back in recliner as well as for amb. He was able to amb 350'  pushing IV for first 100' then using no AD for remaining 250'.  No LOB.  Cues for cardiac precautions, breathing technique and posture.   Progress toward goals demonstrated by increased amb distance and requiring less assistance with transfers.      SUBJECTIVE:

## 2024-05-23 NOTE — PLAN OF CARE
Problem: Discharge Planning  Goal: Discharge to home or other facility with appropriate resources  5/23/2024 1219 by Gavin Benavidez RN  Outcome: Progressing  Flowsheets (Taken 5/23/2024 1219)  Discharge to home or other facility with appropriate resources:   Identify barriers to discharge with patient and caregiver   Arrange for needed discharge resources and transportation as appropriate   Identify discharge learning needs (meds, wound care, etc)   Refer to discharge planning if patient needs post-hospital services based on physician order or complex needs related to functional status, cognitive ability or social support system  5/23/2024 0334 by Gale Olguin, RN  Outcome: Progressing  Flowsheets (Taken 5/22/2024 1930)  Discharge to home or other facility with appropriate resources: Identify barriers to discharge with patient and caregiver     Problem: Skin/Tissue Integrity  Goal: Absence of new skin breakdown  Description: 1.  Monitor for areas of redness and/or skin breakdown  2.  Assess vascular access sites hourly  3.  Every 4-6 hours minimum:  Change oxygen saturation probe site  4.  Every 4-6 hours:  If on nasal continuous positive airway pressure, respiratory therapy assess nares and determine need for appliance change or resting period.  5/23/2024 1219 by Gavin Benavidez RN  Outcome: Progressing  5/23/2024 0334 by Gale Olguin, RN  Outcome: Progressing     Problem: Safety - Adult  Goal: Free from fall injury  5/23/2024 1219 by Gavin Benavidez RN  Outcome: Progressing  Flowsheets (Taken 5/23/2024 1219)  Free From Fall Injury: Instruct family/caregiver on patient safety  5/23/2024 0334 by Gale Olguin, RN  Outcome: Progressing  Flowsheets (Taken 5/22/2024 2219)  Free From Fall Injury: Instruct family/caregiver on patient safety

## 2024-05-24 VITALS
HEART RATE: 79 BPM | DIASTOLIC BLOOD PRESSURE: 70 MMHG | OXYGEN SATURATION: 97 % | BODY MASS INDEX: 30.2 KG/M2 | TEMPERATURE: 97.5 F | SYSTOLIC BLOOD PRESSURE: 115 MMHG | WEIGHT: 210.98 LBS | RESPIRATION RATE: 18 BRPM | HEIGHT: 70 IN

## 2024-05-24 LAB
ANION GAP SERPL CALC-SCNC: 9 MMOL/L (ref 9–18)
BASOPHILS # BLD: 0 K/UL (ref 0–0.2)
BASOPHILS NFR BLD: 1 % (ref 0–2)
BUN SERPL-MCNC: 12 MG/DL (ref 8–23)
CALCIUM SERPL-MCNC: 9.2 MG/DL (ref 8.8–10.2)
CHLORIDE SERPL-SCNC: 106 MMOL/L (ref 98–107)
CO2 SERPL-SCNC: 25 MMOL/L (ref 20–28)
CREAT SERPL-MCNC: 0.82 MG/DL (ref 0.8–1.3)
DIFFERENTIAL METHOD BLD: ABNORMAL
EOSINOPHIL # BLD: 0.1 K/UL (ref 0–0.8)
EOSINOPHIL NFR BLD: 2 % (ref 0.5–7.8)
ERYTHROCYTE [DISTWIDTH] IN BLOOD BY AUTOMATED COUNT: 12.6 % (ref 11.9–14.6)
GLUCOSE SERPL-MCNC: 107 MG/DL (ref 70–99)
HCT VFR BLD AUTO: 31.7 % (ref 41.1–50.3)
HGB BLD-MCNC: 10.3 G/DL (ref 13.6–17.2)
IMM GRANULOCYTES # BLD AUTO: 0 K/UL (ref 0–0.5)
IMM GRANULOCYTES NFR BLD AUTO: 0 % (ref 0–5)
LYMPHOCYTES # BLD: 0.7 K/UL (ref 0.5–4.6)
LYMPHOCYTES NFR BLD: 16 % (ref 13–44)
MAGNESIUM SERPL-MCNC: 2 MG/DL (ref 1.8–2.4)
MCH RBC QN AUTO: 32.8 PG (ref 26.1–32.9)
MCHC RBC AUTO-ENTMCNC: 32.5 G/DL (ref 31.4–35)
MCV RBC AUTO: 101 FL (ref 82–102)
MONOCYTES # BLD: 0.6 K/UL (ref 0.1–1.3)
MONOCYTES NFR BLD: 13 % (ref 4–12)
NEUTS SEG # BLD: 3 K/UL (ref 1.7–8.2)
NEUTS SEG NFR BLD: 68 % (ref 43–78)
NRBC # BLD: 0 K/UL (ref 0–0.2)
PLATELET # BLD AUTO: 176 K/UL (ref 150–450)
PMV BLD AUTO: 9.9 FL (ref 9.4–12.3)
POTASSIUM SERPL-SCNC: 4 MMOL/L (ref 3.5–5.1)
RBC # BLD AUTO: 3.14 M/UL (ref 4.23–5.6)
SODIUM SERPL-SCNC: 140 MMOL/L (ref 136–145)
WBC # BLD AUTO: 4.5 K/UL (ref 4.3–11.1)

## 2024-05-24 PROCEDURE — 97110 THERAPEUTIC EXERCISES: CPT

## 2024-05-24 PROCEDURE — 6370000000 HC RX 637 (ALT 250 FOR IP): Performed by: PHYSICIAN ASSISTANT

## 2024-05-24 PROCEDURE — 36415 COLL VENOUS BLD VENIPUNCTURE: CPT

## 2024-05-24 PROCEDURE — 97530 THERAPEUTIC ACTIVITIES: CPT

## 2024-05-24 PROCEDURE — 83735 ASSAY OF MAGNESIUM: CPT

## 2024-05-24 PROCEDURE — 99024 POSTOP FOLLOW-UP VISIT: CPT | Performed by: PHYSICIAN ASSISTANT

## 2024-05-24 PROCEDURE — 6370000000 HC RX 637 (ALT 250 FOR IP): Performed by: INTERNAL MEDICINE

## 2024-05-24 PROCEDURE — 85025 COMPLETE CBC W/AUTO DIFF WBC: CPT

## 2024-05-24 PROCEDURE — 80048 BASIC METABOLIC PNL TOTAL CA: CPT

## 2024-05-24 PROCEDURE — 2580000003 HC RX 258: Performed by: PHYSICIAN ASSISTANT

## 2024-05-24 RX ORDER — METOPROLOL TARTRATE 50 MG/1
50 TABLET, FILM COATED ORAL 2 TIMES DAILY
Status: DISCONTINUED | OUTPATIENT
Start: 2024-05-24 | End: 2024-05-24 | Stop reason: HOSPADM

## 2024-05-24 RX ORDER — ROSUVASTATIN CALCIUM 20 MG/1
20 TABLET, COATED ORAL NIGHTLY
Qty: 90 TABLET | Refills: 3 | Status: SHIPPED | OUTPATIENT
Start: 2024-05-24

## 2024-05-24 RX ORDER — ACETAMINOPHEN 325 MG/1
650 TABLET ORAL EVERY 6 HOURS PRN
Qty: 120 TABLET | Refills: 3 | COMMUNITY
Start: 2024-05-24

## 2024-05-24 RX ORDER — AMIODARONE HYDROCHLORIDE 200 MG/1
TABLET ORAL
Qty: 44 TABLET | Refills: 0 | Status: SHIPPED | OUTPATIENT
Start: 2024-05-24 | End: 2024-06-11

## 2024-05-24 RX ADMIN — POTASSIUM CHLORIDE 10 MEQ: 1500 TABLET, EXTENDED RELEASE ORAL at 09:18

## 2024-05-24 RX ADMIN — METOPROLOL TARTRATE 25 MG: 25 TABLET, FILM COATED ORAL at 05:04

## 2024-05-24 RX ADMIN — ASPIRIN 81 MG: 81 TABLET, COATED ORAL at 09:18

## 2024-05-24 RX ADMIN — SODIUM CHLORIDE, PRESERVATIVE FREE 10 ML: 5 INJECTION INTRAVENOUS at 09:24

## 2024-05-24 RX ADMIN — FAMOTIDINE 20 MG: 20 TABLET, FILM COATED ORAL at 09:18

## 2024-05-24 RX ADMIN — METOPROLOL TARTRATE 25 MG: 25 TABLET, FILM COATED ORAL at 09:18

## 2024-05-24 RX ADMIN — Medication 1 AMPULE: at 09:19

## 2024-05-24 RX ADMIN — POTASSIUM CHLORIDE 20 MEQ: 1500 TABLET, EXTENDED RELEASE ORAL at 09:21

## 2024-05-24 RX ADMIN — FUROSEMIDE 40 MG: 40 TABLET ORAL at 09:18

## 2024-05-24 RX ADMIN — TAMSULOSIN HYDROCHLORIDE 0.4 MG: 0.4 CAPSULE ORAL at 09:18

## 2024-05-24 RX ADMIN — AMIODARONE HYDROCHLORIDE 400 MG: 200 TABLET ORAL at 05:03

## 2024-05-24 NOTE — PROGRESS NOTES
Discharge instructions, follow up appointments and prescriptions reviewed with patient and family. Both verbalize understanding. All personal belongings taken with patient. Family member will drive patient home. Patient escorted to discharge area via wheelchair. Patient is stable at discharge.     Sutures removed.

## 2024-05-24 NOTE — CARE COORDINATION
Patient has discharge orders to go home today. Patient's discharge plan is to go home with [facility]. Patient is aware and in agreement with this discharge plan. CM informed [facility] that patient will be discharged today.     No CM needs voiced or noted.     ASSESSMENT NOTE    Attending Physician: Jimbo Laird MD  Admit Problem: Chest pain [R07.9]  CAD, multiple vessel [I25.10]  Date/Time of Admission: 5/15/2024  8:24 PM  Problem List:  Patient Active Problem List   Diagnosis    S/P partial colectomy    Colovesical fistula    High cholesterol    Osteoarthritis of both knees    Encounter for long-term (current) use of other medications    Colostomy status ([facility])    Ulcerative (chronic) proctitis without complications ([facility])    Anemia    NSAID long-term use    Personal history of COVID-19    Deep vein thrombosis (DVT) of distal vein of right lower extremity ([facility])    Chest pain    Atherosclerotic heart disease of native coronary artery with unstable angina pectoris ([facility])    NSTEMI (non-ST elevation myocardial infarction) ([facility])    Dyslipidemia    Precordial chest pain    CAD, multiple vessel    Encounter for weaning from ventilator ([facility])    Hypoxemia    S/P CABG x 4    Atelectasis    Atrial fibrillation with RVR ([facility])       Service Assessment  Patient Orientation Alert and Oriented, Person, Place, Situation, Self   Cognition     History Provided By Patient   Primary Caregiver Self   Accompanied By/Relationship     Support Systems Children, Family Members, [employer], Friends/Neighbors   Patient's Healthcare Decision Maker is:     PCP Verified by CM Yes (Tawnya)   Last Visit to PCP     Prior Functional Level Independent in ADLs/IADLs   Current Functional Level Independent in ADLs/IADLs   Can patient return to prior living arrangement Yes   Ability to make needs known: Good   Family able to assist with home care needs: Yes   Would you like for me to discuss the discharge plan with any other

## 2024-05-24 NOTE — PROGRESS NOTES
Pts HR ranging from 80's-110's over the last few hours, gave PO Metoprolol and Amio early at approximately 0500.

## 2024-05-24 NOTE — PROGRESS NOTES
Comprehensive Nutrition Assessment    Type and Reason for Visit: Initial, RD Nutrition Re-Screen/LOS  Length of Stay    Nutrition Recommendations/Plan:   Meals and Snacks:  Diet: Continue current order  Nutrition Supplement Therapy:  Medical food supplement therapy:  Initiate Glucerna Shake once per day (this provides 220 kcal and 10 grams protein per bottle)       Malnutrition Assessment:  Malnutrition Status: At risk for malnutrition (Comment) (reduced intake post op)    No dionte wasting  Nutrition Assessment:  Nutrition History: Patient reports at home he eats well- normally does not eat breakfast prior to early work day. He states he has been trying to work on some weight loss and lost 5 lbs prior to admission. Denies decline in appetite or po prior to admission.      Do You Have Any Cultural, Mandaen, or Ethnic Food Preferences?: No   Weight History: 7/10/23 213#; 10/10/23 214#; 4/15/24 217#  Nutrition Background:   Wound Type: Surgical Incision   PMH: CAD, NSTEMI, afib, DVT, colectomy. Patient presented with chest tightness. Determined to need CABG performed on 5/20.   Nutrition Interval:  Patient in chair with family at bedside. Patient reports after surgery poor intake as patient reports altered taste and fullness very quickly. Patient reports this morning was first day where he ate significant portion of meal (75%). Patient reports everything tasted better today. Patient agreeable to glucerna once daily. Family asked if patient needed to follow special diet at home- discussed heart healthy but patient currently on regular so overall just adequate intake post surgery important.     Current Nutrition Therapies:  ADULT DIET; Regular; No Concentrated sweets    Current Intake:   Average Meal Intake: 1-25% Average Supplements Intake: None Ordered      Anthropometric Measures:  Height: 177.8 cm (5' 10\")  Current Body Wt: 95.7 kg (210 lb 15.7 oz) (5/23), Weight source: Standing Scale  BMI: 30.3, Obese Class 1  (BMI 30.0-34.9)  Admission Body Weight: 95.3 kg (210 lb 1.6 oz) (5/15 standing scale)  Ideal Body Weight (Kg) (Calculated): 75 kg (166 lbs), 127.1 %  BMI Category Obese Class 1 (BMI 30.0-34.9)  Estimated Daily Nutrient Needs:  Energy (kcal/day): 8455-6511 (20-25 kcal/kg) (Kcal/kg Weight used: 95.7 kg Current  Protein (g/day): 77-96 g (0.8-1 g/kg) Weight Used: (Current) 95.7 kg  Fluid (ml/day):   (1 ml/kcal)    Nutrition Diagnosis:   Inadequate oral intake related to early satiety as evidenced by intake 0-25% (altered taste)  Nutrition Interventions:   Food and/or Nutrient Delivery: Continue Current Diet, Start Oral Nutrition Supplement     Coordination of Nutrition Care: Continue to monitor while inpatient      Goals:      Active Goal: Meet at least 75% of estimated needs, by next RD assessment       Nutrition Monitoring and Evaluation:      Food/Nutrient Intake Outcomes: Food and Nutrient Intake, Supplement Intake  Physical Signs/Symptoms Outcomes: Weight, Meal Time Behavior    Discharge Planning:    Too soon to determine    Mag Sherman RD

## 2024-05-24 NOTE — PLAN OF CARE
Problem: Discharge Planning  Goal: Discharge to home or other facility with appropriate resources  5/24/2024 1219 by Majo Arcos RN  Outcome: Progressing  Flowsheets (Taken 5/24/2024 0711)  Discharge to home or other facility with appropriate resources: Identify barriers to discharge with patient and caregiver  5/24/2024 0051 by Bárbara Cadet RN  Outcome: Progressing  Flowsheets (Taken 5/23/2024 1930)  Discharge to home or other facility with appropriate resources:   Identify barriers to discharge with patient and caregiver   Arrange for needed discharge resources and transportation as appropriate     Problem: Skin/Tissue Integrity  Goal: Absence of new skin breakdown  Description: 1.  Monitor for areas of redness and/or skin breakdown  2.  Assess vascular access sites hourly  3.  Every 4-6 hours minimum:  Change oxygen saturation probe site  4.  Every 4-6 hours:  If on nasal continuous positive airway pressure, respiratory therapy assess nares and determine need for appliance change or resting period.  5/24/2024 1219 by Majo Arcos RN  Outcome: Progressing  5/24/2024 0051 by Bárbara Cadet RN  Outcome: Progressing     Problem: Safety - Adult  Goal: Free from fall injury  5/24/2024 1219 by Majo Arcos RN  Outcome: Progressing  5/24/2024 0051 by Bárbara Cadet RN  Outcome: Progressing  Flowsheets (Taken 5/23/2024 2258)  Free From Fall Injury: Instruct family/caregiver on patient safety     Problem: ABCDS Injury Assessment  Goal: Absence of physical injury  5/24/2024 1219 by Majo Arcos RN  Outcome: Progressing  5/24/2024 0051 by Bárbara Cadet RN  Outcome: Progressing  Flowsheets (Taken 5/23/2024 2258)  Absence of Physical Injury: Implement safety measures based on patient assessment     Problem: Pain  Goal: Verbalizes/displays adequate comfort level or baseline comfort level  5/24/2024 1219 by Majo Arcos RN  Outcome: Progressing  Flowsheets  Taken 5/24/2024 0711 by Majo Arcos  pain and request assistance   Assess pain using appropriate pain scale

## 2024-05-24 NOTE — PLAN OF CARE
Problem: Discharge Planning  Goal: Discharge to home or other facility with appropriate resources  5/24/2024 0051 by Bárbara Cadet RN  Outcome: Progressing  Flowsheets (Taken 5/23/2024 1930)  Discharge to home or other facility with appropriate resources:   Identify barriers to discharge with patient and caregiver   Arrange for needed discharge resources and transportation as appropriate  5/23/2024 1219 by Gavin Benavidez RN  Outcome: Progressing  Flowsheets (Taken 5/23/2024 1219)  Discharge to home or other facility with appropriate resources:   Identify barriers to discharge with patient and caregiver   Arrange for needed discharge resources and transportation as appropriate   Identify discharge learning needs (meds, wound care, etc)   Refer to discharge planning if patient needs post-hospital services based on physician order or complex needs related to functional status, cognitive ability or social support system     Problem: Skin/Tissue Integrity  Goal: Absence of new skin breakdown  Description: 1.  Monitor for areas of redness and/or skin breakdown  2.  Assess vascular access sites hourly  3.  Every 4-6 hours minimum:  Change oxygen saturation probe site  4.  Every 4-6 hours:  If on nasal continuous positive airway pressure, respiratory therapy assess nares and determine need for appliance change or resting period.  5/24/2024 0051 by Bárbaar Cadet RN  Outcome: Progressing  5/23/2024 1219 by Gavin Benavidez RN  Outcome: Progressing     Problem: Safety - Adult  Goal: Free from fall injury  5/24/2024 0051 by Bárbara Cadet RN  Outcome: Progressing  Flowsheets (Taken 5/23/2024 2258)  Free From Fall Injury: Instruct family/caregiver on patient safety  5/23/2024 1219 by Gavin Benavidez RN  Outcome: Progressing  Flowsheets (Taken 5/23/2024 1219)  Free From Fall Injury: Instruct family/caregiver on patient safety     Problem: ABCDS Injury Assessment  Goal: Absence of physical injury  Outcome:

## 2024-05-24 NOTE — PROGRESS NOTES
ACUTE PHYSICAL THERAPY GOALS:   (Developed with and agreed upon by patient and/or caregiver.)  LTG:  (1.)Mr. Weber will move from supine to sit and sit to supine , scoot up and down and roll side to side in flat bed without siderails with  CONTACT GUARD ASSIST within 7 day(s).    (2.)Mr. Weber will perform all functional transfers with  MODIFIED INDEPENDENCE using the least restrictive/no device within 7 day(s).    (3.)Mr. Weber will ambulate with  MODIFIED INDEPENDENCE for 500+ feet with normal vital sign response with the least restrictive/no device within 7 day(s).   (4.)Mr. Weber will ambulate up/down 3 steps with bilateral railing with  MODIFIED INDEPENDENCE with no device within  7  day(s).    PHYSICAL THERAPY: Daily Note AM   (Link to Caseload Tracking: PT Visit Days : 4  Time In/Out PT Charge Capture  Rehab Caseload Tracker  Orders    Arpit Weber is a 73 y.o. male   PRIMARY DIAGNOSIS: S/P CABG x 4  Chest pain [R07.9]  CAD, multiple vessel [I25.10]  Procedure(s) (LRB):  CORONARY ARTERY BYPASS GRAFT (CABG X 4), LIMA; ENDOSCOPIC VEIN HARVEST, LEFT GREATER SAPHENOUS VEIN (N/A)  TRANSESOPHAGEAL ECHOCARDIOGRAM (N/A)  4 Days Post-Op  Inpatient: Payor: MEDICARE / Plan: MEDICARE PART A AND B / Product Type: *No Product type* /     ASSESSMENT:     REHAB RECOMMENDATIONS:   Recommendation to date pending progress:  Setting:  Home Health Therapy    Equipment:    To Be Determined     ASSESSMENT:  Mr. Weber was sitting up in recliner on contact and agreeable to work with therapy. The PT session focused on transfers, amb and ex.  Pt needed Sup for sit <> stand as well as for amb. He was able to amb 350' with no AD.   No LOB.  Progress toward goals demonstrated by requiring less assistance overall.      SUBJECTIVE:   Mr. 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 That's why I walk like this.\"     Social/Functional Lives With: Alone  Type of Home: House  Home Layout: One level  Home

## 2024-05-24 NOTE — DISCHARGE SUMMARY
Discharge Summary     Patient ID:  Arpit Weber  [id number]  73 y.o.  1950    Admit date: 5/15/2024    Discharge date:  5/24/2024    Admitting Physician:  Dr Canas     Discharge Physician: ERVIN Rider/Dr. Laird    Admission Diagnoses: Chest pain [R07.9]  CAD, multiple vessel [I25.10]    Discharge Diagnoses:   Patient Active Problem List    Diagnosis Date Noted    Dyslipidemia 05/17/2024    Chest pain 05/15/2024    Colostomy status ([facility]) 02/06/2023    Ulcerative (chronic) proctitis without complications ([facility]) 02/06/2023    Atrial fibrillation with RVR ([facility]) 05/23/2024    CAD, multiple vessel 05/20/2024    Encounter for weaning from ventilator ([facility]) 05/20/2024    Hypoxemia 05/20/2024    S/P CABG x 4 05/20/2024    Atelectasis 05/20/2024    Atherosclerotic heart disease of native coronary artery with unstable angina pectoris ([facility]) 05/16/2024    NSTEMI (non-ST elevation myocardial infarction) ([facility]) 05/16/2024    Precordial chest pain 05/16/2024    Deep vein thrombosis (DVT) of distal vein of right lower extremity ([facility]) 04/03/2023    Anemia 04/01/2023    NSAID long-term use 04/01/2023    Personal history of COVID-19 04/01/2023    S/P partial colectomy 11/08/2021    Colovesical fistula 05/20/2021    Osteoarthritis of both knees 02/27/2017    High cholesterol 07/29/2014    Encounter for long-term (current) use of other medications 04/22/2014       Procedures this admission:  Diagnostic left heart catheterization  EchoCardiogram  Coronary Artery Bypass Graft Surgery   Consults: Cardiac Surgery, Pulmonary/Intensive Care     Hospital Course: The patient is a 73 y.o. male who presented to the ER at [facility] with chest pain. Chest pain first occurred last week with radiation to both arms but resolved without treatment. He had recurrent pain and presented to the ER. His troponin was elevated consistent with NSTEMI. He was transferred to [facility]. He underwent cardiac catheterization  office or return to the ER for immediate evaluation for any shortness of breath or chest pain not relieved by NTG.    Discharge Exam: /70   Pulse 79   Temp 97.5 °F (36.4 °C) (Temporal)   Resp 18   Ht 1.778 m (5' 10\")   Wt 95.7 kg (210 lb 15.7 oz)   SpO2 97%   BMI 30.27 kg/m²       Physical Exam:  General: Well Developed, Well Nourished, No Acute Distress, Alert & Oriented  Neck: supple, no JVD  Heart: S1S2 with IRR  Lungs: Clear throughout auscultation bilaterally without adventitious sounds  Abd: soft, nontender, nondistended, with good bowel sounds  Ext: warm, no edema  Sternal incision: clean, dry, and intact  Skin: warm and dry      Recent Results (from the past 24 hour(s))   Basic Metabolic Panel    Collection Time: 05/24/24  4:58 AM   Result Value Ref Range    Sodium 140 136 - 145 mmol/L    Potassium 4.0 3.5 - 5.1 mmol/L    Chloride 106 98 - 107 mmol/L    CO2 25 20 - 28 mmol/L    Anion Gap 9 9 - 18 mmol/L    Glucose 107 (H) 70 - 99 mg/dL    BUN 12 8 - 23 MG/DL    Creatinine 0.82 0.80 - 1.30 MG/DL    Est, Glom Filt Rate >90 >60 ml/min/1.73m2    Calcium 9.2 8.8 - 10.2 MG/DL   CBC with Auto Differential    Collection Time: 05/24/24  4:58 AM   Result Value Ref Range    WBC 4.5 4.3 - 11.1 K/uL    RBC 3.14 (L) 4.23 - 5.6 M/uL    Hemoglobin 10.3 (L) 13.6 - 17.2 g/dL    Hematocrit 31.7 (L) 41.1 - 50.3 %    .0 82 - 102 FL    MCH 32.8 26.1 - 32.9 PG    MCHC 32.5 31.4 - 35.0 g/dL    RDW 12.6 11.9 - 14.6 %    Platelets 176 150 - 450 K/uL    MPV 9.9 9.4 - 12.3 FL    nRBC 0.00 0.0 - 0.2 K/uL    Differential Type AUTOMATED      Neutrophils % 68 43 - 78 %    Lymphocytes % 16 13 - 44 %    Monocytes % 13 (H) 4.0 - 12.0 %    Eosinophils % 2 0.5 - 7.8 %    Basophils % 1 0.0 - 2.0 %    Immature Granulocytes % 0 0.0 - 5.0 %    Neutrophils Absolute 3.0 1.7 - 8.2 K/UL    Lymphocytes Absolute 0.7 0.5 - 4.6 K/UL    Monocytes Absolute 0.6 0.1 - 1.3 K/UL    Eosinophils Absolute 0.1 0.0 - 0.8 K/UL    Basophils Absolute

## 2024-05-28 ENCOUNTER — TELEPHONE (OUTPATIENT)
Age: 74
End: 2024-05-28

## 2024-05-28 ENCOUNTER — CARE COORDINATION (OUTPATIENT)
Dept: CARE COORDINATION | Facility: CLINIC | Age: 74
End: 2024-05-28

## 2024-05-28 NOTE — TELEPHONE ENCOUNTER
Care Transitions Initial Follow Up Call    Call within 2 business days of discharge: Yes     Patient: Arpit Weber Patient : 1950 MRN: [id number]    [unfilled]    RARS: Readmission Risk Score: 12       Spoke with: Arpit Weber    Discharge department/facility: [facility]    Non-face-to-face services provided:  Spoke with pt educated her on Heart healthy diet, Low salt diet. Activities as tolerated. No Driving until cleared by doctor. Avoid all heavy lifting and straining or any straining of the upper chest or upper arm muscles. No strenuous activity. Avoid soaking for 7-10 days. Site care and signs of infection. Educated when to call/when to go to ER. Blood thinner precaution. Proper use of NTG. Confirmed listed of medications.       Follow Up  Future Appointments   Date Time Provider Department Center   2024 10:15 AM Obey Hatch DO [facility] AMB   2024  2:30 PM Jimbo Laird MD [facility] AMB   2024 11:00 AM [facility]   8/15/2024 11:30 AM Andrew Bowling MD [facility]       JAZMIN COLEY LPN

## 2024-05-28 NOTE — CARE COORDINATION
Care Transitions Initial Follow Up Call    Call within 2 business days of discharge: Yes    Patient Current Location:  Home: [address] [place] [address] Rd  [place] [address]    Care Transition Nurse contacted the patient by telephone to perform post hospital discharge assessment. Verified name and  with patient as identifiers. Provided introduction to self, and explanation of the Care Transition Nurse role.     Patient: Arpit Weber Patient : 1950   MRN: [id number]  Reason for Admission: NSTEMI/CABG  Discharge Date: 24 RARS: Readmission Risk Score: 12      Last Discharge Facility       Date Complaint Diagnosis Description Type Department Provider    5/15/24  Chest pain, unspecified type ... Admission (Discharged) [id number] Jimbo Laird MD    5/15/24 Chest Pain NSTEMI (non-ST elevated myocardial infarction) (HCC) ED (TRANSFER) Lily Gibbons MD            Was this an external facility discharge? No Discharge Facility: [facility]    Challenges to be reviewed by the provider   Additional needs identified to be addressed with provider: No  none               Method of communication with provider: none.      Care Transition Nurse reviewed discharge instructions, medical action plan, and red flags with patient who verbalized understanding. The patient was given an opportunity to ask questions and does not have any further questions or concerns at this time. Were discharge instructions available to patient? Yes. Reviewed appropriate site of care based on symptoms and resources available to patient including: PCP  Specialist  Urgent care clinics  [facility]. The patient agrees to contact the PCP office for questions related to their healthcare.     Advance Care Planning:   Does patient have an Advance Directive: decision maker updated.    Medication reconciliation was performed with patient, who verbalizes understanding of administration of home medications. Medications reviewed, [id number] entered:

## 2024-06-03 ENCOUNTER — CARE COORDINATION (OUTPATIENT)
Dept: CARE COORDINATION | Facility: CLINIC | Age: 74
End: 2024-06-03

## 2024-06-03 NOTE — CARE COORDINATION
Care Transitions Follow Up Call    Patient Current Location:  Home: [address] [place], [place] [address]  [place] [address]    N Care Coordinator contacted the patient by telephone to follow up after admission on 5/15/24.  Verified name and  with patient as identifiers.    Patient: Arpit Weber  Patient : 1950   MRN: [id number]  Reason for Admission: Chest pain, CAD, multiple vessel, S/P CABG x 4  Discharge Date: 24 RARS: Readmission Risk Score: 12      Needs to be reviewed by the provider   Additional needs identified to be addressed with provider: No  none             Method of communication with provider: none.    Patient reports doing well today with no complaints or concerns voiced.   Patient to f/u with Dr. Hatch at [facility] on 24 and will f/u with Cardiovascular surgeon, Dr. Laird on 24.  Patient scheduled to begin cardiopulmonary rehab on 24.  Patient is engaged with [facility].      Follow Up  Future Appointments   Date Time Provider Department Center   2024 10:15 AM Obey Hatch DO [facility] AMB   2024  2:30 PM [facility]   2024 11:00 AM [facility]   8/15/2024 11:30 AM Andrew Bowling MD [facility]     External follow up appointment(s): n/a    LPN Care Coordinator reviewed discharge instructions, medical action plan, and red flags with patient and discussed any barriers to care and/or understanding of plan of care after discharge. Discussed appropriate site of care based on symptoms and resources available to patient including: PCP  Specialist  Urgent care clinics  [facility]  When to call 911  [facility]. The patient agrees to contact the PCP office for questions related to their healthcare.     Advance Care Planning:   decision maker updated.     Patients top risk factors for readmission: S/P CABG x 4  Interventions to address risk factors: Obtained and reviewed discharge

## 2024-06-07 ENCOUNTER — OFFICE VISIT (OUTPATIENT)
Age: 74
End: 2024-06-07

## 2024-06-07 VITALS
SYSTOLIC BLOOD PRESSURE: 118 MMHG | WEIGHT: 203.4 LBS | HEIGHT: 70 IN | BODY MASS INDEX: 29.12 KG/M2 | DIASTOLIC BLOOD PRESSURE: 70 MMHG | HEART RATE: 66 BPM

## 2024-06-07 DIAGNOSIS — D68.69 SECONDARY HYPERCOAGULABLE STATE (HCC): ICD-10-CM

## 2024-06-07 DIAGNOSIS — I48.91 ATRIAL FIBRILLATION, UNSPECIFIED TYPE (HCC): ICD-10-CM

## 2024-06-07 DIAGNOSIS — I25.10 CORONARY ARTERY DISEASE INVOLVING NATIVE CORONARY ARTERY OF NATIVE HEART WITHOUT ANGINA PECTORIS: Primary | Chronic | ICD-10-CM

## 2024-06-07 DIAGNOSIS — E78.5 DYSLIPIDEMIA: Chronic | ICD-10-CM

## 2024-06-07 ASSESSMENT — ENCOUNTER SYMPTOMS: SHORTNESS OF BREATH: 0

## 2024-06-07 NOTE — PROGRESS NOTES
[facility], [address] [place] [address]  PHONE: [phone]    SUBJECTIVE:   Arpit Weber is a 73 y.o. male 1950   seen for a consultation visit regarding the following:     Chief Complaint   Patient presents with    New Patient        Admission date: 5/15/24  Discharge date: 5/24/24  Transitional care phone call within 2 business days on 5/28/24      Consultation is requested for evaluation of New Patient   .    History of present illness: 73 y.o. male with PMH HLD, h/o PE presenting for hospital follow up after recent 4vCABG on 5/20/24 (LIMA-LAD, SVG-PDA, seq SVG-D/OM). Since discharge he has been doing well. Denies chest pain like before his CABG. He has mild pain at his surgical site. He has mile CAMP but is progressively improving. No other acute complaints.      Past Medical History, Past Surgical History, Family history, Social History, and Medications were all reviewed with the patient today and updated as necessary.       No Known Allergies  Past Medical History:   Diagnosis Date    Calculus of kidney     \"they don't bother me\" 5/18/21    Colostomy in place (HCC)     Colovesical fistula     COVID-19 vaccine series completed 02/25/2021    Pfizer     Diverticulitis     Encounter for long-term (current) use of other medications 4/22/2014    GERD (gastroesophageal reflux disease)     High cholesterol 07/29/2014    no meds    Other testicular hypofunction 7/29/2014    Weight loss      Past Surgical History:   Procedure Laterality Date    CARDIAC PROCEDURE N/A 5/16/2024    Left heart cath / coronary angiography performed by Bulmaro Miles MD at [facility] CARDIAC CATH LAB    COLONOSCOPY N/A 2/28/2021    COLONOSCOPY performed by Galo Sage MD at [facility] [place] [place] ENDOSCOPY    COLONOSCOPY  05/17/2021    COLONOSCOPY N/A 9/10/2021    COLONOSCOPY THRU STOMA performed by Candice Mckoy MD at [facility] [place] [place] ENDOSCOPY    CORONARY ARTERY BYPASS GRAFT N/A 5/20/2024    CORONARY ARTERY BYPASS GRAFT (CABG X 4),

## 2024-06-10 ENCOUNTER — CARE COORDINATION (OUTPATIENT)
Dept: CARE COORDINATION | Facility: CLINIC | Age: 74
End: 2024-06-10

## 2024-06-10 NOTE — CARE COORDINATION
Care Transitions Follow Up Call    Patient Current Location:  Home: [address] [place], [place] [address]  [place] [address]    LPN Care Coordinator contacted the patient by telephone to follow up after admission on 5/15/24.  Verified name and  with patient as identifiers.    Patient: Arpit Weber  Patient : 1950   MRN: [id number]  Reason for Admission: Chest pain, CAD, multiple vessel, S/P CABG x 4   Discharge Date: 24 RARS: Readmission Risk Score: 12      Needs to be reviewed by the provider   Additional needs identified to be addressed with provider: No  none             Method of communication with provider: none.    Patient reports doing well with no questions or concerns at time of call.  Patient to f/u with Dr. Laird on tomorrow, 24 s/p CABG.  Patient reports walking several times per day and is hoping Dr. Laird will release him to drive tomorrow so he may return to work.    Follow Up  Future Appointments   Date Time Provider Department Center   2024  2:30 PM Jimbo Laird MD [facility] AMB   2024 11:00 AM Justice Martínez RN [facility] O   8/15/2024 11:30 AM Andrew Bowling MD [facility] AMB   9/10/2024  2:00 PM Obey Hatch DO [facility] AMB     External follow up appointment(s): n/a    LPN Care Coordinator reviewed discharge instructions, medical action plan, and red flags with patient and discussed any barriers to care and/or understanding of plan of care after discharge. Discussed appropriate site of care based on symptoms and resources available to patient including: PCP  Specialist  Home health  When to call 911  [place]. The patient agrees to contact the PCP office for questions related to their healthcare.     Advance Care Planning:   decision maker updated.       Patients top risk factors for readmission: S/P CABG x 4  Interventions to address risk factors: Obtained and reviewed discharge summary and/or continuity of care documents and Education

## 2024-06-11 ENCOUNTER — OFFICE VISIT (OUTPATIENT)
Dept: CARDIOTHORACIC SURGERY | Age: 74
End: 2024-06-11

## 2024-06-11 VITALS
BODY MASS INDEX: 29.38 KG/M2 | WEIGHT: 205.2 LBS | SYSTOLIC BLOOD PRESSURE: 144 MMHG | HEIGHT: 70 IN | HEART RATE: 60 BPM | DIASTOLIC BLOOD PRESSURE: 86 MMHG

## 2024-06-11 DIAGNOSIS — Z95.1 S/P CABG X 4: Primary | ICD-10-CM

## 2024-06-11 PROCEDURE — 99024 POSTOP FOLLOW-UP VISIT: CPT | Performed by: THORACIC SURGERY (CARDIOTHORACIC VASCULAR SURGERY)

## 2024-06-11 NOTE — PROGRESS NOTES
[facility]  [facility]  MD Jamel Guerrero MD          Arpit Weber       [id number]  6/11/2024       1950      Arpit Weber is seen today for follow-up status post CABG x 4 with LIMA to the LAD, reverse SVG to the PDA and a sequential SVG to the diagonal and OM on 5/20/24. He did well post operatively and was [facility] to [facility]. He was weaned off of O2. He developed atrial fibrillation and was started on IV Cardizem. Xarelto was resumed. He remained a-fib. BB was titrated for rate control. He converted to sinus rhythm late morning on 5/24, he was discharged home that afternoon.       he is active and ambulatory.  There is no fever or shortness of breath.    Appetite is very good.   Pain has improved.   Pt is sleeping ok.     EXAM:  Vitals:  Blood pressure (!) 144/86, pulse 60, height 1.778 m (5' 10\"), weight 93.1 kg (205 lb 3.2 oz).  Lungs:  Clear to auscultation bilaterally.  Heart: Regular rate and rhythm without murmurs.  Incision: Sternum stable. Incision healing well. Leg incisions healing well.    IMPRESSION and PLAN:  DC from CT surgery. Pt may drive.   Pt is planning to begin cardiac rehab. He may return to work in another 3-4 weeks but with lifting restriction of less than 25lbs.     Sternal precautions: Pt advised to avoid any heavy lifting for another 4 weeks but can increase activity as tolerated.   Pt has cardiology follow-up with Dr. Hatch.   No need to schedule follow-up at this point but the patient may call or return anytime if issues or questions arise.        Michelle Mathur PA-C  6/11/2024

## 2024-06-13 ENCOUNTER — HOSPITAL ENCOUNTER (OUTPATIENT)
Dept: CARDIAC REHAB | Age: 74
Setting detail: RECURRING SERIES
Discharge: HOME | End: 2024-06-16

## 2024-06-13 ASSESSMENT — PATIENT HEALTH QUESTIONNAIRE - PHQ9
1. LITTLE INTEREST OR PLEASURE IN DOING THINGS: NOT AT ALL
7. TROUBLE CONCENTRATING ON THINGS, SUCH AS READING THE NEWSPAPER OR WATCHING TELEVISION: NOT AT ALL
8. MOVING OR SPEAKING SO SLOWLY THAT OTHER PEOPLE COULD HAVE NOTICED. OR THE OPPOSITE, BEING SO FIGETY OR RESTLESS THAT YOU HAVE BEEN MOVING AROUND A LOT MORE THAN USUAL: NOT AT ALL
SUM OF ALL RESPONSES TO PHQ9 QUESTIONS 1 & 2: 0
3. TROUBLE FALLING OR STAYING ASLEEP: NOT AT ALL
10. IF YOU CHECKED OFF ANY PROBLEMS, HOW DIFFICULT HAVE THESE PROBLEMS MADE IT FOR YOU TO DO YOUR WORK, TAKE CARE OF THINGS AT HOME, OR GET ALONG WITH OTHER PEOPLE: NOT DIFFICULT AT ALL
9. THOUGHTS THAT YOU WOULD BE BETTER OFF DEAD, OR OF HURTING YOURSELF: NOT AT ALL
SUM OF ALL RESPONSES TO PHQ QUESTIONS 1-9: 0
4. FEELING TIRED OR HAVING LITTLE ENERGY: NOT AT ALL
SUM OF ALL RESPONSES TO PHQ QUESTIONS 1-9: 0
SUM OF ALL RESPONSES TO PHQ QUESTIONS 1-9: 0
6. FEELING BAD ABOUT YOURSELF - OR THAT YOU ARE A FAILURE OR HAVE LET YOURSELF OR YOUR FAMILY DOWN: NOT AT ALL
5. POOR APPETITE OR OVEREATING: NOT AT ALL
SUM OF ALL RESPONSES TO PHQ QUESTIONS 1-9: 0
2. FEELING DOWN, DEPRESSED OR HOPELESS: NOT AT ALL

## 2024-06-13 NOTE — CARDIO/PULMONARY
Dr. Hatch    Thank you for referring your patient Arpit Weber (1950) to the Cardiopulmonary Rehabilitation Program at [facility]. He is a good candidate for the Cardiac Rehab Program and should see improvements with regular participation.     We will be addressing appropriate interventions for modifiable risk factors with your patient during the next 12 weeks. We will contact you with any issues or concerns that may arise, or you can follow your patient's progress through [facility] at any time. A final summary will be sent to you when the program is completed.     Again, thank you for the referral. If we can be of further assistance, please feel free to contact the Cardiopulmonary Rehab staff at [phone].     Sincerely,    Justice Martínez MS, RN

## 2024-06-17 ENCOUNTER — CARE COORDINATION (OUTPATIENT)
Dept: CARE COORDINATION | Facility: CLINIC | Age: 74
End: 2024-06-17

## 2024-06-17 DIAGNOSIS — N20.0 KIDNEY STONE: ICD-10-CM

## 2024-06-17 RX ORDER — TAMSULOSIN HYDROCHLORIDE 0.4 MG/1
0.4 CAPSULE ORAL DAILY
Qty: 30 CAPSULE | Refills: 5 | Status: SHIPPED | OUTPATIENT
Start: 2024-06-17 | End: 2024-12-14

## 2024-06-17 NOTE — TELEPHONE ENCOUNTER
requesting refill(s) on     Requested Prescriptions     Pending Prescriptions Disp Refills    tamsulosin (FLOMAX) 0.4 MG capsule [Pharmacy Med Name: TAMSULOSIN HCL 0.4 MG CAP 0.4 Capsule] 30 capsule 5     Sig: Take 1 capsule by mouth daily       Last appointment- 4/15/24  Next appointment- 8/15/24

## 2024-06-17 NOTE — CARE COORDINATION
Care Transitions Note  Follow Up Call     Patient Current Location:  Home: [address]  [place] [address]    LPN Care Coordinator contacted the patient by telephone. Verified name and  as identifiers.    Additional needs identified to be addressed with provider   No needs identified                   Method of communication with provider: none.    Care Summary Note: Patient reports doing very well with no questions or concerns at this time.  Follow up with ERVIN Cordova completed on 24.  Patient cleared to drive and will begin Cardiac Rehab soon.  Patient may return to work in another 3-4 weeks with lifting restricted to less than 25 pounds.   Follow up appointments reviewed.    Plan of care updates since last contact:  Review of patient management of conditions/medications       Advance Care Planning:   Does patient have an Advance Directive: health care decision maker updated.    Medication Review:  No changes since last call.     Assessments:   Goals Addressed                   This Visit's Progress     Returns to baseline activity level.   On track     Patient/Family able to obtain medicine after d/c     Patient/Family able to verbalize medicine changes     Patient/Family aware and attends follow up appointments s/p d/c.     Patient/Family agrees to notify provider of any barriers to plan of care.    Patient/Family agrees to notify provider of any symptoms that indicate a worsening of condition               Follow Up Appointment:   Reviewed upcoming appointment(s).  Future Appointments         Provider Specialty Dept Phone    8/15/2024 11:30 AM Andrew Bowling MD Family Medicine [phone]    9/10/2024 2:00 PM Obey Hatch DO Cardiology [phone]            LPN Care Coordinator provided contact information.  Plan for follow-up call in 6-10 days based on severity of symptoms and risk factors.  Plan for next call: symptom management    Tasha Hatch LPN

## 2024-06-27 ENCOUNTER — CARE COORDINATION (OUTPATIENT)
Dept: CARE COORDINATION | Facility: CLINIC | Age: 74
End: 2024-06-27

## 2024-06-27 NOTE — CARE COORDINATION
Patient has graduated from the Care Transitions program on 6/27/24.  Patient/family has the ability to self-manage at this time. Patient declined referral to the ACM team for further management.   Patient reports doing very well and has returned to work.  Follow up appointments reviewed and are scheduled appropriately.   has no further questions/concerns or needs but has saved this LPN CC's contact information for any future needs.    Patient has Care Transition Nurse's contact information for any further questions, concerns, or needs.  Patients upcoming visits:    Future Appointments   Date Time Provider Department Center   8/15/2024 11:30 AM Andrew Bowling MD [facility] AMB   9/10/2024  2:00 PM Obey Hatch DO [facility] AMB

## 2024-08-15 ENCOUNTER — OFFICE VISIT (OUTPATIENT)
Dept: FAMILY MEDICINE CLINIC | Facility: CLINIC | Age: 74
End: 2024-08-15

## 2024-08-15 VITALS
SYSTOLIC BLOOD PRESSURE: 124 MMHG | HEIGHT: 70 IN | DIASTOLIC BLOOD PRESSURE: 68 MMHG | WEIGHT: 188 LBS | BODY MASS INDEX: 26.92 KG/M2

## 2024-08-15 DIAGNOSIS — I82.5Z1 CHRONIC DEEP VEIN THROMBOSIS (DVT) OF DISTAL VEIN OF RIGHT LOWER EXTREMITY (HCC): ICD-10-CM

## 2024-08-15 DIAGNOSIS — E55.9 VITAMIN D DEFICIENCY: ICD-10-CM

## 2024-08-15 DIAGNOSIS — R73.9 HIGH BLOOD SUGAR: ICD-10-CM

## 2024-08-15 DIAGNOSIS — E83.42 HYPOMAGNESEMIA: ICD-10-CM

## 2024-08-15 DIAGNOSIS — E78.00 HIGH CHOLESTEROL: ICD-10-CM

## 2024-08-15 DIAGNOSIS — I73.9 CLAUDICATION OF LEFT LOWER EXTREMITY (HCC): ICD-10-CM

## 2024-08-15 DIAGNOSIS — I73.9 CLAUDICATION OF RIGHT LOWER EXTREMITY (HCC): Primary | ICD-10-CM

## 2024-08-15 DIAGNOSIS — I10 ESSENTIAL (PRIMARY) HYPERTENSION: ICD-10-CM

## 2024-08-15 LAB
ALBUMIN SERPL-MCNC: 4.2 G/DL (ref 3.2–4.6)
ALBUMIN/GLOB SERPL: 1.5 (ref 1–1.9)
ALP SERPL-CCNC: 77 U/L (ref 40–129)
ALT SERPL-CCNC: 18 U/L (ref 12–65)
ANION GAP SERPL CALC-SCNC: 11 MMOL/L (ref 9–18)
AST SERPL-CCNC: 31 U/L (ref 15–37)
BASOPHILS # BLD: 0.1 K/UL (ref 0–0.2)
BASOPHILS NFR BLD: 1 % (ref 0–2)
BILIRUB SERPL-MCNC: 0.4 MG/DL (ref 0–1.2)
BUN SERPL-MCNC: 11 MG/DL (ref 8–23)
CALCIUM SERPL-MCNC: 9.8 MG/DL (ref 8.8–10.2)
CHLORIDE SERPL-SCNC: 104 MMOL/L (ref 98–107)
CHOLEST SERPL-MCNC: 109 MG/DL (ref 0–200)
CO2 SERPL-SCNC: 23 MMOL/L (ref 20–28)
CREAT SERPL-MCNC: 1.2 MG/DL (ref 0.8–1.3)
DIFFERENTIAL METHOD BLD: ABNORMAL
EOSINOPHIL # BLD: 0.1 K/UL (ref 0–0.8)
EOSINOPHIL NFR BLD: 1 % (ref 0.5–7.8)
ERYTHROCYTE [DISTWIDTH] IN BLOOD BY AUTOMATED COUNT: 12.8 % (ref 11.9–14.6)
EST. AVERAGE GLUCOSE BLD GHB EST-MCNC: 114 MG/DL
GLOBULIN SER CALC-MCNC: 2.9 G/DL (ref 2.3–3.5)
GLUCOSE SERPL-MCNC: 103 MG/DL (ref 70–99)
HBA1C MFR BLD: 5.6 % (ref 0–5.6)
HCT VFR BLD AUTO: 38.5 % (ref 41.1–50.3)
HDLC SERPL-MCNC: 36 MG/DL (ref 40–60)
HDLC SERPL: 3 (ref 0–5)
HGB BLD-MCNC: 12.1 G/DL (ref 13.6–17.2)
IMM GRANULOCYTES # BLD AUTO: 0 K/UL (ref 0–0.5)
IMM GRANULOCYTES NFR BLD AUTO: 0 % (ref 0–5)
LDLC SERPL CALC-MCNC: 60 MG/DL (ref 0–100)
LYMPHOCYTES # BLD: 1 K/UL (ref 0.5–4.6)
LYMPHOCYTES NFR BLD: 17 % (ref 13–44)
MAGNESIUM SERPL-MCNC: 2.3 MG/DL (ref 1.8–2.4)
MCH RBC QN AUTO: 31.8 PG (ref 26.1–32.9)
MCHC RBC AUTO-ENTMCNC: 31.4 G/DL (ref 31.4–35)
MCV RBC AUTO: 101.3 FL (ref 82–102)
MONOCYTES # BLD: 0.5 K/UL (ref 0.1–1.3)
MONOCYTES NFR BLD: 9 % (ref 4–12)
NEUTS SEG # BLD: 4.2 K/UL (ref 1.7–8.2)
NEUTS SEG NFR BLD: 72 % (ref 43–78)
NRBC # BLD: 0 K/UL (ref 0–0.2)
PLATELET # BLD AUTO: 220 K/UL (ref 150–450)
PMV BLD AUTO: 11.2 FL (ref 9.4–12.3)
POTASSIUM SERPL-SCNC: 4.6 MMOL/L (ref 3.5–5.1)
PROT SERPL-MCNC: 7.1 G/DL (ref 6.3–8.2)
RBC # BLD AUTO: 3.8 M/UL (ref 4.23–5.6)
SODIUM SERPL-SCNC: 139 MMOL/L (ref 136–145)
TRIGL SERPL-MCNC: 66 MG/DL (ref 0–150)
VLDLC SERPL CALC-MCNC: 13 MG/DL (ref 6–23)
WBC # BLD AUTO: 5.8 K/UL (ref 4.3–11.1)

## 2024-08-15 ASSESSMENT — ENCOUNTER SYMPTOMS
ABDOMINAL DISTENTION: 0
BLURRED VISION: 0
EYE ITCHING: 0
ORTHOPNEA: 0
RHINORRHEA: 0
ABDOMINAL PAIN: 0
SINUS PRESSURE: 0
EYE PAIN: 0
EYE REDNESS: 0
FACIAL SWELLING: 0
SINUS PAIN: 0
ANAL BLEEDING: 0
BLOOD IN STOOL: 0
CHEST TIGHTNESS: 0
EYE DISCHARGE: 0
BACK PAIN: 0
COUGH: 0
APNEA: 0

## 2024-08-15 NOTE — PROGRESS NOTES
[facility]  _______________________________________  Andrew Bowling MD                 [address] [place] [address]        Clemente Jean-[place]aptiste [place] [address]                                                                                    Phone: [phone]                                                                                    Fax: [phone]    Arpit Weber is a 73 y.o. male who is seen for evaluation of   Chief Complaint   Patient presents with   • Hypertension   • Cholesterol Problem       HPI:   Hypertension  This is a chronic problem. The current episode started more than 1 year ago. The problem is unchanged. The problem is controlled. Pertinent negatives include no anxiety, blurred vision, chest pain, headaches, malaise/fatigue, neck pain, orthopnea or palpitations. (on meds, need refills and labs, no side effect noted.   )   Hyperlipidemia  This is a chronic problem. Lipid results: on meds,n ed recheck labs fasting. Pertinent negatives include no chest pain or myalgias.   Other  Episode onset: pt iwth bilat leg pain, worse with walking uphill, relieved wtih rest or walking down hill, Pertinent negatives include no abdominal pain, arthralgias, chest pain, chills, congestion, coughing, diaphoresis, fatigue, fever, headaches, joint swelling, myalgias, neck pain or rash.   Benign Prostatic Hypertrophy  sx improved wiht meds, still nocturia at times but better. Pertinent negatives include no chills, dysuria or hematuria.   Gastroesophageal Reflux  He reports no abdominal pain, no chest pain or no coughing. Episode onset: sx controlled on meds, no side effect noted. Pertinent negatives include no fatigue.   Abnormal Lab  Episode onset: CKD with low mag and low vit D< need recheck labs. Pertinent negatives include no abdominal pain, arthralgias, chest pain, chills, congestion, coughing, diaphoresis, fatigue, fever, headaches, joint swelling, myalgias,

## 2024-09-10 ENCOUNTER — OFFICE VISIT (OUTPATIENT)
Age: 74
End: 2024-09-10
Payer: MEDICARE

## 2024-09-10 VITALS
HEART RATE: 68 BPM | SYSTOLIC BLOOD PRESSURE: 102 MMHG | WEIGHT: 193.6 LBS | DIASTOLIC BLOOD PRESSURE: 50 MMHG | BODY MASS INDEX: 27.72 KG/M2 | HEIGHT: 70 IN

## 2024-09-10 DIAGNOSIS — I25.10 CORONARY ARTERY DISEASE INVOLVING NATIVE CORONARY ARTERY OF NATIVE HEART WITHOUT ANGINA PECTORIS: Primary | Chronic | ICD-10-CM

## 2024-09-10 DIAGNOSIS — E78.5 DYSLIPIDEMIA: Chronic | ICD-10-CM

## 2024-09-10 DIAGNOSIS — I97.89 POSTOPERATIVE ATRIAL FIBRILLATION (HCC): ICD-10-CM

## 2024-09-10 DIAGNOSIS — I48.91 POSTOPERATIVE ATRIAL FIBRILLATION (HCC): ICD-10-CM

## 2024-09-10 PROCEDURE — 1123F ACP DISCUSS/DSCN MKR DOCD: CPT | Performed by: INTERNAL MEDICINE

## 2024-09-10 PROCEDURE — 99214 OFFICE O/P EST MOD 30 MIN: CPT | Performed by: INTERNAL MEDICINE

## 2024-09-10 PROCEDURE — G8417 CALC BMI ABV UP PARAM F/U: HCPCS | Performed by: INTERNAL MEDICINE

## 2024-09-10 PROCEDURE — G8427 DOCREV CUR MEDS BY ELIG CLIN: HCPCS | Performed by: INTERNAL MEDICINE

## 2024-09-10 PROCEDURE — 1036F TOBACCO NON-USER: CPT | Performed by: INTERNAL MEDICINE

## 2024-09-10 PROCEDURE — 3017F COLORECTAL CA SCREEN DOC REV: CPT | Performed by: INTERNAL MEDICINE

## 2024-09-10 ASSESSMENT — ENCOUNTER SYMPTOMS: SHORTNESS OF BREATH: 0

## 2024-09-11 ENCOUNTER — TELEPHONE (OUTPATIENT)
Age: 74
End: 2024-09-11

## 2024-10-28 DIAGNOSIS — E55.9 VITAMIN D DEFICIENCY: ICD-10-CM

## 2024-10-28 RX ORDER — ERGOCALCIFEROL 1.25 MG/1
50000 CAPSULE, LIQUID FILLED ORAL WEEKLY
Qty: 4 CAPSULE | Refills: 1 | Status: SHIPPED | OUTPATIENT
Start: 2024-10-28 | End: 2024-12-23

## 2024-10-28 NOTE — TELEPHONE ENCOUNTER
Patient called requesting refill(s) on     Requested Prescriptions     Pending Prescriptions Disp Refills    vitamin D (ERGOCALCIFEROL) 1.25 MG (95139 UT) CAPS capsule [Pharmacy Med Name: VIT D2 1.25 MG (50,000 UNIT 1.25 MG Capsule] 4 capsule 1     Sig: Take 1 capsule by mouth once a week       Last appointment- 8/15/24  Next appointment- 12/17/24

## 2024-11-22 ENCOUNTER — APPOINTMENT (OUTPATIENT)
Dept: CT IMAGING | Age: 74
DRG: 389 | End: 2024-11-22
Payer: MEDICARE

## 2024-11-22 ENCOUNTER — HOSPITAL ENCOUNTER (INPATIENT)
Age: 74
LOS: 3 days | Discharge: HOME | DRG: 389 | End: 2024-11-25
Admitting: INTERNAL MEDICINE
Payer: MEDICARE

## 2024-11-22 ENCOUNTER — HOSPITAL ENCOUNTER (EMERGENCY)
Age: 74
Discharge: ANOTHER HOSPITAL | DRG: 389 | End: 2024-11-22
Attending: EMERGENCY MEDICINE
Payer: MEDICARE

## 2024-11-22 ENCOUNTER — APPOINTMENT (OUTPATIENT)
Dept: GENERAL RADIOLOGY | Age: 74
DRG: 389 | End: 2024-11-22
Payer: MEDICARE

## 2024-11-22 VITALS
RESPIRATION RATE: 17 BRPM | HEART RATE: 67 BPM | DIASTOLIC BLOOD PRESSURE: 67 MMHG | HEIGHT: 71 IN | BODY MASS INDEX: 26.32 KG/M2 | WEIGHT: 188 LBS | SYSTOLIC BLOOD PRESSURE: 146 MMHG | OXYGEN SATURATION: 96 % | TEMPERATURE: 97.5 F

## 2024-11-22 DIAGNOSIS — K56.609 SBO (SMALL BOWEL OBSTRUCTION) (HCC): Primary | ICD-10-CM

## 2024-11-22 PROBLEM — I25.110 ATHEROSCLEROTIC HEART DISEASE OF NATIVE CORONARY ARTERY WITH UNSTABLE ANGINA PECTORIS (HCC): Chronic | Status: ACTIVE | Noted: 2024-05-16

## 2024-11-22 PROBLEM — Z86.711 HISTORY OF PULMONARY EMBOLUS (PE): Chronic | Status: ACTIVE | Noted: 2023-04-03

## 2024-11-22 PROBLEM — Z86.711 HISTORY OF PULMONARY EMBOLUS (PE): Status: ACTIVE | Noted: 2023-04-03

## 2024-11-22 PROBLEM — I48.0 PAF (PAROXYSMAL ATRIAL FIBRILLATION) (HCC): Chronic | Status: ACTIVE | Noted: 2024-05-23

## 2024-11-22 PROBLEM — Z93.3 COLOSTOMY STATUS (HCC): Status: RESOLVED | Noted: 2023-02-06 | Resolved: 2024-11-22

## 2024-11-22 PROBLEM — D68.69 SECONDARY HYPERCOAGULABLE STATE (HCC): Chronic | Status: ACTIVE | Noted: 2024-06-07

## 2024-11-22 PROBLEM — Z95.1 S/P CABG X 4: Chronic | Status: ACTIVE | Noted: 2024-05-20

## 2024-11-22 PROBLEM — I48.0 PAF (PAROXYSMAL ATRIAL FIBRILLATION) (HCC): Status: ACTIVE | Noted: 2024-05-23

## 2024-11-22 PROBLEM — Z86.718 HISTORY OF DVT (DEEP VEIN THROMBOSIS): Chronic | Status: ACTIVE | Noted: 2023-04-03

## 2024-11-22 LAB
ALBUMIN SERPL-MCNC: 4.7 G/DL (ref 3.2–4.6)
ALBUMIN/GLOB SERPL: 1.6 (ref 1–1.9)
ALP SERPL-CCNC: 88 U/L (ref 40–129)
ALT SERPL-CCNC: 19 U/L (ref 12–65)
ANION GAP SERPL CALC-SCNC: 11 MMOL/L (ref 7–16)
APPEARANCE UR: CLEAR
AST SERPL-CCNC: 26 U/L (ref 15–37)
BACTERIA URNS QL MICRO: ABNORMAL /HPF
BASOPHILS # BLD: 0 K/UL (ref 0–0.2)
BASOPHILS NFR BLD: 1 % (ref 0–2)
BILIRUB SERPL-MCNC: 0.7 MG/DL (ref 0–1.2)
BILIRUB UR QL: NEGATIVE
BUN SERPL-MCNC: 16 MG/DL (ref 8–23)
CALCIUM SERPL-MCNC: 10.5 MG/DL (ref 8.8–10.2)
CHLORIDE SERPL-SCNC: 104 MMOL/L (ref 98–107)
CO2 SERPL-SCNC: 26 MMOL/L (ref 20–29)
COLOR UR: YELLOW
CREAT SERPL-MCNC: 0.77 MG/DL (ref 0.8–1.3)
DIFFERENTIAL METHOD BLD: ABNORMAL
EKG ATRIAL RATE: 64 BPM
EKG ATRIAL RATE: 65 BPM
EKG DIAGNOSIS: NORMAL
EKG DIAGNOSIS: NORMAL
EKG P AXIS: -5 DEGREES
EKG P AXIS: 15 DEGREES
EKG P-R INTERVAL: 159 MS
EKG P-R INTERVAL: 162 MS
EKG Q-T INTERVAL: 430 MS
EKG Q-T INTERVAL: 430 MS
EKG QRS DURATION: 103 MS
EKG QRS DURATION: 90 MS
EKG QTC CALCULATION (BAZETT): 443 MS
EKG QTC CALCULATION (BAZETT): 448 MS
EKG R AXIS: 16 DEGREES
EKG R AXIS: 32 DEGREES
EKG T AXIS: 54 DEGREES
EKG T AXIS: 62 DEGREES
EKG VENTRICULAR RATE: 64 BPM
EKG VENTRICULAR RATE: 65 BPM
EOSINOPHIL # BLD: 0 K/UL (ref 0–0.8)
EOSINOPHIL NFR BLD: 0 % (ref 0.5–7.8)
EPI CELLS #/AREA URNS HPF: ABNORMAL /HPF
ERYTHROCYTE [DISTWIDTH] IN BLOOD BY AUTOMATED COUNT: 13.1 % (ref 11.9–14.6)
GLOBULIN SER CALC-MCNC: 3 G/DL (ref 2.3–3.5)
GLUCOSE SERPL-MCNC: 134 MG/DL (ref 65–100)
GLUCOSE UR STRIP.AUTO-MCNC: NEGATIVE MG/DL
HCT VFR BLD AUTO: 42.1 % (ref 41.1–50.3)
HGB BLD-MCNC: 13.8 G/DL (ref 13.6–17.2)
HGB UR QL STRIP: ABNORMAL
IMM GRANULOCYTES # BLD AUTO: 0 K/UL (ref 0–0.5)
IMM GRANULOCYTES NFR BLD AUTO: 0 % (ref 0–5)
KETONES UR QL STRIP.AUTO: NEGATIVE MG/DL
LACTATE SERPL-SCNC: 1 MMOL/L (ref 0.5–2)
LEUKOCYTE ESTERASE UR QL STRIP.AUTO: NEGATIVE
LIPASE SERPL-CCNC: 26 U/L (ref 13–60)
LYMPHOCYTES # BLD: 0.7 K/UL (ref 0.5–4.6)
LYMPHOCYTES NFR BLD: 8 % (ref 13–44)
MCH RBC QN AUTO: 32 PG (ref 26.1–32.9)
MCHC RBC AUTO-ENTMCNC: 32.8 G/DL (ref 31.4–35)
MCV RBC AUTO: 97.7 FL (ref 82–102)
MONOCYTES # BLD: 0.2 K/UL (ref 0.1–1.3)
MONOCYTES NFR BLD: 3 % (ref 4–12)
MUCOUS THREADS URNS QL MICRO: 0 /LPF
NEUTS SEG # BLD: 7.4 K/UL (ref 1.7–8.2)
NEUTS SEG NFR BLD: 89 % (ref 43–78)
NITRITE UR QL STRIP.AUTO: NEGATIVE
NRBC # BLD: 0 K/UL (ref 0–0.2)
OTHER OBSERVATIONS: ABNORMAL
PH UR STRIP: 6 (ref 5–9)
PLATELET # BLD AUTO: 231 K/UL (ref 150–450)
PMV BLD AUTO: 9.4 FL (ref 9.4–12.3)
POTASSIUM SERPL-SCNC: 4.2 MMOL/L (ref 3.5–5.1)
PROT SERPL-MCNC: 7.7 G/DL (ref 6.3–8.2)
PROT UR STRIP-MCNC: ABNORMAL MG/DL
RBC # BLD AUTO: 4.31 M/UL (ref 4.23–5.6)
RBC #/AREA URNS HPF: ABNORMAL /HPF
SODIUM SERPL-SCNC: 141 MMOL/L (ref 133–143)
SP GR UR REFRACTOMETRY: 1.02 (ref 1–1.02)
TROPONIN T SERPL HS-MCNC: 9.5 NG/L (ref 0–22)
UROBILINOGEN UR QL STRIP.AUTO: 1 EU/DL (ref 0.2–1)
WBC # BLD AUTO: 8.4 K/UL (ref 4.3–11.1)
WBC URNS QL MICRO: ABNORMAL /HPF

## 2024-11-22 PROCEDURE — 1100000000 HC RM PRIVATE

## 2024-11-22 PROCEDURE — 99285 EMERGENCY DEPT VISIT HI MDM: CPT

## 2024-11-22 PROCEDURE — 2580000003 HC RX 258: Performed by: INTERNAL MEDICINE

## 2024-11-22 PROCEDURE — 0D9670Z DRAINAGE OF STOMACH WITH DRAINAGE DEVICE, VIA NATURAL OR ARTIFICIAL OPENING: ICD-10-PCS | Performed by: INTERNAL MEDICINE

## 2024-11-22 PROCEDURE — 71275 CT ANGIOGRAPHY CHEST: CPT

## 2024-11-22 PROCEDURE — 80053 COMPREHEN METABOLIC PANEL: CPT

## 2024-11-22 PROCEDURE — 71275 CT ANGIOGRAPHY CHEST: CPT | Performed by: RADIOLOGY

## 2024-11-22 PROCEDURE — 6360000002 HC RX W HCPCS: Performed by: INTERNAL MEDICINE

## 2024-11-22 PROCEDURE — 74174 CTA ABD&PLVS W/CONTRAST: CPT | Performed by: RADIOLOGY

## 2024-11-22 PROCEDURE — 85025 COMPLETE CBC W/AUTO DIFF WBC: CPT

## 2024-11-22 PROCEDURE — 6360000002 HC RX W HCPCS: Performed by: EMERGENCY MEDICINE

## 2024-11-22 PROCEDURE — 6370000000 HC RX 637 (ALT 250 FOR IP): Performed by: EMERGENCY MEDICINE

## 2024-11-22 PROCEDURE — 93005 ELECTROCARDIOGRAM TRACING: CPT | Performed by: EMERGENCY MEDICINE

## 2024-11-22 PROCEDURE — 96376 TX/PRO/DX INJ SAME DRUG ADON: CPT

## 2024-11-22 PROCEDURE — 96375 TX/PRO/DX INJ NEW DRUG ADDON: CPT

## 2024-11-22 PROCEDURE — 96374 THER/PROPH/DIAG INJ IV PUSH: CPT

## 2024-11-22 PROCEDURE — 93010 ELECTROCARDIOGRAM REPORT: CPT | Performed by: INTERNAL MEDICINE

## 2024-11-22 PROCEDURE — 83690 ASSAY OF LIPASE: CPT

## 2024-11-22 PROCEDURE — 84484 ASSAY OF TROPONIN QUANT: CPT

## 2024-11-22 PROCEDURE — 81001 URINALYSIS AUTO W/SCOPE: CPT

## 2024-11-22 PROCEDURE — 2500000003 HC RX 250 WO HCPCS: Performed by: INTERNAL MEDICINE

## 2024-11-22 PROCEDURE — 83605 ASSAY OF LACTIC ACID: CPT

## 2024-11-22 PROCEDURE — 71045 X-RAY EXAM CHEST 1 VIEW: CPT

## 2024-11-22 PROCEDURE — 6360000004 HC RX CONTRAST MEDICATION: Performed by: EMERGENCY MEDICINE

## 2024-11-22 PROCEDURE — 6370000000 HC RX 637 (ALT 250 FOR IP): Performed by: INTERNAL MEDICINE

## 2024-11-22 RX ORDER — ENOXAPARIN SODIUM 100 MG/ML
40 INJECTION SUBCUTANEOUS EVERY 24 HOURS
Status: DISCONTINUED | OUTPATIENT
Start: 2024-11-22 | End: 2024-11-25 | Stop reason: HOSPADM

## 2024-11-22 RX ORDER — ONDANSETRON 4 MG/1
4 TABLET, ORALLY DISINTEGRATING ORAL EVERY 8 HOURS PRN
Status: DISCONTINUED | OUTPATIENT
Start: 2024-11-22 | End: 2024-11-25 | Stop reason: HOSPADM

## 2024-11-22 RX ORDER — HYDROMORPHONE HYDROCHLORIDE 1 MG/ML
1 INJECTION, SOLUTION INTRAMUSCULAR; INTRAVENOUS; SUBCUTANEOUS EVERY 4 HOURS PRN
Status: DISCONTINUED | OUTPATIENT
Start: 2024-11-22 | End: 2024-11-23

## 2024-11-22 RX ORDER — LIDOCAINE HYDROCHLORIDE 20 MG/ML
15 SOLUTION OROPHARYNGEAL
Status: COMPLETED | OUTPATIENT
Start: 2024-11-22 | End: 2024-11-22

## 2024-11-22 RX ORDER — ONDANSETRON 2 MG/ML
4 INJECTION INTRAMUSCULAR; INTRAVENOUS
Status: COMPLETED | OUTPATIENT
Start: 2024-11-22 | End: 2024-11-22

## 2024-11-22 RX ORDER — ONDANSETRON 2 MG/ML
4 INJECTION INTRAMUSCULAR; INTRAVENOUS EVERY 6 HOURS PRN
Status: DISCONTINUED | OUTPATIENT
Start: 2024-11-22 | End: 2024-11-25 | Stop reason: HOSPADM

## 2024-11-22 RX ORDER — ASPIRIN 81 MG/1
81 TABLET ORAL DAILY
Status: DISCONTINUED | OUTPATIENT
Start: 2024-11-23 | End: 2024-11-25 | Stop reason: HOSPADM

## 2024-11-22 RX ORDER — PANTOPRAZOLE SODIUM 40 MG/1
40 TABLET, DELAYED RELEASE ORAL
Status: DISCONTINUED | OUTPATIENT
Start: 2024-11-23 | End: 2024-11-25 | Stop reason: HOSPADM

## 2024-11-22 RX ORDER — MAGNESIUM HYDROXIDE/ALUMINUM HYDROXICE/SIMETHICONE 120; 1200; 1200 MG/30ML; MG/30ML; MG/30ML
30 SUSPENSION ORAL EVERY 6 HOURS PRN
Status: DISCONTINUED | OUTPATIENT
Start: 2024-11-22 | End: 2024-11-25 | Stop reason: HOSPADM

## 2024-11-22 RX ORDER — ONDANSETRON 2 MG/ML
4 INJECTION INTRAMUSCULAR; INTRAVENOUS ONCE
Status: COMPLETED | OUTPATIENT
Start: 2024-11-22 | End: 2024-11-22

## 2024-11-22 RX ORDER — SODIUM CHLORIDE 0.9 % (FLUSH) 0.9 %
5-40 SYRINGE (ML) INJECTION EVERY 12 HOURS SCHEDULED
Status: DISCONTINUED | OUTPATIENT
Start: 2024-11-22 | End: 2024-11-25 | Stop reason: HOSPADM

## 2024-11-22 RX ORDER — BISACODYL 10 MG
10 SUPPOSITORY, RECTAL RECTAL DAILY PRN
Status: DISCONTINUED | OUTPATIENT
Start: 2024-11-22 | End: 2024-11-25 | Stop reason: HOSPADM

## 2024-11-22 RX ORDER — ACETAMINOPHEN 650 MG/1
650 SUPPOSITORY RECTAL EVERY 6 HOURS PRN
Status: DISCONTINUED | OUTPATIENT
Start: 2024-11-22 | End: 2024-11-25 | Stop reason: HOSPADM

## 2024-11-22 RX ORDER — MAGNESIUM SULFATE IN WATER 40 MG/ML
2000 INJECTION, SOLUTION INTRAVENOUS PRN
Status: DISCONTINUED | OUTPATIENT
Start: 2024-11-22 | End: 2024-11-25 | Stop reason: HOSPADM

## 2024-11-22 RX ORDER — TAMSULOSIN HYDROCHLORIDE 0.4 MG/1
0.4 CAPSULE ORAL DAILY
Status: DISCONTINUED | OUTPATIENT
Start: 2024-11-22 | End: 2024-11-25 | Stop reason: HOSPADM

## 2024-11-22 RX ORDER — SODIUM CHLORIDE 0.9 % (FLUSH) 0.9 %
5-40 SYRINGE (ML) INJECTION PRN
Status: DISCONTINUED | OUTPATIENT
Start: 2024-11-22 | End: 2024-11-25 | Stop reason: HOSPADM

## 2024-11-22 RX ORDER — POTASSIUM CHLORIDE 7.45 MG/ML
10 INJECTION INTRAVENOUS PRN
Status: DISCONTINUED | OUTPATIENT
Start: 2024-11-22 | End: 2024-11-25 | Stop reason: HOSPADM

## 2024-11-22 RX ORDER — POTASSIUM CHLORIDE 1500 MG/1
40 TABLET, EXTENDED RELEASE ORAL PRN
Status: DISCONTINUED | OUTPATIENT
Start: 2024-11-22 | End: 2024-11-25 | Stop reason: HOSPADM

## 2024-11-22 RX ORDER — HYDROMORPHONE HYDROCHLORIDE 1 MG/ML
0.5 INJECTION, SOLUTION INTRAMUSCULAR; INTRAVENOUS; SUBCUTANEOUS EVERY 4 HOURS PRN
Status: DISCONTINUED | OUTPATIENT
Start: 2024-11-22 | End: 2024-11-23

## 2024-11-22 RX ORDER — METOPROLOL TARTRATE 25 MG/1
25 TABLET, FILM COATED ORAL 2 TIMES DAILY
Status: DISCONTINUED | OUTPATIENT
Start: 2024-11-22 | End: 2024-11-25 | Stop reason: HOSPADM

## 2024-11-22 RX ORDER — IOPAMIDOL 755 MG/ML
100 INJECTION, SOLUTION INTRAVASCULAR
Status: COMPLETED | OUTPATIENT
Start: 2024-11-22 | End: 2024-11-22

## 2024-11-22 RX ORDER — FAMOTIDINE 20 MG/1
10 TABLET, FILM COATED ORAL DAILY PRN
Status: DISCONTINUED | OUTPATIENT
Start: 2024-11-22 | End: 2024-11-25 | Stop reason: HOSPADM

## 2024-11-22 RX ORDER — POLYETHYLENE GLYCOL 3350 17 G/17G
17 POWDER, FOR SOLUTION ORAL DAILY PRN
Status: DISCONTINUED | OUTPATIENT
Start: 2024-11-22 | End: 2024-11-25 | Stop reason: HOSPADM

## 2024-11-22 RX ORDER — SODIUM CHLORIDE, SODIUM LACTATE, POTASSIUM CHLORIDE, CALCIUM CHLORIDE 600; 310; 30; 20 MG/100ML; MG/100ML; MG/100ML; MG/100ML
INJECTION, SOLUTION INTRAVENOUS CONTINUOUS
Status: DISCONTINUED | OUTPATIENT
Start: 2024-11-22 | End: 2024-11-24

## 2024-11-22 RX ORDER — ROSUVASTATIN CALCIUM 20 MG/1
20 TABLET, COATED ORAL NIGHTLY
Status: DISCONTINUED | OUTPATIENT
Start: 2024-11-22 | End: 2024-11-25 | Stop reason: HOSPADM

## 2024-11-22 RX ORDER — SODIUM CHLORIDE 9 MG/ML
INJECTION, SOLUTION INTRAVENOUS PRN
Status: DISCONTINUED | OUTPATIENT
Start: 2024-11-22 | End: 2024-11-25 | Stop reason: HOSPADM

## 2024-11-22 RX ORDER — ACETAMINOPHEN 325 MG/1
650 TABLET ORAL EVERY 6 HOURS PRN
Status: DISCONTINUED | OUTPATIENT
Start: 2024-11-22 | End: 2024-11-25 | Stop reason: HOSPADM

## 2024-11-22 RX ADMIN — ROSUVASTATIN 20 MG: 20 TABLET, FILM COATED ORAL at 20:49

## 2024-11-22 RX ADMIN — METOPROLOL TARTRATE 25 MG: 25 TABLET, FILM COATED ORAL at 20:50

## 2024-11-22 RX ADMIN — FENTANYL CITRATE 65 MCG: 50 INJECTION INTRAMUSCULAR; INTRAVENOUS at 13:50

## 2024-11-22 RX ADMIN — SODIUM CHLORIDE, PRESERVATIVE FREE 10 ML: 5 INJECTION INTRAVENOUS at 21:17

## 2024-11-22 RX ADMIN — HYDROMORPHONE HYDROCHLORIDE 0.5 MG: 1 INJECTION, SOLUTION INTRAMUSCULAR; INTRAVENOUS; SUBCUTANEOUS at 21:16

## 2024-11-22 RX ADMIN — ONDANSETRON 4 MG: 2 INJECTION, SOLUTION INTRAMUSCULAR; INTRAVENOUS at 13:51

## 2024-11-22 RX ADMIN — IOPAMIDOL 100 ML: 755 INJECTION, SOLUTION INTRAVENOUS at 12:00

## 2024-11-22 RX ADMIN — FENTANYL CITRATE 50 MCG: 50 INJECTION INTRAMUSCULAR; INTRAVENOUS at 11:11

## 2024-11-22 RX ADMIN — LIDOCAINE HYDROCHLORIDE 15 ML: 20 SOLUTION ORAL at 13:51

## 2024-11-22 RX ADMIN — TAMSULOSIN HYDROCHLORIDE 0.4 MG: 0.4 CAPSULE ORAL at 17:24

## 2024-11-22 RX ADMIN — ENOXAPARIN SODIUM 40 MG: 100 INJECTION SUBCUTANEOUS at 20:49

## 2024-11-22 RX ADMIN — ONDANSETRON 4 MG: 2 INJECTION INTRAMUSCULAR; INTRAVENOUS at 11:10

## 2024-11-22 RX ADMIN — SODIUM CHLORIDE, POTASSIUM CHLORIDE, SODIUM LACTATE AND CALCIUM CHLORIDE: 600; 310; 30; 20 INJECTION, SOLUTION INTRAVENOUS at 17:27

## 2024-11-22 ASSESSMENT — PAIN DESCRIPTION - LOCATION
LOCATION: ABDOMEN
LOCATION: ABDOMEN

## 2024-11-22 ASSESSMENT — PAIN SCALES - GENERAL
PAINLEVEL_OUTOF10: 8
PAINLEVEL_OUTOF10: 4
PAINLEVEL_OUTOF10: 3

## 2024-11-22 ASSESSMENT — PAIN DESCRIPTION - DESCRIPTORS: DESCRIPTORS: SHOOTING

## 2024-11-22 ASSESSMENT — PAIN DESCRIPTION - ORIENTATION: ORIENTATION: MID;UPPER

## 2024-11-22 ASSESSMENT — PAIN - FUNCTIONAL ASSESSMENT: PAIN_FUNCTIONAL_ASSESSMENT: 0-10

## 2024-11-22 NOTE — ED PROVIDER NOTES
Emergency Department Provider Note       PCP: Andrew Bowling MD   Age: 74 y.o.   Sex: male     DISPOSITION Decision To Transfer 11/22/2024 01:25:59 PM    ICD-10-CM    1. SBO (small bowel obstruction) ([facility])  K56.609           Medical Decision Making     Reviewed patient's chart briefly.  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  There is no discussion of aortic caliber and his imaging.  Given his mid abdominal pain radiating into his back concern for AAA or dissection.  CTA was done.  Patient given pain medication.    Patient CT shows a small bowel obstruction with transition point likely secondary to adhesions.  NG tube was ordered.  Ordered to go to low intermittent suction.  X-ray shows NG tube in the GE junction.  Discussed with Dr. Rousseau (his original surgeon) who requests admission to the hospitalist at [facility].  Pt agreeable.     1 or more acute illnesses that pose a threat to life or bodily function.   Prescription drug management performed.  Parental controlled substances given in the ED.  Discussion with external consultants.  Shared medical decision making was utilized in creating the patients health plan today.  I independently ordered and reviewed each unique test.    I reviewed external records: provider visit note from outside specialist.  I reviewed external records: previous lab results from outside ED.     ED cardiac monitoring rhythm strip was ordered and interpreted:  sinus rhythm, no evidence of an arrhythmia  ST Segments:Normal ST segments - NO STEMI   Rate: 69  I interpreted the X-rays ngt in place.  ED provider's independent EKG interpretation NSR no ST elevation nl QRS  The patient was admitted and I have discussed patient management with the admitting provider.  The management of this patient was discussed with an external consultant.            History     Presents with complaint of mid abdominal pain  that radiates into his back.  States started in the middle of the night woke him up.  He has had nausea without vomiting.  He reports a history of PEs on Xarelto.  He takes Nexium regularly.  He had extensive abdominal surgery in 2021 for diverticulitis requiring a colostomy and then reversal.    The history is provided by the patient.     Physical Exam     Vitals signs and nursing note reviewed:  Vitals:    11/22/24 1313 11/22/24 1314 11/22/24 1315 11/22/24 1400   BP:   (!) 161/84 (!) 146/67   Pulse: 65 65 64 67   Resp: 19 19 19 17   Temp:       TempSrc:       SpO2: 100% 99%  96%   Weight:       Height:          Physical Exam  Vitals and nursing note reviewed.   Constitutional:       General: He is not in acute distress.     Appearance: Normal appearance. He is well-developed. He is not ill-appearing.   HENT:      Head: Normocephalic and atraumatic.   Eyes:      Conjunctiva/sclera: Conjunctivae normal.   Cardiovascular:      Rate and Rhythm: Normal rate and regular rhythm.   Pulmonary:      Effort: Pulmonary effort is normal.      Breath sounds: Normal breath sounds.   Abdominal:      Palpations: Abdomen is soft. There is no mass or pulsatile mass.      Tenderness: There is generalized abdominal tenderness. There is no guarding or rebound.   Musculoskeletal:         General: Normal range of motion.      Cervical back: Normal range of motion and neck supple.      Right lower leg: No edema.      Left lower leg: No edema.   Skin:     General: Skin is warm and dry.      Coloration: Skin is not jaundiced.   Neurological:      General: No focal deficit present.      Mental Status: He is alert and oriented to person, place, and time.      Cranial Nerves: No cranial nerve deficit.   Psychiatric:         Mood and Affect: Mood normal.         Behavior: Behavior normal.          Procedures     Procedures    Orders placed during this emergency department visit:     Orders Placed This Encounter   Procedures    CTA CHEST ABDOMEN  Urine Negative NEG     Lactate, Sepsis   Result Value Ref Range    Lactic Acid, Sepsis 1.0 0.5 - 2.0 MMOL/L   Urinalysis, Micro   Result Value Ref Range    WBC, UA 0-3 0 /hpf    RBC, UA 3-5 0 /hpf    Epithelial Cells, UA 0-3 0 /hpf    BACTERIA, URINE 1+ (H) 0 /hpf    Mucus, UA 0 0 /lpf    Other observations RESULTS VERIFIED MANUALLY     Troponin   Result Value Ref Range    Troponin T 9.5 0 - 22 ng/L   EKG 12 Lead “IF” over 40 years of age, and Upper Abd pain, SOB, Diaphoresis, Diabetes, or Tachycardia greater than or equal to 120 bmp. (Use as indication for order).   Result Value Ref Range    Ventricular Rate 64 BPM    Atrial Rate 64 BPM    P-R Interval 162 ms    QRS Duration 90 ms    Q-T Interval 430 ms    QTc Calculation (Bazett) 443 ms    P Axis -5 degrees    R Axis 16 degrees    T Axis 62 degrees    Diagnosis       Normal sinus rhythm  ST elevation noted in the inferior leads- consider acute injury if clinically   indicated  When compared with ECG of 21-MAY-2024 08:21,  St elevation is noted in the inferior leads  ST no longer elevated in Lateral leads    Confirmed by Andrew Zazueta MD ([id number]) on 11/22/2024 10:24:35 AM     EKG 12 Lead Repeat   Result Value Ref Range    Ventricular Rate 65 BPM    Atrial Rate 65 BPM    P-R Interval 159 ms    QRS Duration 103 ms    Q-T Interval 430 ms    QTc Calculation (Bazett) 448 ms    P Axis 15 degrees    R Axis 32 degrees    T Axis 54 degrees    Diagnosis       Sinus rhythm    Confirmed by Andrew Zazueta MD ([id number]) on 11/22/2024 12:41:50 PM           XR CHEST PORTABLE   Final Result   Enteric tube with tip projecting over the gastric body. Sideport projects near   the gastroesophageal junction.   No airspace disease by radiograph in the visualized portion of the lungs.         Electronically signed by Deepika Richard      CTA CHEST ABDOMEN PELVIS W WO CONTRAST   Final Result   Mild to moderate mechanical small bowel obstruction with dilatation   of the proximal and mid

## 2024-11-22 NOTE — H&P
includes techniques or standardized protocols for targeted exams where dose is matched to indication/reason for exam); and Use of Iterative Reconstruction Technique. Comparison: None Findings: Neck base: Normal Lungs: Normal Mediastinum: Normal. Cardiac: Patent bypass grafts are not well seen at this exam.. Pulmonary Arteries:  Normal Esophagus: Normal CHEST WALL: Normal Liver: Benign-appearing dense calcification of the capsule of the right lobe of the liver. No change compared with February 2023 Biliary: Cholelithiasis without evidence of cholecystitis. Pancreas:Normal Spleen:Normal Adrenals:Normal Kidneys: 6 cm simple cyst upper pole right kidney. 6 mm stone without obstruction of the lower pole left kidney. Lymph nodes:No pathologically enlarged lymph nodes Abdominal wall:Jessika Bowel: Mildly dilated loops of small bowel the left upper quadrant left lower abdomen with normal terminal ileum and appendix. Diverticulosis without CT evidence of diverticulitis. Normal suture line seen in the proximal rectum. Pelvic organs:Normal Bones:Normal for age Aorta: 3.9 cm a sending aorta. Normal caliber thoracic aortic arch. Proximal vessels are patent. Descending thoracic aorta is normal in caliber. No dissection. No aneurysm. Mesenteric and renal arteries are widely patent. Bilateral common and external iliac arteries are patent. Mild atherosclerotic disease. Vascular/Other:  Normal     Mild to moderate mechanical small bowel obstruction with dilatation of the proximal and mid small bowel in the left side of the abdomen and pelvis. Normal ileum. Transition point is within the mid pelvis best seen on axial image 474 series 3. No mass or adenopathy. No significant inflammation. Adhesion favored.  Large simple cyst of the right kidney. 6 mm nonobstructing stone of the lower pole of the left kidney Electronically signed by DELMIS DOWLING        Signed:  Arpit Sher MD    Part of this note may have been written by

## 2024-11-22 NOTE — PROGRESS NOTES
TRANSFER - IN REPORT:    Verbal report received from Vanessa(name) on Arpit Weber  being received from ED(unit) for routine progression of care      Report consisted of patient's Situation, Background, Assessment and   Recommendations(SBAR).     Information from the following report(s) SBAR, ED Summary, MAR, and Recent Results was reviewed with the receiving nurse.    Opportunity for questions and clarification was provided.      Assessment completed upon patient's arrival to unit and care assumed.

## 2024-11-22 NOTE — ED TRIAGE NOTES
PT ambulatory to triage with steady gait by self. Pt reports upper abdominal pain, and bilateral flank pain that woke patient up from sleep. PT reports having nausea, denies V/D. Pt denies urinary symptoms. Hx acid reflux. Still has all abdominal organs.Bypass done in April. Pt reports taking rolaids at night, denies any pain medications pta.

## 2024-11-23 LAB
ANION GAP SERPL CALC-SCNC: 12 MMOL/L (ref 7–16)
BASOPHILS # BLD: 0 K/UL (ref 0–0.2)
BASOPHILS NFR BLD: 0 % (ref 0–2)
BUN SERPL-MCNC: 13 MG/DL (ref 8–23)
CALCIUM SERPL-MCNC: 9.9 MG/DL (ref 8.8–10.2)
CHLORIDE SERPL-SCNC: 104 MMOL/L (ref 98–107)
CO2 SERPL-SCNC: 26 MMOL/L (ref 20–29)
CREAT SERPL-MCNC: 0.93 MG/DL (ref 0.8–1.3)
DIFFERENTIAL METHOD BLD: ABNORMAL
EOSINOPHIL # BLD: 0 K/UL (ref 0–0.8)
EOSINOPHIL NFR BLD: 0 % (ref 0.5–7.8)
ERYTHROCYTE [DISTWIDTH] IN BLOOD BY AUTOMATED COUNT: 13.2 % (ref 11.9–14.6)
GLUCOSE SERPL-MCNC: 110 MG/DL (ref 70–99)
HCT VFR BLD AUTO: 38.9 % (ref 41.1–50.3)
HGB BLD-MCNC: 12.6 G/DL (ref 13.6–17.2)
IMM GRANULOCYTES # BLD AUTO: 0 K/UL (ref 0–0.5)
IMM GRANULOCYTES NFR BLD AUTO: 0 % (ref 0–5)
LYMPHOCYTES # BLD: 0.8 K/UL (ref 0.5–4.6)
LYMPHOCYTES NFR BLD: 9 % (ref 13–44)
MCH RBC QN AUTO: 32 PG (ref 26.1–32.9)
MCHC RBC AUTO-ENTMCNC: 32.4 G/DL (ref 31.4–35)
MCV RBC AUTO: 98.7 FL (ref 82–102)
MONOCYTES # BLD: 0.5 K/UL (ref 0.1–1.3)
MONOCYTES NFR BLD: 5 % (ref 4–12)
NEUTS SEG # BLD: 8.1 K/UL (ref 1.7–8.2)
NEUTS SEG NFR BLD: 86 % (ref 43–78)
NRBC # BLD: 0 K/UL (ref 0–0.2)
PLATELET # BLD AUTO: 223 K/UL (ref 150–450)
PMV BLD AUTO: 9.6 FL (ref 9.4–12.3)
POTASSIUM SERPL-SCNC: 3.9 MMOL/L (ref 3.5–5.1)
RBC # BLD AUTO: 3.94 M/UL (ref 4.23–5.6)
SODIUM SERPL-SCNC: 142 MMOL/L (ref 136–145)
WBC # BLD AUTO: 9.4 K/UL (ref 4.3–11.1)

## 2024-11-23 PROCEDURE — 85025 COMPLETE CBC W/AUTO DIFF WBC: CPT

## 2024-11-23 PROCEDURE — 6370000000 HC RX 637 (ALT 250 FOR IP): Performed by: INTERNAL MEDICINE

## 2024-11-23 PROCEDURE — 36415 COLL VENOUS BLD VENIPUNCTURE: CPT

## 2024-11-23 PROCEDURE — 6360000002 HC RX W HCPCS: Performed by: INTERNAL MEDICINE

## 2024-11-23 PROCEDURE — 1100000000 HC RM PRIVATE

## 2024-11-23 PROCEDURE — 80048 BASIC METABOLIC PNL TOTAL CA: CPT

## 2024-11-23 PROCEDURE — 2580000003 HC RX 258: Performed by: INTERNAL MEDICINE

## 2024-11-23 RX ORDER — MORPHINE SULFATE 2 MG/ML
2 INJECTION, SOLUTION INTRAMUSCULAR; INTRAVENOUS EVERY 4 HOURS PRN
Status: DISCONTINUED | OUTPATIENT
Start: 2024-11-23 | End: 2024-11-25 | Stop reason: HOSPADM

## 2024-11-23 RX ORDER — MORPHINE SULFATE 4 MG/ML
4 INJECTION INTRAVENOUS EVERY 4 HOURS PRN
Status: DISCONTINUED | OUTPATIENT
Start: 2024-11-23 | End: 2024-11-25 | Stop reason: HOSPADM

## 2024-11-23 RX ADMIN — METOPROLOL TARTRATE 25 MG: 25 TABLET, FILM COATED ORAL at 08:45

## 2024-11-23 RX ADMIN — SODIUM CHLORIDE, PRESERVATIVE FREE 10 ML: 5 INJECTION INTRAVENOUS at 19:54

## 2024-11-23 RX ADMIN — MORPHINE SULFATE 2 MG: 2 INJECTION, SOLUTION INTRAMUSCULAR; INTRAVENOUS at 19:53

## 2024-11-23 RX ADMIN — ENOXAPARIN SODIUM 40 MG: 100 INJECTION SUBCUTANEOUS at 19:54

## 2024-11-23 RX ADMIN — SODIUM CHLORIDE, POTASSIUM CHLORIDE, SODIUM LACTATE AND CALCIUM CHLORIDE: 600; 310; 30; 20 INJECTION, SOLUTION INTRAVENOUS at 19:57

## 2024-11-23 RX ADMIN — TAMSULOSIN HYDROCHLORIDE 0.4 MG: 0.4 CAPSULE ORAL at 08:45

## 2024-11-23 RX ADMIN — ASPIRIN 81 MG: 81 TABLET, COATED ORAL at 08:45

## 2024-11-23 RX ADMIN — METOPROLOL TARTRATE 25 MG: 25 TABLET, FILM COATED ORAL at 19:53

## 2024-11-23 RX ADMIN — ROSUVASTATIN 20 MG: 20 TABLET, FILM COATED ORAL at 19:53

## 2024-11-23 RX ADMIN — PANTOPRAZOLE SODIUM 40 MG: 40 TABLET, DELAYED RELEASE ORAL at 06:55

## 2024-11-23 ASSESSMENT — PAIN SCALES - GENERAL: PAINLEVEL_OUTOF10: 6

## 2024-11-23 ASSESSMENT — PAIN DESCRIPTION - LOCATION: LOCATION: ABDOMEN

## 2024-11-23 ASSESSMENT — PAIN DESCRIPTION - DESCRIPTORS: DESCRIPTORS: CRAMPING;DISCOMFORT

## 2024-11-23 NOTE — CARE COORDINATION
RNCM met with patient in room 356 to discuss discharge planning.     Patient presents to assessment alert and oriented, and answers questions appropriately.  Patient typically lives at home alone.  At baseline, patient is independent with ADLs.    Demographics verified. Patient has medicare and BCBS Supplement.  PCP is Dr. Bowling.    At this time, patients discharge needs are undetermined. Patient has used [facility] in the past, if services are needed following this admission. Case management will continue to follow.  Please notify if there are any changes.          11/23/24 4116   Service Assessment   Patient Orientation Person;Place;Situation;Self;Other (see comment)   Cognition Other (see comment)  (lethargic)   History Provided By Patient   Primary Caregiver Self   Accompanied By/Relationship n/a   Support Systems Family Members;[employer] [employer];Friends/Neighbors   PCP Verified by CM Yes   Last Visit to PCP Within last 6 months   Prior Functional Level Independent in ADLs/IADLs   Current Functional Level Independent in ADLs/IADLs   Can patient return to prior living arrangement Yes   Ability to make needs known: Good   Family able to assist with home care needs: Yes   Would you like for me to discuss the discharge plan with any other family members/significant others, and if so, who? Yes  (family)   Financial Resources Medicare   Community Resources None   Social/Functional History   Lives With Alone   Type of Home House   Home Layout One level   Home Access Stairs to enter with rails   Entrance Stairs - Number of Steps 3   Bathroom Toilet Standard   Bathroom Accessibility Accessible   Home Equipment Cane;Walker - Rolling   Receives Help From Family   ADL Assistance Independent   Active  Yes   Mode of Transportation Car   Occupation Part time employment   Discharge Planning   Type of Residence House   Living Arrangements Alone   Current Services Prior To Admission None   Potential  Assistance Needed N/A   DME Ordered? No   Potential Assistance Purchasing Medications No   Type of Home Care Services None   Patient expects to be discharged to: House   Services At/After Discharge   Transition of Care Consult (CM Consult) Discharge Planning    Resource Information Provided? No   Mode of Transport at Discharge Self   Confirm Follow Up Transport Family

## 2024-11-23 NOTE — PROGRESS NOTES
K/UL       No results for input(s): \"COVID19\" in the last 72 hours.    Current Meds:  Current Facility-Administered Medications   Medication Dose Route Frequency    sodium chloride flush 0.9 % injection 5-40 mL  5-40 mL IntraVENous 2 times per day    sodium chloride flush 0.9 % injection 5-40 mL  5-40 mL IntraVENous PRN    0.9 % sodium chloride infusion   IntraVENous PRN    potassium chloride (KLOR-CON M) extended release tablet 40 mEq  40 mEq Oral PRN    Or    potassium bicarb-citric acid (EFFER-K) effervescent tablet 40 mEq  40 mEq Oral PRN    Or    potassium chloride 10 mEq/100 mL IVPB (Peripheral Line)  10 mEq IntraVENous PRN    potassium chloride 10 mEq/100 mL IVPB (Peripheral Line)  10 mEq IntraVENous PRN    magnesium sulfate 2000 mg in 50 mL IVPB premix  2,000 mg IntraVENous PRN    ondansetron (ZOFRAN-ODT) disintegrating tablet 4 mg  4 mg Oral Q8H PRN    Or    ondansetron (ZOFRAN) injection 4 mg  4 mg IntraVENous Q6H PRN    melatonin tablet 3 mg  3 mg Oral Nightly PRN    polyethylene glycol (GLYCOLAX) packet 17 g  17 g Oral Daily PRN    bisacodyl (DULCOLAX) suppository 10 mg  10 mg Rectal Daily PRN    famotidine (PEPCID) tablet 10 mg  10 mg Oral Daily PRN    aluminum & magnesium hydroxide-simethicone (MAALOX) 200-200-20 MG/5ML suspension 30 mL  30 mL Oral Q6H PRN    acetaminophen (TYLENOL) tablet 650 mg  650 mg Oral Q6H PRN    Or    acetaminophen (TYLENOL) suppository 650 mg  650 mg Rectal Q6H PRN    enoxaparin (LOVENOX) injection 40 mg  40 mg SubCUTAneous Q24H    HYDROmorphone HCl PF (DILAUDID) injection 0.5 mg  0.5 mg IntraVENous Q4H PRN    Or    HYDROmorphone HCl PF (DILAUDID) injection 1 mg  1 mg IntraVENous Q4H PRN    aspirin EC tablet 81 mg  81 mg Oral Daily    pantoprazole (PROTONIX) tablet 40 mg  40 mg Oral QAM AC    metoprolol tartrate (LOPRESSOR) tablet 25 mg  25 mg Oral BID    [Held by provider] rivaroxaban (XARELTO) tablet 20 mg  20 mg Oral Daily with breakfast    rosuvastatin (CRESTOR) tablet 20  mg  20 mg Oral Nightly    tamsulosin (FLOMAX) capsule 0.4 mg  0.4 mg Oral Daily    lactated ringers infusion   IntraVENous Continuous       Signed:  Arpit Sher MD    Part of this note may have been written by using a voice dictation software.  The note has been proof read but may still contain some grammatical/other typographical errors.

## 2024-11-24 ENCOUNTER — APPOINTMENT (OUTPATIENT)
Dept: GENERAL RADIOLOGY | Age: 74
DRG: 389 | End: 2024-11-24
Payer: MEDICARE

## 2024-11-24 LAB
ANION GAP SERPL CALC-SCNC: 12 MMOL/L (ref 7–16)
BASOPHILS # BLD: 0.1 K/UL (ref 0–0.2)
BASOPHILS NFR BLD: 1 % (ref 0–2)
BUN SERPL-MCNC: 14 MG/DL (ref 8–23)
CALCIUM SERPL-MCNC: 9.3 MG/DL (ref 8.8–10.2)
CHLORIDE SERPL-SCNC: 104 MMOL/L (ref 98–107)
CO2 SERPL-SCNC: 25 MMOL/L (ref 20–29)
CREAT SERPL-MCNC: 0.86 MG/DL (ref 0.8–1.3)
DIFFERENTIAL METHOD BLD: ABNORMAL
EOSINOPHIL # BLD: 0 K/UL (ref 0–0.8)
EOSINOPHIL NFR BLD: 0 % (ref 0.5–7.8)
ERYTHROCYTE [DISTWIDTH] IN BLOOD BY AUTOMATED COUNT: 13.2 % (ref 11.9–14.6)
GLUCOSE SERPL-MCNC: 93 MG/DL (ref 70–99)
HCT VFR BLD AUTO: 38.1 % (ref 41.1–50.3)
HGB BLD-MCNC: 12.4 G/DL (ref 13.6–17.2)
IMM GRANULOCYTES # BLD AUTO: 0.1 K/UL (ref 0–0.5)
IMM GRANULOCYTES NFR BLD AUTO: 2 % (ref 0–5)
LYMPHOCYTES # BLD: 1 K/UL (ref 0.5–4.6)
LYMPHOCYTES NFR BLD: 11 % (ref 13–44)
MCH RBC QN AUTO: 31.7 PG (ref 26.1–32.9)
MCHC RBC AUTO-ENTMCNC: 32.5 G/DL (ref 31.4–35)
MCV RBC AUTO: 97.4 FL (ref 82–102)
MONOCYTES # BLD: 0.7 K/UL (ref 0.1–1.3)
MONOCYTES NFR BLD: 8 % (ref 4–12)
NEUTS SEG # BLD: 6.9 K/UL (ref 1.7–8.2)
NEUTS SEG NFR BLD: 79 % (ref 43–78)
NRBC # BLD: 0 K/UL (ref 0–0.2)
PLATELET # BLD AUTO: 202 K/UL (ref 150–450)
PMV BLD AUTO: 10 FL (ref 9.4–12.3)
POTASSIUM SERPL-SCNC: 3.8 MMOL/L (ref 3.5–5.1)
RBC # BLD AUTO: 3.91 M/UL (ref 4.23–5.6)
SODIUM SERPL-SCNC: 141 MMOL/L (ref 136–145)
WBC # BLD AUTO: 8.7 K/UL (ref 4.3–11.1)

## 2024-11-24 PROCEDURE — 74018 RADEX ABDOMEN 1 VIEW: CPT

## 2024-11-24 PROCEDURE — 85025 COMPLETE CBC W/AUTO DIFF WBC: CPT

## 2024-11-24 PROCEDURE — 80048 BASIC METABOLIC PNL TOTAL CA: CPT

## 2024-11-24 PROCEDURE — 2580000003 HC RX 258: Performed by: INTERNAL MEDICINE

## 2024-11-24 PROCEDURE — 6370000000 HC RX 637 (ALT 250 FOR IP): Performed by: INTERNAL MEDICINE

## 2024-11-24 PROCEDURE — 6360000002 HC RX W HCPCS: Performed by: INTERNAL MEDICINE

## 2024-11-24 PROCEDURE — 1100000000 HC RM PRIVATE

## 2024-11-24 PROCEDURE — 36415 COLL VENOUS BLD VENIPUNCTURE: CPT

## 2024-11-24 RX ADMIN — PANTOPRAZOLE SODIUM 40 MG: 40 TABLET, DELAYED RELEASE ORAL at 05:43

## 2024-11-24 RX ADMIN — ROSUVASTATIN 20 MG: 20 TABLET, FILM COATED ORAL at 21:05

## 2024-11-24 RX ADMIN — TAMSULOSIN HYDROCHLORIDE 0.4 MG: 0.4 CAPSULE ORAL at 08:39

## 2024-11-24 RX ADMIN — SODIUM CHLORIDE, PRESERVATIVE FREE 10 ML: 5 INJECTION INTRAVENOUS at 08:39

## 2024-11-24 RX ADMIN — METOPROLOL TARTRATE 25 MG: 25 TABLET, FILM COATED ORAL at 21:04

## 2024-11-24 RX ADMIN — ENOXAPARIN SODIUM 40 MG: 100 INJECTION SUBCUTANEOUS at 21:04

## 2024-11-24 RX ADMIN — ASPIRIN 81 MG: 81 TABLET, COATED ORAL at 08:39

## 2024-11-24 RX ADMIN — METOPROLOL TARTRATE 25 MG: 25 TABLET, FILM COATED ORAL at 08:39

## 2024-11-24 RX ADMIN — SODIUM CHLORIDE, POTASSIUM CHLORIDE, SODIUM LACTATE AND CALCIUM CHLORIDE: 600; 310; 30; 20 INJECTION, SOLUTION INTRAVENOUS at 08:38

## 2024-11-24 NOTE — PROGRESS NOTES
Hospitalist Progress Note   Admit Date:  2024  3:36 PM   Name:  Arpit Weber   Age:  74 y.o.  Sex:  male  :  1950   MRN:  [id number]   Room:  [facility]/01    Presenting/Chief Complaint: No chief complaint on file.     Reason(s) for Admission: SBO (small bowel obstruction) (Formerly [facility]) [K56.609]     Hospital Course:   Arpit Weber is a 74 y.o. male who presented with lower abdominal pain, moderate-severe.  Started last night along with some nausea.  He did not vomit until he came to the ER today for evaluation.  Vomiting was nonbloody mostly bilious.  He had a normal-appearing bowel movement yesterday and a little bit again this morning.   CT scan in the ER [place] showed small bowel obstruction.      In  he had colonoscopy due to CT concerning for colonic mass.  Biopsies were benign.  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  There was a mention of perforated diverticulitis also but I could not find this in the surgical notes.  he had resection of the colovesicular fistula with partial colectomy and bladder repair in May 2021.  He had reversal of the colostomy that November.  Since then he has been doing well from a GI standpoint.  He did get admitted in May of this year for quadruple CABG and had a brief run of A-fib postop.      Subjective & 24hr Events:   Patient says abdominal pain has improved, only mild left lower quadrant.  He is passing gas.  No nausea or vomiting.  No NG tube output last 24 hours.        Assessment & Plan:       SBO (small bowel obstruction) (Formerly [facility])  -No output from NG tube last 24 hours.  Will clamp and try liquids today  -surgery aware of patient.  If no improvement by Monday will have them see  -Changed IV Dilaudid to IV morphine because complained of side effects but he is not needing as much anymore       History of DVT and saddle PE 2023  -completed 6 months.  Holding Xarelto   -lovenox SC    distension.  Lungs:   ND  Symmetric expansion.  Abdomen:   Soft, mild TTP mid abdomen, nondistended.    Extremities: No cyanosis or clubbing.  No edema  Skin:     No rashes.  Normal coloration.   Warm and dry.    Neuro:  CN II-XII grossly intact.    Psych:  Normal mood and affect.      I have personally reviewed labs and tests:  Recent Labs:  Recent Results (from the past 48 hour(s))   CBC with Auto Differential    Collection Time: 11/22/24  9:35 AM   Result Value Ref Range    WBC 8.4 4.3 - 11.1 K/uL    RBC 4.31 4.23 - 5.60 M/uL    Hemoglobin 13.8 13.6 - 17.2 g/dL    Hematocrit 42.1 41.1 - 50.3 %    MCV 97.7 82.0 - 102.0 FL    MCH 32.0 26.1 - 32.9 PG    MCHC 32.8 31.4 - 35.0 g/dL    RDW 13.1 11.9 - 14.6 %    Platelets 231 150 - 450 K/uL    MPV 9.4 9.4 - 12.3 FL    nRBC 0.00 0.0 - 0.2 K/uL    Differential Type AUTOMATED      Neutrophils % 89 (H) 43 - 78 %    Lymphocytes % 8 (L) 13 - 44 %    Monocytes % 3 (L) 4.0 - 12.0 %    Eosinophils % 0 (L) 0.5 - 7.8 %    Basophils % 1 0.0 - 2.0 %    Immature Granulocytes % 0 0.0 - 5.0 %    Neutrophils Absolute 7.4 1.7 - 8.2 K/UL    Lymphocytes Absolute 0.7 0.5 - 4.6 K/UL    Monocytes Absolute 0.2 0.1 - 1.3 K/UL    Eosinophils Absolute 0.0 0.0 - 0.8 K/UL    Basophils Absolute 0.0 0.0 - 0.2 K/UL    Immature Granulocytes Absolute 0.0 0.0 - 0.5 K/UL   Comprehensive Metabolic Panel    Collection Time: 11/22/24  9:35 AM   Result Value Ref Range    Sodium 141 133 - 143 mmol/L    Potassium 4.2 3.5 - 5.1 mmol/L    Chloride 104 98 - 107 mmol/L    CO2 26 20 - 29 mmol/L    Anion Gap 11 7 - 16 mmol/L    Glucose 134 (H) 65 - 100 mg/dL    BUN 16 8 - 23 MG/DL    Creatinine 0.77 (L) 0.80 - 1.30 MG/DL    Est, Glom Filt Rate >90 >60 ml/min/1.73m2    Calcium 10.5 (H) 8.8 - 10.2 MG/DL    Total Bilirubin 0.7 0.0 - 1.2 MG/DL    ALT 19 12 - 65 U/L    AST 26 15 - 37 U/L    Alk Phosphatase 88 40 - 129 U/L    Total Protein 7.7 6.3 - 8.2 g/dL    Albumin 4.7 (H) 3.2 - 4.6 g/dL    Globulin 3.0 2.3 - 3.5 g/dL

## 2024-11-25 VITALS
OXYGEN SATURATION: 99 % | WEIGHT: 188 LBS | DIASTOLIC BLOOD PRESSURE: 62 MMHG | HEIGHT: 71 IN | RESPIRATION RATE: 16 BRPM | SYSTOLIC BLOOD PRESSURE: 143 MMHG | TEMPERATURE: 98.6 F | HEART RATE: 66 BPM | BODY MASS INDEX: 26.32 KG/M2

## 2024-11-25 PROCEDURE — 6370000000 HC RX 637 (ALT 250 FOR IP): Performed by: INTERNAL MEDICINE

## 2024-11-25 PROCEDURE — 2580000003 HC RX 258: Performed by: INTERNAL MEDICINE

## 2024-11-25 RX ORDER — POLYETHYLENE GLYCOL 3350 17 G/17G
17 POWDER, FOR SOLUTION ORAL DAILY PRN
COMMUNITY
Start: 2024-11-25

## 2024-11-25 RX ORDER — BISACODYL 5 MG/1
10 TABLET, DELAYED RELEASE ORAL DAILY PRN
COMMUNITY
Start: 2024-11-25

## 2024-11-25 RX ADMIN — TAMSULOSIN HYDROCHLORIDE 0.4 MG: 0.4 CAPSULE ORAL at 08:39

## 2024-11-25 RX ADMIN — METOPROLOL TARTRATE 25 MG: 25 TABLET, FILM COATED ORAL at 08:39

## 2024-11-25 RX ADMIN — SODIUM CHLORIDE, PRESERVATIVE FREE 10 ML: 5 INJECTION INTRAVENOUS at 08:39

## 2024-11-25 RX ADMIN — ASPIRIN 81 MG: 81 TABLET, COATED ORAL at 08:39

## 2024-11-25 RX ADMIN — PANTOPRAZOLE SODIUM 40 MG: 40 TABLET, DELAYED RELEASE ORAL at 06:04

## 2024-11-25 NOTE — DISCHARGE SUMMARY
Hospitalist Discharge Summary   Admit Date:  2024  3:36 PM   DC Note date: 2024  Name:  Arpit Weber   Age:  74 y.o.  Sex:  male  :  1950   MRN:  [id number]   Room:  [facility]  PCP:  Andrew Bowling MD    Presenting Complaint: No chief complaint on file.     Initial Admission Diagnosis: SBO (small bowel obstruction) (HCC) [K56.609]     Problem List for this Hospitalization (present on admission):    Principal Problem:    SBO (small bowel obstruction) (HCC)  Active Problems:    History of DVT and PE    Atherosclerotic heart disease of native coronary artery with unstable angina pectoris (HCC)    S/P CABG x 4    History of Isolated Afib after CABG (HCC)    Secondary hypercoagulable state (HCC)  Resolved Problems:    * No resolved hospital problems. *      Hospital Course:  Arpit Weber is a 74 y.o. male who presented with lower abdominal pain, moderate-severe.  Started last night along with some nausea.  He did not vomit until he came to the ER today for evaluation.  Vomiting was nonbloody mostly bilious.  He had a normal-appearing bowel movement yesterday and a little bit again this morning.   CT scan in the ER [place] showed small bowel obstruction.      In  he had colonoscopy due to CT concerning for colonic mass.  Biopsies were benign.  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  There was a mention of perforated diverticulitis also but I could not find this in the surgical notes.  he had resection of the colovesicular fistula with partial colectomy and bladder repair in May 2021.  He had reversal of the colostomy that November.  Since then he has been doing well from a GI standpoint.  He did get admitted in May of this year for quadruple CABG and had a brief run of A-fib postop.    Patient admitted for small bowel obstruction and improved with conservative management quickly.  He had no NG tube output the

## 2024-11-26 ENCOUNTER — ENROLLMENT (OUTPATIENT)
Dept: CARE COORDINATION | Facility: CLINIC | Age: 74
End: 2024-11-26

## 2024-11-26 ENCOUNTER — CARE COORDINATION (OUTPATIENT)
Dept: CARE COORDINATION | Facility: CLINIC | Age: 74
End: 2024-11-26

## 2024-11-26 DIAGNOSIS — K56.609 SBO (SMALL BOWEL OBSTRUCTION) (HCC): Primary | ICD-10-CM

## 2024-11-26 PROCEDURE — 1111F DSCHRG MED/CURRENT MED MERGE: CPT | Performed by: FAMILY MEDICINE

## 2024-11-26 NOTE — CARE COORDINATION
Care Transitions Note    Initial Call - Call within 2 business days of discharge: Yes    Patient Current Location:  Home: [address] [place] [address]  [place] [address]    Care Transition Nurse contacted the patient by telephone to perform post hospital discharge assessment, verified name and  as identifiers. Provided introduction to self, and explanation of the Care Transition Nurse role.     Patient: Arpit Weber    Patient : 1950   MRN: [id number]    Reason for Admission: SBO  Discharge Date: 24  RURS: Readmission Risk Score: 7.7      Last Discharge Facility       Date Complaint Diagnosis Description Type Department Provider    24   Admission (Discharged) Arpit Lea MD    24 Abdominal Pain SBO (small bowel obstruction) (HCC) ED (TRANSFER) Pippa Guillen MD            Was this an external facility discharge? No    Additional needs identified to be addressed with provider   No needs identified             Method of communication with provider: none.    Patients top risk factors for readmission: medical condition-SBO,CAD,hx of DVT and PE    Interventions to address risk factors:   Reviewed discharge instructions and offered opportunity to ask any questions regarding discharge instructions.  Confirmed medications obtained and taking as ordered  CTN scheduled PCP follow up appointment-2024 at 11:45 am  Patient to contact CTN with any issues, questions, or concerns prior to next outreach.    Care Transition Nurse reviewed discharge instructions, medical action plan, and red flags with patient. The patient was given an opportunity to ask questions; no further questions or concerns at this time.. The patient verbalized understanding.   Were discharge instructions available to patient? Yes.   Reviewed appropriate site of care based on symptoms and resources available to patient including: PCP  Specialist. The patient agrees to contact the primary care

## 2024-12-02 ENCOUNTER — OFFICE VISIT (OUTPATIENT)
Dept: FAMILY MEDICINE CLINIC | Facility: CLINIC | Age: 74
End: 2024-12-02

## 2024-12-02 VITALS
BODY MASS INDEX: 26.6 KG/M2 | DIASTOLIC BLOOD PRESSURE: 66 MMHG | HEIGHT: 71 IN | SYSTOLIC BLOOD PRESSURE: 130 MMHG | WEIGHT: 190 LBS

## 2024-12-02 DIAGNOSIS — Z00.00 ROUTINE GENERAL MEDICAL EXAMINATION AT A HEALTH CARE FACILITY: ICD-10-CM

## 2024-12-02 DIAGNOSIS — H81.10 BENIGN PAROXYSMAL POSITIONAL VERTIGO, UNSPECIFIED LATERALITY: Primary | ICD-10-CM

## 2024-12-02 DIAGNOSIS — Z09 HOSPITAL DISCHARGE FOLLOW-UP: ICD-10-CM

## 2024-12-02 RX ORDER — MECLIZINE HYDROCHLORIDE 25 MG/1
25 TABLET ORAL 3 TIMES DAILY PRN
Qty: 30 TABLET | Refills: 0 | Status: SHIPPED | OUTPATIENT
Start: 2024-12-02 | End: 2024-12-12

## 2024-12-02 ASSESSMENT — PATIENT HEALTH QUESTIONNAIRE - PHQ9
SUM OF ALL RESPONSES TO PHQ QUESTIONS 1-9: 0
SUM OF ALL RESPONSES TO PHQ9 QUESTIONS 1 & 2: 0
2. FEELING DOWN, DEPRESSED OR HOPELESS: NOT AT ALL
1. LITTLE INTEREST OR PLEASURE IN DOING THINGS: NOT AT ALL

## 2024-12-02 NOTE — PROGRESS NOTES
Medicare Annual Wellness Visit    Arpit Weber is here for Follow-Up from Hospital (Small bowel Obstruction ) and Medicare AWV    Assessment & Plan   Benign paroxysmal positional vertigo, unspecified laterality  -     meclizine (ANTIVERT) 25 MG tablet; Take 1 tablet by mouth 3 times daily as needed for Dizziness, Disp-30 tablet, R-0Normal  Hospital discharge follow-up  -     AK DISCHARGE MEDS RECONCILED W/ CURRENT OUTPATIENT MED LIST    Recommendations for Preventive Services Due: see orders and patient instructions/AVS.  Recommended screening schedule for the next 5-10 years is provided to the patient in written form: see Patient Instructions/AVS.     Return in 1 month (on 1/2/2025).     Subjective   The following acute and/or chronic problems were also addressed today:  No new issues     Patient's complete Health Risk Assessment and screening values have been reviewed and are found in Flowsheets. The following problems were reviewed today and where indicated follow up appointments were made and/or referrals ordered.    Positive Risk Factor Screenings with Interventions:                      Advanced Directives:  Do you have a Living Will?: (!) No    Intervention:                       Objective   Vitals:    12/02/24 1129   BP: 130/66   Weight: 86.2 kg (190 lb)   Height: 1.803 m (5' 10.98\")      Body mass index is 26.51 kg/m².        General Appearance: alert and oriented to person, place and time, well-developed and well-nourished, in no acute distress          No Known Allergies  Prior to Visit Medications    Medication Sig Taking? Authorizing Provider   meclizine (ANTIVERT) 25 MG tablet Take 1 tablet by mouth 3 times daily as needed for Dizziness Yes Andrew Bowling MD   bisacodyl (DULCOLAX) 5 MG EC tablet Take 2 tablets by mouth daily as needed for Constipation Yes Arpit Sher MD   polyethylene glycol (GLYCOLAX) 17 GM/SCOOP powder Take 17 g by mouth daily as needed (constipation) Yes 
Coronary artery disease involving native coronary artery of native heart without angina pectoris    Encounter for weaning from ventilator ([facility])    Hypoxemia    S/P CABG x 4    Atelectasis    History of Isolated Afib after CABG ([facility])    Secondary hypercoagulable state ([facility])    SBO (small bowel obstruction) ([facility])       Medications listed as ordered at the time of discharge from hospital     Medication List            Accurate as of December 2, 2024 12:14 PM. If you have any questions, ask your nurse or doctor.                START taking these medications      meclizine 25 MG tablet  Commonly known as: ANTIVERT  Take 1 tablet by mouth 3 times daily as needed for Dizziness  Started by: Dr. Andrew Bowling MD            CONTINUE taking these medications      acetaminophen 325 MG tablet  Commonly known as: TYLENOL  Take 2 tablets by mouth every 6 hours as needed for Pain     aspirin 81 MG EC tablet     bisacodyl 5 MG EC tablet  Commonly known as: DULCOLAX  Take 2 tablets by mouth daily as needed for Constipation     * clobetasol 0.05 % cream  Commonly known as: TEMOVATE     * clobetasol 0.05 % ointment  Commonly known as: TEMOVATE  Apply topically 2 times daily     esomeprazole 40 MG delayed release capsule  Commonly known as: NEXIUM     FISH OIL OMEGA-3 PO     metoprolol tartrate 25 MG tablet  Commonly known as: LOPRESSOR  Take 1 tablet by mouth 2 times daily     polyethylene glycol 17 GM/SCOOP powder  Commonly known as: GLYCOLAX  Take 17 g by mouth daily as needed (constipation)     rivaroxaban 20 MG Tabs tablet  Commonly known as: Xarelto  Take 1 tablet by mouth daily (with breakfast)     rosuvastatin 20 MG tablet  Commonly known as: Crestor  Take 1 tablet by mouth nightly     tamsulosin 0.4 MG capsule  Commonly known as: FLOMAX  Take 1 capsule by mouth daily     tiZANidine 2 MG tablet  Commonly known as: ZANAFLEX  Take 1 tablet by mouth nightly as needed (muscle spasms)     vitamin D 1.25 MG (64722 UT) Caps

## 2024-12-04 ENCOUNTER — CARE COORDINATION (OUTPATIENT)
Dept: CARE COORDINATION | Facility: CLINIC | Age: 74
End: 2024-12-04

## 2024-12-04 NOTE — CARE COORDINATION
Care Transitions Note    Follow Up Call     Patient Current Location:  Home: [address]  [place] [address]    LPN Care Coordinator contacted the patient by telephone. Verified name and  as identifiers.    Additional needs identified to be addressed with provider   No needs identified                 Method of communication with provider: none.    Care Summary Note: Patient reports doing fine with no issues at time of call. He is taking his miralax and stool softener as ordered and drinking plenty of water.  Patient denies any symptoms since discharge.  He was seen for follow up with PCP on 24.  Dr. Bowling added meclizine hcl 25mg tid prn-benign paroxysmal positional vertigo.    Patient verbalized no questions or concerns at this time.  Patient agrees to follow up call next week.    Advance Care Planning:   Does patient have an Advance Directive: reviewed during previous call, see note.     Medication Review:  No changes since last call.     Assessments:   Goals Addressed                   This Visit's Progress     Returns to baseline activity level.   On track     Patient/Family able to obtain medicine after d/c     Patient/Family able to verbalize medicine changes     Patient/Family aware and attends follow up appointments s/p d/c.     Patient/Family agrees to notify provider of any barriers to plan of care.    Patient/Family agrees to notify provider of any symptoms that indicate a worsening of condition    Patient/Family agrees to monitor and record BP daily for MD review.                Follow Up Appointment:   Reviewed upcoming appointment(s).  Future Appointments         Provider Specialty Dept Phone    2025 11:30 AM Andrew Bowling MD Family Medicine [phone]    3/12/2025 3:00 PM Obey Hatch DO Cardiology [phone]            LPN Care Coordinator provided contact information.  Plan for follow-up call in 6-10 days based on severity of symptoms and risk

## 2024-12-07 NOTE — PROGRESS NOTES
Physician Progress Note      PATIENT:               ARPIT ALFONSO  CSN #:                  [id number]  :                       1950  ADMIT DATE:       2024 3:36 PM  DISCH DATE:        2024 11:32 AM  RESPONDING  PROVIDER #:        Arpit Sher MD          QUERY TEXT:    Patient admitted with small bowel obstruction, noted to have paroxysmal atrial   fibrillation and is maintained on rivaroxaban. If possible, please document   in progress notes and discharge summary if you are evaluating and/or treating   any of the following:?  ?  The medical record reflects the following:  Risk Factors: 74 yr old male,  Clinical Indicators: H&P note on   History of Isolated Afib after CABG    -Was seen by cardiology in the office and they considered stopping   anticoagulation for this diagnosis per se but left on due to DVT/PE  Treatment: rivaroxaban,    Thank you,  MARGARETH Erwin CDS  Options provided:  -- Secondary hypercoagulable state in a patient with atrial fibrillation  -- Other - I will add my own diagnosis  -- Disagree - Not applicable / Not valid  -- Disagree - Clinically unable to determine / Unknown  -- Refer to Clinical Documentation Reviewer    PROVIDER RESPONSE TEXT:    This patient has secondary hypercoagulable state in a patient with atrial   fibrillation.    Query created by: Veronica Babin on 2024 8:29 AM      Electronically signed by:  Arpit Sher MD 2024 11:27 AM

## 2024-12-11 ENCOUNTER — CARE COORDINATION (OUTPATIENT)
Dept: CARE COORDINATION | Facility: CLINIC | Age: 74
End: 2024-12-11

## 2024-12-11 NOTE — CARE COORDINATION
Care Transitions Note    Follow Up Call     Attempted to reach patient for transitions of care follow up.  Unable to reach patient.      Outreach Attempts:   HIPAA compliant voicemail left for patient.     Follow Up Appointment:   Future Appointments         Provider Specialty Dept Phone    1/22/2025 11:30 AM Andrew Bowling MD Family Medicine [phone]    3/12/2025 3:00 PM Obey Hatch,  Cardiology [phone]            Plan for follow-up call in 6-10 days based on severity of symptoms and risk factors. Plan for next call: self management    Tasha Hatch LPN

## 2024-12-18 ENCOUNTER — CARE COORDINATION (OUTPATIENT)
Dept: CARE COORDINATION | Facility: CLINIC | Age: 74
End: 2024-12-18

## 2024-12-18 NOTE — CARE COORDINATION
Care Transitions Note    Follow Up Call     Attempted to reach patient for transitions of care follow up.  Unable to reach patient.      Outreach Attempts:   HIPAA compliant voicemail left for patient.     Care Summary Note:   Follow Up Appointment:   Future Appointments         Provider Specialty Dept Phone    1/22/2025 11:30 AM Andrew Bowling MD Family Medicine [phone]    3/12/2025 3:00 PM Obey Hatch,  Cardiology [phone]            Plan for follow-up call in 6-10 days based on severity of symptoms and risk factors. Plan for next call: symptom management    Tasha Hatch LPN

## 2024-12-19 DIAGNOSIS — N20.0 KIDNEY STONE: ICD-10-CM

## 2024-12-19 RX ORDER — TAMSULOSIN HYDROCHLORIDE 0.4 MG/1
0.4 CAPSULE ORAL DAILY
Qty: 30 CAPSULE | Refills: 5 | Status: SHIPPED | OUTPATIENT
Start: 2024-12-19 | End: 2025-06-17

## 2024-12-19 NOTE — TELEPHONE ENCOUNTER
requesting refill(s) on     Requested Prescriptions     Pending Prescriptions Disp Refills    tamsulosin (FLOMAX) 0.4 MG capsule [Pharmacy Med Name: TAMSULOSIN HCL 0.4 MG CAPS 0.4 Capsule] 30 capsule 5     Sig: Take 1 capsule by mouth daily       Last appointment- 12/2/24  Next appointment- 1/22/25

## 2024-12-27 ENCOUNTER — CARE COORDINATION (OUTPATIENT)
Dept: CARE COORDINATION | Facility: CLINIC | Age: 74
End: 2024-12-27

## 2024-12-27 NOTE — CARE COORDINATION
Care Transitions Note    Final Call     Attempted to reach patient for transitions of care follow up.  Unable to reach patient.      Outreach Attempts:   Multiple attempts to contact patient at phone numbers on file.   HIPAA compliant voicemail left for patient.   Will refer to ACM if return call received and patient in agreement  Patient graduated from the Care Transitions program on 12/27/2024.  Patient/family has the ability to self manage at this time..      Handoff:   Patient was not referred to the ACM team due to unable to contact patient.      Assessments:  No changes since last call    Upcoming Appointments:    Future Appointments         Provider Specialty Dept Phone    1/22/2025 11:30 AM Andrew Bowling MD Family Medicine [phone]    3/12/2025 3:00 PM Obey Hatch,  Cardiology [phone]            Radha Lees RN

## 2025-01-22 ENCOUNTER — OFFICE VISIT (OUTPATIENT)
Dept: FAMILY MEDICINE CLINIC | Facility: CLINIC | Age: 75
End: 2025-01-22

## 2025-01-22 VITALS
HEIGHT: 70 IN | SYSTOLIC BLOOD PRESSURE: 116 MMHG | DIASTOLIC BLOOD PRESSURE: 54 MMHG | BODY MASS INDEX: 28.2 KG/M2 | WEIGHT: 197 LBS

## 2025-01-22 DIAGNOSIS — E55.9 VITAMIN D DEFICIENCY: ICD-10-CM

## 2025-01-22 DIAGNOSIS — H81.10 BENIGN PAROXYSMAL POSITIONAL VERTIGO, UNSPECIFIED LATERALITY: Primary | ICD-10-CM

## 2025-01-22 DIAGNOSIS — R73.9 HIGH BLOOD SUGAR: ICD-10-CM

## 2025-01-22 DIAGNOSIS — I10 ESSENTIAL (PRIMARY) HYPERTENSION: ICD-10-CM

## 2025-01-22 DIAGNOSIS — I48.0 PAF (PAROXYSMAL ATRIAL FIBRILLATION) (HCC): ICD-10-CM

## 2025-01-22 DIAGNOSIS — E78.00 HIGH CHOLESTEROL: ICD-10-CM

## 2025-01-22 DIAGNOSIS — K51.20 ULCERATIVE (CHRONIC) PROCTITIS WITHOUT COMPLICATIONS (HCC): ICD-10-CM

## 2025-01-22 PROBLEM — Z99.11 ENCOUNTER FOR WEANING FROM VENTILATOR (HCC): Status: RESOLVED | Noted: 2024-05-20 | Resolved: 2025-01-22

## 2025-01-22 LAB
ALBUMIN SERPL-MCNC: 4 G/DL (ref 3.2–4.6)
ALBUMIN/GLOB SERPL: 1.3 (ref 1–1.9)
ALP SERPL-CCNC: 83 U/L (ref 40–129)
ALT SERPL-CCNC: 25 U/L (ref 8–55)
ANION GAP SERPL CALC-SCNC: 10 MMOL/L (ref 7–16)
AST SERPL-CCNC: 32 U/L (ref 15–37)
BASOPHILS # BLD: 0.07 K/UL (ref 0–0.2)
BASOPHILS NFR BLD: 1.5 % (ref 0–2)
BILIRUB SERPL-MCNC: 0.7 MG/DL (ref 0–1.2)
BUN SERPL-MCNC: 12 MG/DL (ref 8–23)
CALCIUM SERPL-MCNC: 9.6 MG/DL (ref 8.8–10.2)
CHLORIDE SERPL-SCNC: 105 MMOL/L (ref 98–107)
CHOLEST SERPL-MCNC: 135 MG/DL (ref 0–200)
CO2 SERPL-SCNC: 25 MMOL/L (ref 20–29)
CREAT SERPL-MCNC: 1 MG/DL (ref 0.8–1.3)
DIFFERENTIAL METHOD BLD: ABNORMAL
EOSINOPHIL # BLD: 0.1 K/UL (ref 0–0.8)
EOSINOPHIL NFR BLD: 2.2 % (ref 0.5–7.8)
ERYTHROCYTE [DISTWIDTH] IN BLOOD BY AUTOMATED COUNT: 12.6 % (ref 11.9–14.6)
EST. AVERAGE GLUCOSE BLD GHB EST-MCNC: 112 MG/DL
GLOBULIN SER CALC-MCNC: 3.1 G/DL (ref 2.3–3.5)
GLUCOSE SERPL-MCNC: 100 MG/DL (ref 70–99)
HBA1C MFR BLD: 5.5 % (ref 0–5.6)
HCT VFR BLD AUTO: 38.3 % (ref 41.1–50.3)
HDLC SERPL-MCNC: 51 MG/DL (ref 40–60)
HDLC SERPL: 2.7 (ref 0–5)
HGB BLD-MCNC: 12.1 G/DL (ref 13.6–17.2)
IMM GRANULOCYTES # BLD AUTO: 0 K/UL (ref 0–0.5)
IMM GRANULOCYTES NFR BLD AUTO: 0 % (ref 0–5)
LDLC SERPL CALC-MCNC: 71 MG/DL (ref 0–100)
LYMPHOCYTES # BLD: 1.28 K/UL (ref 0.5–4.6)
LYMPHOCYTES NFR BLD: 27.9 % (ref 13–44)
MCH RBC QN AUTO: 31.7 PG (ref 26.1–32.9)
MCHC RBC AUTO-ENTMCNC: 31.6 G/DL (ref 31.4–35)
MCV RBC AUTO: 100.3 FL (ref 82–102)
MONOCYTES # BLD: 0.34 K/UL (ref 0.1–1.3)
MONOCYTES NFR BLD: 7.4 % (ref 4–12)
NEUTS SEG # BLD: 2.79 K/UL (ref 1.7–8.2)
NEUTS SEG NFR BLD: 61 % (ref 43–78)
NRBC # BLD: 0 K/UL (ref 0–0.2)
PLATELET # BLD AUTO: 203 K/UL (ref 150–450)
PMV BLD AUTO: 10 FL (ref 9.4–12.3)
POTASSIUM SERPL-SCNC: 4.5 MMOL/L (ref 3.5–5.1)
PROT SERPL-MCNC: 7.1 G/DL (ref 6.3–8.2)
RBC # BLD AUTO: 3.82 M/UL (ref 4.23–5.6)
SODIUM SERPL-SCNC: 140 MMOL/L (ref 136–145)
TRIGL SERPL-MCNC: 66 MG/DL (ref 0–150)
VLDLC SERPL CALC-MCNC: 13 MG/DL (ref 6–23)
WBC # BLD AUTO: 4.6 K/UL (ref 4.3–11.1)

## 2025-01-22 SDOH — ECONOMIC STABILITY: FOOD INSECURITY: WITHIN THE PAST 12 MONTHS, YOU WORRIED THAT YOUR FOOD WOULD RUN OUT BEFORE YOU GOT MONEY TO BUY MORE.: PATIENT DECLINED

## 2025-01-22 SDOH — ECONOMIC STABILITY: FOOD INSECURITY: WITHIN THE PAST 12 MONTHS, THE FOOD YOU BOUGHT JUST DIDN'T LAST AND YOU DIDN'T HAVE MONEY TO GET MORE.: PATIENT DECLINED

## 2025-01-22 ASSESSMENT — ENCOUNTER SYMPTOMS
CHEST TIGHTNESS: 0
ANAL BLEEDING: 0
COUGH: 0
NAUSEA: 0
EYE PAIN: 0
EYE DISCHARGE: 0
EYE ITCHING: 0
SINUS PAIN: 0
ABDOMINAL DISTENTION: 0
ORTHOPNEA: 0
BLURRED VISION: 0
EYE REDNESS: 0
SINUS PRESSURE: 0
RHINORRHEA: 0
ABDOMINAL PAIN: 1
FACIAL SWELLING: 0
APNEA: 0
BACK PAIN: 0
BLOOD IN STOOL: 0

## 2025-01-22 ASSESSMENT — PATIENT HEALTH QUESTIONNAIRE - PHQ9
1. LITTLE INTEREST OR PLEASURE IN DOING THINGS: NOT AT ALL
2. FEELING DOWN, DEPRESSED OR HOPELESS: NOT AT ALL
SUM OF ALL RESPONSES TO PHQ QUESTIONS 1-9: 0
SUM OF ALL RESPONSES TO PHQ9 QUESTIONS 1 & 2: 0

## 2025-01-22 NOTE — PROGRESS NOTES
[facility]  _______________________________________  Andrew Bowling MD                 [address] [place] [address]        Clemente Jean-Baptiste [place] [address]                                                                                    Phone: [phone]                                                                                    Fax: [phone]    Arpit Weber is a 74 y.o. male who is seen for evaluation of No chief complaint on file.      HPI:   Hypertension  This is a chronic problem. The current episode started more than 1 year ago. The problem is unchanged. The problem is controlled. Associated symptoms include malaise/fatigue. Pertinent negatives include no anxiety, blurred vision, chest pain, headaches, neck pain or orthopnea. (on meds, need refills and labs, no side effect noted.   )   Benign Prostatic Hypertrophy  Episode onset: sx controlled on meds, need reflls. Irritative symptoms do not include frequency. Pertinent negatives include no chills, dysuria, hematuria or nausea.   Hyperlipidemia  Episode onset: on mds, need refills andl abs. Pertinent negatives include no chest pain or myalgias.   Atrial Fibrillation  Presents for follow-up visit. Symptoms are negative for chest pain and dizziness. Symptom course: on meds, need reiflls, no recent flare noted. Past medical history includes hyperlipidemia.   Abdominal Pain  Episode onset: intermittent constipation and bowel pain and pressure issues noted, has had better results with miralax daily, keep sstool soft and moving. Pertinent negatives include no arthralgias, dysuria, fever, frequency, headaches, hematuria, myalgias or nausea. His past medical history is significant for GERD.   Gastroesophageal Reflux  He complains of abdominal pain. He reports no chest pain, no coughing or no nausea. Chronicity: no side effect meds noted, need refills. Associated symptoms include fatigue.   Abnormal

## 2025-01-23 DIAGNOSIS — L30.9 DERMATITIS: ICD-10-CM

## 2025-01-23 RX ORDER — CLOBETASOL PROPIONATE 0.5 MG/G
OINTMENT TOPICAL 2 TIMES DAILY
Qty: 15 G | Refills: 3 | Status: SHIPPED | OUTPATIENT
Start: 2025-01-23

## 2025-03-12 ENCOUNTER — OFFICE VISIT (OUTPATIENT)
Age: 75
End: 2025-03-12
Payer: MEDICARE

## 2025-03-12 VITALS
WEIGHT: 198 LBS | DIASTOLIC BLOOD PRESSURE: 62 MMHG | SYSTOLIC BLOOD PRESSURE: 124 MMHG | HEART RATE: 68 BPM | BODY MASS INDEX: 28.35 KG/M2 | HEIGHT: 70 IN

## 2025-03-12 DIAGNOSIS — I25.10 CORONARY ARTERY DISEASE INVOLVING NATIVE CORONARY ARTERY OF NATIVE HEART WITHOUT ANGINA PECTORIS: Primary | Chronic | ICD-10-CM

## 2025-03-12 DIAGNOSIS — E78.5 DYSLIPIDEMIA: Chronic | ICD-10-CM

## 2025-03-12 PROCEDURE — 1036F TOBACCO NON-USER: CPT | Performed by: INTERNAL MEDICINE

## 2025-03-12 PROCEDURE — 1126F AMNT PAIN NOTED NONE PRSNT: CPT | Performed by: INTERNAL MEDICINE

## 2025-03-12 PROCEDURE — 1123F ACP DISCUSS/DSCN MKR DOCD: CPT | Performed by: INTERNAL MEDICINE

## 2025-03-12 PROCEDURE — 1160F RVW MEDS BY RX/DR IN RCRD: CPT | Performed by: INTERNAL MEDICINE

## 2025-03-12 PROCEDURE — G8417 CALC BMI ABV UP PARAM F/U: HCPCS | Performed by: INTERNAL MEDICINE

## 2025-03-12 PROCEDURE — 3017F COLORECTAL CA SCREEN DOC REV: CPT | Performed by: INTERNAL MEDICINE

## 2025-03-12 PROCEDURE — 1159F MED LIST DOCD IN RCRD: CPT | Performed by: INTERNAL MEDICINE

## 2025-03-12 PROCEDURE — G8427 DOCREV CUR MEDS BY ELIG CLIN: HCPCS | Performed by: INTERNAL MEDICINE

## 2025-03-12 PROCEDURE — 99214 OFFICE O/P EST MOD 30 MIN: CPT | Performed by: INTERNAL MEDICINE

## 2025-03-12 RX ORDER — ROSUVASTATIN CALCIUM 40 MG/1
40 TABLET, COATED ORAL NIGHTLY
Qty: 90 TABLET | Refills: 3 | Status: SHIPPED | OUTPATIENT
Start: 2025-03-12

## 2025-03-12 RX ORDER — METOPROLOL TARTRATE 25 MG/1
25 TABLET, FILM COATED ORAL 2 TIMES DAILY
Qty: 180 TABLET | Refills: 3 | Status: SHIPPED | OUTPATIENT
Start: 2025-03-12

## 2025-03-12 ASSESSMENT — ENCOUNTER SYMPTOMS: SHORTNESS OF BREATH: 0

## 2025-03-12 NOTE — PROGRESS NOTES
[facility] CARDIOLOGY  [address] [place] [address], [address]  [place] [address]  PHONE: [phone]      25    NAME:  Arpit Weber  : 1950  MRN: [id number]         SUBJECTIVE:   Arpit Weber is a 74 y.o. male seen for a follow up visit regarding the following:     Chief Complaint   Patient presents with    Coronary Artery Disease            HPI:  Follow up  Coronary Artery Disease   .      74 y.o. male with PMH HLD, h/o PE, CAD s/p 4vCABG (LIMA-LAD, SVG-PDA, seq SVG-D/OM) presenting for routine follow up. No chest pain or dyspnea. Had a SBO in November - no surgery indicated. Doing well sine then.        Cardiac Medications       Beta Blockers Cardio-Selective       metoprolol tartrate (LOPRESSOR) 25 MG tablet Take 1 tablet by mouth 2 times daily       HMG CoA Reductase Inhibitors       rosuvastatin (CRESTOR) 40 MG tablet Take 1 tablet by mouth nightly       Salicylates       aspirin 81 MG EC tablet Take 1 tablet by mouth daily       Direct Factor Xa Inhibitors       rivaroxaban (XARELTO) 20 MG TABS tablet Take 1 tablet by mouth daily (with breakfast)                  Past Medical History, Past Surgical History, Family history, Social History, and Medications were all reviewed with the patient today and updated as necessary.     Prior to Admission medications    Medication Sig Start Date End Date Taking? Authorizing Provider   rosuvastatin (CRESTOR) 40 MG tablet Take 1 tablet by mouth nightly 3/12/25  Yes Obey Hatch,    clobetasol (TEMOVATE) 0.05 % ointment Apply topically 2 times daily 25  Yes Andrew Bowling MD   tamsulosin (FLOMAX) 0.4 MG capsule Take 1 capsule by mouth daily 24 Yes Andrew Bowling MD   bisacodyl (DULCOLAX) 5 MG EC tablet Take 2 tablets by mouth daily as needed for Constipation 24  Yes Arpit Sher MD   polyethylene glycol (GLYCOLAX) 17 GM/SCOOP powder Take 17 g by mouth daily as needed (constipation)

## 2025-05-22 ENCOUNTER — OFFICE VISIT (OUTPATIENT)
Dept: FAMILY MEDICINE CLINIC | Facility: CLINIC | Age: 75
End: 2025-05-22

## 2025-05-22 ENCOUNTER — RESULTS FOLLOW-UP (OUTPATIENT)
Dept: FAMILY MEDICINE CLINIC | Facility: CLINIC | Age: 75
End: 2025-05-22

## 2025-05-22 VITALS
BODY MASS INDEX: 28.35 KG/M2 | SYSTOLIC BLOOD PRESSURE: 112 MMHG | DIASTOLIC BLOOD PRESSURE: 60 MMHG | HEIGHT: 70 IN | WEIGHT: 198 LBS

## 2025-05-22 DIAGNOSIS — L03.039 ONYCHIA OF TOE, UNSPECIFIED LATERALITY: ICD-10-CM

## 2025-05-22 DIAGNOSIS — I82.5Z1 CHRONIC DEEP VEIN THROMBOSIS (DVT) OF DISTAL VEIN OF RIGHT LOWER EXTREMITY (HCC): ICD-10-CM

## 2025-05-22 DIAGNOSIS — R73.9 HIGH BLOOD SUGAR: ICD-10-CM

## 2025-05-22 DIAGNOSIS — E55.9 VITAMIN D DEFICIENCY: ICD-10-CM

## 2025-05-22 DIAGNOSIS — K51.20 ULCERATIVE (CHRONIC) PROCTITIS WITHOUT COMPLICATIONS (HCC): ICD-10-CM

## 2025-05-22 DIAGNOSIS — I48.0 PAF (PAROXYSMAL ATRIAL FIBRILLATION) (HCC): ICD-10-CM

## 2025-05-22 DIAGNOSIS — R53.82 CHRONIC FATIGUE: ICD-10-CM

## 2025-05-22 DIAGNOSIS — E83.42 HYPOMAGNESEMIA: ICD-10-CM

## 2025-05-22 DIAGNOSIS — E78.00 HIGH CHOLESTEROL: ICD-10-CM

## 2025-05-22 DIAGNOSIS — H81.10 BENIGN PAROXYSMAL POSITIONAL VERTIGO, UNSPECIFIED LATERALITY: ICD-10-CM

## 2025-05-22 DIAGNOSIS — I10 ESSENTIAL (PRIMARY) HYPERTENSION: Primary | ICD-10-CM

## 2025-05-22 LAB
ALBUMIN SERPL-MCNC: 3.8 G/DL (ref 3.2–4.6)
ALBUMIN/GLOB SERPL: 1.3 (ref 1–1.9)
ALP SERPL-CCNC: 85 U/L (ref 40–129)
ALT SERPL-CCNC: 20 U/L (ref 8–55)
ANION GAP SERPL CALC-SCNC: 12 MMOL/L (ref 7–16)
AST SERPL-CCNC: 27 U/L (ref 15–37)
BASOPHILS # BLD: 0.07 K/UL (ref 0–0.2)
BASOPHILS NFR BLD: 1.4 % (ref 0–2)
BILIRUB SERPL-MCNC: 0.8 MG/DL (ref 0–1.2)
BUN SERPL-MCNC: 13 MG/DL (ref 8–23)
CALCIUM SERPL-MCNC: 9.5 MG/DL (ref 8.8–10.2)
CHLORIDE SERPL-SCNC: 105 MMOL/L (ref 98–107)
CHOLEST SERPL-MCNC: 117 MG/DL (ref 0–200)
CO2 SERPL-SCNC: 22 MMOL/L (ref 20–29)
CREAT SERPL-MCNC: 0.98 MG/DL (ref 0.8–1.3)
DIFFERENTIAL METHOD BLD: ABNORMAL
EOSINOPHIL # BLD: 0.08 K/UL (ref 0–0.8)
EOSINOPHIL NFR BLD: 1.6 % (ref 0.5–7.8)
ERYTHROCYTE [DISTWIDTH] IN BLOOD BY AUTOMATED COUNT: 12.6 % (ref 11.9–14.6)
EST. AVERAGE GLUCOSE BLD GHB EST-MCNC: 115 MG/DL
GLOBULIN SER CALC-MCNC: 3 G/DL (ref 2.3–3.5)
GLUCOSE SERPL-MCNC: 90 MG/DL (ref 70–99)
HBA1C MFR BLD: 5.6 % (ref 0–5.6)
HCT VFR BLD AUTO: 37 % (ref 41.1–50.3)
HDLC SERPL-MCNC: 49 MG/DL (ref 40–60)
HDLC SERPL: 2.4 (ref 0–5)
HGB BLD-MCNC: 12.1 G/DL (ref 13.6–17.2)
IMM GRANULOCYTES # BLD AUTO: 0.01 K/UL (ref 0–0.5)
IMM GRANULOCYTES NFR BLD AUTO: 0.2 % (ref 0–5)
LDLC SERPL CALC-MCNC: 55 MG/DL (ref 0–100)
LYMPHOCYTES # BLD: 1.18 K/UL (ref 0.5–4.6)
LYMPHOCYTES NFR BLD: 23.6 % (ref 13–44)
MAGNESIUM SERPL-MCNC: 2.2 MG/DL (ref 1.8–2.4)
MCH RBC QN AUTO: 32.3 PG (ref 26.1–32.9)
MCHC RBC AUTO-ENTMCNC: 32.7 G/DL (ref 31.4–35)
MCV RBC AUTO: 98.7 FL (ref 82–102)
MONOCYTES # BLD: 0.41 K/UL (ref 0.1–1.3)
MONOCYTES NFR BLD: 8.2 % (ref 4–12)
NEUTS SEG # BLD: 3.24 K/UL (ref 1.7–8.2)
NEUTS SEG NFR BLD: 65 % (ref 43–78)
NRBC # BLD: 0 K/UL (ref 0–0.2)
PLATELET # BLD AUTO: 208 K/UL (ref 150–450)
PMV BLD AUTO: 10.5 FL (ref 9.4–12.3)
POTASSIUM SERPL-SCNC: 4.5 MMOL/L (ref 3.5–5.1)
PROT SERPL-MCNC: 6.8 G/DL (ref 6.3–8.2)
RBC # BLD AUTO: 3.75 M/UL (ref 4.23–5.6)
SODIUM SERPL-SCNC: 139 MMOL/L (ref 136–145)
TRIGL SERPL-MCNC: 65 MG/DL (ref 0–150)
TSH, 3RD GENERATION: 0.87 UIU/ML (ref 0.27–4.2)
VLDLC SERPL CALC-MCNC: 13 MG/DL (ref 6–23)
WBC # BLD AUTO: 5 K/UL (ref 4.3–11.1)

## 2025-05-22 RX ORDER — TERBINAFINE HYDROCHLORIDE 250 MG/1
250 TABLET ORAL DAILY
Qty: 84 TABLET | Refills: 0 | Status: SHIPPED | OUTPATIENT
Start: 2025-05-22 | End: 2025-08-14

## 2025-05-22 ASSESSMENT — PATIENT HEALTH QUESTIONNAIRE - PHQ9
SUM OF ALL RESPONSES TO PHQ QUESTIONS 1-9: 0
SUM OF ALL RESPONSES TO PHQ QUESTIONS 1-9: 0
1. LITTLE INTEREST OR PLEASURE IN DOING THINGS: NOT AT ALL
SUM OF ALL RESPONSES TO PHQ QUESTIONS 1-9: 0
SUM OF ALL RESPONSES TO PHQ QUESTIONS 1-9: 0
2. FEELING DOWN, DEPRESSED OR HOPELESS: NOT AT ALL

## 2025-05-22 ASSESSMENT — ENCOUNTER SYMPTOMS
SINUS PRESSURE: 0
APNEA: 0
ANAL BLEEDING: 0
ABDOMINAL PAIN: 0
COUGH: 0
EYE PAIN: 0
BLOOD IN STOOL: 0
EYE DISCHARGE: 0
EYE ITCHING: 0
BACK PAIN: 0
RHINORRHEA: 0
ABDOMINAL DISTENTION: 0
FACIAL SWELLING: 0
CHEST TIGHTNESS: 0
SINUS PAIN: 0
EYE REDNESS: 0

## 2025-05-23 NOTE — PROGRESS NOTES
[facility]  _______________________________________  Andrew Bowling MD                 [address] [place] [address]        Clemente [place] [address]                                                                                    Phone: [phone]                                                                                    Fax: [phone]    Arpit Weber is a 74 y.o. male who is seen for evaluation of   Chief Complaint   Patient presents with   • Cholesterol Problem   • Abnormal Lab     Vit D Deficiency   • Gastroesophageal Reflux   • Hypertension       HPI:   Abnormal Lab  Episode onset: low mag, low vit D< high sugar, need recheck labs. Pertinent negatives include no abdominal pain, arthralgias, chest pain, chills, congestion, coughing, diaphoresis, fatigue, fever, headaches, joint swelling, myalgias or rash.   Gastroesophageal Reflux  He reports no abdominal pain, no chest pain or no coughing. Chronicity: on meds, no side effect noted. Pertinent negatives include no fatigue.   Hypertension  This is a chronic problem. The current episode started more than 1 year ago. The problem is unchanged. The problem is controlled. Pertinent negatives include no chest pain or headaches. (on meds, need refills and labs, no side effect noted.   )   Other  Episode onset: toenail fungus, all 10 nails. Pertinent negatives include no abdominal pain, arthralgias, chest pain, chills, congestion, coughing, diaphoresis, fatigue, fever, headaches, joint swelling, myalgias or rash.    Chief Complaint   Patient presents with   • Cholesterol Problem   • Abnormal Lab     Vit D Deficiency   • Gastroesophageal Reflux   • Hypertension         Review of Systems:    Review of Systems   Constitutional:  Negative for activity change, appetite change, chills, diaphoresis, fatigue and fever.   HENT:  Negative for congestion, ear discharge, ear pain, facial swelling, hearing loss,

## 2025-06-16 DIAGNOSIS — N20.0 KIDNEY STONE: ICD-10-CM

## 2025-06-16 RX ORDER — TAMSULOSIN HYDROCHLORIDE 0.4 MG/1
0.4 CAPSULE ORAL DAILY
Qty: 30 CAPSULE | Refills: 5 | Status: SHIPPED | OUTPATIENT
Start: 2025-06-16 | End: 2025-12-13

## 2025-06-16 NOTE — TELEPHONE ENCOUNTER
Requested Prescriptions     Pending Prescriptions Disp Refills    tamsulosin (FLOMAX) 0.4 MG capsule [Pharmacy Med Name: TAMSULOSIN HCL 0.4 MG CAPS 0.4 Capsule] 30 capsule 5     Sig: Take 1 capsule by mouth daily     Last appointment- 5/22/25  Next appointment- 9/22/25

## 2025-07-02 ENCOUNTER — HOSPITAL ENCOUNTER (EMERGENCY)
Age: 75
Discharge: HOME | End: 2025-07-02
Attending: EMERGENCY MEDICINE
Payer: MEDICARE

## 2025-07-02 VITALS
DIASTOLIC BLOOD PRESSURE: 61 MMHG | RESPIRATION RATE: 17 BRPM | HEART RATE: 56 BPM | OXYGEN SATURATION: 97 % | WEIGHT: 198 LBS | TEMPERATURE: 98.2 F | HEIGHT: 71 IN | SYSTOLIC BLOOD PRESSURE: 129 MMHG | BODY MASS INDEX: 27.72 KG/M2

## 2025-07-02 DIAGNOSIS — I83.892 BLEEDING FROM VARICOSE VEINS OF LEFT LOWER EXTREMITY: Primary | ICD-10-CM

## 2025-07-02 PROCEDURE — 99282 EMERGENCY DEPT VISIT SF MDM: CPT

## 2025-07-02 ASSESSMENT — PAIN - FUNCTIONAL ASSESSMENT: PAIN_FUNCTIONAL_ASSESSMENT: 0-10

## 2025-07-02 ASSESSMENT — PAIN SCALES - GENERAL: PAINLEVEL_OUTOF10: 0

## 2025-07-02 ASSESSMENT — LIFESTYLE VARIABLES
HOW OFTEN DO YOU HAVE A DRINK CONTAINING ALCOHOL: NEVER
HOW MANY STANDARD DRINKS CONTAINING ALCOHOL DO YOU HAVE ON A TYPICAL DAY: PATIENT DOES NOT DRINK

## 2025-07-03 ENCOUNTER — CARE COORDINATION (OUTPATIENT)
Dept: CARE COORDINATION | Facility: CLINIC | Age: 75
End: 2025-07-03

## 2025-07-03 NOTE — ED NOTES
Patient mobility status ambulates with no difficulty.     I have reviewed discharge instructions with the patient.  The patient verbalized understanding.    Patient left ED via Discharge Method: ambulatory to Home with son.    Opportunity for questions and clarification provided.     Patient given 0 scripts.

## 2025-07-03 NOTE — CARE COORDINATION
Ambulatory Care Coordination Note     7/3/2025 8:03 AM     ACM outreach attempt by this ACM today to offer care management services. ACM was unable to reach the patient by telephone today;   hospital follow up call within 2 business days of discharge: Yes     ACM: Rosario Wu, RN     Care Summary Note: left message    PCP/Specialist follow up:   Future Appointments         Provider Specialty Dept Phone    9/12/2025 2:00 PM Obey Hatch DO Cardiology [phone]    9/22/2025 11:30 AM Andrew Bowling MD Family Medicine [phone]            Follow Up:   Plan for next ACM outreach in approximately 1-2 days  to complete:  - outreach attempt to offer care management services.

## 2025-07-03 NOTE — ED TRIAGE NOTES
Pt was in shower and scrubbed right ankle and \" blood started spurting out\". Wrapped in pressure dressing, no bleeding at this time . Pt on xerelto

## 2025-07-03 NOTE — ED PROVIDER NOTES
Emergency Department Provider Note       [facility] EMERGENCY DEPT   PCP: Andrew Bowling MD   Age: 74 y.o.   Sex: male     DISPOSITION Decision To Discharge 07/02/2025 09:47:18 PM    ICD-10-CM    1. Bleeding from varicose veins of left lower extremity  I83.892           Medical Decision Making     Patient is being evaluated for his bleeding lesion on his leg.  Findings were consistent with a ruptured varicose vein.  There was still some oozing on my exam.  Surgicel with pressure bandage was applied to the area.  He was watched for a period of time.  No blood was appreciable soaking through the gauze.  I think that he can be discharged home.  He is to leave the bandage in place overnight.  He can change the bandage in the morning.  Advised not to pick or scrub any scabs that may be there.  He can return with any worsening symptoms     1 or more acute illnesses that pose a threat to life or bodily function.   Shared medical decision making was utilized in creating the patients health plan today.  I independently ordered and reviewed each unique test.                         History     Patient is a 74-year-old male with a history of A-fib on blood thinning medications.  He presents with bleeding to his left lower leg.  He states that he was tiling off after shower when his leg just darted bleeding.  Denies any known trauma or injury to cause the bleeding.  Son placed a bandage and then transported him to our facility.    The history is provided by the patient.     Physical Exam     Vitals signs and nursing note reviewed:  Vitals:    07/02/25 2111   BP: 126/63   Pulse: 55   Resp: 16   Temp: 98.2 °F (36.8 °C)   SpO2: 97%   Weight: 89.8 kg (198 lb)   Height: 1.803 m (5' 11\")      Physical Exam  Vitals and nursing note reviewed.   Constitutional:       General: He is not in acute distress.     Appearance: Normal appearance. He is not ill-appearing, toxic-appearing or diaphoretic.   Skin:     General: Skin is warm and

## 2025-07-07 ENCOUNTER — CARE COORDINATION (OUTPATIENT)
Dept: CARE COORDINATION | Facility: CLINIC | Age: 75
End: 2025-07-07

## 2025-07-07 NOTE — CARE COORDINATION
Ambulatory Care Coordination Note     7/7/2025 7:47 AM     ACM outreach attempt by this ACM today to offer care management services. ACM was unable to reach the patient by telephone today;   left voice message requesting a return phone call to this ACM.     ACM: Rosario Wu RN         PCP/Specialist follow up:   Future Appointments         Provider Specialty Dept Phone    9/12/2025 2:00 PM Obey Hatch DO Cardiology [phone]    9/22/2025 11:30 AM Andrew Bowling MD Family Medicine [phone]            Follow Up:   Plan for next ACM outreach in approximately 1 week to complete:  - outreach attempt to offer care management services.

## 2025-07-07 NOTE — CARE COORDINATION
Ambulatory Care Coordination Note     2025 1:01 PM     Patient Current Location:  [place]     This patient was received as a referral from Ambulatory Care Manager .    ACM contacted the patient by telephone. Verified name and  with patient as identifiers. Provided introduction to self, and explanation of the ACM role.   Patient declined care management services at this time.

## (undated) DEVICE — 5FR MEDTRONIC PIG  110CM

## (undated) DEVICE — CLAMP INSERT: Brand: STEALTH® CLAMP INSERT

## (undated) DEVICE — TOTAL 1-LAYER TRAY, LATEX FOLEY, 16FR 10: Brand: MEDLINE

## (undated) DEVICE — KIT CATH L11.5CM DIA9FR CTRL VEN POLYUR ANTIMIC COAT DBL

## (undated) DEVICE — CANNULA NSL ORAL AD FOR CAPNOFLEX CO2 O2 AIRLFE

## (undated) DEVICE — SUTURE PERMAHAND SZ 2-0 L12X18IN NONABSORBABLE BLK SILK A185H

## (undated) DEVICE — SUTURE NONABSORBABLE MONOFILAMENT 4-0 RB-1 36 IN BLU PROLENE 8557H

## (undated) DEVICE — SNARE POLYP SM W13MMXL240CM SHTH DIA2.4MM OVL FLX DISP

## (undated) DEVICE — 3M™ TEGADERM™ TRANSPARENT FILM DRESSING FRAME STYLE, 1627, 4 IN X 10 IN (10 CM X 25 CM), 20/CT 4CT/CASE: Brand: 3M™ TEGADERM™

## (undated) DEVICE — PERCUTANEOUS INSERTION KIT-ARTERIAL: Brand: PERCUTANEOUS INSERTION KIT-ARTERIAL

## (undated) DEVICE — SYR 3ML LL TIP 1/10ML GRAD --

## (undated) DEVICE — PERFUSION PACK XCOATING 76320] TERUMO CARDIOVASCULAR]

## (undated) DEVICE — SUTURE VCRL SZ 1 L36IN ABSRB UD CTX L48MM 1/2 CIR J977H

## (undated) DEVICE — SPONGE DISSECT PNUT SM 3/8IN -- 5/PK

## (undated) DEVICE — NDL PRT INJ NSAF BLNT 18GX1.5 --

## (undated) DEVICE — SUTURE ETHIBOND EXCEL SZ 3-0 L36IN NONABSORBABLE GRN RB-1 X558H

## (undated) DEVICE — SUTURE VICRYL COAT SZ 0 L36IN ABSRB VLT CTX L48MM TAPERPOINT J370H

## (undated) DEVICE — REM POLYHESIVE ADULT PATIENT RETURN ELECTRODE: Brand: VALLEYLAB

## (undated) DEVICE — SOLUTION IRRIG 1000ML H2O STRL BLT

## (undated) DEVICE — SUTURE PERMA-HAND SZ 0 L30IN NONABSORBABLE BLK L36MM CT-1 424H

## (undated) DEVICE — FORCEPS BX L240CM JAW DIA2.8MM L CAP W/ NDL MIC MESH TOOTH

## (undated) DEVICE — SUTURE NONABSORBABLE MONOFILAMENT 5-0 C-1 1X24 IN PROLENE 8725H

## (undated) DEVICE — SYSTEM ENDOSCP VES HARV W/ TOOL CANN SEAL SHT PRT BLNT TIP

## (undated) DEVICE — DEVICE COMPR LNG 27 CM VASC BND

## (undated) DEVICE — SUT SLK 2-0SH 30IN BLK --

## (undated) DEVICE — INTENDED TO BE USED TO OCCLUDE, RETRACT AND IDENTIFY ARTERIES, VEINS, TENDONS AND NERVES IN SURGICAL PROCEDURES: Brand: STERION®  VESSEL LOOP

## (undated) DEVICE — CARDINAL HEALTH FLEXIBLE LIGHT HANDLE COVER: Brand: CARDINAL HEALTH

## (undated) DEVICE — SUT PROL 2-0 30IN SH BLU --

## (undated) DEVICE — SYR IRR CATH TIP LR ADPT 70ML -- CONVERT TO ITEM 363120

## (undated) DEVICE — LEGGINGS, PAIR, 31X48, STERILE: Brand: MEDLINE

## (undated) DEVICE — DRAPE TWL SURG 16X26IN BLU ORB04] ALLCARE INC]

## (undated) DEVICE — RESERVOIR,SUCTION,100CC,SILICONE: Brand: MEDLINE

## (undated) DEVICE — SCOPE RECT LED W INSUFFLATOR

## (undated) DEVICE — MEGA SUTURECUT ND: Brand: ENDOWRIST

## (undated) DEVICE — FLOTRAC SENSOR W/ VAMP ADULT (60"/152CM): Brand: FLOTRAC, VAMP

## (undated) DEVICE — ARM DRAPE

## (undated) DEVICE — SOLUTION IRRIG 1000ML 0.9% SOD CHL USP POUR PLAS BTL

## (undated) DEVICE — CANISTER, RIGID, 3000CC: Brand: MEDLINE INDUSTRIES, INC.

## (undated) DEVICE — SURGICAL PROCEDURE PACK TISS 3X5 IN ABSORBABLE SEPRAFILM

## (undated) DEVICE — BUTTON SWITCH PENCIL BLADE ELECTRODE, HOLSTER: Brand: EDGE

## (undated) DEVICE — SUTURE ETHIBOND EXCEL SZ 0 L18IN NONABSORBABLE GRN L36MM CT-1 CX21D

## (undated) DEVICE — SPONGE LAP 18X18IN STRL -- 5/PK

## (undated) DEVICE — GLIDESHEATH SLENDER STAINLESS STEEL KIT: Brand: GLIDESHEATH SLENDER

## (undated) DEVICE — CONNECTOR TBNG OD5-7MM O2 END DISP

## (undated) DEVICE — DRAIN SURG SGL COLL PT TB FOR ATS BG OASIS

## (undated) DEVICE — AEGIS 1" DISK 4MM HOLE, PEEL OPEN: Brand: MEDLINE

## (undated) DEVICE — SUTURE ETHIBOND EXCEL 2-0 L30IN NONABSORBABLE GRN L26MM SH MX563

## (undated) DEVICE — PRESSURE MONITORING SET: Brand: TRUWAVE, VAMP PLUS

## (undated) DEVICE — CLOTH PRE OP W9XL10.5IN 2% CHG FRAGRANCE RNS FREE READYPREP

## (undated) DEVICE — SUTURE PERMAHAND SZ 0 L30IN NONABSORBABLE BLK L30MM PSL 3/8 590H

## (undated) DEVICE — PREP SKN CHLRAPRP APL 26ML STR --

## (undated) DEVICE — DISSECTOR ENDOSCP L21CM TIP CURVATURE 40DEG FN CRV JAW VES

## (undated) DEVICE — KENDALL RADIOLUCENT FOAM MONITORING ELECTRODE RECTANGULAR SHAPE: Brand: KENDALL

## (undated) DEVICE — BLADE ELECTRODE: Brand: VALLEYLAB

## (undated) DEVICE — CADIERE FORCEPS: Brand: ENDOWRIST

## (undated) DEVICE — JELLY LUBRICATING 10GM PREFIL SYR LUBE

## (undated) DEVICE — TTL1LYR 16FR10ML 100%SIL TMPST TR: Brand: MEDLINE

## (undated) DEVICE — CABG DR DENNIS: Brand: MEDLINE INDUSTRIES, INC.

## (undated) DEVICE — CATHETER DIAG 5FR L100CM LUMN ID0.047IN JL3.5 CRV 0 SIDE H

## (undated) DEVICE — STERILE SLEEVE: Brand: CONVERTORS

## (undated) DEVICE — SUTURE PDS II SZ 0 L27IN ABSRB VLT L36MM CT-1 1/2 CIR Z340H

## (undated) DEVICE — SOLUTION IV 1000ML 0.9% SOD CHL

## (undated) DEVICE — SUTURE NONABSORBABLE L24IN SZ 7-0 M0-5 BV175-8 EP 24 BLU M8745

## (undated) DEVICE — OCCLUDER CV VES 1.5X12 MM CORONARY INT SIL STRL FLO RST

## (undated) DEVICE — DUAL LUMEN STOMACH TUBE: Brand: SALEM SUMP

## (undated) DEVICE — SYRINGE CATH TIP 50ML

## (undated) DEVICE — COLUMN DRAPE

## (undated) DEVICE — UROSTOMY KIT, FLEXTEND: Brand: NEW IMAGE

## (undated) DEVICE — 3M™ TEGADERM™ TRANSPARENT FILM DRESSING FRAME STYLE, 1626W, 4 IN X 4-3/4 IN (10 CM X 12 CM), 50/CT 4CT/CASE: Brand: 3M™ TEGADERM™

## (undated) DEVICE — SCISSORS SURG DIA8MM MPLR CRV ENDOWRIST

## (undated) DEVICE — 1840 FOAM BLOCK NEEDLE COUNTER: Brand: DEVON

## (undated) DEVICE — Z DISCONTINUED USE 2744636  DRESSING AQUACEL 14 IN ALG W3.5XL14IN POLYUR FLM CVR W/ HYDRCOLL

## (undated) DEVICE — YANKAUER,BULB TIP,W/O VENT,RIGID,STERILE: Brand: MEDLINE

## (undated) DEVICE — GAUZE,SPONGE,2"X2",8PLY,STERILE,LF,2'S: Brand: MEDLINE

## (undated) DEVICE — SYR 5ML 1/5 GRAD LL NSAF LF --

## (undated) DEVICE — REDUCER CANN ENDOWRIST 12-8MM -- DA VINCI XI - SNGL USE

## (undated) DEVICE — CATHETER THOR 32FR L23IN PVC 5 EYELET STR ATRAUM

## (undated) DEVICE — NEEDLE HYPO 21GA L1.5IN INTRAMUSCULAR S STL LATCH BVL UP

## (undated) DEVICE — Device

## (undated) DEVICE — OXYGENATOR FX15 W/O RESERVOIR

## (undated) DEVICE — BLADELESS OBTURATOR: Brand: WECK VISTA

## (undated) DEVICE — SUT SLK 3-0 30IN SH BLK --

## (undated) DEVICE — GUIDE NDL ST W/ 14 X 147CM TELESCOPICALLY FLD CIV FLX CVR

## (undated) DEVICE — SUTURE PROL SZ 6-0 L30IN NONABSORBABLE BLU L13MM CC-1 3/8 M8707

## (undated) DEVICE — CONTAINER PREFIL FRMLN 40ML --

## (undated) DEVICE — SUT SLK 0 30IN SH BLK --

## (undated) DEVICE — SMART SLEEVE SURGICAL GOWN, XL, POLYREINFORCED FRONT: Brand: CONVERTORS

## (undated) DEVICE — 40585 XL ADVANCED TRENDELENBURG POSITIONING KIT: Brand: 40585 XL ADVANCED TRENDELENBURG POSITIONING KIT

## (undated) DEVICE — DRAPE,UNDERBUTTOCKS,PCH,STERILE: Brand: MEDLINE

## (undated) DEVICE — PAD,NON-ADHERENT,3X8,STERILE,LF,1/PK: Brand: MEDLINE

## (undated) DEVICE — Z DUPLICATE USE 2716240 STAPLER INT CUT LN L40MM STPL L51MM GRN CRV HD B FRM

## (undated) DEVICE — STAPLER INT L75MM CUT LN L73MM STPL LN L77MM BLU B FRM 8

## (undated) DEVICE — SUTURE ETHLN SZ 2-0 L18IN NONABSORBABLE BLK L26MM PS 3/8 585H

## (undated) DEVICE — VESSEL SEALER XI EXTENDED DISP -- VESSEL

## (undated) DEVICE — SUTURE SILK PERMAHAND PRECUT 6 X 30 IN SZ 1 BLK BRAID A307H

## (undated) DEVICE — SOLUTION IRRIG 1000ML LAC RINGER PLAS POUR BTL

## (undated) DEVICE — GLOVE SURG SZ 8 CRM LTX FREE POLYISOPRENE POLYMER BEAD ANTI

## (undated) DEVICE — CYSTO/BLADDER IRRIGATION SET, REGULATING CLAMP

## (undated) DEVICE — SUTURE PDS II SZ 1 L36IN ABSRB VLT L48MM CTX 1/2 CIR Z371T

## (undated) DEVICE — SUTURE PDS II SZ 1 L54IN ABSRB VLT L65MM TP-1 1/2 CIR Z879G

## (undated) DEVICE — TIP COVER ACCESSORY

## (undated) DEVICE — GUIDEWIRE 035IN 210CM PTFE COAT FIX COR J TIP 15MM FIRM BODY

## (undated) DEVICE — FENESTRATED BIPOLAR FORCEPS: Brand: ENDOWRIST

## (undated) DEVICE — TUBING, SUCTION, 1/4" X 10', STRAIGHT: Brand: MEDLINE

## (undated) DEVICE — SUTURE PROL SZ 7-0 L24IN NONABSORBABLE BLU L9.3MM BV-1 3/8 M8702

## (undated) DEVICE — TIP SUCTION IRRIGATION STRYKEFLOW

## (undated) DEVICE — OCCLUDER CV VES 1.25X12 MM CORONARY INT SIL STRL FLO RST

## (undated) DEVICE — SEAL

## (undated) DEVICE — BASIC SINGLE BASIN-LF: Brand: MEDLINE INDUSTRIES, INC.

## (undated) DEVICE — CATHETER 5FR JR5 MEDTRONIC 100CM

## (undated) DEVICE — CATHETER THOR 32FR L23IN PVC 6 EYELET STR ATRAUM

## (undated) DEVICE — CATHETER DIAG 6FR L100CM GWIRE 0.038IN S STL POLYUR MP W/ 2

## (undated) DEVICE — Z DISCONTINUED USE 2257856 SUTURE VICRYL 3-0 L36IN ABSRB UD CT L40MM 1/2 CIR TAPERPOINT J956H

## (undated) DEVICE — SYRINGE IRRIG 60ML SFT PLIABLE BLB EZ TO GRP 1 HND USE W/

## (undated) DEVICE — SUTURE S STL SZ 5 L18IN NONABSORBABLE SIL CCS L48MM 1/2 CIR M653G

## (undated) DEVICE — DRSG AQUACEL SURG 3.5 X 10IN -- CONVERT TO ITEM 370183

## (undated) DEVICE — MPS® DELIVERY SET WITH 6 FT. DELIVERY TUBING: Brand: MPS

## (undated) DEVICE — SOLUTION IRRIG 3000ML 0.9% SOD CHL USP UROMATIC PLAS CONT

## (undated) DEVICE — LOGICUT SCISSOR LENGTH 320MM: Brand: LOGI - LAPAROSCOPIC INSTRUMENT SYSTEM

## (undated) DEVICE — GARMENT,MEDLINE,DVT,INT,CALF,MED, GEN2: Brand: MEDLINE

## (undated) DEVICE — TRAY PREP DRY W/ PREM GLV 2 APPL 6 SPNG 2 UNDPD 1 OVERWRAP

## (undated) DEVICE — ROBOTIC HYSTERECTOMY: Brand: MEDLINE INDUSTRIES, INC.

## (undated) DEVICE — PURSE STRING DEVICE: Brand: PURSTRING

## (undated) DEVICE — SUTURE VCRL SZ 3-0 L18IN ABSRB UD L26MM SH 1/2 CIR J864D

## (undated) DEVICE — CLIP LIG SM TI 20 BLU HNDL FOR OPN AND ENDOSCP SGL APPL

## (undated) DEVICE — AIRSEAL BIFURCATED SMOKE EVAC FILTERED TUBE SET: Brand: AIRSEAL

## (undated) DEVICE — VISUALIZATION SYSTEM: Brand: CLEARIFY

## (undated) DEVICE — STAPLER INT DIA29MM CLS STPL H1.5-2.2MM OPN LEG L5.2MM 26

## (undated) DEVICE — AUTOTRANSFUSION KIT 120 MH FAST STRT AT1 FOR CATS +

## (undated) DEVICE — DRAIN SURG 19FR 100% SIL RND END PERF

## (undated) DEVICE — SEAL UNIV 5-8MM DISP BX/10 -- DA VINCI XI - SNGL USE

## (undated) DEVICE — GENERAL LAPAROSCOPY: Brand: MEDLINE INDUSTRIES, INC.